# Patient Record
Sex: MALE | Race: WHITE | NOT HISPANIC OR LATINO | Employment: OTHER | URBAN - METROPOLITAN AREA
[De-identification: names, ages, dates, MRNs, and addresses within clinical notes are randomized per-mention and may not be internally consistent; named-entity substitution may affect disease eponyms.]

---

## 2017-01-06 ENCOUNTER — HOSPITAL ENCOUNTER (OUTPATIENT)
Dept: RADIOLOGY | Facility: MEDICAL CENTER | Age: 67
End: 2017-01-06
Attending: SPECIALIST
Payer: MEDICARE

## 2017-01-06 DIAGNOSIS — C18.2 MALIGNANT NEOPLASM OF ASCENDING COLON (HCC): ICD-10-CM

## 2017-01-06 PROCEDURE — 700117 HCHG RX CONTRAST REV CODE 255: Performed by: SPECIALIST

## 2017-01-06 PROCEDURE — 71260 CT THORAX DX C+: CPT

## 2017-01-06 RX ADMIN — IOHEXOL 75 ML: 300 INJECTION, SOLUTION INTRAVENOUS at 10:00

## 2017-01-16 ENCOUNTER — HOSPITAL ENCOUNTER (OUTPATIENT)
Dept: RADIOLOGY | Facility: MEDICAL CENTER | Age: 67
End: 2017-01-16

## 2017-01-18 VITALS
WEIGHT: 302 LBS | SYSTOLIC BLOOD PRESSURE: 144 MMHG | HEART RATE: 88 BPM | TEMPERATURE: 98.8 F | BODY MASS INDEX: 43.23 KG/M2 | HEIGHT: 70 IN | DIASTOLIC BLOOD PRESSURE: 82 MMHG | RESPIRATION RATE: 20 BRPM

## 2017-01-20 ENCOUNTER — APPOINTMENT (OUTPATIENT)
Dept: RADIOLOGY | Facility: MEDICAL CENTER | Age: 67
DRG: 684 | End: 2017-01-20
Attending: EMERGENCY MEDICINE
Payer: MEDICARE

## 2017-01-20 ENCOUNTER — SLEEP CENTER VISIT (OUTPATIENT)
Dept: SLEEP MEDICINE | Facility: MEDICAL CENTER | Age: 67
End: 2017-01-20
Payer: MEDICARE

## 2017-01-20 ENCOUNTER — RESOLUTE PROFESSIONAL BILLING HOSPITAL PROF FEE (OUTPATIENT)
Dept: HOSPITALIST | Facility: MEDICAL CENTER | Age: 67
End: 2017-01-20
Payer: MEDICARE

## 2017-01-20 ENCOUNTER — HOSPITAL ENCOUNTER (INPATIENT)
Facility: MEDICAL CENTER | Age: 67
LOS: 4 days | DRG: 684 | End: 2017-01-24
Attending: EMERGENCY MEDICINE | Admitting: HOSPITALIST
Payer: MEDICARE

## 2017-01-20 VITALS
DIASTOLIC BLOOD PRESSURE: 30 MMHG | SYSTOLIC BLOOD PRESSURE: 90 MMHG | BODY MASS INDEX: 38.51 KG/M2 | TEMPERATURE: 97.3 F | OXYGEN SATURATION: 92 % | WEIGHT: 260 LBS | HEIGHT: 69 IN | RESPIRATION RATE: 22 BRPM | HEART RATE: 85 BPM

## 2017-01-20 DIAGNOSIS — C18.9 MALIGNANT NEOPLASM OF COLON, UNSPECIFIED PART OF COLON (HCC): ICD-10-CM

## 2017-01-20 DIAGNOSIS — E86.0 DEHYDRATION: ICD-10-CM

## 2017-01-20 DIAGNOSIS — N17.9 AKI (ACUTE KIDNEY INJURY) (HCC): ICD-10-CM

## 2017-01-20 DIAGNOSIS — G47.33 OSA TREATED WITH BIPAP: ICD-10-CM

## 2017-01-20 DIAGNOSIS — K52.9 CHRONIC DIARRHEA: ICD-10-CM

## 2017-01-20 DIAGNOSIS — R00.0 TACHYCARDIA: ICD-10-CM

## 2017-01-20 DIAGNOSIS — I95.1 ORTHOSTATIC HYPOTENSION: ICD-10-CM

## 2017-01-20 DIAGNOSIS — E86.1 HYPOVOLEMIA DEHYDRATION: ICD-10-CM

## 2017-01-20 PROBLEM — E87.6 HYPOKALEMIA: Status: ACTIVE | Noted: 2017-01-20

## 2017-01-20 LAB
ALBUMIN SERPL BCP-MCNC: 3.9 G/DL (ref 3.2–4.9)
ALBUMIN/GLOB SERPL: 1.6 G/DL
ALP SERPL-CCNC: 77 U/L (ref 30–99)
ALT SERPL-CCNC: 21 U/L (ref 2–50)
ANION GAP SERPL CALC-SCNC: 14 MMOL/L (ref 0–11.9)
APPEARANCE UR: CLEAR
AST SERPL-CCNC: 16 U/L (ref 12–45)
BASOPHILS # BLD AUTO: 0.4 % (ref 0–1.8)
BASOPHILS # BLD: 0.02 K/UL (ref 0–0.12)
BILIRUB SERPL-MCNC: 0.7 MG/DL (ref 0.1–1.5)
BILIRUB UR QL STRIP.AUTO: NEGATIVE
BUN SERPL-MCNC: 81 MG/DL (ref 8–22)
C DIFF DNA SPEC QL NAA+PROBE: NEGATIVE
C DIFF TOX GENS STL QL NAA+PROBE: NEGATIVE
CALCIUM SERPL-MCNC: 9.1 MG/DL (ref 8.5–10.5)
CHLORIDE SERPL-SCNC: 101 MMOL/L (ref 96–112)
CO2 SERPL-SCNC: 22 MMOL/L (ref 20–33)
COLOR UR: COLORLESS
CREAT SERPL-MCNC: 5.12 MG/DL (ref 0.5–1.4)
CREAT UR-MCNC: 73.5 MG/DL
EOSINOPHIL # BLD AUTO: 0.06 K/UL (ref 0–0.51)
EOSINOPHIL NFR BLD: 1.1 % (ref 0–6.9)
ERYTHROCYTE [DISTWIDTH] IN BLOOD BY AUTOMATED COUNT: 64.7 FL (ref 35.9–50)
GFR SERPL CREATININE-BSD FRML MDRD: 11 ML/MIN/1.73 M 2
GLOBULIN SER CALC-MCNC: 2.5 G/DL (ref 1.9–3.5)
GLUCOSE SERPL-MCNC: 155 MG/DL (ref 65–99)
GLUCOSE UR STRIP.AUTO-MCNC: NEGATIVE MG/DL
HCT VFR BLD AUTO: 33.5 % (ref 42–52)
HGB BLD-MCNC: 11.8 G/DL (ref 14–18)
IMM GRANULOCYTES # BLD AUTO: 0.01 K/UL (ref 0–0.11)
IMM GRANULOCYTES NFR BLD AUTO: 0.2 % (ref 0–0.9)
KETONES UR STRIP.AUTO-MCNC: NEGATIVE MG/DL
LACTATE BLD-SCNC: 2.3 MMOL/L (ref 0.5–2)
LEUKOCYTE ESTERASE UR QL STRIP.AUTO: NEGATIVE
LYMPHOCYTES # BLD AUTO: 2.21 K/UL (ref 1–4.8)
LYMPHOCYTES NFR BLD: 41.1 % (ref 22–41)
MAGNESIUM SERPL-MCNC: 1.9 MG/DL (ref 1.5–2.5)
MCH RBC QN AUTO: 29.6 PG (ref 27–33)
MCHC RBC AUTO-ENTMCNC: 35.2 G/DL (ref 33.7–35.3)
MCV RBC AUTO: 84 FL (ref 81.4–97.8)
MICRO URNS: NORMAL
MONOCYTES # BLD AUTO: 1.26 K/UL (ref 0–0.85)
MONOCYTES NFR BLD AUTO: 23.4 % (ref 0–13.4)
NEUTROPHILS # BLD AUTO: 1.82 K/UL (ref 1.82–7.42)
NEUTROPHILS NFR BLD: 33.8 % (ref 44–72)
NITRITE UR QL STRIP.AUTO: NEGATIVE
NRBC # BLD AUTO: 0 K/UL
NRBC BLD AUTO-RTO: 0 /100 WBC
PH UR STRIP.AUTO: 6 [PH]
PHOSPHATE SERPL-MCNC: 4.6 MG/DL (ref 2.5–4.5)
PLATELET # BLD AUTO: 262 K/UL (ref 164–446)
PMV BLD AUTO: 9.2 FL (ref 9–12.9)
POTASSIUM SERPL-SCNC: 2.8 MMOL/L (ref 3.6–5.5)
PROT SERPL-MCNC: 6.4 G/DL (ref 6–8.2)
PROT UR QL STRIP: NEGATIVE MG/DL
RBC # BLD AUTO: 3.99 M/UL (ref 4.7–6.1)
RBC UR QL AUTO: NEGATIVE
SODIUM SERPL-SCNC: 137 MMOL/L (ref 135–145)
SODIUM UR-SCNC: 52 MMOL/L
SP GR UR STRIP.AUTO: 1
TROPONIN I SERPL-MCNC: 0.03 NG/ML (ref 0–0.04)
WBC # BLD AUTO: 5.4 K/UL (ref 4.8–10.8)

## 2017-01-20 PROCEDURE — 99285 EMERGENCY DEPT VISIT HI MDM: CPT

## 2017-01-20 PROCEDURE — 87086 URINE CULTURE/COLONY COUNT: CPT

## 2017-01-20 PROCEDURE — 83735 ASSAY OF MAGNESIUM: CPT

## 2017-01-20 PROCEDURE — 87177 OVA AND PARASITES SMEARS: CPT

## 2017-01-20 PROCEDURE — 99213 OFFICE O/P EST LOW 20 MIN: CPT | Performed by: INTERNAL MEDICINE

## 2017-01-20 PROCEDURE — 80053 COMPREHEN METABOLIC PANEL: CPT

## 2017-01-20 PROCEDURE — 84300 ASSAY OF URINE SODIUM: CPT

## 2017-01-20 PROCEDURE — 71010 DX-CHEST-PORTABLE (1 VIEW): CPT

## 2017-01-20 PROCEDURE — 84484 ASSAY OF TROPONIN QUANT: CPT

## 2017-01-20 PROCEDURE — 85025 COMPLETE CBC W/AUTO DIFF WBC: CPT

## 2017-01-20 PROCEDURE — 87040 BLOOD CULTURE FOR BACTERIA: CPT

## 2017-01-20 PROCEDURE — 82570 ASSAY OF URINE CREATININE: CPT

## 2017-01-20 PROCEDURE — 770020 HCHG ROOM/CARE - TELE (206)

## 2017-01-20 PROCEDURE — 700111 HCHG RX REV CODE 636 W/ 250 OVERRIDE (IP): Performed by: EMERGENCY MEDICINE

## 2017-01-20 PROCEDURE — 87899 AGENT NOS ASSAY W/OPTIC: CPT

## 2017-01-20 PROCEDURE — A9270 NON-COVERED ITEM OR SERVICE: HCPCS | Performed by: HOSPITALIST

## 2017-01-20 PROCEDURE — 700101 HCHG RX REV CODE 250: Performed by: INTERNAL MEDICINE

## 2017-01-20 PROCEDURE — 87493 C DIFF AMPLIFIED PROBE: CPT

## 2017-01-20 PROCEDURE — 87045 FECES CULTURE AEROBIC BACT: CPT

## 2017-01-20 PROCEDURE — 83605 ASSAY OF LACTIC ACID: CPT

## 2017-01-20 PROCEDURE — 93005 ELECTROCARDIOGRAM TRACING: CPT | Performed by: EMERGENCY MEDICINE

## 2017-01-20 PROCEDURE — 76775 US EXAM ABDO BACK WALL LIM: CPT

## 2017-01-20 PROCEDURE — 700102 HCHG RX REV CODE 250 W/ 637 OVERRIDE(OP): Performed by: HOSPITALIST

## 2017-01-20 PROCEDURE — 81003 URINALYSIS AUTO W/O SCOPE: CPT

## 2017-01-20 PROCEDURE — 99223 1ST HOSP IP/OBS HIGH 75: CPT | Mod: AI | Performed by: HOSPITALIST

## 2017-01-20 PROCEDURE — 87046 STOOL CULTR AEROBIC BACT EA: CPT

## 2017-01-20 PROCEDURE — 700111 HCHG RX REV CODE 636 W/ 250 OVERRIDE (IP): Performed by: HOSPITALIST

## 2017-01-20 PROCEDURE — 700101 HCHG RX REV CODE 250: Performed by: HOSPITALIST

## 2017-01-20 PROCEDURE — 96360 HYDRATION IV INFUSION INIT: CPT

## 2017-01-20 PROCEDURE — 84100 ASSAY OF PHOSPHORUS: CPT

## 2017-01-20 PROCEDURE — 36415 COLL VENOUS BLD VENIPUNCTURE: CPT

## 2017-01-20 PROCEDURE — 96361 HYDRATE IV INFUSION ADD-ON: CPT

## 2017-01-20 RX ORDER — ACETAMINOPHEN 325 MG/1
650 TABLET ORAL EVERY 6 HOURS PRN
Status: DISCONTINUED | OUTPATIENT
Start: 2017-01-20 | End: 2017-01-24 | Stop reason: HOSPADM

## 2017-01-20 RX ORDER — FLUOXETINE 10 MG/1
10 CAPSULE ORAL
COMMUNITY
End: 2017-01-20

## 2017-01-20 RX ORDER — OLANZAPINE 10 MG/1
10 TABLET ORAL NIGHTLY
COMMUNITY
End: 2017-01-20

## 2017-01-20 RX ORDER — HEPARIN SODIUM 5000 [USP'U]/ML
5000 INJECTION, SOLUTION INTRAVENOUS; SUBCUTANEOUS EVERY 8 HOURS
Status: DISCONTINUED | OUTPATIENT
Start: 2017-01-20 | End: 2017-01-24 | Stop reason: HOSPADM

## 2017-01-20 RX ORDER — CLONAZEPAM 1 MG/1
1 TABLET ORAL 2 TIMES DAILY
Status: DISCONTINUED | OUTPATIENT
Start: 2017-01-20 | End: 2017-01-24 | Stop reason: HOSPADM

## 2017-01-20 RX ORDER — ONDANSETRON 2 MG/ML
4 INJECTION INTRAMUSCULAR; INTRAVENOUS EVERY 4 HOURS PRN
Status: DISCONTINUED | OUTPATIENT
Start: 2017-01-20 | End: 2017-01-24 | Stop reason: HOSPADM

## 2017-01-20 RX ORDER — AMOXICILLIN 250 MG
1 CAPSULE ORAL
Status: DISCONTINUED | OUTPATIENT
Start: 2017-01-20 | End: 2017-01-21

## 2017-01-20 RX ORDER — BISACODYL 10 MG
10 SUPPOSITORY, RECTAL RECTAL
Status: DISCONTINUED | OUTPATIENT
Start: 2017-01-20 | End: 2017-01-21

## 2017-01-20 RX ORDER — SODIUM CHLORIDE 9 MG/ML
INJECTION, SOLUTION INTRAVENOUS CONTINUOUS
Status: DISCONTINUED | OUTPATIENT
Start: 2017-01-20 | End: 2017-01-20

## 2017-01-20 RX ORDER — ONDANSETRON 4 MG/1
4 TABLET, ORALLY DISINTEGRATING ORAL EVERY 4 HOURS PRN
Status: DISCONTINUED | OUTPATIENT
Start: 2017-01-20 | End: 2017-01-24 | Stop reason: HOSPADM

## 2017-01-20 RX ORDER — SODIUM CHLORIDE 9 MG/ML
30 INJECTION, SOLUTION INTRAVENOUS ONCE
Status: COMPLETED | OUTPATIENT
Start: 2017-01-20 | End: 2017-01-20

## 2017-01-20 RX ORDER — ZOLPIDEM TARTRATE 5 MG/1
5 TABLET ORAL
Status: DISCONTINUED | OUTPATIENT
Start: 2017-01-20 | End: 2017-01-24 | Stop reason: HOSPADM

## 2017-01-20 RX ORDER — POTASSIUM CHLORIDE 20 MEQ/1
40 TABLET, EXTENDED RELEASE ORAL ONCE
Status: COMPLETED | OUTPATIENT
Start: 2017-01-20 | End: 2017-01-20

## 2017-01-20 RX ORDER — ENEMA 19; 7 G/133ML; G/133ML
1 ENEMA RECTAL
Status: DISCONTINUED | OUTPATIENT
Start: 2017-01-20 | End: 2017-01-20

## 2017-01-20 RX ORDER — MEGESTROL ACETATE 40 MG/ML
800 SUSPENSION ORAL DAILY
Status: DISCONTINUED | OUTPATIENT
Start: 2017-01-20 | End: 2017-01-24 | Stop reason: HOSPADM

## 2017-01-20 RX ORDER — AMOXICILLIN 250 MG
1 CAPSULE ORAL NIGHTLY
Status: DISCONTINUED | OUTPATIENT
Start: 2017-01-20 | End: 2017-01-21

## 2017-01-20 RX ORDER — SODIUM CHLORIDE AND POTASSIUM CHLORIDE 150; 900 MG/100ML; MG/100ML
INJECTION, SOLUTION INTRAVENOUS CONTINUOUS
Status: DISCONTINUED | OUTPATIENT
Start: 2017-01-20 | End: 2017-01-22

## 2017-01-20 RX ORDER — POTASSIUM CHLORIDE 1.5 G/1.58G
20 POWDER, FOR SOLUTION ORAL ONCE
Status: COMPLETED | OUTPATIENT
Start: 2017-01-20 | End: 2017-01-20

## 2017-01-20 RX ORDER — DOCUSATE SODIUM 100 MG/1
100 CAPSULE, LIQUID FILLED ORAL EVERY MORNING
Status: DISCONTINUED | OUTPATIENT
Start: 2017-01-21 | End: 2017-01-21

## 2017-01-20 RX ORDER — LACTULOSE 20 G/30ML
30 SOLUTION ORAL
Status: DISCONTINUED | OUTPATIENT
Start: 2017-01-20 | End: 2017-01-21

## 2017-01-20 RX ADMIN — POTASSIUM CHLORIDE AND SODIUM CHLORIDE: 900; 150 INJECTION, SOLUTION INTRAVENOUS at 15:52

## 2017-01-20 RX ADMIN — HEPARIN SODIUM 5000 UNITS: 5000 INJECTION, SOLUTION INTRAVENOUS; SUBCUTANEOUS at 21:03

## 2017-01-20 RX ADMIN — POTASSIUM CHLORIDE 40 MEQ: 1500 TABLET, EXTENDED RELEASE ORAL at 19:05

## 2017-01-20 RX ADMIN — MEGESTROL ACETATE 800 MG: 40 SUSPENSION ORAL at 19:05

## 2017-01-20 RX ADMIN — SODIUM CHLORIDE 1000 ML: 9 INJECTION, SOLUTION INTRAVENOUS at 14:01

## 2017-01-20 RX ADMIN — SODIUM CHLORIDE 1000 ML: 9 INJECTION, SOLUTION INTRAVENOUS at 11:37

## 2017-01-20 ASSESSMENT — LIFESTYLE VARIABLES
ALCOHOL_USE: NO
EVER_SMOKED: NEVER

## 2017-01-20 ASSESSMENT — PAIN SCALES - GENERAL
PAINLEVEL_OUTOF10: 0
PAINLEVEL_OUTOF10: 0

## 2017-01-20 NOTE — MR AVS SNAPSHOT
"        Gerald Oshea   2017 10:00 AM   Sleep Center Visit   MRN: [id number]    Department:  [facility]   Dept Phone:  [phone]    Description:  Male : 1950   Provider:  Ann Keller M.D.           Reason for Visit     Apnea CPAP broke; Last seen by PMA 2015      Allergies as of 2017     Allergen Noted Reactions    Lidocaine 2016   Rash    Rash        You were diagnosed with     SU treated with BiPAP   [1806555]       Orthostatic hypotension   [458.0.ICD-9-CM]       Hypovolemia dehydration   [270950]       Malignant neoplasm of colon, unspecified part of colon (CMS-Formerly [facility])   [8457964]         Vital Signs     Blood Pressure Pulse Temperature Respirations Height Weight    90/30 mmHg 85 36.3 °C (97.3 °F) 22 1.753 m (5' 9\") 117.935 kg (260 lb)    Body Mass Index Oxygen Saturation Smoking Status             38.38 kg/m2 92% Former Smoker         Basic Information     Date Of Birth Sex Race Ethnicity Preferred Language    1950 Male White Non- English      Your appointments     Mar 22, 2017 11:00 AM   Established Patient with Jocelyn Ramos M.D.   [facility] [employer] [facility] (--)    [address]  [address]  [facility] [address] [phone]           You will be receiving a confirmation call a few days before your appointment from our automated call confirmation system.              Problem List              ICD-10-CM Priority Class Noted - Resolved    Respiratory failure following trauma and surgery (CMS-Formerly [facility]) J95.822 Medium  10/25/2012 - Present    Obesity E66.9 Medium  10/25/2012 - Present    Borderline diabetes mellitus R73.03 Low  10/25/2012 - Present    Colon cancer (CMS-HCC) C18.9   2016 - Present    Chronic idiopathic gout of multiple sites M1A.09X0   2016 - Present    Essential hypertension I10   2016 - Present    Lung nodule, solitary R91.1   2016 - Present    Anxiety F41.9   2016 - Present    Pure hypercholesterolemia E78.00   " 9/28/2016 - Present    Elevated PSA, less than 10 ng/ml R97.20   9/28/2016 - Present    Atrophy of right kidney N26.1   9/28/2016 - Present    Functional constipation K59.04   11/2/2016 - Present      Health Maintenance        Date Due Completion Dates    IMM DTaP/Tdap/Td Vaccine (1 - Tdap) 8/7/1970 ---    IMM ZOSTER VACCINE 8/7/2011 ---    IMM PNEUMOCOCCAL 65+ (ADULT) LOW/MEDIUM RISK SERIES (1 of 2 - PCV13) 8/7/2016 ---    IMM INFLUENZA (1) 9/1/2016 ---    COLONOSCOPY 6/29/2026 6/29/2016            Current Immunizations     No immunizations on file.      Below and/or attached are the medications your provider expects you to take. Review all of your home medications and newly ordered medications with your provider and/or pharmacist. Follow medication instructions as directed by your provider and/or pharmacist. Please keep your medication list with you and share with your provider. Update the information when medications are discontinued, doses are changed, or new medications (including over-the-counter products) are added; and carry medication information at all times in the event of emergency situations     Allergies:  LIDOCAINE - Rash               Medications  Valid as of: January 20, 2017 - 10:33 AM    Generic Name Brand Name Tablet Size Instructions for use    Allopurinol (Tab) ZYLOPRIM 100 MG Take 100 mg by mouth every morning.        ClonazePAM (Tab) KLONOPIN 1 MG Take 1 Tab by mouth 2 times a day.        Ferrous Sulfate (Tab) Iron 325 (65 FE) MG Take 325 mg by mouth every day.        Hydrocodone-Acetaminophen (Tab) NORCO 7.5-325 MG Take 1-2 Tabs by mouth every 6 hours as needed.        Losartan Potassium (Tab) COZAAR 100 MG Take 1 Tab by mouth every day.        Ondansetron HCl (Tab) ZOFRAN 8 MG Take 1 Tab by mouth 2 Times a Day.        Rosuvastatin Calcium (Tab) CRESTOR 40 MG Take 1 Tab by mouth every bedtime.        Triamterene-HCTZ (Tab) MAXZIDE-25/DYAZIDE 37.5-25 MG Take 1 Tab by mouth every day.        .                  Medicines prescribed today were sent to:     [id number] - [place], [place] - [address]    [address] [place] [address]    Phone: [phone] Fax: [phone]    Open 24 Hours?: No      Medication refill instructions:       If your prescription bottle indicates you have medication refills left, it is not necessary to call your provider’s office. Please contact your pharmacy and they will refill your medication.    If your prescription bottle indicates you do not have any refills left, you may request refills at any time through one of the following ways: The online HomeCon system (except Urgent Care), by calling your provider’s office, or by asking your pharmacy to contact your provider’s office with a refill request. Medication refills are processed only during regular business hours and may not be available until the next business day. Your provider may request additional information or to have a follow-up visit with you prior to refilling your medication.   *Please Note: Medication refills are assigned a new Rx number when refilled electronically. Your pharmacy may indicate that no refills were authorized even though a new prescription for the same medication is available at the pharmacy. Please request the medicine by name with the pharmacy before contacting your provider for a refill.           HomeCon Access Code: [email or web address]  Expires: 2/19/2017 10:33 AM    HomeCon  A secure, online tool to manage your health information     [employer]’s HomeCon® is a secure, online tool that connects you to your personalized health information from the privacy of your home -- day or night - making it very easy for you to manage your healthcare. Once the activation process is completed, you can even access your medical information using the HomeCon jimi, which is available for free in the Apple Jimi store or Google Play store.     HomeCon provides the following levels of access (as shown  below):   My Chart Features   [facility] Doctor [facility]  Specialists [facility]  Urgent  Care Non-[facility]  Primary Care  Doctor   Email your healthcare team securely and privately 24/7 X X X    Manage appointments: schedule your next appointment; view details of past/upcoming appointments X      Request prescription refills. X      View recent personal medical records, including lab and immunizations X X X X   View health record, including health history, allergies, medications X X X X   Read reports about your outpatient visits, procedures, consult and ER notes X X X X   See your discharge summary, which is a recap of your hospital and/or ER visit that includes your diagnosis, lab results, and care plan. X X       How to register for Netlogon:  1. Go to  [email or web address].  2. Click on the Sign Up Now box, which takes you to the New Member Sign Up page. You will need to provide the following information:  a. Enter your Netlogon Access Code exactly as it appears at the top of this page. (You will not need to use this code after you’ve completed the sign-up process. If you do not sign up before the expiration date, you must request a new code.)   b. Enter your date of birth.   c. Enter your home email address.   d. Click Submit, and follow the next screen’s instructions.  3. Create a Netlogon ID. This will be your Netlogon login ID and cannot be changed, so think of one that is secure and easy to remember.  4. Create a Netlogon password. You can change your password at any time.  5. Enter your Password Reset Question and Answer. This can be used at a later time if you forget your password.   6. Enter your e-mail address. This allows you to receive e-mail notifications when new information is available in Netlogon.  7. Click Sign Up. You can now view your health information.    For assistance activating your Netlogon account, call [phone]

## 2017-01-20 NOTE — CONSULTS
[facility] NEPHROLOGY CONSULTATION    Consulting MD: Justin Salamanca MD    Reason for Consult: VANNA/Hypokalemia    HISTORY  66 y.o. Male with no signficant hx of CKD who was admitted through the Emergency Department following a referral by Dr. Silverman for dehydration and general sickness.    Labs showed Acute Kidney injury and hypokalemia prompting this consultation.    The patient was diagnosed with colon cancer five months ago and underwent surgery in [place] to remove three cancerous lymph nodes.  He has since completed two rounds of chemotherapy.   Per patient, he was feeling great up until one month ago when he suddenly developed many symptoms.  For the last month the patient has suffered from decreased appetite and fatigue.  The patient is scheduled to receive treatment each week but missed his last appointment due to feeling ill.  The patient has suffered from diarrhea for the last week.  He does not note associated fevers, chills, or abdominal pain. He does not report having a medication for anti-nausea.     Currently, the patient remains fatigued.  He denies black or bloody stool.  His family says that he hasn't eaten much in multiple days.      He denies dysuria/frequency/nocturia/edema/rash  Denies decreased urine output  He did have CT scan as outpatient ~10 days ago - patient not sure if he got IV contrast or not.  He just had Renal US - has a history of a solitary functioning kidney (cause of loss of other kidney unknown)  Denies change in medications  Finished last round of Chemotherapy ~1 week ago per patient      PMH  # Colon Cancer  # Chronic Essential HTN  # Hyperlipidemia  # Severe pneumonia  - nearly  per patient    PSH  # Colon resection    REVIEW OF SYSTEMS  CARDIAC: no chest pain, no palpitations    PULMONARY: no dyspnea, cough, or congestion    GI: no vomiting, abdominal pain, nausea, Positive for diarrhea  : no dysuria, back pain, or hematuria    Neuro: no weakness,  numbness,  Endocrine: no fevers, sweating,    Decreased appetite yes    See history of present illness. All other systems are negative    EXAM  Vitals: 90/50 P 90  afeb  GEN: WNWD WM  HEENT: NC/AT  LUNGS: CTA  ABD: soft + BS  EXT no edema  NEURO non focal    RENAL US: just done    LABS  --see CPU    IMPRESSION/RECOMMENDATIONS    # Acute Kidney Injury  -- volume depletion vs obstruction  -- contrast injury also possible  -- currently non oliguric/non uremic  -- suggest aggressive IV hydration  -- check Renal US results for obstruction  -- Check UA/Urine sodium  -- serial monitoring of chemistries  -- avoid further IV contrast  -- close I/Os    # HYPOKALEMIA  -- likely related to decreased PO intake + diarrhea  -- no EKG changes  -- replace in IV and follow levels    # ANEMIA  -- no gross bleeding  -- follow Hgb  -- transfuse prn    # COLON CANCER

## 2017-01-20 NOTE — ED PROVIDER NOTES
ED Provider Note    Scribed for Emily Mercado M.D. by Sandy Peoples. 1/20/2017  11:13 AM    Primary care provider: Jocelyn Ramos M.D.  Means of arrival: Walk-In  History obtained from: Patient  History limited by: None    CHIEF COMPLAINT  Chief Complaint   Patient presents with   • Sent by MD     By Herrera, r/o dehydration   • Loss of Appetite   • Tachycardia   • Diarrhea     HPI  Gerald Oshea is a 66 y.o. male who presents to the Emergency Department following a referral by Dr. Silverman for dehydration and general sickness.  The patient was diagnosed with colon cancer five months ago and underwent surgery in [place] to remove three cancerous lymph nodes.  He has since completed two rounds of chemotherapy.   Per patient, he was feeling great up until one month ago when he suddenly developed many symptoms.  For the last month the patient has suffered from decreased appetite and fatigue.  The patient is scheduled to receive treatment each week but missed his last appointment due to feeling ill.  The patient has suffered from diarrhea for the last week.  He does not note associated fevers, chills, or abdominal pain. He does not report having a medication for anti-nausea.     Currently, the patient remains fatigued.  He denies black or bloody stool.  His family says that he hasn't eaten much in multiple days. The patient denies chest pain, breath shortness, fevers, chills, and nausea. His additional chronic medical history includes hyperlipidemia, diabetes, and anemia.  The patient has not met with a nephrologist. His primary care physician is Dr. Ramos. The patient denies additional symptoms or further pertinent medical history.     REVIEW OF SYSTEMS  CARDIAC: no chest pain, no palpitations    PULMONARY: no dyspnea, cough, or congestion   GI: no vomiting, abdominal pain, nausea, Positive for diarrhea  : no dysuria, back pain, or hematuria   Neuro: no weakness, numbness,  Endocrine: no fevers, sweating,    Decreased appetite yes    See history of present illness. All other systems are negative    PAST MEDICAL HISTORY   has a past medical history of Hypertension; Hyperlipidemia; Anxiety; Colon cancer (CMS-HCC); Gout; Former tobacco use; Cancer (CMS-HCC) (2016); Anemia; Atrophy of right kidney; Pneumonia (1991); Sleep apnea; Snoring; Psychiatric problem; and DM (diabetes mellitus) (CMS-HCC).    SURGICAL HISTORY   has past surgical history that includes closed reduction (10/25/2012); colon resection; and cath placement (Left, 10/7/2016).    SOCIAL HISTORY  Social History   Substance Use Topics   • Smoking status: Former Smoker -- 1.00 packs/day for 20 years     Types: Cigarettes     Quit date: 01/01/1998   • Smokeless tobacco: Never Used   • Alcohol Use: 0.0 oz/week     0 Standard drinks or equivalent per week      Comment: wine  2 daily      History   Drug Use No     FAMILY HISTORY  Family History   Problem Relation Age of Onset   • Cancer Mother      Bladder   • Heart Disease Father    • Heart Disease Brother      CABG x2     CURRENT MEDICATIONS  No current facility-administered medications on file prior to encounter.     Current Outpatient Prescriptions on File Prior to Encounter   Medication Sig Dispense Refill   • losartan (COZAAR) 100 MG Tab Take 1 Tab by mouth every day. 90 Tab 3   • rosuvastatin (CRESTOR) 40 MG tablet Take 1 Tab by mouth every bedtime. 90 Tab 3   • triamterene-hctz (MAXZIDE-25/DYAZIDE) 37.5-25 MG Tab Take 1 Tab by mouth every day. 90 Tab 3   • clonazepam (KLONOPIN) 1 MG Tab Take 1 Tab by mouth 2 times a day. 60 Tab 3   • ondansetron (ZOFRAN) 8 MG Tab Take 1 Tab by mouth 2 Times a Day.  4   • Ferrous Sulfate (IRON) 325 (65 FE) MG Tab Take 325 mg by mouth every day.     • hydrocodone-acetaminophen (NORCO) 7.5-325 MG per tablet Take 1-2 Tabs by mouth every 6 hours as needed. (Patient not taking: Reported on 1/20/2017) 20 Tab 0   • allopurinol (ZYLOPRIM) 100 MG TABS Take 100 mg by mouth every  "morning.       ALLERGIES  Allergies   Allergen Reactions   • Lidocaine Rash     Rash         PHYSICAL EXAM  VITAL SIGNS: BP 91/59 mmHg  Pulse 135  Temp(Src) 36.9 °C (98.4 °F) (Temporal)  Resp 14  Ht 1.753 m (5' 9\")  Wt 117 kg (257 lb 15 oz)  BMI 38.07 kg/m2  SpO2 94%    Constitutional: Well developed, Well nourished, No acute distress, Non-toxic appearance.   HEENT: Normocephalic, Atraumatic,  external ears normal, pharynx pink,  Mucous  Membranes moist, No rhinorrhea or mucosal edema  Eyes: PERRL, EOMI, Conjunctiva normal, No discharge.   Neck: Normal range of motion, No tenderness, Supple, No stridor.   Lymphatic: No lymphadenopathy    Cardiovascular: Regular Rate and Rhythm, No murmurs,  rubs, or gallops.   Thorax & Lungs: Lungs clear to auscultation bilaterally, No respiratory distress, No wheezes, rhales or rhonchi, No chest wall tenderness.   Abdomen: Bowel sounds normal, Soft, non tender, non distended,  No pulsatile masses., no rebound guarding or peritoneal signs.   Skin: Warm, Dry, No erythema, No rash,   Back:  No CVA tenderness,  No spinal tenderness, bony crepitance, step offs, or instability.   Neurologic: Alert & oriented x 3, Normal motor function, Normal sensory function, No focal deficits noted. Normal reflexes. Normal Cranial Nerves.  Extremities: Equal, intact distal pulses, No cyanosis, clubbing or edema,  No tenderness.   Musculoskeletal: Good range of motion in all major joints. No tenderness to palpation or major deformities noted.     DIAGNOSTIC STUDIES / PROCEDURES    LABS  Results for orders placed or performed during the hospital encounter of 01/20/17   LACTIC ACID   Result Value Ref Range    Lactic Acid 2.3 (H) 0.5 - 2.0 mmol/L   CBC WITH DIFFERENTIAL   Result Value Ref Range    WBC 5.4 4.8 - 10.8 K/uL    RBC 3.99 (L) 4.70 - 6.10 M/uL    Hemoglobin 11.8 (L) 14.0 - 18.0 g/dL    Hematocrit 33.5 (L) 42.0 - 52.0 %    MCV 84.0 81.4 - 97.8 fL    MCH 29.6 27.0 - 33.0 pg    MCHC 35.2 33.7 " - 35.3 g/dL    RDW 64.7 (H) 35.9 - 50.0 fL    Platelet Count 262 164 - 446 K/uL    MPV 9.2 9.0 - 12.9 fL    Neutrophils-Polys 33.80 (L) 44.00 - 72.00 %    Lymphocytes 41.10 (H) 22.00 - 41.00 %    Monocytes 23.40 (H) 0.00 - 13.40 %    Eosinophils 1.10 0.00 - 6.90 %    Basophils 0.40 0.00 - 1.80 %    Immature Granulocytes 0.20 0.00 - 0.90 %    Nucleated RBC 0.00 /100 WBC    Neutrophils (Absolute) 1.82 1.82 - 7.42 K/uL    Lymphs (Absolute) 2.21 1.00 - 4.80 K/uL    Monos (Absolute) 1.26 (H) 0.00 - 0.85 K/uL    Eos (Absolute) 0.06 0.00 - 0.51 K/uL    Baso (Absolute) 0.02 0.00 - 0.12 K/uL    Immature Granulocytes (abs) 0.01 0.00 - 0.11 K/uL    NRBC (Absolute) 0.00 K/uL   COMP METABOLIC PANEL   Result Value Ref Range    Sodium 137 135 - 145 mmol/L    Potassium 2.8 (L) 3.6 - 5.5 mmol/L    Chloride 101 96 - 112 mmol/L    Co2 22 20 - 33 mmol/L    Anion Gap 14.0 (H) 0.0 - 11.9    Glucose 155 (H) 65 - 99 mg/dL    Bun 81 (HH) 8 - 22 mg/dL    Creatinine 5.12 (HH) 0.50 - 1.40 mg/dL    Calcium 9.1 8.5 - 10.5 mg/dL    AST(SGOT) 16 12 - 45 U/L    ALT(SGPT) 21 2 - 50 U/L    Alkaline Phosphatase 77 30 - 99 U/L    Total Bilirubin 0.7 0.1 - 1.5 mg/dL    Albumin 3.9 3.2 - 4.9 g/dL    Total Protein 6.4 6.0 - 8.2 g/dL    Globulin 2.5 1.9 - 3.5 g/dL    A-G Ratio 1.6 g/dL   TROPONIN   Result Value Ref Range    Troponin I 0.03 0.00 - 0.04 ng/mL   MAGNESIUM   Result Value Ref Range    Magnesium 1.9 1.5 - 2.5 mg/dL   PHOSPHORUS   Result Value Ref Range    Phosphorus 4.6 (H) 2.5 - 4.5 mg/dL   ESTIMATED GFR   Result Value Ref Range    GFR If  14 (A) >60 mL/min/1.73 m 2    GFR If Non African American 11 (A) >60 mL/min/1.73 m 2     All labs reviewed by me.    EKG  12 lead EKG; Interpreted by emergency department physician    Rhythm: Normal Sinus Rhythm   Rate: 65  Axis: Normal  First Degree AV Block  Nonspecific Interventricular Conduction Delay  R Wave: Normal R wave progression  Normal ST-T segments  ST Segments: No ST segment  elevation   T waves: Normal  Q waves: None    Clinical Impression: No acute changes from 10/7/16    RADIOLOGY  US-RENAL   Preliminary Result      DX-CHEST-PORTABLE (1 VIEW)    (Results Pending)     The radiologist's interpretation of all radiological studies have been reviewed by me.    COURSE & MEDICAL DECISION MAKING  Nursing notes, [id number] reviewed in chart.    11:13 AM - Patient seen and examined at bedside. Patient will be treated with an NS Bolus Infusion, 3,510 ml. Ordered US-Renal, DX-Chest, Lactic Acid, Urine Creatinine, Troponin, Magnesium, Phosphorus, Urine Sodium Random, CBC, CMP, Urinalysis, Urine Culture, Blood culture, and an EKG to evaluate his symptoms. The differential diagnoses include but are not limited to: dehydration, renal insufficiency, electrolyte disturbance.     11:51 AM- At this time I obtained and reviewed the patient's EKG.  My findings can be seen above. The patient will be updated during my next recheck.     12:02 PM - I discussed the patient's case and the above findings with Dr. Silverman (Oncology) who informed me that patient had a CT scan one week ago. At that time the patient had a Creatinine of 7.    1:02 PM I spoke with Dr Salamanca nephrology who will see the patient. I also spoke with Dr Kaur who will admit the patient.     The patient will be admitted to the hospitalist with nephrology consult.     DISPOSITION:  Patient will be admitted to Dr. Kaur in guarded condition.    FINAL IMPRESSION  1. VANNA (acute kidney injury) (CMS-HCC)    2. Chronic diarrhea    3. Dehydration    4. Tachycardia          Sandy ACKERMAN ([place]), am scribing for, and in the presence of, Emily Mercado M.D..    Electronically signed by: Sandy Peoples (Scribe), 1/20/2017    Emily ACKERMAN M.D. personally performed the services described in this documentation, as scribed by Sandy Peoples in my presence, and it is both accurate and complete.    The note accurately reflects work and  decisions made by me.  Emily Mercado  1/20/2017  1:03 PM

## 2017-01-20 NOTE — ED NOTES
Pt amb to triage w/ brother.  Chief Complaint   Patient presents with   • Sent by MD     By Herrera, r/o dehydration   • Loss of Appetite   • Tachycardia   • Diarrhea     Chemo pt. Pt wearing a mask, charge RN aware of pt.

## 2017-01-20 NOTE — ED NOTES
Break RN:  Pt laying on gurney, eyes open ,looking around, no needs at this time.  Will continue to monitor.

## 2017-01-20 NOTE — IP AVS SNAPSHOT
Byban Access Code: [email or web address]  Expires: 2/19/2017 10:33 AM    Byban  A secure, online tool to manage your health information     [employer]’s Byban® is a secure, online tool that connects you to your personalized health information from the privacy of your home -- day or night - making it very easy for you to manage your healthcare. Once the activation process is completed, you can even access your medical information using the Byban jimi, which is available for free in the Apple Jimi store or Google Play store.     Byban provides the following levels of access (as shown below):   My Chart Features   [facility] Primary Care Doctor [facility]  Specialists [facility]  Urgent  Care Non-[facility]  Primary Care  Doctor   Email your healthcare team securely and privately 24/7 X X X X   Manage appointments: schedule your next appointment; view details of past/upcoming appointments X      Request prescription refills. X      View recent personal medical records, including lab and immunizations X X X X   View health record, including health history, allergies, medications X X X X   Read reports about your outpatient visits, procedures, consult and ER notes X X X X   See your discharge summary, which is a recap of your hospital and/or ER visit that includes your diagnosis, lab results, and care plan. X X       How to register for Byban:  1. Go to  [email or web address].  2. Click on the Sign Up Now box, which takes you to the New Member Sign Up page. You will need to provide the following information:  a. Enter your Byban Access Code exactly as it appears at the top of this page. (You will not need to use this code after you’ve completed the sign-up process. If you do not sign up before the expiration date, you must request a new code.)   b. Enter your date of birth.   c. Enter your home email address.   d. Click Submit, and follow the next screen’s instructions.  3. Create a Byban ID. This will be your Byban  login ID and cannot be changed, so think of one that is secure and easy to remember.  4. Create a [employer] password. You can change your password at any time.  5. Enter your Password Reset Question and Answer. This can be used at a later time if you forget your password.   6. Enter your e-mail address. This allows you to receive e-mail notifications when new information is available in [employer].  7. Click Sign Up. You can now view your health information.    For assistance activating your [employer] account, call [phone]

## 2017-01-20 NOTE — PROGRESS NOTES
Chief Complaint   Patient presents with   • Apnea     CPAP broke; Last seen by [facility] 01/19/2015       HPI: This patient is a 66 y.o. Male who returns for follow-up of severe SU, AHI 97. His last visit was in 2015. His BiPAP machine broke and requires replacement. He is currently on BiPAP 20/16 cm of water with 1 L oxygen bleed in. He has been undergoing chemotherapy for colon cancer and had poor oral intake over the past week in addition to diarrhea. He feels lightheaded and is hypotensive. He required IV hydration last week at his oncologist's office. I contacted Dr. Silverman's office directly and they have kindly agreed to see the patient immediately for IV hydration. He has dropped 40 pounds with chemotherapy.      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   • Pneumonia 1991     Left upper lobe   • Sleep apnea    • Snoring    • Psychiatric problem      anxiety   • DM (diabetes mellitus) (CMS-HCC)        Social History     Social History   • Marital Status: Single     Spouse Name: N/A   • Number of Children: N/A   • Years of Education: N/A     Occupational History   • Retired    • Former business owner      Social History Main Topics   • Smoking status: Former Smoker -- 1.00 packs/day for 20 years     Types: Cigarettes     Quit date: 01/01/1998   • Smokeless tobacco: Never Used   • Alcohol Use: 0.0 oz/week     0 Standard drinks or equivalent per week      Comment: wine  2 daily   • Drug Use: No   • Sexual Activity: Not Currently     Other Topics Concern   • Not on file     Social History Narrative       Family History   Problem Relation Age of Onset   • Cancer Mother      Bladder   • Heart Disease Father    • Heart Disease Brother      CABG x2       Current Outpatient Prescriptions on  "File Prior to Visit   Medication Sig Dispense Refill   • rosuvastatin (CRESTOR) 40 MG tablet Take 1 Tab by mouth every bedtime. 90 Tab 3   • clonazepam (KLONOPIN) 1 MG Tab Take 1 Tab by mouth 2 times a day. 60 Tab 3   • allopurinol (ZYLOPRIM) 100 MG TABS Take 100 mg by mouth every morning.     • losartan (COZAAR) 100 MG Tab Take 1 Tab by mouth every day. 90 Tab 3   • triamterene-hctz (MAXZIDE-25/DYAZIDE) 37.5-25 MG Tab Take 1 Tab by mouth every day. 90 Tab 3   • ondansetron (ZOFRAN) 8 MG Tab Take 1 Tab by mouth 2 Times a Day.  4   • Ferrous Sulfate (IRON) 325 (65 FE) MG Tab Take 325 mg by mouth every day.     • hydrocodone-acetaminophen (NORCO) 7.5-325 MG per tablet Take 1-2 Tabs by mouth every 6 hours as needed. (Patient not taking: Reported on 1/20/2017) 20 Tab 0     No current facility-administered medications on file prior to visit.       Allergies: Lidocaine    ROS:   Constitutional: Denies fevers, chills, night sweats, +fatigue,+ weight loss, + lightheaded  Eyes: Denies vision loss, pain, drainage, double vision  Ears, Nose, Throat: Denies earache, difficulty hearing, tinnitus, nasal congestion, hoarseness  Cardiovascular: Denies chest pain, tightness, palpitations, orthopnea or edema  Respiratory: Denies shortness of breath, cough, wheezing, hemoptysis  Sleep: Denies daytime sleepiness, snoring, apneas, insomnia, morning headaches  GI: Denies heartburn, dysphagia, nausea, abdominal pain, diarrhea or constipation  : Denies frequent urination, hematuria, discharge or painful urination  Musculoskeletal: Denies back pain, painful joints, sore muscles  Neurological: Denies weakness or headaches  Skin: No rashes    Blood pressure 90/30, pulse 85, temperature 36.3 °C (97.3 °F), resp. rate 22, height 1.753 m (5' 9\"), weight 117.935 kg (260 lb), SpO2 92 %.  Multi-Ox Readings  Multi Ox #1     O2 sat % at rest     O2 sat % on exertion     O2 sat average on exertion     Multi Ox #2     O2 sat % at rest     O2 sat % on " exertion     O2 sat average on exertion       Oxygen Use     Oxygen Frequency     Duration of need     Is the patient mobile within the home?     CPAP Use?     BIPAP Use? 20/16   Servo Titration         Physical Exam:  Appearance: Well-nourished, well-developed, in no acute distress  HEENT: Normocephalic, atraumatic, white sclera, PERRLA, oropharynx clear  Neck: No adenopathy or masses  Respiratory: no intercostal retractions or accessory muscle use  Lungs auscultation: Clear to auscultation bilaterally  Cardiovascular: Regular rate rhythm. No murmurs, rubs or gallops.  No LE edema  Abdomen: soft, obese  Gait: Normal  Digits: No clubbing, cyanosis  Motor: No focal deficits  Orientation: Oriented to time, person and place    Diagnosis:  1. SU treated with BiPAP     2. Orthostatic hypotension     3. Hypovolemia dehydration     4. Malignant neoplasm of colon, unspecified part of colon (CMS-HCC)         Plan:  We will arrange for a replacement BiPAP machine set at 20/16 cm of water with 1 L oxygen bleed in.   More importantly today, he is hypotensive and dehydrated following chemotherapy, and requires urgent IV hydration. I have contacted Dr. Silverman's office who has kindly agreed to see him immediately.  We will download his compliance card on follow-up. Given significant weight loss, he should undergo re-titration of BiPAP pressures in the sleep laboratory, which she wants to defer until he has completed chemotherapy in March.  Return in about 3 months (around 4/20/2017) for follow up visit with ELVIS

## 2017-01-20 NOTE — IP AVS SNAPSHOT
1/24/2017          Gerald Oshea  [address] [facility]  [place] [address]    Dear Gerald:    [facility] wants to ensure your discharge home is safe and you or your loved ones have had all your questions answered regarding your care after you leave the hospital.    You may receive a telephone call within two days of your discharge.  This call is to make certain you understand your discharge instructions as well as ensure we provided you with the best care possible during your stay with us.     The call will only last approximately 3-5 minutes and will be done by a nurse.    Once again, we want to ensure your discharge home is safe and that you have a clear understanding of any next steps in your care.  If you have any questions or concerns, please do not hesitate to contact us, we are here for you.  Thank you for choosing [facility] for your healthcare needs.    Sincerely,    Golden Jack    [facility]

## 2017-01-20 NOTE — IP AVS SNAPSHOT
" <p align=\"LEFT\"><IMG SRC=\"//[email or web address]\" alt=\"Image\" WIDTH=\"50%\" HEIGHT=\"200\" BORDER=\"\"></p>                   Name:Gerald Oshea  Medical Record Number:[id number]  CSN: [id number]    YOB: 1950   Age: 66 y.o.  Sex: male  HT:1.753 m (5' 9\") WT: 110.7 kg (244 lb 0.8 oz)          Admit Date: 1/20/2017     Discharge Date:   Today's Date: 1/24/2017  Attending Doctor:  Roxane Way M.D.                  Allergies:  Lidocaine          Your appointments     Mar 22, 2017 11:00 AM   Established Patient with Jocelyn Ramos M.D.   [facility] (--)    [email or web address]  [address]  [facility] [id number] [phone]           You will be receiving a confirmation call a few days before your appointment from our automated call confirmation system.                 Medication List      Take these Medications        Instructions    allopurinol 100 MG Tabs   Commonly known as:  ZYLOPRIM    Take 50 mg by mouth every morning.   Dose:  50 mg       clonazepam 1 MG Tabs   Commonly known as:  KLONOPIN    Doctor's comments:  Start on January 2nd, 2017   Take 1 Tab by mouth 2 times a day.   Dose:  1 mg       Iron 325 (65 FE) MG Tabs    Take 325 mg by mouth every day.   Dose:  325 mg       megestrol 40 MG/ML Susp   Commonly known as:  MEGACE    Take 20 mL by mouth every day.   Dose:  800 mg       NON SPECIFIED    1 Each by Intravenous route every 14 days. 5FU   Dose:  1 Each       rosuvastatin 40 MG tablet   Commonly known as:  CRESTOR    Doctor's comments:  Start January 2nd, 2017   Take 1 Tab by mouth every bedtime.   Dose:  40 mg         "

## 2017-01-20 NOTE — IP AVS SNAPSHOT
" After Visit Summary                                                                                                                  Name:Gerald Oshea  Medical Record Number:[id number]  CSN: [id number]    YOB: 1950   Age: 66 y.o.  Sex: male  HT:1.753 m (5' 9\") WT: 110.7 kg (244 lb 0.8 oz)          Admit Date: 1/20/2017     Discharge Date:   Today's Date: 1/24/2017  Attending Doctor:  Roxane Way M.D.                  Allergies:  Lidocaine            Discharge Instructions       Has Home health with [facility] for IV chemo infusions, resume upon dc.   No longer hypertensive:  Hold on cozaar and triamterene/HCTZ while receiving chemotherapy.   Ensure plenty of oral fluids.   Follow up with DR. Silverman next week.   Follow up with Dr. Carcamo in 4-6 weeks.   Continue port maintenance as before.   Continue healthy diet.    Dehydration, Adult  Dehydration is when you lose more fluids from the body than you take in. Vital organs like the kidneys, brain, and heart cannot function without a proper amount of fluids and salt. Any loss of fluids from the body can cause dehydration.   CAUSES   · Vomiting.  · Diarrhea.  · Excessive sweating.  · Excessive urine output.  · Fever.  SYMPTOMS   Mild dehydration  1. Thirst.  2. Dry lips.  3. Slightly dry mouth.  Moderate dehydration  1. Very dry mouth.  2. Sunken eyes.  3. Skin does not bounce back quickly when lightly pinched and released.  4. Dark urine and decreased urine production.  5. Decreased tear production.  6. Headache.  Severe dehydration  1. Very dry mouth.  2. Extreme thirst.  3. Rapid, weak pulse (more than 100 beats per minute at rest).  4. Cold hands and feet.  5. Not able to sweat in spite of heat and temperature.  6. Rapid breathing.  7. Blue lips.  8. Confusion and lethargy.  9. Difficulty being awakened.  10. Minimal urine production.  11. No tears.  DIAGNOSIS   Your caregiver will diagnose dehydration based on your symptoms and your exam. " Blood and urine tests will help confirm the diagnosis. The diagnostic evaluation should also identify the cause of dehydration.  TREATMENT   Treatment of mild or moderate dehydration can often be done at home by increasing the amount of fluids that you drink. It is best to drink small amounts of fluid more often. Drinking too much at one time can make vomiting worse. Refer to the home care instructions below.  Severe dehydration needs to be treated at the hospital where you will probably be given intravenous (IV) fluids that contain water and electrolytes.  HOME CARE INSTRUCTIONS   1. Ask your caregiver about specific rehydration instructions.  2. Drink enough fluids to keep your urine clear or pale yellow.  3. Drink small amounts frequently if you have nausea and vomiting.  4. Eat as you normally do.  5. Avoid:  1. Foods or drinks high in sugar.  2. Carbonated drinks.  3. Juice.  4. Extremely hot or cold fluids.  5. Drinks with caffeine.  6. Fatty, greasy foods.  7. Alcohol.  8. Tobacco.  9. Overeating.  10. Gelatin desserts.  6. Wash your hands well to avoid spreading bacteria and viruses.  7. Only take over-the-counter or prescription medicines for pain, discomfort, or fever as directed by your caregiver.  8. Ask your caregiver if you should continue all prescribed and over-the-counter medicines.  9. Keep all follow-up appointments with your caregiver.  SEEK MEDICAL CARE IF:  · You have abdominal pain and it increases or stays in one area (localizes).  · You have a rash, stiff neck, or severe headache.  · You are irritable, sleepy, or difficult to awaken.  · You are weak, dizzy, or extremely thirsty.  SEEK IMMEDIATE MEDICAL CARE IF:   · You are unable to keep fluids down or you get worse despite treatment.  · You have frequent episodes of vomiting or diarrhea.  · You have blood or green matter (bile) in your vomit.  · You have blood in your stool or your stool looks black and tarry.  · You have not urinated in 6  to 8 hours, or you have only urinated a small amount of very dark urine.  · You have a fever.  · You faint.  MAKE SURE YOU:   · Understand these instructions.  · Will watch your condition.  · Will get help right away if you are not doing well or get worse.     This information is not intended to replace advice given to you by your health care provider. Make sure you discuss any questions you have with your health care provider.     Document Released: 12/18/2006 Document Revised: 03/11/2013 Document Reviewed: 08/06/2012  [employer] Interactive Patient Education ©2016 [employer]    Discharge Instructions    Discharged to home by car with relative. Discharged via wheelchair, hospital escort: Yes.  Special equipment needed: Not Applicable    Be sure to schedule a follow-up appointment with your primary care doctor or any specialists as instructed.     Discharge Plan:   Influenza Vaccine Indication: Patient Refuses    I understand that a diet low in cholesterol, fat, and sodium is recommended for good health. Unless I have been given specific instructions below for another diet, I accept this instruction as my diet prescription.   Other diet: renal      Special Instructions: None    · Is patient discharged on Warfarin / Coumadin?   No     · Is patient Post Blood Transfusion?  No    Depression / Suicide Risk    As you are discharged from this [facility] facility, it is important to learn how to keep safe from harming yourself.    Recognize the warning signs:  · Abrupt changes in personality, positive or negative- including increase in energy   · Giving away possessions  · Change in eating patterns- significant weight changes-  positive or negative  · Change in sleeping patterns- unable to sleep or sleeping all the time   · Unwillingness or inability to communicate  · Depression  · Unusual sadness, discouragement and loneliness  · Talk of wanting to die  · Neglect of personal appearance   · Rebelliousness- reckless  behavior  · Withdrawal from people/activities they love  · Confusion- inability to concentrate     If you or a loved one observes any of these behaviors or has concerns about self-harm, here's what you can do:  · Talk about it- your feelings and reasons for harming yourself  · Remove any means that you might use to hurt yourself (examples: pills, rope, extension cords, firearm)  · Get professional help from the community (Mental Health, Substance Abuse, psychological counseling)  · Do not be alone:Call your Safe Contact- someone whom you trust who will be there for you.  · Call your local CRISIS HOTLINE 698-5511 or 345-462-8844  · Call your local Children's Mobile Crisis Response Team [place] (983) 130-4103 or www.[employer]  · Call the toll free National Suicide Prevention Hotlines   · National Suicide Prevention Lifeline 116-343-GESZ (8781)  · [employer] Line Network 800-SUICIDE (025-4258)    Chronic Kidney Disease  Chronic kidney disease occurs when the kidneys are damaged over a long period. The kidneys are two organs that lie on either side of the spine between the middle of the back and the front of the abdomen. The kidneys:  · Remove wastes and extra water from the blood.  · Produce important hormones. These help keep bones strong, regulate blood pressure, and help create red blood cells.  · Balance the fluids and chemicals in the blood and tissues.  A small amount of kidney damage may not cause problems, but a large amount of damage may make it difficult or impossible for the kidneys to work the way they should. If steps are not taken to slow down the kidney damage or stop it from getting worse, the kidneys may stop working permanently. Most of the time, chronic kidney disease does not go away. However, it can often be controlled, and those with the disease can usually live normal lives.  CAUSES  The most common causes of chronic kidney disease are diabetes and high blood pressure  (hypertension). Chronic kidney disease may also be caused by:  4. Diseases that cause the kidneys' filters to become inflamed.  5. Diseases that affect the immune system.  6. Genetic diseases.  7. Medicines that damage the kidneys, such as anti-inflammatory medicines.  8. Poisoning or exposure to toxic substances.  9. A reoccurring kidney or urinary infection.  10. A problem with urine flow. This may be caused by:  1. Cancer.  2. Kidney stones.  3. An enlarged prostate in males.  SIGNS AND SYMPTOMS  Because the kidney damage in chronic kidney disease occurs slowly, symptoms develop slowly and may not be obvious until the kidney damage becomes severe. A person may have a kidney disease for years without showing any symptoms. Symptoms can include:  7. Swelling (edema) of the legs, ankles, or feet.  8. Tiredness (lethargy).  9. Nausea or vomiting.  10. Confusion.  11. Problems with urination, such as:  1. Decreased urine production.  2. Frequent urination, especially at night.  3. Frequent accidents in children who are potty trained.  12. Muscle twitches and cramps.  13. Shortness of breath.  14. Weakness.  15. Persistent itchiness.  16. Loss of appetite.  17. Metallic taste in the mouth.  18. Trouble sleeping.  19. Slowed development in children.  20. Short stature in children.  DIAGNOSIS  Chronic kidney disease may be detected and diagnosed by tests, including blood, urine, imaging, or kidney biopsy tests.  TREATMENT  Most chronic kidney diseases cannot be cured. Treatment usually involves relieving symptoms and preventing or slowing the progression of the disease. Treatment may include:  2. A special diet. You may need to avoid alcohol and foods that are salty and high in potassium.  3. Medicines. These may:  1. Lower blood pressure.  2. Relieve anemia.  3. Relieve swelling.  4. Protect the bones.  HOME CARE INSTRUCTIONS  2. Follow your prescribed diet.  Your health care provider may instruct you to limit daily salt  (sodium) and protein intake.  3. Take medicines only as directed by your health care provider. Do not take any new medicines (prescription, over-the-counter, or nutritional supplements) unless approved by your health care provider. Many medicines can worsen your kidney damage or need to have the dose adjusted.    4. Quit smoking if you smoke. Talk to your health care provider about a smoking cessation program.  5. Keep all follow-up visits as directed by your health care provider.  6. Monitor your blood pressure.  7. Start or continue an exercise plan.  8. Get immunizations as directed by your health care provider.  9. Take vitamin and mineral supplements as directed by your health care provider.  SEEK IMMEDIATE MEDICAL CARE IF:  · Your symptoms get worse or you develop new symptoms.  · You develop symptoms of end-stage kidney disease. These include:  ¨ Headaches.  ¨ Abnormally dark or light skin.  ¨ Numbness in the hands or feet.  ¨ Easy bruising.  ¨ Frequent hiccups.  ¨ Menstruation stops.  · You have a fever.  · You have decreased urine production.  · You have pain or bleeding when urinating.  MAKE SURE YOU:  · Understand these instructions.  · Will watch your condition.  · Will get help right away if you are not doing well or get worse.  FOR MORE INFORMATION   · American Association of Kidney Patients: www.aakp.org  · National Kidney Foundation: www.kidney.org  · American Kidney Fund: www.akfinc.org  · Life Options Rehabilitation Program: www.lifeoptions.org and www.kidneyschool.org     This information is not intended to replace advice given to you by your health care provider. Make sure you discuss any questions you have with your health care provider.     Document Released: 09/26/2009 Document Revised: 01/08/2016 Document Reviewed: 08/16/2013  [employer] Interactive Patient Education ©2016 [employer]        Your appointments     Mar 22, 2017 11:00 AM   Established Patient with LUIS Richard  [facility] (--)    [address]  [address]  [place] [id number] [phone]           You will be receiving a confirmation call a few days before your appointment from our automated call confirmation system.                 Discharge Medication Instructions:    Below are the medications your physician expects you to take upon discharge:    Review all your home medications and newly ordered medications with your doctor and/or pharmacist. Follow medication instructions as directed by your doctor and/or pharmacist.    Please keep your medication list with you and share with your physician.               Medication List      START taking these medications        Instructions    megestrol 40 MG/ML Susp   Last time this was given:  800 mg on 1/24/2017  9:24 AM   Commonly known as:  MEGACE    Take 20 mL by mouth every day.   Dose:  800 mg         CONTINUE taking these medications        Instructions    allopurinol 100 MG Tabs   Commonly known as:  ZYLOPRIM    Take 50 mg by mouth every morning.   Dose:  50 mg       clonazepam 1 MG Tabs   Last time this was given:  1 mg on 1/24/2017  9:24 AM   Commonly known as:  KLONOPIN    Doctor's comments:  Start on January 2nd, 2017   Take 1 Tab by mouth 2 times a day.   Dose:  1 mg       Iron 325 (65 FE) MG Tabs    Take 325 mg by mouth every day.   Dose:  325 mg       NON SPECIFIED    1 Each by Intravenous route every 14 days. 5FU   Dose:  1 Each       rosuvastatin 40 MG tablet   Commonly known as:  CRESTOR    Doctor's comments:  Start January 2nd, 2017   Take 1 Tab by mouth every bedtime.   Dose:  40 mg         STOP taking these medications     losartan 100 MG Tabs   Commonly known as:  COZAAR       triamterene-hctz 37.5-25 MG Tabs   Commonly known as:  MAXZIDE-25/DYAZIDE               Instructions           Diet / Nutrition:    Follow any diet instructions given to you by your doctor or the dietician, including how much salt (sodium) you are allowed each day.    If you  are overweight, talk to your doctor about a weight reduction plan.    Activity:    Remain physically active following your doctor's instructions about exercise and activity.    Rest often.     Any time you become even a little tired or short of breath, SIT DOWN and rest.    Worsening Symptoms:    Report any of the following signs and symptoms to the doctor's office immediately:    *Pain of jaw, arm, or neck  *Chest pain not relieved by medication                               *Dizziness or loss of consciousness  *Difficulty breathing even when at rest   *More tired than usual                                       *Bleeding drainage or swelling of surgical site  *Swelling of feet, ankles, legs or stomach                 *Fever (>100ºF)  *Pink or blood tinged sputum  *Weight gain (3lbs/day or 5lbs /week)           *Shock from internal defibrillator (if applicable)  *Palpitations or irregular heartbeats                *Cool and/or numb extremities    Stroke Awareness    Common Risk Factors for Stroke include:    Age  Atrial Fibrillation  Carotid Artery Stenosis  Diabetes Mellitus  Excessive alcohol consumption  High blood pressure  Overweight   Physical inactivity  Smoking    Warning signs and symptoms of a stroke include:    *Sudden numbness or weakness of the face, arm or leg (especially on one side of the body).  *Sudden confusion, trouble speaking or understanding.  *Sudden trouble seeing in one or both eyes.  *Sudden trouble walking, dizziness, loss of balance or coordination.Sudden severe headache with no known cause.    It is very important to get treatment quickly when a stroke occurs. If you experience any of the above warning signs, call 911 immediately.                   Disclaimer         Quit Smoking / Tobacco Use:    I understand the use of any tobacco products increases my chance of suffering from future heart disease or stroke and could cause other illnesses which may shorten my life. Quitting the use  of tobacco products is the single most important thing I can do to improve my health. For further information on smoking / tobacco cessation call a Toll Free Quit Line at 1-811.147.1999 (*[employer] [place]) or 1-486.354.7372 (American Lung Association) or you can access the web based program at www.lungusa.org.    [place] Tobacco Users Help Line:  (253) 885-4743       Toll Free: 1-203.648.6855  Quit Tobacco Program [facility] [phone]    Crisis Hotline:    [place] Crisis Hotline:  5-594-FOAXDNI or 1-882.846.7564    [place] Crisis Hotline:    1-251.637.5557 or 521-815-3596    Discharge Survey:   Thank you for choosing [facility]. We hope we did everything we could to make your hospital stay a pleasant one. You may be receiving a phone survey and we would appreciate your time and participation in answering the questions. Your input is very valuable to us in our efforts to improve our service to our patients and their families.        My signature on this form indicates that:    1. I have reviewed and understand the above information.  2. My questions regarding this information have been answered to my satisfaction.  3. I have formulated a plan with my discharge nurse to obtain my prescribed medications for home.                  Disclaimer         __________________________________                     __________       ________                       Patient Signature                                                 Date                    Time

## 2017-01-21 LAB
ANION GAP SERPL CALC-SCNC: 11 MMOL/L (ref 0–11.9)
BUN SERPL-MCNC: 67 MG/DL (ref 8–22)
CALCIUM SERPL-MCNC: 9.2 MG/DL (ref 8.5–10.5)
CHLORIDE SERPL-SCNC: 109 MMOL/L (ref 96–112)
CO2 SERPL-SCNC: 20 MMOL/L (ref 20–33)
CREAT SERPL-MCNC: 3.63 MG/DL (ref 0.5–1.4)
E COLI SXT1+2 STL IA: NORMAL
GFR SERPL CREATININE-BSD FRML MDRD: 17 ML/MIN/1.73 M 2
GLUCOSE SERPL-MCNC: 125 MG/DL (ref 65–99)
POTASSIUM SERPL-SCNC: 3.3 MMOL/L (ref 3.6–5.5)
SIGNIFICANT IND 70042: NORMAL
SITE SITE: NORMAL
SODIUM SERPL-SCNC: 140 MMOL/L (ref 135–145)
SOURCE SOURCE: NORMAL

## 2017-01-21 PROCEDURE — A9270 NON-COVERED ITEM OR SERVICE: HCPCS | Performed by: INTERNAL MEDICINE

## 2017-01-21 PROCEDURE — 700111 HCHG RX REV CODE 636 W/ 250 OVERRIDE (IP): Performed by: HOSPITALIST

## 2017-01-21 PROCEDURE — 700102 HCHG RX REV CODE 250 W/ 637 OVERRIDE(OP): Performed by: HOSPITALIST

## 2017-01-21 PROCEDURE — 80048 BASIC METABOLIC PNL TOTAL CA: CPT

## 2017-01-21 PROCEDURE — 770006 HCHG ROOM/CARE - MED/SURG/GYN SEMI*

## 2017-01-21 PROCEDURE — 700102 HCHG RX REV CODE 250 W/ 637 OVERRIDE(OP): Performed by: INTERNAL MEDICINE

## 2017-01-21 PROCEDURE — 700101 HCHG RX REV CODE 250: Performed by: HOSPITALIST

## 2017-01-21 PROCEDURE — A9270 NON-COVERED ITEM OR SERVICE: HCPCS | Performed by: HOSPITALIST

## 2017-01-21 PROCEDURE — 700101 HCHG RX REV CODE 250: Performed by: INTERNAL MEDICINE

## 2017-01-21 PROCEDURE — 99233 SBSQ HOSP IP/OBS HIGH 50: CPT | Performed by: HOSPITALIST

## 2017-01-21 PROCEDURE — 94760 N-INVAS EAR/PLS OXIMETRY 1: CPT

## 2017-01-21 PROCEDURE — 94660 CPAP INITIATION&MGMT: CPT

## 2017-01-21 RX ORDER — POTASSIUM CHLORIDE 20 MEQ/1
40 TABLET, EXTENDED RELEASE ORAL 2 TIMES DAILY
Status: DISCONTINUED | OUTPATIENT
Start: 2017-01-21 | End: 2017-01-24 | Stop reason: HOSPADM

## 2017-01-21 RX ORDER — POTASSIUM CHLORIDE 20 MEQ/1
60 TABLET, EXTENDED RELEASE ORAL ONCE
Status: COMPLETED | OUTPATIENT
Start: 2017-01-21 | End: 2017-01-21

## 2017-01-21 RX ADMIN — POTASSIUM CHLORIDE 40 MEQ: 1500 TABLET, EXTENDED RELEASE ORAL at 20:24

## 2017-01-21 RX ADMIN — MEGESTROL ACETATE 800 MG: 40 SUSPENSION ORAL at 08:50

## 2017-01-21 RX ADMIN — HEPARIN SODIUM 5000 UNITS: 5000 INJECTION, SOLUTION INTRAVENOUS; SUBCUTANEOUS at 14:16

## 2017-01-21 RX ADMIN — CLONAZEPAM 1 MG: 1 TABLET ORAL at 20:24

## 2017-01-21 RX ADMIN — POTASSIUM CHLORIDE 60 MEQ: 1500 TABLET, EXTENDED RELEASE ORAL at 08:49

## 2017-01-21 RX ADMIN — POTASSIUM CHLORIDE 20 MEQ: 1500 TABLET, EXTENDED RELEASE ORAL at 15:37

## 2017-01-21 RX ADMIN — POTASSIUM CHLORIDE AND SODIUM CHLORIDE: 900; 150 INJECTION, SOLUTION INTRAVENOUS at 17:00

## 2017-01-21 RX ADMIN — POTASSIUM CHLORIDE 20 MEQ: 1500 TABLET, EXTENDED RELEASE ORAL at 13:27

## 2017-01-21 RX ADMIN — HEPARIN SODIUM 5000 UNITS: 5000 INJECTION, SOLUTION INTRAVENOUS; SUBCUTANEOUS at 05:24

## 2017-01-21 RX ADMIN — POTASSIUM CHLORIDE AND SODIUM CHLORIDE: 900; 150 INJECTION, SOLUTION INTRAVENOUS at 08:45

## 2017-01-21 RX ADMIN — HEPARIN SODIUM 5000 UNITS: 5000 INJECTION, SOLUTION INTRAVENOUS; SUBCUTANEOUS at 20:24

## 2017-01-21 RX ADMIN — CLONAZEPAM 1 MG: 1 TABLET ORAL at 08:49

## 2017-01-21 RX ADMIN — POTASSIUM CHLORIDE AND SODIUM CHLORIDE: 900; 150 INJECTION, SOLUTION INTRAVENOUS at 00:34

## 2017-01-21 ASSESSMENT — ENCOUNTER SYMPTOMS
WEAKNESS: 0
MYALGIAS: 0
PALPITATIONS: 0
ABDOMINAL PAIN: 0
CHILLS: 0
DIARRHEA: 1
HEADACHES: 0
NAUSEA: 0
DIZZINESS: 0
CONSTIPATION: 0
COUGH: 0
FOCAL WEAKNESS: 0
VOMITING: 0
FEVER: 0
SHORTNESS OF BREATH: 0

## 2017-01-21 ASSESSMENT — COPD QUESTIONNAIRES
DURING THE PAST 4 WEEKS HOW MUCH DID YOU FEEL SHORT OF BREATH: NONE/LITTLE OF THE TIME
HAVE YOU SMOKED AT LEAST 100 CIGARETTES IN YOUR ENTIRE LIFE: NO/DON'T KNOW
COPD SCREENING SCORE: 2
DO YOU EVER COUGH UP ANY MUCUS OR PHLEGM?: NO/ONLY WITH OCCASIONAL COLDS OR INFECTIONS

## 2017-01-21 ASSESSMENT — LIFESTYLE VARIABLES
DO YOU DRINK ALCOHOL: NO
EVER_SMOKED: NEVER

## 2017-01-21 ASSESSMENT — PAIN SCALES - GENERAL
PAINLEVEL_OUTOF10: 0

## 2017-01-21 NOTE — CARE PLAN
Problem: Safety  Goal: Will remain free from falls  Intervention: Assess risk factors for falls  Up to bathroom with standby assist. Denies dizziness. Gait steady.      Problem: Knowledge Deficit  Goal: Knowledge of disease process/condition, treatment plan, diagnostic tests, and medications will improve  Outcome: PROGRESSING AS EXPECTED  Intervention: Explain information regarding disease process/condition, treatment plan, diagnostic tests, and medications and document in education  Completed.

## 2017-01-21 NOTE — PROGRESS NOTES
"Hospital Medicine Interval Note  Date of Service:  1/21/2017    Chief Complaint  66 y.o.-year-old male admitted 1/20/2017 with colon CA and acute renal failure due to dehydration    Interval Problem Update  1/21 - discussed with Herrera. Discussed with patient--he has not really been eating or drinking much since just before [place] since he says his \"taste buds are all messed up.\" Only has one kidney.     Consultants/Specialty  Herrera, oncology  Nephrology    Disposition  Clinical  No need for tele       Review of Systems   Constitutional: Negative for fever and chills.   Respiratory: Negative for cough and shortness of breath.    Cardiovascular: Negative for chest pain and palpitations.   Gastrointestinal: Positive for diarrhea. Negative for nausea, vomiting, abdominal pain and constipation.   Genitourinary: Negative for dysuria.   Musculoskeletal: Negative for myalgias.   Skin: Negative for itching.   Neurological: Negative for dizziness, focal weakness, weakness and headaches.   All other systems reviewed and are negative.     Physical Exam Laboratory/Imaging   Filed Vitals:    01/20/17 2000 01/21/17 0000 01/21/17 0400 01/21/17 0800   BP:  117/64 135/71 125/74   Pulse:  61 70 64   Temp:  36.7 °C (98.1 °F) 36.6 °C (97.9 °F) 37.1 °C (98.7 °F)   TempSrc:       Resp:  19 19 18   Height: 1.753 m (5' 9\")      Weight: 111.5 kg (245 lb 13 oz)      SpO2:  92% 94% 94%     Physical Exam   Constitutional: He is oriented to person, place, and time. He appears well-developed and well-nourished.   HENT:   Head: Normocephalic and atraumatic.   Mouth/Throat: Oropharynx is clear and moist.   Eyes: Conjunctivae and EOM are normal. Pupils are equal, round, and reactive to light. No scleral icterus.   Neck: Normal range of motion. Neck supple. No tracheal deviation present. No thyromegaly present.   Cardiovascular: Normal rate, regular rhythm, normal heart sounds and intact distal pulses.    No murmur heard.  Pulmonary/Chest: " Effort normal and breath sounds normal. No respiratory distress. He has no wheezes.   Abdominal: Soft. Bowel sounds are normal. He exhibits no distension. There is tenderness (slight).   Musculoskeletal: Normal range of motion. He exhibits no edema or tenderness.   Lymphadenopathy:     He has no cervical adenopathy.        Right: No supraclavicular adenopathy present.        Left: No supraclavicular adenopathy present.   Neurological: He is alert and oriented to person, place, and time. No cranial nerve deficit.   Skin: Skin is warm and dry.   Vitals reviewed.   Lab Results   Component Value Date/Time    WBC 5.4 01/20/2017 11:38 AM    HEMOGLOBIN 11.8* 01/20/2017 11:38 AM    HEMATOCRIT 33.5* 01/20/2017 11:38 AM    PLATELET COUNT 262 01/20/2017 11:38 AM     Lab Results   Component Value Date/Time    SODIUM 140 01/21/2017 02:25 AM    POTASSIUM 3.3* 01/21/2017 02:25 AM    CHLORIDE 109 01/21/2017 02:25 AM    CO2 20 01/21/2017 02:25 AM    GLUCOSE 125* 01/21/2017 02:25 AM    BUN 67* 01/21/2017 02:25 AM    CREATININE 3.63* 01/21/2017 02:25 AM      Assessment/Plan    * Acute renal failure (ARF) (CMS-HCC)  Assessment & Plan  Lab Results   Component Value Date    BUN 67* 01/21/2017    CREATININE 3.63* 01/21/2017   Baseline Cr 1.3 or so  Maintaining hydration  Avoiding nephrotoxics: NSAIDs etc  Following Cr closely; to keep near baseline as possible  Nephrology following  Appears to be all or mostly pre-renal    Colon cancer (CMS-HCC)  Assessment & Plan  Consulted [facility]  Has had 7 rounds of chemo  Following closely    Dehydration  Assessment & Plan  Much improved; will continue IVF, follow renal function    Essential hypertension  Assessment & Plan  SBP goal less than 140 mmHg  DBP goal less than 90 mmHg  PRN and antihypertensives to titrate by hospitalist towards goal  Most recent Blood Pressure : 125/74 mmHg    Hypokalemia  Assessment & Plan  Potassium supplementation, oral preferred  Expect increase of 0.1 mEq/L per each  10 mEq given  Will recheck after supplementation  Goal > 4 mEq/L  Lab Results   Component Value Date/Time    POTASSIUM 3.3* 01/21/2017 02:25 AM      Labs reviewed and Medications reviewed        DVT Prophylaxis: Heparin    Ulcer prophylaxis: Not indicated

## 2017-01-21 NOTE — ASSESSMENT & PLAN NOTE
Baseline Cr 1.3 or so  Maintaining hydration PO  Avoiding nephrotoxics: NSAIDs etc  Following Cr closely; to keep near baseline as possible  Nephrology following  Appears to be all or mostly pre-renal (dehydration from diarrhea)  Oncology requests INR <1.5 prior to discharge.

## 2017-01-21 NOTE — ASSESSMENT & PLAN NOTE
SBP goal less than 140 mmHg  DBP goal less than 90 mmHg  PRN and antihypertensives to titrate by hospitalist towards goal  Most recent Blood Pressure : 125/80 mmHg

## 2017-01-21 NOTE — PROGRESS NOTES
Care of patient assumed after arrival to unit, cardiac monitor on, vital signs stable. Patient denies having any pain, no distress noted. Bed alarm on, fall precautions in place.  Discussed plan of care with patient. Will continue to monitor.

## 2017-01-21 NOTE — DIETARY
"Nutrition Services: Consult FTT    Gerald Oshea is a 66 y.o. male with admitting DX of Acute kidney injury (CMS-HCC), Dehydration, Hypokalemia, Colon cancer    Pertinent History: Colon CA, resected 5 mos ago;  chemo rounds completed. H/o HTN, hypelipidemia, former smoker, DM (pre-diabetes per pt), SU, gout  Allergies: Lidocaine    Height: 175.3 cm (5' 9\")  Weight: 111.5 kg (245 lb 13 oz)  Ideal Body Weight: 72.576 kg (160 lb)  Percent Ideal Body Weight: 153.6  Body mass index is 36.28 kg/(m^2).     Pertinent Labs: K+ 3.3, glu 125, BUN 67, Cr 3.63  Pertinent Medications: Megace, zofran, compazine  Pertinent Fluids: NaCl KCl @ 125 ml/hr  Skin: Intact  GI: Diarrhea >1 week (2x today)    Estimated Needs: REE per MSJ = 1892 kcal/day (x1.1-1.2 = 1829-1053 kcal/day)  Total Calories / day: 1900 - 2275  (Calories / k - 20)  Total Grams Protein / day: 112 - 145 (Grams Protein / k - 1.3)  Total Fluids ml / day: 2790 - 3345 ml (25-30 ml/kg)         Assessment / Evaluation:  Pt with decreased appetite, fatigue, dysgeusia, dry mouth, and poor PO intake x1 month. Pt states initial 6 rounds of chemo went very well, and he was eating and drinking normally. Pt is now having a very difficult time finding food that appeals and tastes good to him.   RD spoke @ length with pt about likes, dislikes, preferences, and options. Pt uses Ensure supplements @ home and is receptive to small meals and snacks. Meal plan modified per this discussion. Pt became tearful during conversation.  Nutrition Representative will also see pt today to provide menu; plan daily NR visit for menu selections.  BUN and Cr improving with hydration.  Pt would like anti-diarrheal.    Plan / Recommendation:   Encourage adequate PO intake as pt tolerates. Promote Boost supplements and nutrient-dense snacks.   Fluids per MD.  Consider anti-diarrheal.    RD following.        "

## 2017-01-21 NOTE — CARE PLAN
Problem: Communication  Goal: The ability to communicate needs accurately and effectively will improve  Outcome: PROGRESSING AS EXPECTED    Problem: Safety  Goal: Will remain free from injury  Outcome: PROGRESSING AS EXPECTED

## 2017-01-21 NOTE — CARE PLAN
Problem: Nutritional:  Goal: Achieve adequate nutritional intake  Patient will consume 50% of small meals, supplements, and snacks.  Outcome: PROGRESSING SLOWER THAN EXPECTED

## 2017-01-21 NOTE — PROGRESS NOTES
Resting in bed. VSS. RA. NSR. A/o x 4. Denies pain. C/o fatigue. Appetite poor. IVF infusing. Plan of care reviewed, questions answered. Call light within reach.

## 2017-01-21 NOTE — CONSULTS
DATE OF SERVICE:  01/21/2017    CHIEF COMPLAINT:  1.  Renal insufficiency.  2.  History of colon cancer, status post 7 courses of FOLFOX chemotherapy.  3.  Intractable diarrhea.    HISTORY OF PRESENT ILLNESS:  The patient is a pleasant 66-year-old gentleman   who I know fairly well.  The patient presented in July with colon cancer.  He   had a colonoscopy with Dr. Abraham.  In August, he had a right hemicolectomy at   [facility].  He was noted to have a T4aN1b colon cancer.  He had an initial CAT   scan that showed that he only had one kidney.  He also had a CAT scan at   [facility] that showed a 9 mm left upper lobe pulmonary nodule.  The patient   refused a lung biopsy.  The patient was started on FOLFOX.  He was doing well   until around [place].  At that time, he began having some low-grade diarrhea   and a bad taste in his mouth.  At the end of December, the patient had a   creatinine of 1.33.  The patient presented for chemotherapy in our office on   the 17th and was not treated because of diarrhea.  He had a chem panel that   showed a creatinine of 7.  Because of that, he was advised to come to the   emergency room.  Coming into the emergency room, the patient had a creatinine   here of 5.12.  He has been seen by Dr. Justin Salamanca, had a diagnostic renal   ultrasound that again shows that his right kidney is not visualized and is   markedly atrophic.  It was not seen on prior CAT scan going all the way back   to 2012.  The patient has been receiving hydration.  His creatinine is down to   3.63.  Today, he feels fine.  He still has a bad taste in his mouth.  His   stool is C. difficile negative.  He is not on any IV antibiotics.    It looks like the patient is turning his renal function around.  The problem   is he still was having diarrhea.  I do not know the etiology of his diarrhea.    Generally one does not have diarrhea with FOLFOX.  The patient had   constipation at least for his first 6 cycles of FOLFOX.  I  want to make sure   that we deal with the diarrhea, so the patient does not continue to have   problems with renal dysfunction, particularly since he only has one kidney.    PAST MEDICAL HISTORY:  The patient's past medical history is pertinent for   hyperlipidemia, hypertension, sleep apnea, and chronic back pain.    PAST SURGICAL HISTORY:  Include his laparoscopic right hemicolectomy at   [place] in August.    RISK FACTORS:  The patient is a former smoker.  He discontinued in 1988.    Alcohol intake is denied.    FAMILY HISTORY:  Noncontributory.    REVIEW OF SYSTEMS:  The patient has no fevers, no chills.  He has no back   pain.  He has no abdominal pain.  He is urinating well.  He has had no nausea.    He has had no chest pain.  He has had diarrhea.  He has not had any blood in   his diarrhea. Neurologic exam is nonfocal.    PHYSICAL EXAMINATION:  VITAL SIGNS:  Currently on physical, the patient's blood pressure is 125/74,   pulse 64, respirations 18, temperature 37.1.  GENERAL:  He is awake, alert and oriented.  HEENT:  He has no scleral icterus.  No oral lesions.  NECK:  Supple.  No adenopathy.  CARDIAC:  Reveals a regular rate and rhythm.  ABDOMEN:  Soft and nontender without hepatosplenomegaly.  NEUROLOGIC:  Nonfocal.  He has minimal neuropathy.  He has no neurologic   weakness.  PSYCHIATRIC:  He has a normal mood and affect.    IMPRESSION:  Hopefully, the majority of the patient's renal insufficiency is   prerenal.  At the end of December, he had a creatinine of 1.3.  He had a CAT   scan here at [facility] with IV contrast on the 01/06/2017.    We may have to get GI involved to help us with the diarrhea, this can go on.    It is one of the etiologies of the patient's renal insufficiency.  I will   follow along with you.       ____________________________________     F MD JASWINDER RAY / ISABEL    DD:  01/21/2017 12:27:52  DT:  01/21/2017 15:13:56    D#:  [id number]  Job#:  [id number]

## 2017-01-21 NOTE — PROGRESS NOTES
Hospital Medicine Progress Note, Adult, Complex               Author: Breanna Lyon Date & Time created: 1/21/2017  8:23 AM     Interval History  66 y.o. Male with no signficant hx of CKD who was admitted through the Emergency Department following a referral by Dr. Silverman for dehydration and general sickness.    Labs showed Acute Kidney injury and hypokalemia prompting this consultation.The patient was diagnosed with colon cancer five months ago and underwent surgery in [place] to remove three cancerous lymph nodes.  He has since completed two rounds of chemotherapy.   Per patient, he was feeling great up until one month ago when he suddenly developed many symptoms.  For the last month the patient has suffered from decreased appetite and fatigue.  The patient is scheduled to receive treatment each week but missed his last appointment due to feeling ill.  The patient has suffered from diarrhea for the last week.  He does not note associated fevers, chills, or abdominal pain. He does not report having a medication for anti-nausea.   He denies dysuria/frequency/nocturia/edema/rash  Denies decreased urine output  He did have CT scan as outpatient ~10 days ago - patient not sure if he got IV contrast or not.  He just had Renal US - has a history of a solitary functioning kidney (cause of loss of other kidney unknown)  Denies change in medications  Finished last round of Chemotherapy ~1 week ago per patient      Daily Nephrology update:   01/21/17 -  Cr is trending down , we will replace Bhupendra RAMIREZ UOP    REVIEW OF SYSTEMS  CARDIAC: no chest pain, no palpitations    PULMONARY: no dyspnea, cough, or congestion    GI: no vomiting, abdominal pain, nausea, Positive for diarrhea  : no dysuria, back pain, or hematuria    Neuro: no weakness, numbness,  Endocrine: no fevers, sweating,    Decreased appetite yes        Review of Systems:  ROS    Physical Exam:  Physical Exam    Labs:        Invalid input(s):  [id number]  Recent Labs      17   1138   TROPONINI  0.03     Recent Labs      17   1138  17   0225   SODIUM  137  140   POTASSIUM  2.8*  3.3*   CHLORIDE  101  109   CO2  22  20   BUN  81*  67*   CREATININE  5.12*  3.63*   MAGNESIUM  1.9   --    PHOSPHORUS  4.6*   --    CALCIUM  9.1  9.2     Recent Labs      17   1138  17   0225   ALTSGPT  21   --    ASTSGOT  16   --    ALKPHOSPHAT  77   --    TBILIRUBIN  0.7   --    GLUCOSE  155*  125*     Recent Labs      17   1138   RBC  3.99*   HEMOGLOBIN  11.8*   HEMATOCRIT  33.5*   PLATELETCT  262     Recent Labs      17   1138   WBC  5.4   NEUTSPOLYS  33.80*   LYMPHOCYTES  41.10*   MONOCYTES  23.40*   EOSINOPHILS  1.10   BASOPHILS  0.40   ASTSGOT  16   ALTSGPT  21   ALKPHOSPHAT  77   TBILIRUBIN  0.7           Hemodynamics:  Temp (24hrs), Av.6 °C (97.9 °F), Min:36.2 °C (97.1 °F), Max:36.9 °C (98.4 °F)  Temperature: 36.6 °C (97.9 °F)  Pulse  Av.4  Min: 57  Max: 135Heart Rate (Monitored): 60  Blood Pressure : 135/71 mmHg, NIBP: (!) 95/57 mmHg     Respiratory:    Respiration: 19, Pulse Oximetry: 94 %           Fluids:    Intake/Output Summary (Last 24 hours) at 17 0823  Last data filed at 17 0720   Gross per 24 hour   Intake    120 ml   Output   1650 ml   Net  -1530 ml     Weight: 111.5 kg (245 lb 13 oz)  GI/Nutrition:  Orders Placed This Encounter   Procedures   • Diet Order     Standing Status: Standing      Number of Occurrences: 1      Standing Expiration Date:      Order Specific Question:  Diet:     Answer:  Regular [1]     Medical Decision Making, by Problem:  Active Hospital Problems    Diagnosis   • Dehydration [E86.0]   • Hypokalemia [E87.6]   • Acute kidney injury (CMS-HCC) [N17.9]   • Colon cancer (CMS-HCC) [C18.9]     IMPRESSION/RECOMMENDATIONS    # Acute Kidney Injury  -- volume depletion vs obstruction  -- contrast injury also possible  -- currently non oliguric/non uremic  -- suggest aggressive IV  hydration  -- check Renal US results for obstruction  -- Check UA/Urine sodium  -- serial monitoring of chemistries  -- avoid further IV contrast  -- close I/Os    # HYPOKALEMIA  -- likely related to decreased PO intake + diarrhea  -- no EKG changes  -- replace in IV and follow levels    # ANEMIA  -- no gross bleeding  -- follow Hgb  -- transfuse prn    # COLON CANCER    Suggestions:   - Continue fluid resuscitation  - Replace K  - Adjust meds  According to current eGFR.   - Daily BMP    Core Measures

## 2017-01-22 LAB
ANION GAP SERPL CALC-SCNC: 10 MMOL/L (ref 0–11.9)
BACTERIA UR CULT: NORMAL
BUN SERPL-MCNC: 45 MG/DL (ref 8–22)
CALCIUM SERPL-MCNC: 8.7 MG/DL (ref 8.5–10.5)
CHLORIDE SERPL-SCNC: 115 MMOL/L (ref 96–112)
CO2 SERPL-SCNC: 20 MMOL/L (ref 20–33)
CREAT SERPL-MCNC: 2.3 MG/DL (ref 0.5–1.4)
GFR SERPL CREATININE-BSD FRML MDRD: 29 ML/MIN/1.73 M 2
GLUCOSE SERPL-MCNC: 104 MG/DL (ref 65–99)
POTASSIUM SERPL-SCNC: 4.1 MMOL/L (ref 3.6–5.5)
SIGNIFICANT IND 70042: NORMAL
SITE SITE: NORMAL
SODIUM SERPL-SCNC: 145 MMOL/L (ref 135–145)
SOURCE SOURCE: NORMAL

## 2017-01-22 PROCEDURE — 80048 BASIC METABOLIC PNL TOTAL CA: CPT

## 2017-01-22 PROCEDURE — 94660 CPAP INITIATION&MGMT: CPT

## 2017-01-22 PROCEDURE — 700102 HCHG RX REV CODE 250 W/ 637 OVERRIDE(OP): Performed by: HOSPITALIST

## 2017-01-22 PROCEDURE — 700102 HCHG RX REV CODE 250 W/ 637 OVERRIDE(OP): Performed by: INTERNAL MEDICINE

## 2017-01-22 PROCEDURE — 700101 HCHG RX REV CODE 250: Performed by: HOSPITALIST

## 2017-01-22 PROCEDURE — 770009 HCHG ROOM/CARE - ONCOLOGY SEMI PRI*

## 2017-01-22 PROCEDURE — 700101 HCHG RX REV CODE 250: Performed by: INTERNAL MEDICINE

## 2017-01-22 PROCEDURE — A9270 NON-COVERED ITEM OR SERVICE: HCPCS | Performed by: HOSPITALIST

## 2017-01-22 PROCEDURE — 700111 HCHG RX REV CODE 636 W/ 250 OVERRIDE (IP): Performed by: HOSPITALIST

## 2017-01-22 PROCEDURE — 700105 HCHG RX REV CODE 258: Performed by: NURSE PRACTITIONER

## 2017-01-22 PROCEDURE — A9270 NON-COVERED ITEM OR SERVICE: HCPCS | Performed by: INTERNAL MEDICINE

## 2017-01-22 PROCEDURE — 99233 SBSQ HOSP IP/OBS HIGH 50: CPT | Performed by: HOSPITALIST

## 2017-01-22 RX ORDER — SODIUM CHLORIDE 450 MG/100ML
INJECTION, SOLUTION INTRAVENOUS CONTINUOUS
Status: DISCONTINUED | OUTPATIENT
Start: 2017-01-22 | End: 2017-01-23

## 2017-01-22 RX ADMIN — HEPARIN SODIUM 5000 UNITS: 5000 INJECTION, SOLUTION INTRAVENOUS; SUBCUTANEOUS at 21:55

## 2017-01-22 RX ADMIN — HEPARIN SODIUM 5000 UNITS: 5000 INJECTION, SOLUTION INTRAVENOUS; SUBCUTANEOUS at 05:52

## 2017-01-22 RX ADMIN — POTASSIUM CHLORIDE AND SODIUM CHLORIDE: 900; 150 INJECTION, SOLUTION INTRAVENOUS at 08:34

## 2017-01-22 RX ADMIN — CLONAZEPAM 1 MG: 1 TABLET ORAL at 21:52

## 2017-01-22 RX ADMIN — SODIUM CHLORIDE: 4.5 INJECTION, SOLUTION INTRAVENOUS at 16:20

## 2017-01-22 RX ADMIN — POTASSIUM CHLORIDE AND SODIUM CHLORIDE: 900; 150 INJECTION, SOLUTION INTRAVENOUS at 00:15

## 2017-01-22 RX ADMIN — POTASSIUM CHLORIDE 40 MEQ: 1500 TABLET, EXTENDED RELEASE ORAL at 21:52

## 2017-01-22 RX ADMIN — MEGESTROL ACETATE 800 MG: 40 SUSPENSION ORAL at 08:32

## 2017-01-22 RX ADMIN — HEPARIN SODIUM 5000 UNITS: 5000 INJECTION, SOLUTION INTRAVENOUS; SUBCUTANEOUS at 13:02

## 2017-01-22 RX ADMIN — CLONAZEPAM 1 MG: 1 TABLET ORAL at 08:32

## 2017-01-22 RX ADMIN — POTASSIUM CHLORIDE 40 MEQ: 1500 TABLET, EXTENDED RELEASE ORAL at 08:32

## 2017-01-22 ASSESSMENT — ENCOUNTER SYMPTOMS
MYALGIAS: 0
FEVER: 0
CONSTIPATION: 0
CARDIOVASCULAR NEGATIVE: 1
PALPITATIONS: 0
CHILLS: 0
PSYCHIATRIC NEGATIVE: 1
HEADACHES: 0
VOMITING: 0
COUGH: 0
NEUROLOGICAL NEGATIVE: 1
WEAKNESS: 0
FOCAL WEAKNESS: 0
SHORTNESS OF BREATH: 0
NAUSEA: 0
ABDOMINAL PAIN: 0
DIARRHEA: 1
DIZZINESS: 0

## 2017-01-22 ASSESSMENT — PAIN SCALES - GENERAL
PAINLEVEL_OUTOF10: 0

## 2017-01-22 NOTE — PROGRESS NOTES
"Hospital Medicine Interval Note  Date of Service:  1/22/2017    Chief Complaint  66 y.o.-year-old male admitted 1/20/2017 with colon CA and acute renal failure due to dehydration    Interval Problem Update  1/22 - discussed with roberth. Discussed with patient at length. He feels like his appetites is much better and his diarrhea has resolved. His SCr is improved to 2.3 now.     1/21 - discussed with Herrera. Discussed with patient--he has not really been eating or drinking much since just before [place] since he says his \"taste buds are all messed up.\" Only has one kidney.     Consultants/Specialty  Herrera, oncology  Nephrology    Disposition  Clinical  No need for tele       Review of Systems   Constitutional: Negative for fever and chills.   Respiratory: Negative for cough and shortness of breath.    Cardiovascular: Negative for chest pain and palpitations.   Gastrointestinal: Positive for diarrhea. Negative for nausea, vomiting, abdominal pain and constipation.   Genitourinary: Negative for dysuria.   Musculoskeletal: Negative for myalgias.   Skin: Negative for itching.   Neurological: Negative for dizziness, focal weakness, weakness and headaches.   All other systems reviewed and are negative.     Physical Exam Laboratory/Imaging   Filed Vitals:    01/21/17 2000 01/21/17 2018 01/22/17 0400 01/22/17 0800   BP: 118/69  154/80 120/78   Pulse: 60 75 76 61   Temp: 36.4 °C (97.6 °F)  36.6 °C (97.9 °F) 37.2 °C (98.9 °F)   TempSrc:       Resp: 16 16 17 18   Height:       Weight: 110.7 kg (244 lb 0.8 oz)      SpO2: 96% 97% 94% 96%     Physical Exam   Constitutional: He is oriented to person, place, and time. He appears well-developed and well-nourished.   HENT:   Head: Normocephalic and atraumatic.   Mouth/Throat: Oropharynx is clear and moist.   Eyes: Conjunctivae and EOM are normal. Pupils are equal, round, and reactive to light. No scleral icterus.   Neck: Normal range of motion. Neck supple. No tracheal " deviation present. No thyromegaly present.   Cardiovascular: Normal rate, regular rhythm, normal heart sounds and intact distal pulses.    No murmur heard.  Pulmonary/Chest: Effort normal and breath sounds normal. No respiratory distress. He has no wheezes.   Abdominal: Soft. Bowel sounds are normal. He exhibits no distension. There is tenderness (slight).   Musculoskeletal: Normal range of motion. He exhibits no edema or tenderness.   Lymphadenopathy:     He has no cervical adenopathy.        Right: No supraclavicular adenopathy present.        Left: No supraclavicular adenopathy present.   Neurological: He is alert and oriented to person, place, and time. No cranial nerve deficit.   Skin: Skin is warm and dry.   Vitals reviewed.   Lab Results   Component Value Date/Time    WBC 5.4 01/20/2017 11:38 AM    HEMOGLOBIN 11.8* 01/20/2017 11:38 AM    HEMATOCRIT 33.5* 01/20/2017 11:38 AM    PLATELET COUNT 262 01/20/2017 11:38 AM     Lab Results   Component Value Date/Time    SODIUM 145 01/22/2017 03:00 AM    POTASSIUM 4.1 01/22/2017 03:00 AM    CHLORIDE 115* 01/22/2017 03:00 AM    CO2 20 01/22/2017 03:00 AM    GLUCOSE 104* 01/22/2017 03:00 AM    BUN 45* 01/22/2017 03:00 AM    CREATININE 2.30* 01/22/2017 03:00 AM      Assessment/Plan    * Acute renal failure (ARF) (CMS-HCC)  Assessment & Plan  Lab Results   Component Value Date    BUN 45* 01/22/2017    CREATININE 2.30* 01/22/2017   Baseline Cr 1.3 or so  Maintaining hydration  Avoiding nephrotoxics: NSAIDs etc  Following Cr closely; to keep near baseline as possible  Nephrology following  Appears to be all or mostly pre-renal    Colon cancer (CMS-HCC)  Assessment & Plan  Consulted [facility]  Has had 7 rounds of chemo  Following closely    Dehydration  Assessment & Plan  Much improved; will continue IVF, follow renal function    Essential hypertension  Assessment & Plan  SBP goal less than 140 mmHg  DBP goal less than 90 mmHg  PRN and antihypertensives to titrate by  hospitalist towards goal  Most recent Blood Pressure : 120/78 mmHg     Hypokalemia  Assessment & Plan  Resolved  Goal > 4 mEq/L  Lab Results   Component Value Date/Time    POTASSIUM 4.1 01/22/2017 03:00 AM          Labs reviewed and Medications reviewed        DVT Prophylaxis: Heparin    Ulcer prophylaxis: Not indicated

## 2017-01-22 NOTE — PROGRESS NOTES
[facility] Nephrology Progress Note               Author: Deonna VASQUEZ, CNN-NP  Collaborating Physician: Dr. Mukherjee Date & Time created: 1/22/2017  2:59 PM     Interval History  66 y.o. Male with no signficant hx of CKD who was admitted through the Emergency Department following a referral by Dr. Silverman for dehydration and general sickness.    Labs showed Acute Kidney injury and hypokalemia prompting this consultation.The patient was diagnosed with colon cancer five months ago and underwent surgery in [place] to remove three cancerous lymph nodes.  He has since completed two rounds of chemotherapy.   Per patient, he was feeling great up until one month ago when he suddenly developed many symptoms.  For the last month the patient has suffered from decreased appetite and fatigue.  The patient is scheduled to receive treatment each week but missed his last appointment due to feeling ill.  The patient has suffered from diarrhea for the last week.  He does not note associated fevers, chills, or abdominal pain. He does not report having a medication for anti-nausea.   He denies dysuria/frequency/nocturia/edema/rash  Denies decreased urine output  He did have CT scan as outpatient ~10 days ago - patient not sure if he got IV contrast or not.  He just had Renal US - has a history of a solitary functioning kidney (cause of loss of other kidney unknown)  Denies change in medications  Finished last round of Chemotherapy ~1 week ago per patient      Daily Nephrology update:   01/21/17 -  Cr is trending down , we will replace K , Good UOP  01/22/17 - Scr continues to trend down with IVFs.  Appetite improved, continues with diarrhea     REVIEW OF SYSTEMS  CARDIAC: no chest pain, no palpitations    PULMONARY: no dyspnea, cough, or congestion    GI: no vomiting, abdominal pain, nausea, Positive for diarrhea  : no dysuria, back pain, or hematuria    Neuro: no weakness, numbness,  Endocrine: no fevers, sweating,     Decreased appetite yes        Review of Systems:  ROS    Physical Exam:  Physical Exam    Labs:        Invalid input(s): [id number]  Recent Labs      17   1138   TROPONINI  0.03     Recent Labs      17   1138  17   0300   SODIUM  137  140  145   POTASSIUM  2.8*  3.3*  4.1   CHLORIDE  101  109  115*   CO2     BUN  81*  67*  45*   CREATININE  5.12*  3.63*  2.30*   MAGNESIUM  1.9   --    --    PHOSPHORUS  4.6*   --    --    CALCIUM  9.1  9.2  8.7     Recent Labs      17   1138  17   0300   ALTSGPT  21   --    --    ASTSGOT  16   --    --    ALKPHOSPHAT  77   --    --    TBILIRUBIN  0.7   --    --    GLUCOSE  155*  125*  104*     Recent Labs      17   113   RBC  3.99*   HEMOGLOBIN  11.8*   HEMATOCRIT  33.5*   PLATELETCT  262     Recent Labs      17   1138   WBC  5.4   NEUTSPOLYS  33.80*   LYMPHOCYTES  41.10*   MONOCYTES  23.40*   EOSINOPHILS  1.10   BASOPHILS  0.40   ASTSGOT  16   ALTSGPT  21   ALKPHOSPHAT  77   TBILIRUBIN  0.7           Hemodynamics:  Temp (24hrs), Av.6 °C (97.9 °F), Min:36.2 °C (97.2 °F), Max:37.2 °C (98.9 °F)  Temperature: 36.5 °C (97.7 °F)  Pulse  Av.4  Min: 57  Max: 135   Blood Pressure : 136/64 mmHg     Respiratory:    Respiration: 20, Pulse Oximetry: 95 %, O2 Daily Delivery Respiratory : Room Air with O2 Available           Fluids:    Intake/Output Summary (Last 24 hours) at 17 1459  Last data filed at 17 0948   Gross per 24 hour   Intake   3600 ml   Output   1050 ml   Net   2550 ml     Weight: 110.7 kg (244 lb 0.8 oz)  GI/Nutrition:  Orders Placed This Encounter   Procedures   • Diet Order     Standing Status: Standing      Number of Occurrences: 1      Standing Expiration Date:      Order Specific Question:  Diet:     Answer:  Regular [1]     Medical Decision Making, by Problem:  Active Hospital Problems    Diagnosis   • Dehydration [E86.0]   • Hypokalemia [E87.6]   • Acute kidney  injury (CMS-HCC) [N17.9]   • Colon cancer (CMS-HCC) [C18.9]     Assessment/Plan:    # Acute Kidney Injury, non-oliguric   -- 2/2 volume depletion   -- contrast injury also possible  -- Renal US: neg for hydro   -- avoid further IV contrast  -- close I/Os  -- Scr trending down, continue IVFs     # HYPOKALEMIA, resolved   -- likely related to decreased PO intake + diarrhea  -- no EKG changes  -- replace PO     # ANEMIA  -- no gross bleeding  -- follow Hgb  -- transfuse prn    # COLON CANCER  --per oncology    #Diarrhea, improved     # Anorexia  -- megace         Suggestions:   - Change IVFs to 1/2NS with K+   - Potassium Chloride 40meq PO q day   - Adjust meds  According to current eGFR.   - Daily BMP    Core Measures    I personally and independently interviewed and examined the patient with NP , and I reviewed the patient's medical record with her.  I am in agreement with Mrs. Stapleton assessment and proposed treatment plan.  I discussed my findings and recommendation with the patient and answer all  questions.  The patient's medical records were edited to accurately reflect this encounter.

## 2017-01-22 NOTE — PROGRESS NOTES
Received bedside report from PM nurse. Assumed patient care. Chart reviewed. Pt was resting in bed. A&O x 4. No concerns, complaints or distress. Patient denies pain. POC updated with pt and on the patient communication board. Bed locked and in the lowest position. Call light within reach. Will continue to monitor.

## 2017-01-22 NOTE — PROGRESS NOTES
Oncology/Hematology Progress Note               Author: BROOKE Renecoleen Date & Time created: 1/22/2017  10:28 AM     Interval History:  CC: Renal Insufficiency         Hx of Colon Ca; s/p 7 cycles of Folfox         Diarrhea  With hydration creatinine slowly coming down; currently 2.3  I feel diarrhea lead to worsening renal function; patient had constipation for first 6 cycles of chemo  Would like to see creatinine < 1.5 before discharge  Now may not be a bad time for 1st colonoscopy since surgery    Review of Systems:  Review of Systems   Constitutional: Negative for fever and chills.   Cardiovascular: Negative.    Gastrointestinal: Positive for diarrhea. Negative for abdominal pain.   Skin: Negative.    Neurological: Negative.    Psychiatric/Behavioral: Negative.        Physical Exam:  Physical Exam   Constitutional: No distress.   HENT:   Head: Normocephalic.   Mouth/Throat: No oropharyngeal exudate.   Eyes: Pupils are equal, round, and reactive to light. No scleral icterus.   Cardiovascular: Normal rate.    Pulmonary/Chest: Effort normal and breath sounds normal.   Abdominal: Soft. Bowel sounds are normal.   Musculoskeletal: Normal range of motion.   Lymphadenopathy:     He has no cervical adenopathy.   Skin: Skin is warm.       Labs:        Invalid input(s): [id number]  Recent Labs      01/20/17   1138   TROPONINI  0.03     Recent Labs      01/20/17   1138  01/21/17   0225  01/22/17   0300   SODIUM  137  140  145   POTASSIUM  2.8*  3.3*  4.1   CHLORIDE  101  109  115*   CO2  22  20  20   BUN  81*  67*  45*   CREATININE  5.12*  3.63*  2.30*   MAGNESIUM  1.9   --    --    PHOSPHORUS  4.6*   --    --    CALCIUM  9.1  9.2  8.7     Recent Labs      01/20/17   1138  01/21/17   0225  01/22/17   0300   ALTSGPT  21   --    --    ASTSGOT  16   --    --    ALKPHOSPHAT  77   --    --    TBILIRUBIN  0.7   --    --    GLUCOSE  155*  125*  104*     Recent Labs      01/20/17   1138   RBC  3.99*   HEMOGLOBIN  11.8*    HEMATOCRIT  33.5*   PLATELETCT  262     Recent Labs      17   1138   WBC  5.4   NEUTSPOLYS  33.80*   LYMPHOCYTES  41.10*   MONOCYTES  23.40*   EOSINOPHILS  1.10   BASOPHILS  0.40   ASTSGOT  16   ALTSGPT  21   ALKPHOSPHAT  77   TBILIRUBIN  0.7     Recent Labs      17   1138  17   0225  17   0300   SODIUM  137  140  145   POTASSIUM  2.8*  3.3*  4.1   CHLORIDE  101  109  115*   CO2  22  20  20   GLUCOSE  155*  125*  104*   BUN  81*  67*  45*   CREATININE  5.12*  3.63*  2.30*   CALCIUM  9.1  9.2  8.7     Hemodynamics:  Temp (24hrs), Av.6 °C (97.9 °F), Min:36.2 °C (97.2 °F), Max:37.2 °C (98.9 °F)  Temperature: 37.2 °C (98.9 °F)  Pulse  Av.6  Min: 57  Max: 135   Blood Pressure : 120/78 mmHg     Respiratory:    Respiration: 18, Pulse Oximetry: 96 %, O2 Daily Delivery Respiratory : Room Air with O2 Available           Fluids:    Intake/Output Summary (Last 24 hours) at 17 1028  Last data filed at 17 0948   Gross per 24 hour   Intake   3840 ml   Output   2150 ml   Net   1690 ml     Weight: 110.7 kg (244 lb 0.8 oz)  GI/Nutrition:  Orders Placed This Encounter   Procedures   • Diet Order     Standing Status: Standing      Number of Occurrences: 1      Standing Expiration Date:      Order Specific Question:  Diet:     Answer:  Regular [1]     Medical Decision Making, by Problem:  Active Hospital Problems    Diagnosis   • *Acute renal failure (ARF) (CMS-HCC) [N17.9]   • Dehydration [E86.0]   • Colon cancer (CMS-HCC) [C18.9]   • Hypokalemia [E87.6]   • Essential hypertension [I10]       Plan:  Continue hydration; discharge when creatinine < 1.5  GI to see; not sure about etiology of diarrhea    Labs reviewed

## 2017-01-22 NOTE — CARE PLAN
Problem: Safety  Goal: Will remain free from injury  Outcome: PROGRESSING AS EXPECTED  Will remain free from injury    Problem: Bowel/Gastric:  Goal: Normal bowel function is maintained or improved  Outcome: PROGRESSING AS EXPECTED  If loose stool send for C Diff

## 2017-01-22 NOTE — PROGRESS NOTES
Report received from Katie Worrell RN at bedside with son present; patient is very pleasant and talkative.  C/o his bed not being changed and bed bath offered at 0100 and 0300; reported not being to sleep for 2 weeks because of broken CPAP machine; Respiratory therapist aware and will take care of this tonight.

## 2017-01-22 NOTE — CARE PLAN
Problem: Safety  Goal: Will remain free from injury  Fall precautions in place. Bed wheels locked, bed is in the lowest position. Call light in reach. Bed alarm on and in place. Non-skid socks provided. Patient provides successful verbalization of fall and safety precautions and demonstration of use of call bell. Upper side rails are up. Hourly rounding in progress.     Problem: Knowledge Deficit  Goal: Knowledge of disease process/condition, treatment plan, diagnostic tests, and medications will improve  POC discussed with the patient. All questions and concerns addressed and answered. Patient is involved in POC and verbalizes the understanding of the disease process, medications, tests and treatments. Questions on POC encouraged throughout care.

## 2017-01-23 LAB
ALBUMIN SERPL BCP-MCNC: 3.4 G/DL (ref 3.2–4.9)
ALBUMIN/GLOB SERPL: 1.5 G/DL
ALP SERPL-CCNC: 71 U/L (ref 30–99)
ALT SERPL-CCNC: 15 U/L (ref 2–50)
ANION GAP SERPL CALC-SCNC: 7 MMOL/L (ref 0–11.9)
AST SERPL-CCNC: 14 U/L (ref 12–45)
BACTERIA STL CULT: NORMAL
BILIRUB SERPL-MCNC: 0.4 MG/DL (ref 0.1–1.5)
BUN SERPL-MCNC: 30 MG/DL (ref 8–22)
CALCIUM SERPL-MCNC: 8.6 MG/DL (ref 8.5–10.5)
CHLORIDE SERPL-SCNC: 114 MMOL/L (ref 96–112)
CO2 SERPL-SCNC: 20 MMOL/L (ref 20–33)
CREAT SERPL-MCNC: 1.79 MG/DL (ref 0.5–1.4)
E COLI SXT1+2 STL IA: NORMAL
GFR SERPL CREATININE-BSD FRML MDRD: 38 ML/MIN/1.73 M 2
GLOBULIN SER CALC-MCNC: 2.2 G/DL (ref 1.9–3.5)
GLUCOSE SERPL-MCNC: 116 MG/DL (ref 65–99)
POTASSIUM SERPL-SCNC: 3.8 MMOL/L (ref 3.6–5.5)
PROT SERPL-MCNC: 5.6 G/DL (ref 6–8.2)
SIGNIFICANT IND 70042: NORMAL
SITE SITE: NORMAL
SODIUM SERPL-SCNC: 141 MMOL/L (ref 135–145)
SOURCE SOURCE: NORMAL

## 2017-01-23 PROCEDURE — A9270 NON-COVERED ITEM OR SERVICE: HCPCS | Performed by: HOSPITALIST

## 2017-01-23 PROCEDURE — 700102 HCHG RX REV CODE 250 W/ 637 OVERRIDE(OP): Performed by: HOSPITALIST

## 2017-01-23 PROCEDURE — 700101 HCHG RX REV CODE 250: Performed by: HOSPITALIST

## 2017-01-23 PROCEDURE — A9270 NON-COVERED ITEM OR SERVICE: HCPCS | Performed by: INTERNAL MEDICINE

## 2017-01-23 PROCEDURE — 700105 HCHG RX REV CODE 258: Performed by: NURSE PRACTITIONER

## 2017-01-23 PROCEDURE — 700101 HCHG RX REV CODE 250: Performed by: INTERNAL MEDICINE

## 2017-01-23 PROCEDURE — 99233 SBSQ HOSP IP/OBS HIGH 50: CPT | Performed by: INTERNAL MEDICINE

## 2017-01-23 PROCEDURE — 80053 COMPREHEN METABOLIC PANEL: CPT

## 2017-01-23 PROCEDURE — 700111 HCHG RX REV CODE 636 W/ 250 OVERRIDE (IP): Performed by: HOSPITALIST

## 2017-01-23 PROCEDURE — 770009 HCHG ROOM/CARE - ONCOLOGY SEMI PRI*

## 2017-01-23 PROCEDURE — 700102 HCHG RX REV CODE 250 W/ 637 OVERRIDE(OP): Performed by: INTERNAL MEDICINE

## 2017-01-23 RX ORDER — SODIUM CHLORIDE AND POTASSIUM CHLORIDE 150; 450 MG/100ML; MG/100ML
INJECTION, SOLUTION INTRAVENOUS CONTINUOUS
Status: DISCONTINUED | OUTPATIENT
Start: 2017-01-23 | End: 2017-01-24

## 2017-01-23 RX ADMIN — HEPARIN SODIUM 5000 UNITS: 5000 INJECTION, SOLUTION INTRAVENOUS; SUBCUTANEOUS at 21:13

## 2017-01-23 RX ADMIN — CLONAZEPAM 1 MG: 1 TABLET ORAL at 08:26

## 2017-01-23 RX ADMIN — HEPARIN SODIUM 5000 UNITS: 5000 INJECTION, SOLUTION INTRAVENOUS; SUBCUTANEOUS at 14:00

## 2017-01-23 RX ADMIN — MEGESTROL ACETATE 800 MG: 40 SUSPENSION ORAL at 10:37

## 2017-01-23 RX ADMIN — POTASSIUM CHLORIDE AND SODIUM CHLORIDE: 450; 150 INJECTION, SOLUTION INTRAVENOUS at 08:26

## 2017-01-23 RX ADMIN — CLONAZEPAM 1 MG: 1 TABLET ORAL at 21:13

## 2017-01-23 RX ADMIN — POTASSIUM CHLORIDE 40 MEQ: 1500 TABLET, EXTENDED RELEASE ORAL at 08:26

## 2017-01-23 RX ADMIN — SODIUM CHLORIDE: 4.5 INJECTION, SOLUTION INTRAVENOUS at 00:39

## 2017-01-23 RX ADMIN — POTASSIUM CHLORIDE 40 MEQ: 1500 TABLET, EXTENDED RELEASE ORAL at 21:13

## 2017-01-23 RX ADMIN — POTASSIUM CHLORIDE AND SODIUM CHLORIDE: 450; 150 INJECTION, SOLUTION INTRAVENOUS at 17:32

## 2017-01-23 RX ADMIN — HEPARIN SODIUM 5000 UNITS: 5000 INJECTION, SOLUTION INTRAVENOUS; SUBCUTANEOUS at 06:28

## 2017-01-23 ASSESSMENT — ENCOUNTER SYMPTOMS
RESPIRATORY NEGATIVE: 1
DEPRESSION: 0
NERVOUS/ANXIOUS: 0
EYES NEGATIVE: 1
MYALGIAS: 0
SPEECH CHANGE: 0
HEARTBURN: 0
CONSTIPATION: 1
SHORTNESS OF BREATH: 0
ABDOMINAL PAIN: 0
WEAKNESS: 1
CARDIOVASCULAR NEGATIVE: 1
PSYCHIATRIC NEGATIVE: 1
DIARRHEA: 0
HEADACHES: 0
CHILLS: 0
FEVER: 0
NEUROLOGICAL NEGATIVE: 1
MUSCULOSKELETAL NEGATIVE: 1
DIZZINESS: 0
BLURRED VISION: 0
COUGH: 0
SENSORY CHANGE: 0
DIARRHEA: 1
CLAUDICATION: 0
PHOTOPHOBIA: 0

## 2017-01-23 ASSESSMENT — PAIN SCALES - GENERAL
PAINLEVEL_OUTOF10: 0
PAINLEVEL_OUTOF10: 0

## 2017-01-23 NOTE — PROGRESS NOTES
Oncology/Hematology Progress Note               Author: Yo clark Date & Time created: 1/23/2017  2:18 PM     Interval History:  CC: Renal Insufficiency         Hx of Colon Ca; s/p 7 cycles of Folfox         Diarrhea  With hydration creatinine slowly coming down; currently 1.79   Feels better.Diarrhea resolved.          Review of Systems:  Review of Systems   Constitutional: Negative for fever and chills.   Cardiovascular: Negative.    Gastrointestinal: Positive for diarrhea. Negative for abdominal pain.   Skin: Negative.    Neurological: Negative.    Psychiatric/Behavioral: Negative.        Physical Exam:  Physical Exam   Constitutional: No distress.   HENT:   Head: Normocephalic.   Mouth/Throat: No oropharyngeal exudate.   Eyes: Pupils are equal, round, and reactive to light. No scleral icterus.   Cardiovascular: Normal rate.    Pulmonary/Chest: Effort normal and breath sounds normal.   Abdominal: Soft. Bowel sounds are normal.   Musculoskeletal: Normal range of motion.   Lymphadenopathy:     He has no cervical adenopathy.   Skin: Skin is warm.       Labs:        Invalid input(s): [id number]      Recent Labs      01/21/17 0225 01/22/17 0300  01/23/17   0040   SODIUM  140  145  141   POTASSIUM  3.3*  4.1  3.8   CHLORIDE  109  115*  114*   CO2  20  20  20   BUN  67*  45*  30*   CREATININE  3.63*  2.30*  1.79*   CALCIUM  9.2  8.7  8.6     Recent Labs      01/21/17 0225 01/22/17   0300  01/23/17   0040   ALTSGPT   --    --   15   ASTSGOT   --    --   14   ALKPHOSPHAT   --    --   71   TBILIRUBIN   --    --   0.4   GLUCOSE  125*  104*  116*     No results for input(s): RBC, HEMOGLOBIN, HEMATOCRIT, PLATELETCT, PROTHROMBTM, APTT, INR, IRON, FERRITIN, TOTIRONBC in the last 72 hours.  Recent Labs      01/23/17   0040   ASTSGOT  14   ALTSGPT  15   ALKPHOSPHAT  71   TBILIRUBIN  0.4     Recent Labs      01/21/17 0225 01/22/17   0300  01/23/17   0040   SODIUM  140  145  141   POTASSIUM  3.3*  4.1  3.8    CHLORIDE  109  115*  114*   CO2  20  20  20   GLUCOSE  125*  104*  116*   BUN  67*  45*  30*   CREATININE  3.63*  2.30*  1.79*   CALCIUM  9.2  8.7  8.6     Hemodynamics:  Temp (24hrs), Av.5 °C (97.7 °F), Min:36.4 °C (97.5 °F), Max:36.6 °C (97.9 °F)  Temperature: 36.4 °C (97.6 °F)  Pulse  Av.6  Min: 57  Max: 135   Blood Pressure : 125/80 mmHg     Respiratory:    Respiration: 18, Pulse Oximetry: 93 %           Fluids:    Intake/Output Summary (Last 24 hours) at 17 1028  Last data filed at 17 0948   Gross per 24 hour   Intake   3840 ml   Output   2150 ml   Net   1690 ml        GI/Nutrition:  Orders Placed This Encounter   Procedures   • Diet Order     Standing Status: Standing      Number of Occurrences: 1      Standing Expiration Date:      Order Specific Question:  Diet:     Answer:  Regular [1]     Medical Decision Making, by Problem:  Active Hospital Problems    Diagnosis   • *Acute renal failure (ARF) (CMS-HCC) [N17.9]   • Dehydration [E86.0]   • Colon cancer (CMS-HCC) [C18.9]   • Hypokalemia [E87.6]   • Essential hypertension [I10]       Plan:   suggested GI opinion regarding diarrhea.However, diarrhea has resolved at this point . Pre renal VANNA from dehydration resolving .  Should f/u with Dr Silverman early next week in the office.        Labs reviewed

## 2017-01-23 NOTE — PROGRESS NOTES
Pt up to unit via bed. Pt oriented to room and call light. Pt A&Ox4. Pt denies pain, n/v, numbness, tingling, SOB and chest pain. Pt needs met at this time, call light within reach, hourly rounding in effect, and will continue to monitor.

## 2017-01-23 NOTE — PROGRESS NOTES
Hospital Medicine Interval Note  Date of Service:  1/23/2017    Chief Complaint  66 y.o.-year-old male admitted 1/20/2017 with colon cancer and presented with renal failure and diarrhea.    Interval Problem Update  Patient feeling well, good appetite, good hydration and diarrhea has resolved.  Renal function improving daily, Cr down to 1.79 and once less than 1.5 is okay for dc home.    Consultants/Specialty  Nephrology - [facility]  Oncology - [facility] ([facility])    Disposition  Home when cr <1.5     Review of Systems   Constitutional: Negative for fever and chills.   HENT: Negative for congestion and hearing loss.    Eyes: Negative for blurred vision and photophobia.   Respiratory: Negative for cough and shortness of breath.    Cardiovascular: Negative for chest pain and claudication.   Gastrointestinal: Negative for heartburn, abdominal pain and diarrhea.   Genitourinary: Negative for dysuria and hematuria.   Musculoskeletal: Negative for myalgias and joint pain.   Skin: Negative for itching and rash.   Neurological: Positive for weakness (better). Negative for dizziness, sensory change, speech change and headaches.   Psychiatric/Behavioral: Negative for depression. The patient is not nervous/anxious.       Physical Exam Laboratory/Imaging   Filed Vitals:    01/22/17 1600 01/22/17 2000 01/23/17 0400 01/23/17 0800   BP: 118/63 136/67 139/65 125/80   Pulse: 66 64 61 63   Temp: 36.6 °C (97.9 °F) 36.4 °C (97.5 °F) 36.6 °C (97.8 °F) 36.4 °C (97.6 °F)   TempSrc:       Resp: 18 18 18 18   Height:       Weight:       SpO2: 94% 93% 95% 93%     Physical Exam   Constitutional: He is oriented to person, place, and time. He appears well-developed and well-nourished. No distress.   HENT:   Head: Normocephalic and atraumatic.   Eyes: Conjunctivae are normal. No scleral icterus.   Neck: Neck supple. No JVD present.   Cardiovascular: Normal rate, regular rhythm and normal heart sounds.  Exam reveals no gallop and no friction rub.     No murmur heard.  Pulmonary/Chest: Effort normal and breath sounds normal. No respiratory distress. He has no wheezes.   Abdominal: Soft. Bowel sounds are normal. He exhibits no distension and no mass.   Musculoskeletal: Normal range of motion. He exhibits no edema or tenderness.   Neurological: He is alert and oriented to person, place, and time. No cranial nerve deficit. Coordination normal.   Skin: Skin is warm and dry. He is not diaphoretic. No erythema. No pallor.   Psychiatric: He has a normal mood and affect. His behavior is normal.   Nursing note and vitals reviewed.   Lab Results   Component Value Date/Time    WBC 5.4 01/20/2017 11:38 AM    HEMOGLOBIN 11.8* 01/20/2017 11:38 AM    HEMATOCRIT 33.5* 01/20/2017 11:38 AM    PLATELET COUNT 262 01/20/2017 11:38 AM     Lab Results   Component Value Date/Time    SODIUM 141 01/23/2017 12:40 AM    POTASSIUM 3.8 01/23/2017 12:40 AM    CHLORIDE 114* 01/23/2017 12:40 AM    CO2 20 01/23/2017 12:40 AM    GLUCOSE 116* 01/23/2017 12:40 AM    BUN 30* 01/23/2017 12:40 AM    CREATININE 1.79* 01/23/2017 12:40 AM      Assessment/Plan    * Acute renal failure (ARF) (CMS-HCC)  Assessment & Plan  Baseline Cr 1.3 or so  Maintaining hydration PO  Avoiding nephrotoxics: NSAIDs etc  Following Cr closely; to keep near baseline as possible  Nephrology following  Appears to be all or mostly pre-renal (dehydration from diarrhea)  Oncology requests INR <1.5 prior to discharge.    Colon cancer (CMS-HCC)  Assessment & Plan  [facility]  Has had 7 rounds of chemo  Following closely    Dehydration  Assessment & Plan  Much improved; will continue IVF, follow renal function    Essential hypertension  Assessment & Plan  SBP goal less than 140 mmHg  DBP goal less than 90 mmHg  PRN and antihypertensives to titrate by hospitalist towards goal  Most recent Blood Pressure : 125/80 mmHg     Hypokalemia  Assessment & Plan  Resolved  Supplement as needed  3.8  Goal > 4 mEq/L       Labs reviewed,  Radiology images reviewed and Medications reviewed  Valenzuela catheter: No Valenzuela      DVT Prophylaxis: Heparin

## 2017-01-23 NOTE — CARE PLAN
Problem: Safety  Goal: Will remain free from injury  Outcome: PROGRESSING AS EXPECTED  Will remain free from fall or injury        Problem: Infection  Goal: Will remain free from infection  Outcome: PROGRESSING AS EXPECTED  Will remain afebrile, labs will continue to improve

## 2017-01-23 NOTE — PROGRESS NOTES
[facility] Nephrology Progress Note               Author: Carlos Alberto Date & Time created: 1/23/2017  8:06 AM     Interval History  66 y.o. Male with no signficant hx of CKD who was admitted through the Emergency Department following a referral by Dr. Silverman for dehydration and general sickness.    Labs showed Acute Kidney injury and hypokalemia prompting this consultation.The patient was diagnosed with colon cancer five months ago and underwent surgery in [place] to remove three cancerous lymph nodes.  He has since completed two rounds of chemotherapy.   Per patient, he was feeling great up until one month ago when he suddenly developed many symptoms.  For the last month the patient has suffered from decreased appetite and fatigue.  The patient is scheduled to receive treatment each week but missed his last appointment due to feeling ill.  The patient has suffered from diarrhea for the last week.  He does not note associated fevers, chills, or abdominal pain. He does not report having a medication for anti-nausea.   He denies dysuria/frequency/nocturia/edema/rash  Denies decreased urine output  He did have CT scan as outpatient ~10 days ago - patient not sure if he got IV contrast or not.  He just had Renal US - has a history of a solitary functioning kidney (cause of loss of other kidney unknown)  Denies change in medications  Finished last round of Chemotherapy ~1 week ago per patient      Daily Nephrology update:   01/21/17 -  Cr is trending down , we will replace K , Good UOP  01/22/17 - Scr continues to trend down with IVFs.  Appetite improved, continues with diarrhea   1/23/17 -pt continuing to improve, better apetite, still no BM      Review of Systems:  Review of Systems   HENT: Negative.    Eyes: Negative.    Respiratory: Negative.    Cardiovascular: Negative.    Gastrointestinal: Positive for constipation.   Genitourinary: Negative.    Musculoskeletal: Negative.    Skin: Negative.    Neurological:  Positive for weakness.   Endo/Heme/Allergies: Negative.    Psychiatric/Behavioral: Negative.        Physical Exam:  Physical Exam   Constitutional: He is oriented to person, place, and time. He appears well-developed and well-nourished.   HENT:   Head: Normocephalic and atraumatic.   Eyes: Conjunctivae are normal. Pupils are equal, round, and reactive to light.   Neck: Normal range of motion. Neck supple.   Cardiovascular: Normal rate and regular rhythm.    Pulmonary/Chest: Effort normal and breath sounds normal.   Abdominal: Soft. Bowel sounds are normal.   Musculoskeletal: Normal range of motion.   Neurological: He is alert and oriented to person, place, and time.   Skin: Skin is warm and dry.   Nursing note and vitals reviewed.      Labs:        Invalid input(s): [id number]  Recent Labs      17   TROPONINI  0.03     Recent Labs      17   1138  17   0225  17   0300  17   0040   SODIUM  137  140  145  141   POTASSIUM  2.8*  3.3*  4.1  3.8   CHLORIDE  101  109  115*  114*   CO2     BUN  81*  67*  45*  30*   CREATININE  5.12*  3.63*  2.30*  1.79*   MAGNESIUM  1.9   --    --    --    PHOSPHORUS  4.6*   --    --    --    CALCIUM  9.1  9.2  8.7  8.6     Recent Labs      17   1138  17   0225  17   0300  17   0040   ALTSGPT  21   --    --   15   ASTSGOT  16   --    --   14   ALKPHOSPHAT  77   --    --   71   TBILIRUBIN  0.7   --    --   0.4   GLUCOSE  155*  125*  104*  116*     Recent Labs      17   RBC  3.99*   HEMOGLOBIN  11.8*   HEMATOCRIT  33.5*   PLATELETCT  262     Recent Labs      178  17   0040   WBC  5.4   --    NEUTSPOLYS  33.80*   --    LYMPHOCYTES  41.10*   --    MONOCYTES  23.40*   --    EOSINOPHILS  1.10   --    BASOPHILS  0.40   --    ASTSGOT  16  14   ALTSGPT  21  15   ALKPHOSPHAT  77  71   TBILIRUBIN  0.7  0.4           Hemodynamics:  Temp (24hrs), Av.5 °C (97.7 °F), Min:36.4 °C (97.5 °F),  Max:36.6 °C (97.9 °F)  Temperature: 36.6 °C (97.8 °F)  Pulse  Av.8  Min: 57  Max: 135   Blood Pressure : 139/65 mmHg     Respiratory:    Respiration: 18, Pulse Oximetry: 95 %           Fluids:    Intake/Output Summary (Last 24 hours) at 17 0806  Last data filed at 17 0600   Gross per 24 hour   Intake   3467 ml   Output    800 ml   Net   2667 ml        GI/Nutrition:  Orders Placed This Encounter   Procedures   • Diet Order     Standing Status: Standing      Number of Occurrences: 1      Standing Expiration Date:      Order Specific Question:  Diet:     Answer:  Regular [1]     Medical Decision Making, by Problem:  Active Hospital Problems    Diagnosis   • Dehydration [E86.0]   • Hypokalemia [E87.6]   • Acute kidney injury (CMS-HCC) [N17.9]   • Colon cancer (CMS-HCC) [C18.9]     Assessment/Plan:    # Acute Kidney Injury, non-oliguric   -- 2/2 volume depletion   -- improved with fluids    # HYPOKALEMIA, resolved   -- likely related to decreased PO intake + diarrhea  -- replace PO     # ANEMIA  -- no gross bleeding  -- follow Hgb  -- transfuse prn    # COLON CANCER  --per oncology    #Diarrhea, improved     # Anorexia  -- megace         Suggestions:   - IVF x 24 more hours with potassium  - continue with potassium supplemnts    Medications reviewed and Labs reviewed

## 2017-01-23 NOTE — PROGRESS NOTES
"Pt A&Ox4. VS: /80 mmHg  Pulse 63  Temp(Src) 36.4 °C (97.6 °F) (Temporal)  Resp 18  Ht 1.753 m (5' 9\")  Wt 110.7 kg (244 lb 0.8 oz)  BMI 36.02 kg/m2  SpO2 93%. Pt denies pain, n/v, numbness, tingling, SOB and chest pain. Pt needs met at this time, call light within reach, hourly rounding in effect, and will continue to monitor.       "

## 2017-01-23 NOTE — CARE PLAN
Problem: Safety  Goal: Will remain free from injury  Hourly rounding in effect, pt instructed to call for assistance, bed locked and in lowest position. Bed alarm off, with Elisabeth as second RN. Pt calls appropriately for assistance          Problem: Knowledge Deficit  Goal: Knowledge of disease process/condition, treatment plan, diagnostic tests, and medications will improve  Pt updated and educated on nursing interventions, medications and POC

## 2017-01-24 VITALS
HEIGHT: 69 IN | TEMPERATURE: 98.7 F | HEART RATE: 56 BPM | BODY MASS INDEX: 36.15 KG/M2 | WEIGHT: 244.05 LBS | DIASTOLIC BLOOD PRESSURE: 76 MMHG | SYSTOLIC BLOOD PRESSURE: 131 MMHG | OXYGEN SATURATION: 95 % | RESPIRATION RATE: 18 BRPM

## 2017-01-24 PROBLEM — R19.7 DIARRHEA, UNSPECIFIED: Status: ACTIVE | Noted: 2017-01-24

## 2017-01-24 LAB
ALBUMIN SERPL BCP-MCNC: 3.2 G/DL (ref 3.2–4.9)
ALBUMIN/GLOB SERPL: 1.5 G/DL
ALP SERPL-CCNC: 71 U/L (ref 30–99)
ALT SERPL-CCNC: 14 U/L (ref 2–50)
ANION GAP SERPL CALC-SCNC: 8 MMOL/L (ref 0–11.9)
AST SERPL-CCNC: 13 U/L (ref 12–45)
BILIRUB SERPL-MCNC: 0.3 MG/DL (ref 0.1–1.5)
BUN SERPL-MCNC: 24 MG/DL (ref 8–22)
CALCIUM SERPL-MCNC: 8.5 MG/DL (ref 8.5–10.5)
CHLORIDE SERPL-SCNC: 112 MMOL/L (ref 96–112)
CO2 SERPL-SCNC: 20 MMOL/L (ref 20–33)
CREAT SERPL-MCNC: 1.55 MG/DL (ref 0.5–1.4)
GFR SERPL CREATININE-BSD FRML MDRD: 45 ML/MIN/1.73 M 2
GLOBULIN SER CALC-MCNC: 2.1 G/DL (ref 1.9–3.5)
GLUCOSE SERPL-MCNC: 143 MG/DL (ref 65–99)
O+P SPEC MICRO: NORMAL
POTASSIUM SERPL-SCNC: 4.3 MMOL/L (ref 3.6–5.5)
PROT SERPL-MCNC: 5.3 G/DL (ref 6–8.2)
SIGNIFICANT IND 70042: NORMAL
SITE SITE: NORMAL
SODIUM SERPL-SCNC: 140 MMOL/L (ref 135–145)
SOURCE SOURCE: NORMAL

## 2017-01-24 PROCEDURE — 700111 HCHG RX REV CODE 636 W/ 250 OVERRIDE (IP): Performed by: HOSPITALIST

## 2017-01-24 PROCEDURE — 80053 COMPREHEN METABOLIC PANEL: CPT

## 2017-01-24 PROCEDURE — 700102 HCHG RX REV CODE 250 W/ 637 OVERRIDE(OP): Performed by: HOSPITALIST

## 2017-01-24 PROCEDURE — 700101 HCHG RX REV CODE 250: Performed by: INTERNAL MEDICINE

## 2017-01-24 PROCEDURE — 99239 HOSP IP/OBS DSCHRG MGMT >30: CPT | Performed by: HOSPITALIST

## 2017-01-24 PROCEDURE — 700102 HCHG RX REV CODE 250 W/ 637 OVERRIDE(OP): Performed by: INTERNAL MEDICINE

## 2017-01-24 PROCEDURE — A9270 NON-COVERED ITEM OR SERVICE: HCPCS | Performed by: INTERNAL MEDICINE

## 2017-01-24 PROCEDURE — A9270 NON-COVERED ITEM OR SERVICE: HCPCS | Performed by: HOSPITALIST

## 2017-01-24 PROCEDURE — 700101 HCHG RX REV CODE 250: Performed by: HOSPITALIST

## 2017-01-24 RX ORDER — MEGESTROL ACETATE 40 MG/ML
800 SUSPENSION ORAL DAILY
Qty: 1 BOTTLE | Refills: 2 | Status: SHIPPED | OUTPATIENT
Start: 2017-01-24 | End: 2017-03-28

## 2017-01-24 RX ADMIN — HEPARIN 500 UNITS: 100 SYRINGE at 14:06

## 2017-01-24 RX ADMIN — CLONAZEPAM 1 MG: 1 TABLET ORAL at 09:24

## 2017-01-24 RX ADMIN — POTASSIUM CHLORIDE 40 MEQ: 1500 TABLET, EXTENDED RELEASE ORAL at 09:23

## 2017-01-24 RX ADMIN — HEPARIN SODIUM 5000 UNITS: 5000 INJECTION, SOLUTION INTRAVENOUS; SUBCUTANEOUS at 06:15

## 2017-01-24 RX ADMIN — POTASSIUM CHLORIDE AND SODIUM CHLORIDE: 450; 150 INJECTION, SOLUTION INTRAVENOUS at 02:53

## 2017-01-24 RX ADMIN — MEGESTROL ACETATE 800 MG: 40 SUSPENSION ORAL at 09:24

## 2017-01-24 ASSESSMENT — ENCOUNTER SYMPTOMS
FEVER: 0
CHILLS: 0
PSYCHIATRIC NEGATIVE: 1
GASTROINTESTINAL NEGATIVE: 1
WEAKNESS: 1
MUSCULOSKELETAL NEGATIVE: 1
EYES NEGATIVE: 1
DIARRHEA: 1
ABDOMINAL PAIN: 0
NEUROLOGICAL NEGATIVE: 1
CARDIOVASCULAR NEGATIVE: 1
RESPIRATORY NEGATIVE: 1

## 2017-01-24 ASSESSMENT — PATIENT HEALTH QUESTIONNAIRE - PHQ9
SUM OF ALL RESPONSES TO PHQ QUESTIONS 1-9: 0
2. FEELING DOWN, DEPRESSED, IRRITABLE, OR HOPELESS: NOT AT ALL
1. LITTLE INTEREST OR PLEASURE IN DOING THINGS: NOT AT ALL
SUM OF ALL RESPONSES TO PHQ9 QUESTIONS 1 AND 2: 0

## 2017-01-24 ASSESSMENT — PAIN SCALES - GENERAL: PAINLEVEL_OUTOF10: 0

## 2017-01-24 NOTE — DISCHARGE PLANNING
Medical Social Work    JONATHAN received VM from Kiersten with [facility] asking if pt is on any new infusions.  JONATHAN spoke with hospitalist who informed this SW that pt is not on any new infusions.  JONATHAN called Kiersten with [facility] and informed her of this.  SW also informed her that pt will d/c home today.    Plan:

## 2017-01-24 NOTE — DISCHARGE PLANNING
Medical Social Work    Referral:  [facility]    Intervention:  Pt states that he has [facility] in place to assist with his port care and IV home infusions.  Pt states that they come once every two weeks.  Pt would like to continue to have [facility] in place.  SW faxed signed choice form to [facility] to submit referral for [facility] to [facility] services.    Discharge Address: [address] [place] [id number]  Phone #: [phone]  PCP: Dr. Ramos  Oncologist: Dr. Silverman    Plan:  Await response from [facility].  Pt to d/c home today.

## 2017-01-24 NOTE — DISCHARGE PLANNING
Received fax back with acceptance from [facility]. Notified JONATHAN García via voicemail. We will need to fax the DC summary to [facility] at [phone].

## 2017-01-24 NOTE — PROGRESS NOTES
Oncology/Hematology Progress Note               Author: Yo clark Date & Time created: 1/24/2017  11:36 AM     Interval History:  CC: Renal Insufficiency         Hx of Colon Ca; s/p 7 cycles of Folfox         Diarrhea  With hydration creatinine slowly coming down; currently 1.55   Feels better.Diarrhea resolved.          Review of Systems:  Review of Systems   Constitutional: Negative for fever and chills.   Cardiovascular: Negative.    Gastrointestinal: Positive for diarrhea. Negative for abdominal pain.   Skin: Negative.    Neurological: Negative.    Psychiatric/Behavioral: Negative.        Physical Exam:  Physical Exam   Constitutional: No distress.   HENT:   Head: Normocephalic.   Mouth/Throat: No oropharyngeal exudate.   Eyes: Pupils are equal, round, and reactive to light. No scleral icterus.   Cardiovascular: Normal rate.    Pulmonary/Chest: Effort normal and breath sounds normal.   Abdominal: Soft. Bowel sounds are normal.   Musculoskeletal: Normal range of motion.   Lymphadenopathy:     He has no cervical adenopathy.   Skin: Skin is warm.       Labs:        Invalid input(s): [id number]      Recent Labs      01/22/17 0300 01/23/17 0040  01/24/17   0300   SODIUM  145  141  140   POTASSIUM  4.1  3.8  4.3   CHLORIDE  115*  114*  112   CO2  20  20  20   BUN  45*  30*  24*   CREATININE  2.30*  1.79*  1.55*   CALCIUM  8.7  8.6  8.5     Recent Labs      01/22/17   0300 01/23/17   0040  01/24/17   0300   ALTSGPT   --   15  14   ASTSGOT   --   14  13   ALKPHOSPHAT   --   71  71   TBILIRUBIN   --   0.4  0.3   GLUCOSE  104*  116*  143*     No results for input(s): RBC, HEMOGLOBIN, HEMATOCRIT, PLATELETCT, PROTHROMBTM, APTT, INR, IRON, FERRITIN, TOTIRONBC in the last 72 hours.  Recent Labs      01/23/17   0040  01/24/17   0300   ASTSGOT  14  13   ALTSGPT  15  14   ALKPHOSPHAT  71  71   TBILIRUBIN  0.4  0.3     Recent Labs      01/22/17   0300  01/23/17   0040  01/24/17   0300   SODIUM  145  141  140    POTASSIUM  4.1  3.8  4.3   CHLORIDE  115*  114*  112   CO2  20  20  20   GLUCOSE  104*  116*  143*   BUN  45*  30*  24*   CREATININE  2.30*  1.79*  1.55*   CALCIUM  8.7  8.6  8.5     Hemodynamics:  Temp (24hrs), Av.8 °C (98.2 °F), Min:36.5 °C (97.7 °F), Max:37.1 °C (98.7 °F)  Temperature: 37.1 °C (98.7 °F)  Pulse  Av.2  Min: 54  Max: 135   Blood Pressure : 131/76 mmHg     Respiratory:    Respiration: 18, Pulse Oximetry: 95 %           Fluids:    Intake/Output Summary (Last 24 hours) at 17 1028  Last data filed at 17 0948   Gross per 24 hour   Intake   3840 ml   Output   2150 ml   Net   1690 ml        GI/Nutrition:  Orders Placed This Encounter   Procedures   • Diet Order     Standing Status: Standing      Number of Occurrences: 1      Standing Expiration Date:      Order Specific Question:  Diet:     Answer:  Regular [1]     Medical Decision Making, by Problem:  Active Hospital Problems    Diagnosis   • *Acute renal failure (ARF) (CMS-HCC) [N17.9]   • Dehydration [E86.0]   • Colon cancer (CMS-HCC) [C18.9]   • Hypokalemia [E87.6]   • Essential hypertension [I10]       Plan:   suggested GI opinion regarding diarrhea.However, diarrhea has resolved at this point . Pre renal VANNA from dehydration resolving .Can be DCd  Home.  Should f/u with Dr Silverman early next week in the office.Has appt for next wed.        Labs reviewed

## 2017-01-24 NOTE — PROGRESS NOTES
Assumed care of pt at 1900.  POC reviewed and updated, pt agreeable.  Will continue hourly rounding.

## 2017-01-24 NOTE — CARE PLAN
Problem: Communication  Goal: The ability to communicate needs accurately and effectively will improve  Outcome: PROGRESSING AS EXPECTED  Pt able to communicate needs and understands POC.    Problem: Venous Thromboembolism (VTW)/Deep Vein Thrombosis (DVT) Prevention:  Goal: Patient will participate in Venous Thrombosis (VTE)/Deep Vein Thrombosis (DVT)Prevention Measures  Outcome: PROGRESSING AS EXPECTED  Educated on importance of VTE interventions and Heparin use.

## 2017-01-24 NOTE — DISCHARGE INSTRUCTIONS
Has Home health with [facility] for IV chemo infusions, resume upon dc.   No longer hypertensive:  Hold on cozaar and triamterene/HCTZ while receiving chemotherapy.   Ensure plenty of oral fluids.   Follow up with DR. Silverman next week.   Follow up with Dr. Carcamo in 4-6 weeks.   Continue port maintenance as before.   Continue healthy diet.    Dehydration, Adult  Dehydration is when you lose more fluids from the body than you take in. Vital organs like the kidneys, brain, and heart cannot function without a proper amount of fluids and salt. Any loss of fluids from the body can cause dehydration.   CAUSES   · Vomiting.  · Diarrhea.  · Excessive sweating.  · Excessive urine output.  · Fever.  SYMPTOMS   Mild dehydration  1. Thirst.  2. Dry lips.  3. Slightly dry mouth.  Moderate dehydration  1. Very dry mouth.  2. Sunken eyes.  3. Skin does not bounce back quickly when lightly pinched and released.  4. Dark urine and decreased urine production.  5. Decreased tear production.  6. Headache.  Severe dehydration  1. Very dry mouth.  2. Extreme thirst.  3. Rapid, weak pulse (more than 100 beats per minute at rest).  4. Cold hands and feet.  5. Not able to sweat in spite of heat and temperature.  6. Rapid breathing.  7. Blue lips.  8. Confusion and lethargy.  9. Difficulty being awakened.  10. Minimal urine production.  11. No tears.  DIAGNOSIS   Your caregiver will diagnose dehydration based on your symptoms and your exam. Blood and urine tests will help confirm the diagnosis. The diagnostic evaluation should also identify the cause of dehydration.  TREATMENT   Treatment of mild or moderate dehydration can often be done at home by increasing the amount of fluids that you drink. It is best to drink small amounts of fluid more often. Drinking too much at one time can make vomiting worse. Refer to the home care instructions below.  Severe dehydration needs to be treated at the hospital where you will probably be given  intravenous (IV) fluids that contain water and electrolytes.  HOME CARE INSTRUCTIONS   1. Ask your caregiver about specific rehydration instructions.  2. Drink enough fluids to keep your urine clear or pale yellow.  3. Drink small amounts frequently if you have nausea and vomiting.  4. Eat as you normally do.  5. Avoid:  1. Foods or drinks high in sugar.  2. Carbonated drinks.  3. Juice.  4. Extremely hot or cold fluids.  5. Drinks with caffeine.  6. Fatty, greasy foods.  7. Alcohol.  8. Tobacco.  9. Overeating.  10. Gelatin desserts.  6. Wash your hands well to avoid spreading bacteria and viruses.  7. Only take over-the-counter or prescription medicines for pain, discomfort, or fever as directed by your caregiver.  8. Ask your caregiver if you should continue all prescribed and over-the-counter medicines.  9. Keep all follow-up appointments with your caregiver.  SEEK MEDICAL CARE IF:  · You have abdominal pain and it increases or stays in one area (localizes).  · You have a rash, stiff neck, or severe headache.  · You are irritable, sleepy, or difficult to awaken.  · You are weak, dizzy, or extremely thirsty.  SEEK IMMEDIATE MEDICAL CARE IF:   · You are unable to keep fluids down or you get worse despite treatment.  · You have frequent episodes of vomiting or diarrhea.  · You have blood or green matter (bile) in your vomit.  · You have blood in your stool or your stool looks black and tarry.  · You have not urinated in 6 to 8 hours, or you have only urinated a small amount of very dark urine.  · You have a fever.  · You faint.  MAKE SURE YOU:   · Understand these instructions.  · Will watch your condition.  · Will get help right away if you are not doing well or get worse.     This information is not intended to replace advice given to you by your health care provider. Make sure you discuss any questions you have with your health care provider.     Document Released: 12/18/2006 Document Revised: 03/11/2013  Document Reviewed: 08/06/2012  [employer] Interactive Patient Education ©2016 [employer]vier Inc.    Discharge Instructions    Discharged to home by car with relative. Discharged via wheelchair, hospital escort: Yes.  Special equipment needed: Not Applicable    Be sure to schedule a follow-up appointment with your primary care doctor or any specialists as instructed.     Discharge Plan:   Influenza Vaccine Indication: Patient Refuses    I understand that a diet low in cholesterol, fat, and sodium is recommended for good health. Unless I have been given specific instructions below for another diet, I accept this instruction as my diet prescription.   Other diet: renal      Special Instructions: None    · Is patient discharged on Warfarin / Coumadin?   No     · Is patient Post Blood Transfusion?  No    Depression / Suicide Risk    As you are discharged from this [facility] facility, it is important to learn how to keep safe from harming yourself.    Recognize the warning signs:  · Abrupt changes in personality, positive or negative- including increase in energy   · Giving away possessions  · Change in eating patterns- significant weight changes-  positive or negative  · Change in sleeping patterns- unable to sleep or sleeping all the time   · Unwillingness or inability to communicate  · Depression  · Unusual sadness, discouragement and loneliness  · Talk of wanting to die  · Neglect of personal appearance   · Rebelliousness- reckless behavior  · Withdrawal from people/activities they love  · Confusion- inability to concentrate     If you or a loved one observes any of these behaviors or has concerns about self-harm, here's what you can do:  · Talk about it- your feelings and reasons for harming yourself  · Remove any means that you might use to hurt yourself (examples: pills, rope, extension cords, firearm)  · Get professional help from the community (Mental Health, Substance Abuse, psychological counseling)  · Do not be  alone:Call your Safe Contact- someone whom you trust who will be there for you.  · Call your local CRISIS HOTLINE 005-5090 or 330-058-5868  · Call your local Children's Mobile Crisis Response Team [place] (548) 537-5476 or www.Tripwolf  · Call the toll free National Suicide Prevention Hotlines   · National Suicide Prevention Lifeline 590-571-KUGE (6072)  · [employer] Network 800-SUICIDE (830-1478)    Chronic Kidney Disease  Chronic kidney disease occurs when the kidneys are damaged over a long period. The kidneys are two organs that lie on either side of the spine between the middle of the back and the front of the abdomen. The kidneys:  · Remove wastes and extra water from the blood.  · Produce important hormones. These help keep bones strong, regulate blood pressure, and help create red blood cells.  · Balance the fluids and chemicals in the blood and tissues.  A small amount of kidney damage may not cause problems, but a large amount of damage may make it difficult or impossible for the kidneys to work the way they should. If steps are not taken to slow down the kidney damage or stop it from getting worse, the kidneys may stop working permanently. Most of the time, chronic kidney disease does not go away. However, it can often be controlled, and those with the disease can usually live normal lives.  CAUSES  The most common causes of chronic kidney disease are diabetes and high blood pressure (hypertension). Chronic kidney disease may also be caused by:  4. Diseases that cause the kidneys' filters to become inflamed.  5. Diseases that affect the immune system.  6. Genetic diseases.  7. Medicines that damage the kidneys, such as anti-inflammatory medicines.  8. Poisoning or exposure to toxic substances.  9. A reoccurring kidney or urinary infection.  10. A problem with urine flow. This may be caused by:  1. Cancer.  2. Kidney stones.  3. An enlarged prostate in males.  SIGNS AND  SYMPTOMS  Because the kidney damage in chronic kidney disease occurs slowly, symptoms develop slowly and may not be obvious until the kidney damage becomes severe. A person may have a kidney disease for years without showing any symptoms. Symptoms can include:  7. Swelling (edema) of the legs, ankles, or feet.  8. Tiredness (lethargy).  9. Nausea or vomiting.  10. Confusion.  11. Problems with urination, such as:  1. Decreased urine production.  2. Frequent urination, especially at night.  3. Frequent accidents in children who are potty trained.  12. Muscle twitches and cramps.  13. Shortness of breath.  14. Weakness.  15. Persistent itchiness.  16. Loss of appetite.  17. Metallic taste in the mouth.  18. Trouble sleeping.  19. Slowed development in children.  20. Short stature in children.  DIAGNOSIS  Chronic kidney disease may be detected and diagnosed by tests, including blood, urine, imaging, or kidney biopsy tests.  TREATMENT  Most chronic kidney diseases cannot be cured. Treatment usually involves relieving symptoms and preventing or slowing the progression of the disease. Treatment may include:  2. A special diet. You may need to avoid alcohol and foods that are salty and high in potassium.  3. Medicines. These may:  1. Lower blood pressure.  2. Relieve anemia.  3. Relieve swelling.  4. Protect the bones.  HOME CARE INSTRUCTIONS  2. Follow your prescribed diet.  Your health care provider may instruct you to limit daily salt (sodium) and protein intake.  3. Take medicines only as directed by your health care provider. Do not take any new medicines (prescription, over-the-counter, or nutritional supplements) unless approved by your health care provider. Many medicines can worsen your kidney damage or need to have the dose adjusted.    4. Quit smoking if you smoke. Talk to your health care provider about a smoking cessation program.  5. Keep all follow-up visits as directed by your health care  provider.  6. Monitor your blood pressure.  7. Start or continue an exercise plan.  8. Get immunizations as directed by your health care provider.  9. Take vitamin and mineral supplements as directed by your health care provider.  SEEK IMMEDIATE MEDICAL CARE IF:  · Your symptoms get worse or you develop new symptoms.  · You develop symptoms of end-stage kidney disease. These include:  ¨ Headaches.  ¨ Abnormally dark or light skin.  ¨ Numbness in the hands or feet.  ¨ Easy bruising.  ¨ Frequent hiccups.  ¨ Menstruation stops.  · You have a fever.  · You have decreased urine production.  · You have pain or bleeding when urinating.  MAKE SURE YOU:  · Understand these instructions.  · Will watch your condition.  · Will get help right away if you are not doing well or get worse.  FOR MORE INFORMATION   · American Association of Kidney Patients: www.aakp.org  · National Kidney Foundation: www.kidney.org  · American Kidney Fund: www.akfinc.org  · Life Options Rehabilitation Program: www.lifeoptions.org and www.kidneyschool.org     This information is not intended to replace advice given to you by your health care provider. Make sure you discuss any questions you have with your health care provider.     Document Released: 09/26/2009 Document Revised: 01/08/2016 Document Reviewed: 08/16/2013  [employer] Interactive Patient Education ©2016 Elsevier Inc.

## 2017-01-24 NOTE — DISCHARGE PLANNING
Received call from Kiersten with [facility] , who has questions. Gave SW number to her.

## 2017-01-24 NOTE — PROGRESS NOTES
[facility] Nephrology Progress Note               Author: Carlos Alberto Date & Time created: 1/24/2017  8:03 AM     Interval History  66 y.o. Male with no signficant hx of CKD who was admitted through the Emergency Department following a referral by Dr. Silverman for dehydration and general sickness.    Labs showed Acute Kidney injury and hypokalemia prompting this consultation.The patient was diagnosed with colon cancer five months ago and underwent surgery in [place] to remove three cancerous lymph nodes.  He has since completed two rounds of chemotherapy.   Per patient, he was feeling great up until one month ago when he suddenly developed many symptoms.  For the last month the patient has suffered from decreased appetite and fatigue.  The patient is scheduled to receive treatment each week but missed his last appointment due to feeling ill.  The patient has suffered from diarrhea for the last week.  He does not note associated fevers, chills, or abdominal pain. He does not report having a medication for anti-nausea.   He denies dysuria/frequency/nocturia/edema/rash  Denies decreased urine output  He did have CT scan as outpatient ~10 days ago - patient not sure if he got IV contrast or not.  He just had Renal US - has a history of a solitary functioning kidney (cause of loss of other kidney unknown)  Denies change in medications  Finished last round of Chemotherapy ~1 week ago per patient      Daily Nephrology update:   01/21/17 -  Cr is trending down , we will replace K , Good UOP  01/22/17 - Scr continues to trend down with IVFs.  Appetite improved, continues with diarrhea   1/23/17 -pt continuing to improve, better apetite, still no BM  1/24/17- Regular BM yesterday, pt eating and drinking normally      Review of Systems:  Review of Systems   HENT: Negative.    Eyes: Negative.    Respiratory: Negative.    Cardiovascular: Negative.    Gastrointestinal: Negative.    Genitourinary: Negative.    Musculoskeletal:  Negative.    Skin: Negative.    Neurological: Positive for weakness.   Endo/Heme/Allergies: Negative.    Psychiatric/Behavioral: Negative.        Physical Exam:  Physical Exam   Constitutional: He is oriented to person, place, and time. He appears well-developed and well-nourished.   HENT:   Head: Normocephalic and atraumatic.   Eyes: Conjunctivae are normal. Pupils are equal, round, and reactive to light.   Neck: Normal range of motion. Neck supple.   Cardiovascular: Normal rate and regular rhythm.    Pulmonary/Chest: Effort normal and breath sounds normal.   Abdominal: Soft. Bowel sounds are normal.   Musculoskeletal: Normal range of motion.   Neurological: He is alert and oriented to person, place, and time.   Skin: Skin is warm and dry.   Nursing note and vitals reviewed.      Labs:        Invalid input(s): [id number]      Recent Labs      17   0300  17   0040  17   0300   SODIUM  145  141  140   POTASSIUM  4.1  3.8  4.3   CHLORIDE  115*  114*  112   CO2  20  20  20   BUN  45*  30*  24*   CREATININE  2.30*  1.79*  1.55*   CALCIUM  8.7  8.6  8.5     Recent Labs      17   0300  17   0040  17   0300   ALTSGPT   --   15  14   ASTSGOT   --   14  13   ALKPHOSPHAT   --   71  71   TBILIRUBIN   --   0.4  0.3   GLUCOSE  104*  116*  143*     No results for input(s): RBC, HEMOGLOBIN, HEMATOCRIT, PLATELETCT, PROTHROMBTM, APTT, INR, IRON, FERRITIN, TOTIRONBC in the last 72 hours.  Recent Labs      17   0040  17   0300   ASTSGOT  14  13   ALTSGPT  15  14   ALKPHOSPHAT  71  71   TBILIRUBIN  0.4  0.3           Hemodynamics:  Temp (24hrs), Av.7 °C (98 °F), Min:36.5 °C (97.7 °F), Max:36.8 °C (98.3 °F)  Temperature: 36.5 °C (97.7 °F)  Pulse  Av.5  Min: 54  Max: 135   Blood Pressure : 138/77 mmHg     Respiratory:    Respiration: 18, Pulse Oximetry: 96 %           Fluids:    Intake/Output Summary (Last 24 hours) at 17 0803  Last data filed at 17 0451   Gross per  24 hour   Intake   1121 ml   Output   1350 ml   Net   -229 ml        GI/Nutrition:  Orders Placed This Encounter   Procedures   • Diet Order     Standing Status: Standing      Number of Occurrences: 1      Standing Expiration Date:      Order Specific Question:  Diet:     Answer:  Regular [1]     Medical Decision Making, by Problem:  Active Hospital Problems    Diagnosis   • Dehydration [E86.0]   • Hypokalemia [E87.6]   • Acute kidney injury (CMS-HCC) [N17.9]   • Colon cancer (CMS-HCC) [C18.9]     Assessment/Plan:    # Acute Kidney Injury, non-oliguric   -- 2/2 volume depletion   -- improved with fluids    # HYPOKALEMIA, resolved        # ANEMIA      # COLON CANCER  --per oncology    #Diarrhea, resolved    # Anorexia  -- megace         Suggestions:   DC IVF  DC potassium  OK to DC from renal perpsective      Medications reviewed and Labs reviewed

## 2017-01-24 NOTE — FACE TO FACE
Face to Face Supporting Documentation - Home Health    The encounter with this patient was in whole or in part the primary reason for home health admission.    Date of encounter:   Patient:                    MRN:                       YOB: 2016  Gerald Oshea  [id number]  1950     Home health to see patient for:  Skilled Nursing care for assessment, interventions & education and Comment: IV home chemo infusions    Skilled need for:  Exacerbation of Chronic Disease State colon cancer and Medication Management IV chemo home infusions     Skilled nursing interventions to include:  Home IV infusion therapy    Homebound status evidenced by:  Have a condition such that leaving his or her home is medically contraindicated. Leaving home requires a considerable and taxing effort. There is a normal inability to leave the home.    Community Physician to provide follow up care: Jocelyn Ramos M.D.     Optional Interventions? No      I certify the face to face encounter for this home health care referral meets the CMS requirements and the encounter/clinical assessment with the patient was, in whole, or in part, for the medical condition(s) listed above, which is the primary reason for home health care. Based on my clinical findings: the service(s) are medically necessary, support the need for home health care, and the homebound criteria are met.  I certify that this patient has had a face to face encounter by myself.  Roxane Way M.D. - NPI: [id number]

## 2017-01-24 NOTE — DISCHARGE PLANNING
Received choice form from JONATHAN García at 1418. Referral sent to [facility] at 1421.

## 2017-01-25 ENCOUNTER — PATIENT OUTREACH (OUTPATIENT)
Dept: HEALTH INFORMATION MANAGEMENT | Facility: OTHER | Age: 67
End: 2017-01-25

## 2017-01-25 LAB
BACTERIA BLD CULT: NORMAL
BACTERIA BLD CULT: NORMAL
SIGNIFICANT IND 70042: NORMAL
SIGNIFICANT IND 70042: NORMAL
SITE SITE: NORMAL
SITE SITE: NORMAL
SOURCE SOURCE: NORMAL
SOURCE SOURCE: NORMAL

## 2017-01-25 NOTE — DISCHARGE SUMMARY
PRIMARY CARE PROVIDER:  Dr. Jocelyn Ramos, follow up in the next 2 weeks.    ONCOLOGIST:  Dr. Silverman, follow up early next week.    CONSULTATIONS:  Dr. Carcamo of nephrologist, follow up in 4-6 weeks.    CHIEF COMPLAINT:  Acute dehydration after chemotherapy 5-FU 7 rounds with   onset of diarrhea, found to be in acute kidney failure.  Admission BUN 81 and   creatinine 5.12.  One solitary kidney in left side.    DISCHARGE MEDICATIONS:  Megace 800 mg p.o. daily for appetite, allopurinol 50   mg p.o. daily, clonazepam 1 mg 2 times daily, iron 325 mg p.o. daily, he is to   continue with 5-FU through port to [facility], IV infusion as   scheduled, Crestor 40 mg p.o. at bedtime.  He is to stop taking losartan and   triamterene-HCTZ as well as any potassium supplementation.    DISCHARGE DIAGNOSES:  1.  Acute diarrheal illness, thought to be due to viral versus chemotherapy   induced.  Clostridium difficile is negative.  Stool culture is negative,   resolved at the time of discharge.  2.  Acute dehydration secondary to dehydration from diarrhea, resolved with IV   fluids.  3.  Acute kidney injury with initial BUN and creatinine as mentioned,   creatinine 5.3.  Nephrology consulted.  IV fluids has resolved.  Patient's   acute kidney injury.  BUN and creatinine at time of discharge, BUN 24 and   creatinine 1.55 with GFR of 45.  Tolerating p.o. fluids well.  4.  Hypokalemia also resolved.  5.  Anemia without acute blood loss or drop in hemoglobin.  6.  History of colon cancer diagnosed 5 months ago.  He underwent surgery in   [place] with 3 out of 25 cancerous lymph nodes.  He is currently under active   chemotherapy treatment 5-FU.  7.  Essential hypertension.  He has been off all antihypertensives during   hospitalization with the blood pressure of 131/76.  I did recommend that he   not restart his Cozaar or his triamterene-HCTZ as he is prone to dehydration.    His renal failure improved with fluid boluses,  fluid administration.  We   would recommend either a beta blocker or hydralazine, if blood pressure   control is needed.  8.  Hypokalemia, appears to be resolved, likely secondary to diarrheal losses.    HOSPITAL COURSE:  This 66-year-old male presented with acute kidney injury   from acute dehydration secondary to diarrheal losses.  On 01/21/2017, he has   undergone ____ chemotherapy.  Cultures of stool as well C. difficile were   negative.  His LFTs were within normal limits.  His diarrhea had completely   resolved.  Nephrology was consulted.  His triamterene-HCTZ and losartan had   been held since admission with good control of his blood pressure without   these medications.  He also has A1c of 5.7 with borderline diabetes with no   active diabetes treatment.  Patient is doing well.  No further diarrhea and   eating well.  He has a central port in left anterior chest, which he actively   gets home health chemo infusions through [facility].  He will continue with   these and follow with Dr. Silverman.  No procedures performed.  Of note, he also   had a CAT scan on 01/06/2017 with IV contrast, avoid nephrotoxins.  It is   found on ultrasound, he has one solitary kidney on the left with no   hydronephrosis or other abnormality.    TOTAL TIME SPENT:  Discharge time is 50 minutes.       ____________________________________     MD ONESIMO Keith / ISABEL    DD:  01/24/2017 13:48:00  DT:  01/24/2017 20:43:52    D#:  [id number]  Job#:  [id number]

## 2017-01-25 NOTE — PROGRESS NOTES
Pt port deaccessed. Received discharge instructions- no questions, brother at bedside, refused to wait for wheelchair, walked out with brother with his belongs.

## 2017-01-27 LAB — EKG IMPRESSION: NORMAL

## 2017-02-07 ENCOUNTER — TELEPHONE (OUTPATIENT)
Dept: SLEEP MEDICINE | Facility: MEDICAL CENTER | Age: 67
End: 2017-02-07

## 2017-02-07 NOTE — TELEPHONE ENCOUNTER
Pt called [facility] what the status was on order w/ DME:  [facility]  / ph [phone] / fax [phone]  Called [facility] and they said they needed the sleep studies and a note prior to sleep studies, I faxed these records again from [facility] on 2/7/17 at 9:27am. LVM for pt for call back to inform of situation.

## 2017-02-07 NOTE — TELEPHONE ENCOUNTER
Spoke with pt, he is aware and is going to follow up with DME:  [facility]  / khalif [phone] / fax [phone] in a few days.

## 2017-02-16 ENCOUNTER — TELEPHONE (OUTPATIENT)
Dept: SLEEP MEDICINE | Facility: MEDICAL CENTER | Age: 67
End: 2017-02-16

## 2017-02-16 NOTE — TELEPHONE ENCOUNTER
Patient is dissatisfied with [facility] and would like his order for a new BPAP machine to be faxed elsewhere.  Printed order, demos, and records - faxed to DME:  [facility] / ph [phone] / fax [phone].  [facility] is conveniently located for [place].  Gave him the address and phone number.  Advised this could take 7-10 days for setup.

## 2017-03-22 ENCOUNTER — APPOINTMENT (OUTPATIENT)
Dept: MEDICAL GROUP | Facility: PHYSICIAN GROUP | Age: 67
End: 2017-03-22
Payer: MEDICARE

## 2017-03-28 ENCOUNTER — OFFICE VISIT (OUTPATIENT)
Dept: MEDICAL GROUP | Facility: PHYSICIAN GROUP | Age: 67
End: 2017-03-28
Payer: MEDICARE

## 2017-03-28 VITALS
SYSTOLIC BLOOD PRESSURE: 122 MMHG | OXYGEN SATURATION: 95 % | HEIGHT: 69 IN | DIASTOLIC BLOOD PRESSURE: 70 MMHG | HEART RATE: 79 BPM | WEIGHT: 268 LBS | BODY MASS INDEX: 39.69 KG/M2 | TEMPERATURE: 97.8 F | RESPIRATION RATE: 18 BRPM

## 2017-03-28 DIAGNOSIS — C18.6 MALIGNANT NEOPLASM OF DESCENDING COLON (HCC): ICD-10-CM

## 2017-03-28 DIAGNOSIS — R97.20 ELEVATED PSA, LESS THAN 10 NG/ML: ICD-10-CM

## 2017-03-28 DIAGNOSIS — G63 NEUROPATHY ASSOCIATED WITH CANCER (HCC): ICD-10-CM

## 2017-03-28 DIAGNOSIS — E78.00 PURE HYPERCHOLESTEROLEMIA: ICD-10-CM

## 2017-03-28 DIAGNOSIS — E66.9 OBESITY, UNSPECIFIED OBESITY SEVERITY, UNSPECIFIED OBESITY TYPE: ICD-10-CM

## 2017-03-28 DIAGNOSIS — C80.1 NEUROPATHY ASSOCIATED WITH CANCER (HCC): ICD-10-CM

## 2017-03-28 DIAGNOSIS — Z87.39 HISTORY OF GOUT: ICD-10-CM

## 2017-03-28 PROBLEM — E86.0 DEHYDRATION: Status: RESOLVED | Noted: 2017-01-20 | Resolved: 2017-03-28

## 2017-03-28 PROBLEM — N17.9 ACUTE RENAL FAILURE (ARF) (HCC): Status: RESOLVED | Noted: 2017-01-20 | Resolved: 2017-03-28

## 2017-03-28 PROBLEM — R19.7 DIARRHEA, UNSPECIFIED: Status: RESOLVED | Noted: 2017-01-24 | Resolved: 2017-03-28

## 2017-03-28 PROBLEM — E87.6 HYPOKALEMIA: Status: RESOLVED | Noted: 2017-01-20 | Resolved: 2017-03-28

## 2017-03-28 PROCEDURE — G8432 DEP SCR NOT DOC, RNG: HCPCS | Performed by: FAMILY MEDICINE

## 2017-03-28 PROCEDURE — G8484 FLU IMMUNIZE NO ADMIN: HCPCS | Performed by: FAMILY MEDICINE

## 2017-03-28 PROCEDURE — 99214 OFFICE O/P EST MOD 30 MIN: CPT | Performed by: FAMILY MEDICINE

## 2017-03-28 PROCEDURE — G8419 CALC BMI OUT NRM PARAM NOF/U: HCPCS | Performed by: FAMILY MEDICINE

## 2017-03-28 PROCEDURE — 1036F TOBACCO NON-USER: CPT | Performed by: FAMILY MEDICINE

## 2017-03-28 PROCEDURE — 1101F PT FALLS ASSESS-DOCD LE1/YR: CPT | Performed by: FAMILY MEDICINE

## 2017-03-28 PROCEDURE — 4040F PNEUMOC VAC/ADMIN/RCVD: CPT | Performed by: FAMILY MEDICINE

## 2017-03-28 NOTE — ASSESSMENT & PLAN NOTE
PSA 7.74 to 6.7 to 4.9 over the last few months. Denies frequent urination or weak urinary stream. He does admit to nocturia every once in a while.

## 2017-03-28 NOTE — ASSESSMENT & PLAN NOTE
Patient has started having some numbness and tingling in his fingertips and toes. He tells me that for the last 2 chemotherapy sessions, Dr. Silverman will not order one of the medications that has been known to cause neuropathy. He is interested in starting the medicine, but wants to discuss it with Dr. Silverman first.

## 2017-03-28 NOTE — MR AVS SNAPSHOT
"        Gerald Oshea   3/28/2017 10:40 AM   Office Visit   MRN: [id number]    Department:  [facility]   Dept Phone:  [phone]    Description:  Male : 1950   Provider:  Jocelyn Ramos M.D.           Reason for Visit     Labs Only           Allergies as of 3/28/2017     Allergen Noted Reactions    Lidocaine 2016   Rash    [id number]        You were diagnosed with     Malignant neoplasm of descending colon (CMS-HCC)   [153.2.ICD-9-CM]       Neuropathy associated with cancer (CMS-HCC)   [552098]       Elevated PSA, less than 10 ng/ml   [092323]       Pure hypercholesterolemia   [272.0.ICD-9-CM]       History of gout   [373709]       Obesity, unspecified obesity severity, unspecified obesity type   [7394630]         Vital Signs     Blood Pressure Pulse Temperature Respirations Height Weight    122/70 mmHg 79 36.6 °C (97.8 °F) 18 1.753 m (5' 9\") 121.564 kg (268 lb)    Body Mass Index Oxygen Saturation Smoking Status             39.56 kg/m2 95% Former Smoker         Basic Information     Date Of Birth Sex Race Ethnicity Preferred Language    1950 Male White Non- English      Your appointments     2017 11:00 AM   Established Patient with Jocelyn Ramos M.D.   [facility] (--)    [address]  [address]  [facility] [id number] [phone]           You will be receiving a confirmation call a few days before your appointment from our automated call confirmation system.              Problem List              ICD-10-CM Priority Class Noted - Resolved    Obesity E66.9 Medium  10/25/2012 - Present    Colon cancer (CMS-HCC) C18.9 Medium  2016 - Present    History of gout Z87.39   2016 - Present    Essential hypertension I10 Low  2016 - Present    Lung nodule, solitary R91.1   2016 - Present    Anxiety F41.9   2016 - Present    Pure hypercholesterolemia E78.00   2016 - Present    Elevated PSA, less than 10 ng/ml R97.20   2016 - Present "    Functional constipation K59.04   11/2/2016 - Present    Neuropathy associated with cancer (CMS-HCC) C80.1, G63   3/28/2017 - Present      Health Maintenance        Date Due Completion Dates    IMM DTaP/Tdap/Td Vaccine (1 - Tdap) 8/7/1970 ---    IMM ZOSTER VACCINE 8/7/2011 ---    IMM INFLUENZA (1) 9/1/2016 11/20/2015    IMM PNEUMOCOCCAL 65+ (ADULT) LOW/MEDIUM RISK SERIES (2 of 2 - PCV13) 11/20/2017 11/20/2016    COLONOSCOPY 6/29/2026 6/29/2016            Current Immunizations     Influenza Vaccine Quad Inj (Pf) 11/20/2015    Pneumococcal polysaccharide vaccine (PPSV-23) 11/20/2016      Below and/or attached are the medications your provider expects you to take. Review all of your home medications and newly ordered medications with your provider and/or pharmacist. Follow medication instructions as directed by your provider and/or pharmacist. Please keep your medication list with you and share with your provider. Update the information when medications are discontinued, doses are changed, or new medications (including over-the-counter products) are added; and carry medication information at all times in the event of emergency situations     Allergies:  LIDOCAINE - Rash               Medications  Valid as of: March 28, 2017 - 11:06 AM    Generic Name Brand Name Tablet Size Instructions for use    ClonazePAM (Tab) KLONOPIN 1 MG Take 1 Tab by mouth 2 times a day.        Ferrous Sulfate (Tab) Iron 325 (65 FE) MG Take 325 mg by mouth every day.        NON SPECIFIED   1 Each by Intravenous route every 14 days. 5FU        Rosuvastatin Calcium (Tab) CRESTOR 40 MG Take 1 Tab by mouth every bedtime.        .                 Medicines prescribed today were sent to:     [facility] #[id number] - [place], [place] - [address]    [address] [place] [address]    Phone: [phone] Fax: [phone]    Open 24 Hours?: No      Medication refill instructions:       If your prescription bottle indicates you have medication refills  left, it is not necessary to call your provider’s office. Please contact your pharmacy and they will refill your medication.    If your prescription bottle indicates you do not have any refills left, you may request refills at any time through one of the following ways: The online [employer] system (except Urgent Care), by calling your provider’s office, or by asking your pharmacy to contact your provider’s office with a refill request. Medication refills are processed only during regular business hours and may not be available until the next business day. Your provider may request additional information or to have a follow-up visit with you prior to refilling your medication.   *Please Note: Medication refills are assigned a new Rx number when refilled electronically. Your pharmacy may indicate that no refills were authorized even though a new prescription for the same medication is available at the pharmacy. Please request the medicine by name with the pharmacy before contacting your provider for a refill.        Your To Do List     Future Labs/Procedures Complete By Expires    LIPID PROFILE  As directed 3/29/2018    PROSTATE SPECIFIC AG SCREENING  As directed 3/28/2018      Instructions    Gabapentin or Lyrica to help with neuropathy          [employer] Access Code: Activation code not generated  Current [employer] Status: Active

## 2017-04-03 ENCOUNTER — DOCUMENTATION (OUTPATIENT)
Dept: PULMONOLOGY | Facility: HOSPICE | Age: 67
End: 2017-04-03

## 2017-04-03 NOTE — PROGRESS NOTES
PER [facility] - SINCE PT IS NOW MEDICARE AND IT IS DOCUMENTED PT DID NOT USE MACHINE SINCE 2015, HE WILL NEED NEW DX SLEEP STUDY AND FOLLOW UP

## 2017-04-07 ENCOUNTER — TELEPHONE (OUTPATIENT)
Dept: SLEEP MEDICINE | Facility: MEDICAL CENTER | Age: 67
End: 2017-04-07

## 2017-04-07 DIAGNOSIS — G47.33 OSA (OBSTRUCTIVE SLEEP APNEA): ICD-10-CM

## 2017-04-07 NOTE — TELEPHONE ENCOUNTER
Called patient, he just finished chemotherapy and doesn't want to complete any testing at this time.  He was, however, successful in getting his machine from [facility].  He told [facility] to disregard.    Patient will call to schedule the in-lab sleep study after he feels better from the chemo.

## 2017-04-07 NOTE — TELEPHONE ENCOUNTER
Dr. Keller's last note recommended the patient have an in lab titration to BiPAP given weight loss. Since this was recommended, in lab diagnostic and titration in the form of a split-night study should be completed.

## 2017-04-07 NOTE — TELEPHONE ENCOUNTER
Patient needs to requalify for the BPAP order we sent to [facility].  Can we just have the patient complete a home sleep study?

## 2017-05-09 ENCOUNTER — SLEEP CENTER VISIT (OUTPATIENT)
Dept: SLEEP MEDICINE | Facility: MEDICAL CENTER | Age: 67
End: 2017-05-09
Payer: MEDICARE

## 2017-05-09 VITALS
RESPIRATION RATE: 16 BRPM | BODY MASS INDEX: 39.55 KG/M2 | WEIGHT: 267 LBS | DIASTOLIC BLOOD PRESSURE: 86 MMHG | OXYGEN SATURATION: 93 % | SYSTOLIC BLOOD PRESSURE: 118 MMHG | HEIGHT: 69 IN | HEART RATE: 71 BPM

## 2017-05-09 DIAGNOSIS — C18.6 MALIGNANT NEOPLASM OF DESCENDING COLON (HCC): ICD-10-CM

## 2017-05-09 DIAGNOSIS — G47.33 OBSTRUCTIVE SLEEP APNEA: ICD-10-CM

## 2017-05-09 PROCEDURE — 99213 OFFICE O/P EST LOW 20 MIN: CPT | Performed by: NURSE PRACTITIONER

## 2017-05-09 RX ORDER — TRIAMTERENE AND HYDROCHLOROTHIAZIDE 37.5; 25 MG/1; MG/1
1 TABLET ORAL
Refills: 3 | COMMUNITY
Start: 2017-03-30 | End: 2018-02-27

## 2017-05-09 RX ORDER — LOSARTAN POTASSIUM 100 MG/1
100 TABLET ORAL
Refills: 3 | COMMUNITY
Start: 2017-03-30 | End: 2018-02-27

## 2017-05-09 RX ORDER — GABAPENTIN 300 MG/1
300-600 CAPSULE ORAL 4 TIMES DAILY
Refills: 4 | COMMUNITY
Start: 2017-04-18 | End: 2017-12-12

## 2017-05-09 NOTE — MR AVS SNAPSHOT
"        Gerald Oshea   2017 9:20 AM   Sleep Center Visit   MRN: [id number]    Department:  [facility]   Dept Phone:  [phone]    Description:  Male : 1950   Provider:  CHICHO Ramos           Reason for Visit     Follow-Up           Allergies as of 2017     Allergen Noted Reactions    Lidocaine 2016   Rash    HSO=[id number]        You were diagnosed with     Obstructive sleep apnea   [709441]       Malignant neoplasm of descending colon (CMS-HCC)   [153.2.ICD-9-CM]       BMI 39.0-39.9,adult   [099163]         Vital Signs     Blood Pressure Pulse Respirations Height Weight Body Mass Index    118/86 mmHg 71 16 1.753 m (5' 9.02\") 121.11 kg (267 lb) 39.41 kg/m2    Oxygen Saturation Smoking Status                93% Former Smoker          Basic Information     Date Of Birth Sex Race Ethnicity Preferred Language    1950 Male White Non- English      Your appointments     2017 11:00 AM   Established Patient with Jocelyn Ramos M.D.   [facility] (--)    [address]  [address]  [place] [address] [phone]           You will be receiving a confirmation call a few days before your appointment from our automated call confirmation system.            2017  9:00 PM   Sleep Study with SLEEP TECH   [facility] (--)    [address]  [place] [id number] [phone]            2017 11:20 AM   Follow UP with CHICHO Godoy   [facility] (--)    [address]  [place] [id number] [phone]              Problem List              ICD-10-CM Priority Class Noted - Resolved    Obesity E66.9 Medium  10/25/2012 - Present    Colon cancer (CMS-HCC) C18.9 Medium  2016 - Present    History of gout Z87.39   2016 - Present    Essential hypertension I10 Low  2016 - Present    Lung nodule, solitary R91.1   2016 - Present   " Anxiety F41.9   9/28/2016 - Present    Pure hypercholesterolemia E78.00   9/28/2016 - Present    Elevated PSA, less than 10 ng/ml R97.20   9/28/2016 - Present    Functional constipation K59.04   11/2/2016 - Present    Neuropathy associated with cancer (CMS-HCC) C80.1, G63   3/28/2017 - Present    Obstructive sleep apnea G47.33   5/9/2017 - Present    BMI 39.0-39.9,adult Z68.39   5/9/2017 - Present      Health Maintenance        Date Due Completion Dates    IMM DTaP/Tdap/Td Vaccine (1 - Tdap) 8/7/1970 ---    IMM ZOSTER VACCINE 8/7/2011 ---    IMM PNEUMOCOCCAL 65+ (ADULT) LOW/MEDIUM RISK SERIES (2 of 2 - PCV13) 11/20/2017 11/20/2016    COLONOSCOPY 6/29/2026 6/29/2016            Current Immunizations     Influenza Vaccine Quad Inj (Pf) 11/20/2015    Pneumococcal polysaccharide vaccine (PPSV-23) 11/20/2016      Below and/or attached are the medications your provider expects you to take. Review all of your home medications and newly ordered medications with your provider and/or pharmacist. Follow medication instructions as directed by your provider and/or pharmacist. Please keep your medication list with you and share with your provider. Update the information when medications are discontinued, doses are changed, or new medications (including over-the-counter products) are added; and carry medication information at all times in the event of emergency situations     Allergies:  LIDOCAINE - Rash               Medications  Valid as of: May 09, 2017 -  9:52 AM    Generic Name Brand Name Tablet Size Instructions for use    ClonazePAM (Tab) KLONOPIN 1 MG Take 1 Tab by mouth 2 times a day.        Ferrous Sulfate (Tab) Iron 325 (65 FE) MG Take 325 mg by mouth every day.        Gabapentin (Cap) NEURONTIN 300 MG Take 300 mg by mouth.        Losartan Potassium (Tab) COZAAR 100 MG Take 100 mg by mouth every day. TAKE 1 TAB BY MOUTH EVERY DAY.        NON SPECIFIED   1 Each by Intravenous route every 14 days. 5FU        Rosuvastatin  Calcium (Tab) CRESTOR 40 MG Take 1 Tab by mouth every bedtime.        Triamterene-HCTZ (Tab) MAXZIDE-25/DYAZIDE 37.5-25 MG Take 1 Tab by mouth every day. TAKE 1 TAB BY MOUTH EVERY DAY.        .                 Medicines prescribed today were sent to:     [facility]/PHARMACY #[id number] - [place], [place] - [address]    [address] [place] [address]    Phone: [phone] Fax: [phone]    Open 24 Hours?: No      Medication refill instructions:       If your prescription bottle indicates you have medication refills left, it is not necessary to call your provider’s office. Please contact your pharmacy and they will refill your medication.    If your prescription bottle indicates you do not have any refills left, you may request refills at any time through one of the following ways: The online [employer] system (except Urgent Care), by calling your provider’s office, or by asking your pharmacy to contact your provider’s office with a refill request. Medication refills are processed only during regular business hours and may not be available until the next business day. Your provider may request additional information or to have a follow-up visit with you prior to refilling your medication.   *Please Note: Medication refills are assigned a new Rx number when refilled electronically. Your pharmacy may indicate that no refills were authorized even though a new prescription for the same medication is available at the pharmacy. Please request the medicine by name with the pharmacy before contacting your provider for a refill.        Your To Do List     Future Labs/Procedures Complete By Expires    MASK FITTING  As directed 5/9/2018         [employer] Access Code: Activation code not generated  Current [employer] Status: Active

## 2017-05-09 NOTE — PROGRESS NOTES
Chief Complaint   Patient presents with   • Follow-Up       HPI:  Gerald Oshea is a 66 y.o. year old male here today for follow-up on SU, AHI 97. His last visit was 1/20/17 with Dr. Keller and he was experiencing significant weight loss and hypovolemia due to colon cancer. He was referred to Dr. Silverman's office for immediate evaluation. He had been undergoing chemoradiation. Due to weight loss, repeat sleep study was recommended, but this has not been completed. Today he present for compliance check for Medicare due to obtaining a new machine since last OV.  He is currently on BiPAP 20/16 cm of water with 1 L oxygen bleed in. He has dropped 40 pounds with chemotherapy. Compliance card 2/8/17-5/8/17 indicates 61 days of usage since obtaining machine, avg nightly use of 6hrs, AHI 25.5, and significant mask leak. I reviewed finding with patient and highly recommended scheduling PSG split night to re-evaluate sleep apnea and mask fit. He does have a beckwith.    Today he notes fatigue and having to take naps 2 hrs after waking. He finished chemotherapy 1.5mos ago. He is pending f/u with Dr. Newman at the end of this month for updated CT scan and colonoscopy to determine status of colon cancer. He is wants to complete sleep study after that time. He denies fever, chills, night sweats, chest pain, cough, ankle swelling or hemoptysis. He does have neuropathy in hands/feet from chemo. He notes being sedentary due to fatigue and being deconditioned when walking further distances causing dyspnea.      ROS: As per HPI and otherwise negative if not stated.    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   • Pneumonia 1991     Left upper lobe   • Sleep apnea    • Snoring    • Psychiatric  "problem      anxiety   • DM (diabetes mellitus) (CMS-[facility])        Past Surgical History   Procedure Laterality Date   • Closed reduction  10/25/2012     Performed by Chavez Duran M.D. at SURGERY [facility]   • Colon resection     • Cath placement Left 10/7/2016     Procedure: CATH PLACEMENT - SUBCLAVIAN PORT;  Surgeon: Sho Bajwa M.D.;  Location: SURGERY SAME DAY [facility];  Service:        Family History   Problem Relation Age of Onset   • Cancer Mother      Bladder   • Heart Disease Father    • Heart Disease Brother      CABG x2       Social History     Social History   • Marital Status: Single     Spouse Name: N/A   • Number of Children: N/A   • Years of Education: N/A     Occupational History   • Retired    • Former business owner      Social History Main Topics   • Smoking status: Former Smoker -- 1.00 packs/day for 20 years     Types: Cigarettes     Quit date: 01/01/1998   • Smokeless tobacco: Never Used   • Alcohol Use: 0.0 oz/week     0 Standard drinks or equivalent per week      Comment: wine  2 daily   • Drug Use: No   • Sexual Activity: Not Currently     Other Topics Concern   • Not on file     Social History Narrative       Allergies as of 05/09/2017 - Marc as Reviewed 05/09/2017   Allergen Reaction Noted   • Lidocaine Rash 09/28/2016        @Vital signs for this encounter:  Filed Vitals:    05/09/17 0913   Height: 1.753 m (5' 9.02\")   Weight: 121.11 kg (267 lb)   Weight % change since last entry.: 0 %   BP: 118/86   Pulse: 71   BMI (Calculated): 39.41   Resp: 16   O2 sat % room air: 93 %       Current medications as of today   Current Outpatient Prescriptions   Medication Sig Dispense Refill   • gabapentin (NEURONTIN) 300 MG Cap Take 300 mg by mouth.  4   • losartan (COZAAR) 100 MG Tab Take 100 mg by mouth every day. TAKE 1 TAB BY MOUTH EVERY DAY.  3   • triamterene-hctz (MAXZIDE-25/DYAZIDE) 37.5-25 MG Tab Take 1 Tab by mouth every day. TAKE 1 TAB BY MOUTH EVERY DAY.  3   • NON " SPECIFIED 1 Each by Intravenous route every 14 days. 5FU     • rosuvastatin (CRESTOR) 40 MG tablet Take 1 Tab by mouth every bedtime. 90 Tab 3   • clonazepam (KLONOPIN) 1 MG Tab Take 1 Tab by mouth 2 times a day. 60 Tab 3   • Ferrous Sulfate (IRON) 325 (65 FE) MG Tab Take 325 mg by mouth every day.       No current facility-administered medications for this visit.         Physical Exam:   Gen:           Alert and oriented, No apparent distress. Mood and affect appropriate, normal interaction with examiner.  Eyes:          PERRL, EOM intact, sclere white, conjunctive moist. Glasses.  Ears:          Not examined.   Hearing:     Grossly intact.  Nose:          Normal, no lesions or deformities.  Dentition:    Good dentition.  Oropharynx:   Tongue normal, posterior pharynx without erythema or exudate.  Mallampati Classification: 3  Neck:        Supple, trachea midline, no masses.  Respiratory Effort: No intercostal retractions or use of accessory muscles.   Lung Auscultation:      Clear to auscultation bilaterally; no rales, rhonchi or wheezing.  CV:            Regular rate and rhythm. No murmurs, rubs or gallops.  Abd:           Not examined. Overweight.  Lymphadenopathy: Not examined.  Gait and Station: Normal.  Digits and Nails: No clubbing, cyanosis, petechiae, or nodes.   Cranial Nerves: II-XII grossly intact.  Skin:        No rashes, lesions or ulcers noted.               Ext:           No cyanosis or edema.      Assessment:  1. Obstructive sleep apnea     2. Malignant neoplasm of descending colon (CMS-HCC)     3. BMI 39.0-39.9,adult         Immunizations:    Flu:not given  Pneumovax 23:2016  Prevnar 13:not given    Plan:  1. Mask fit in office - patient will tighten his current mask - tech reviewed proper fit.  2. DME mask/supplies order.  3. DME othe order to check humidifier - it is pulling all the water out set at 1.  4. Discussed sleep hygiene.  5. F/u with Dr. Silverman as scheduled.  6. Follow up in 2 months  with updated PSG/compliance card, sooner if needed.

## 2017-05-15 RX ORDER — CLONAZEPAM 1 MG/1
TABLET ORAL
Qty: 60 TAB | Refills: 2 | Status: SHIPPED
Start: 2017-05-15 | End: 2017-08-29 | Stop reason: SDUPTHER

## 2017-06-13 ENCOUNTER — HOSPITAL ENCOUNTER (OUTPATIENT)
Dept: RADIOLOGY | Facility: MEDICAL CENTER | Age: 67
End: 2017-06-13
Attending: SPECIALIST
Payer: MEDICARE

## 2017-06-13 DIAGNOSIS — C18.2 MALIGNANT NEOPLASM OF ASCENDING COLON (HCC): ICD-10-CM

## 2017-06-13 PROCEDURE — 700117 HCHG RX CONTRAST REV CODE 255: Performed by: SPECIALIST

## 2017-06-13 PROCEDURE — 71260 CT THORAX DX C+: CPT

## 2017-06-13 RX ADMIN — IOHEXOL 100 ML: 350 INJECTION, SOLUTION INTRAVENOUS at 11:57

## 2017-06-27 ENCOUNTER — APPOINTMENT (OUTPATIENT)
Dept: MEDICAL GROUP | Facility: PHYSICIAN GROUP | Age: 67
End: 2017-06-27
Payer: MEDICARE

## 2017-06-27 ENCOUNTER — TELEPHONE (OUTPATIENT)
Dept: MEDICAL GROUP | Facility: PHYSICIAN GROUP | Age: 67
End: 2017-06-27

## 2017-06-27 LAB
CHOLEST SERPL-MCNC: 129 MG/DL (ref 100–199)
COMMENT 011824: NORMAL
HDLC SERPL-MCNC: 51 MG/DL
LDLC SERPL CALC-MCNC: 55 MG/DL (ref 0–99)
PSA SERPL-MCNC: 4.8 NG/ML (ref 0–4)
TRIGL SERPL-MCNC: 113 MG/DL (ref 0–149)
VLDLC SERPL CALC-MCNC: 23 MG/DL (ref 5–40)

## 2017-07-03 ENCOUNTER — OFFICE VISIT (OUTPATIENT)
Dept: MEDICAL GROUP | Facility: PHYSICIAN GROUP | Age: 67
End: 2017-07-03
Payer: MEDICARE

## 2017-07-03 VITALS
TEMPERATURE: 98.6 F | OXYGEN SATURATION: 93 % | SYSTOLIC BLOOD PRESSURE: 112 MMHG | DIASTOLIC BLOOD PRESSURE: 66 MMHG | WEIGHT: 284.83 LBS | HEIGHT: 69 IN | HEART RATE: 74 BPM | RESPIRATION RATE: 16 BRPM | BODY MASS INDEX: 42.19 KG/M2

## 2017-07-03 DIAGNOSIS — C80.1 NEUROPATHY ASSOCIATED WITH CANCER (HCC): ICD-10-CM

## 2017-07-03 DIAGNOSIS — G63 NEUROPATHY ASSOCIATED WITH CANCER (HCC): ICD-10-CM

## 2017-07-03 DIAGNOSIS — R97.20 ELEVATED PSA, LESS THAN 10 NG/ML: ICD-10-CM

## 2017-07-03 DIAGNOSIS — C18.6 MALIGNANT NEOPLASM OF DESCENDING COLON (HCC): ICD-10-CM

## 2017-07-03 DIAGNOSIS — N40.1 BENIGN NON-NODULAR PROSTATIC HYPERPLASIA WITH LOWER URINARY TRACT SYMPTOMS: ICD-10-CM

## 2017-07-03 PROCEDURE — 99213 OFFICE O/P EST LOW 20 MIN: CPT | Performed by: FAMILY MEDICINE

## 2017-07-03 NOTE — MR AVS SNAPSHOT
"        Gerald Wooten Dayo   7/3/2017 1:00 PM   Office Visit   MRN: [id number]    Department:  [facility]   Dept Phone:  [phone]    Description:  Male : 1950   Provider:  Jocelyn Ramos M.D.           Reason for Visit     Results Labs & CT       Allergies as of 7/3/2017     Allergen Noted Reactions    Lidocaine 2016   Rash    DYS=3434        You were diagnosed with     Elevated PSA, less than 10 ng/ml   [003590]       Benign non-nodular prostatic hyperplasia with lower urinary tract symptoms   [7611052]       Malignant neoplasm of descending colon (CMS-HCC)   [153.2.ICD-9-CM]       Neuropathy associated with cancer (CMS-HCC)   [499831]         Vital Signs     Blood Pressure Pulse Temperature Respirations Height Weight    112/66 mmHg 74 37 °C (98.6 °F) 16 1.753 m (5' 9\") 129.2 kg (284 lb 13.4 oz)    Body Mass Index Oxygen Saturation Smoking Status             42.04 kg/m2 93% Former Smoker         Basic Information     Date Of Birth Sex Race Ethnicity Preferred Language    1950 Male White Non- English      Your appointments     2017  9:00 PM   Sleep Study Diagnostic with SLEEP TECH   [facility] (--)    [address]  [facility]  [place] [id number] [phone]            2017 11:20 AM   Follow UP with CHICHO Godoy   [facility] (--)    [address]  [facility]  [place] [id number] [phone]            Dec 06, 2017  1:00 PM   Established Patient with Jocelyn Ramos M.D.   [facility] (--)    [address]  [address]  [place] [id number] [phone]           You will be receiving a confirmation call a few days before your appointment from our automated call confirmation system.              Problem List              ICD-10-CM Priority Class Noted - Resolved    Obesity E66.9 Medium  10/25/2012 - Present    Colon cancer (CMS-HCC) C18.9 Medium  2016 - Present   " History of gout Z87.39   9/28/2016 - Present    Essential hypertension I10 Low  9/28/2016 - Present    Lung nodule, solitary R91.1   9/28/2016 - Present    Anxiety F41.9   9/28/2016 - Present    Pure hypercholesterolemia E78.00   9/28/2016 - Present    Elevated PSA, less than 10 ng/ml R97.20   9/28/2016 - Present    Functional constipation K59.04   11/2/2016 - Present    Neuropathy associated with cancer (CMS-HCC) C80.1, G63   3/28/2017 - Present    Obstructive sleep apnea G47.33   5/9/2017 - Present    BMI 39.0-39.9,adult Z68.39   5/9/2017 - Present    Benign non-nodular prostatic hyperplasia with lower urinary tract symptoms N40.1   7/3/2017 - Present      Health Maintenance        Date Due Completion Dates    IMM DTaP/Tdap/Td Vaccine (1 - Tdap) 7/27/2018 (Originally 8/7/1970) ---    IMM ZOSTER VACCINE 7/27/2018 (Originally 8/7/2011) ---    IMM INFLUENZA (1) 9/1/2017 11/20/2015    IMM PNEUMOCOCCAL 65+ (ADULT) LOW/MEDIUM RISK SERIES (2 of 2 - PCV13) 11/20/2017 11/20/2016    COLONOSCOPY 6/29/2026 6/29/2016            Current Immunizations     Influenza Vaccine Quad Inj (Pf) 11/20/2015    Pneumococcal polysaccharide vaccine (PPSV-23) 11/20/2016      Below and/or attached are the medications your provider expects you to take. Review all of your home medications and newly ordered medications with your provider and/or pharmacist. Follow medication instructions as directed by your provider and/or pharmacist. Please keep your medication list with you and share with your provider. Update the information when medications are discontinued, doses are changed, or new medications (including over-the-counter products) are added; and carry medication information at all times in the event of emergency situations     Allergies:  LIDOCAINE - Rash               Medications  Valid as of: July 03, 2017 -  1:20 PM    Generic Name Brand Name Tablet Size Instructions for use    ClonazePAM (Tab) KLONOPIN 1 MG TAKE 1 TABLET BY MOUTH 2 TIMES  DAILY        Ferrous Sulfate (Tab) Iron 325 (65 FE) MG Take 325 mg by mouth every day.        Gabapentin (Cap) NEURONTIN 300 MG Take 300 mg by mouth 4 times a day.        Losartan Potassium (Tab) COZAAR 100 MG Take 100 mg by mouth every day. TAKE 1 TAB BY MOUTH EVERY DAY.        NON SPECIFIED   1 Each by Intravenous route every 14 days. 5FU        Rosuvastatin Calcium (Tab) CRESTOR 40 MG Take 1 Tab by mouth every bedtime.        Triamterene-HCTZ (Tab) MAXZIDE-25/DYAZIDE 37.5-25 MG Take 1 Tab by mouth every day. TAKE 1 TAB BY MOUTH EVERY DAY.        .                 Medicines prescribed today were sent to:     [id number] - [place], [place] - [address]    [address] [place] [address]    Phone: [phone] Fax: [phone]    Open 24 Hours?: No      Medication refill instructions:       If your prescription bottle indicates you have medication refills left, it is not necessary to call your provider’s office. Please contact your pharmacy and they will refill your medication.    If your prescription bottle indicates you do not have any refills left, you may request refills at any time through one of the following ways: The online Sponsify system (except Urgent Care), by calling your provider’s office, or by asking your pharmacy to contact your provider’s office with a refill request. Medication refills are processed only during regular business hours and may not be available until the next business day. Your provider may request additional information or to have a follow-up visit with you prior to refilling your medication.   *Please Note: Medication refills are assigned a new Rx number when refilled electronically. Your pharmacy may indicate that no refills were authorized even though a new prescription for the same medication is available at the pharmacy. Please request the medicine by name with the pharmacy before contacting your provider for a refill.           Sponsify Access Code: Activation code not  generated  Current MyChart Status: Active

## 2017-07-03 NOTE — PROGRESS NOTES
"Subjective:   Gerald Oshea is a 66 y.o. male here today for follow-up labs.    Elevated PSA, less than 10 ng/ml  PSA down to 4.8 on most recent bloodwork. He did have a CT scan done to follow-up finishing chemotherapy. He does have an enlarged prostate. No nodules on the prostate seen on CT. Denies frequent urination or weak urinary stream. He does admit to nocturia every once in a while.    Colon cancer (CMS-HCC)  Completed chemotherapy. He'll have a repeat colonoscopy in September with Dr. Abraham. Following closely with Dr. Silverman. He still struggles with neuropathy in his hands and feet.     Current medicines (including changes today)  Current Outpatient Prescriptions   Medication Sig Dispense Refill   • clonazepam (KLONOPIN) 1 MG Tab TAKE 1 TABLET BY MOUTH 2 TIMES DAILY 60 Tab 2   • gabapentin (NEURONTIN) 300 MG Cap Take 300 mg by mouth 4 times a day.  4   • losartan (COZAAR) 100 MG Tab Take 100 mg by mouth every day. TAKE 1 TAB BY MOUTH EVERY DAY.  3   • triamterene-hctz (MAXZIDE-25/DYAZIDE) 37.5-25 MG Tab Take 1 Tab by mouth every day. TAKE 1 TAB BY MOUTH EVERY DAY.  3   • NON SPECIFIED 1 Each by Intravenous route every 14 days. 5FU     • rosuvastatin (CRESTOR) 40 MG tablet Take 1 Tab by mouth every bedtime. 90 Tab 3   • Ferrous Sulfate (IRON) 325 (65 FE) MG Tab Take 325 mg by mouth every day.       No current facility-administered medications for this visit.     He  has a past medical history of Hypertension; Hyperlipidemia; Anxiety; Colon cancer (CMS-HCC); Gout; Former tobacco use; Cancer (CMS-HCC) (2016); Anemia; Atrophy of right kidney; Pneumonia (1991); Sleep apnea; Snoring; Psychiatric problem; and DM (diabetes mellitus) (CMS-HCC).    ROS   No chest pain, no shortness of breath, no abdominal pain.     Objective:     Physical Exam:  Blood pressure 112/66, pulse 74, temperature 37 °C (98.6 °F), resp. rate 16, height 1.753 m (5' 9\"), weight 129.2 kg (284 lb 13.4 oz), SpO2 93 %. Body mass index is " 42.04 kg/(m^2).  Constitutional: Alert, no distress.  Skin: Warm, dry, good turgor, no rashes in visible areas.  Eye: Conjunctiva clear, lids normal.  ENMT: Lips without lesions, good dentition, oropharynx clear.  Neck: Trachea midline, no masses, no thyromegaly.  Respiratory: Unlabored respiratory effort, no cough.  Abdomen: Soft, non-tender, no masses, no hepatosplenomegaly.  Psych: Alert and oriented x3, normal affect and mood.    Assessment and Plan:     1. Elevated PSA, less than 10 ng/ml  2. Benign non-nodular prostatic hyperplasia with lower urinary tract symptoms  PSA continues to decline. Most recent result is 4.8. Enlarged prostate seen on CT scan. No nodules. Will follow annually.    3. Malignant neoplasm of descending colon (CMS-HCC)  4. Neuropathy associated with cancer (CMS-HCC)  Recently finished chemotherapy. He will have a follow-up colonoscopy later this year. Continues to follow closely with oncology. Encouraged patient to try 2 capsules in the morning to help with his neuropathy.    Followup: Return in about 5 months (around 12/3/2017) for f/u colon cancer, short.         PLEASE NOTE: This dictation was created using voice recognition software. I have made every reasonable attempt to correct obvious errors, but I expect that there are errors of grammar and possibly content that I did not discover before finalizing the note.

## 2017-07-03 NOTE — ASSESSMENT & PLAN NOTE
Completed chemotherapy. He'll have a repeat colonoscopy in September with Dr. Abraham. Following closely with Dr. Silverman. He still struggles with neuropathy in his hands and feet.

## 2017-07-03 NOTE — ASSESSMENT & PLAN NOTE
PSA down to 4.8 on most recent bloodwork. He did have a CT scan done to follow-up finishing chemotherapy. He does have an enlarged prostate. No nodules on the prostate seen on CT. Denies frequent urination or weak urinary stream. He does admit to nocturia every once in a while.

## 2017-07-17 ENCOUNTER — APPOINTMENT (OUTPATIENT)
Dept: SLEEP MEDICINE | Facility: MEDICAL CENTER | Age: 67
End: 2017-07-17
Attending: NURSE PRACTITIONER
Payer: MEDICARE

## 2017-07-20 ENCOUNTER — TELEPHONE (OUTPATIENT)
Dept: MEDICAL GROUP | Facility: PHYSICIAN GROUP | Age: 67
End: 2017-07-20

## 2017-07-20 ENCOUNTER — APPOINTMENT (OUTPATIENT)
Dept: SLEEP MEDICINE | Facility: MEDICAL CENTER | Age: 67
End: 2017-07-20
Payer: MEDICARE

## 2017-07-20 DIAGNOSIS — D22.9 ATYPICAL NEVUS: ICD-10-CM

## 2017-07-20 NOTE — TELEPHONE ENCOUNTER
Phone Number Called: [phone] (home)     Message: Pt notified of message pt states he can wait several months.     Left Message for patient to call back: N\A

## 2017-07-20 NOTE — TELEPHONE ENCOUNTER
I'll place the referral, but please let him know it may take several months for dermatology to see him. If he has any concerning moles, he can make an appointment with me and we can discuss a biopsy.  Jocelyn Ramos M.D.

## 2017-08-01 ENCOUNTER — PATIENT OUTREACH (OUTPATIENT)
Dept: HEALTH INFORMATION MANAGEMENT | Facility: OTHER | Age: 67
End: 2017-08-01

## 2017-08-01 NOTE — PROGRESS NOTES
Attempt #:1    WebIZ Checked & Epic Updated: yes  HealthConnect Verified: no  Verify PCP: yes    Communication Preference Obtained: yes     Review Care Team: yes    Annual Wellness Visit Scheduling  1. Scheduling Status:Scheduled        Care Gap Scheduling (Attempt to Schedule EACH Overdue Care Gap!)     Health Maintenance Due   Topic Date Due   • Annual Wellness Visit  SCHEDULED         Linkuahart Activation: already active  TeamSnap Jimi: no  Virtual Visits: no  Opt In to Text Messages: no

## 2017-08-07 ENCOUNTER — HOSPITAL ENCOUNTER (EMERGENCY)
Facility: MEDICAL CENTER | Age: 67
End: 2017-08-07
Attending: EMERGENCY MEDICINE
Payer: MEDICARE

## 2017-08-07 VITALS
HEIGHT: 69 IN | TEMPERATURE: 97.5 F | OXYGEN SATURATION: 92 % | WEIGHT: 293.87 LBS | SYSTOLIC BLOOD PRESSURE: 141 MMHG | HEART RATE: 81 BPM | RESPIRATION RATE: 18 BRPM | DIASTOLIC BLOOD PRESSURE: 77 MMHG | BODY MASS INDEX: 43.53 KG/M2

## 2017-08-07 DIAGNOSIS — L08.9 INFECTED SEBACEOUS CYST: ICD-10-CM

## 2017-08-07 DIAGNOSIS — L72.3 INFECTED SEBACEOUS CYST: ICD-10-CM

## 2017-08-07 PROCEDURE — 99283 EMERGENCY DEPT VISIT LOW MDM: CPT

## 2017-08-07 PROCEDURE — 99282 EMERGENCY DEPT VISIT SF MDM: CPT

## 2017-08-07 PROCEDURE — 700111 HCHG RX REV CODE 636 W/ 250 OVERRIDE (IP)

## 2017-08-07 PROCEDURE — 303977 HCHG I & D

## 2017-08-07 RX ORDER — SULFAMETHOXAZOLE AND TRIMETHOPRIM 800; 160 MG/1; MG/1
1 TABLET ORAL 2 TIMES DAILY
Qty: 14 TAB | Refills: 0 | Status: SHIPPED | OUTPATIENT
Start: 2017-08-07 | End: 2017-08-14

## 2017-08-07 RX ORDER — AMOXICILLIN 500 MG/1
500 CAPSULE ORAL 3 TIMES DAILY
Qty: 21 CAP | Refills: 0 | Status: SHIPPED | OUTPATIENT
Start: 2017-08-07 | End: 2017-08-14

## 2017-08-07 RX ORDER — DIPHENHYDRAMINE HYDROCHLORIDE 50 MG/ML
INJECTION INTRAMUSCULAR; INTRAVENOUS
Status: DISCONTINUED
Start: 2017-08-07 | End: 2017-08-07 | Stop reason: HOSPADM

## 2017-08-07 RX ADMIN — DIPHENHYDRAMINE HYDROCHLORIDE: 50 INJECTION, SOLUTION INTRAMUSCULAR; INTRAVENOUS at 09:30

## 2017-08-07 NOTE — DISCHARGE INSTRUCTIONS
Abscess    Antibiotics.  Return here in 3 days for wound check and likely re-packing.  Keep appt with your dermatologist to discuss excising that cyst vs just watching it.    An abscess (boil or furuncle) is an infected area on or under the skin. This area is filled with yellowish-white fluid (pus) and other material (debris).  HOME CARE   · Only take medicines as told by your doctor.  · If you were given antibiotic medicine, take it as directed. Finish the medicine even if you start to feel better.  · If gauze is used, follow your doctor's directions for changing the gauze.  · To avoid spreading the infection:  · Keep your abscess covered with a bandage.  · Wash your hands well.  · Do not share personal care items, towels, or whirlpools with others.  · Avoid skin contact with others.  · Keep your skin and clothes clean around the abscess.  · Keep all doctor visits as told.  GET HELP RIGHT AWAY IF:   · You have more pain, puffiness (swelling), or redness in the wound site.  · You have more fluid or blood coming from the wound site.  · You have muscle aches, chills, or you feel sick.  · You have a fever.  MAKE SURE YOU:   · Understand these instructions.  · Will watch your condition.  · Will get help right away if you are not doing well or get worse.     This information is not intended to replace advice given to you by your health care provider. Make sure you discuss any questions you have with your health care provider.     Document Released: 06/05/2009 Document Revised: 06/18/2013 Document Reviewed: 03/02/2013  [employer] Interactive Patient Education ©2016 [employer]    Epidermal Cyst  An epidermal cyst is usually a small, painless lump under the skin. Cysts often occur on the face, neck, stomach, chest, or genitals. The cyst may be filled with a bad smelling paste. Do not pop your cyst. Popping the cyst can cause pain and puffiness (swelling).  HOME CARE   · Only take medicines as told by your doctor.  · Take  your medicine (antibiotics) as told. Finish it even if you start to feel better.  GET HELP RIGHT AWAY IF:  · Your cyst is tender, red, or puffy.  · You are not getting better, or you are getting worse.  · You have any questions or concerns.  MAKE SURE YOU:  · Understand these instructions.  · Will watch your condition.  · Will get help right away if you are not doing well or get worse.     This information is not intended to replace advice given to you by your health care provider. Make sure you discuss any questions you have with your health care provider.     Document Released: 01/25/2006 Document Revised: 06/18/2013 Document Reviewed: 06/25/2012  [employer] Interactive Patient Education ©2016 [employer] Inc.

## 2017-08-07 NOTE — ED AVS SNAPSHOT
8/7/2017    Gerald Porterkentrell  [address] [place] [address]    Dear Gerald:    Critical access hospital wants to ensure your discharge home is safe and you or your loved ones have had all of your questions answered regarding your care after you leave the hospital.    Below is a list of resources and contact information should you have any questions regarding your hospital stay, follow-up instructions, or active medical symptoms.    Questions or Concerns Regarding… Contact   Medical Questions Related to Your Discharge  (7 days a week, 8am-5pm) Contact a Nurse Care Coordinator   [phone]   Medical Questions Not Related to Your Discharge  (24 hours a day / 7 days a week)  Contact the Nurse Health Line   [phone]    Medications or Discharge Instructions Refer to your discharge packet   or contact your [facility] Primary Care Provider   [phone]   Follow-up Appointment(s) Schedule your appointment via [employer]   or contact Scheduling [phone]   Billing Review your statement via [employer]  or contact Billing [phone]   Medical Records Review your records via [employer]   or contact Medical Records [phone]     You may receive a telephone call within two days of discharge. This call is to make certain you understand your discharge instructions and have the opportunity to have any questions answered. You can also easily access your medical information, test results and upcoming appointments via the [employer] free online health management tool. You can learn more and sign up at [employer]. For assistance setting up your [employer] account, please call [phone].    Once again, we want to ensure your discharge home is safe and that you have a clear understanding of any next steps in your care. If you have any questions or concerns, please do not hesitate to contact us, we are here for you. Thank you for choosing [facility] for your healthcare needs.    Sincerely,    Your [facility] Healthcare Team

## 2017-08-07 NOTE — ED AVS SNAPSHOT
FoKo Access Code: Activation code not generated  Current FoKo Status: Active    [employer]  A secure, online tool to manage your health information     [employer]’s FoKo® is a secure, online tool that connects you to your personalized health information from the privacy of your home -- day or night - making it very easy for you to manage your healthcare. Once the activation process is completed, you can even access your medical information using the FoKo jimi, which is available for free in the Apple Jimi store or Google Play store.     FoKo provides the following levels of access (as shown below):   My Chart Features   [facility] Primary Care Doctor [facility]  Specialists [facility]  Urgent  Care Non-[facility]  Primary Care  Doctor   Email your healthcare team securely and privately 24/7 X X X X   Manage appointments: schedule your next appointment; view details of past/upcoming appointments X      Request prescription refills. X      View recent personal medical records, including lab and immunizations X X X X   View health record, including health history, allergies, medications X X X X   Read reports about your outpatient visits, procedures, consult and ER notes X X X X   See your discharge summary, which is a recap of your hospital and/or ER visit that includes your diagnosis, lab results, and care plan. X X       How to register for FoKo:  1. Go to  [email or web address].  2. Click on the Sign Up Now box, which takes you to the New Member Sign Up page. You will need to provide the following information:  a. Enter your FoKo Access Code exactly as it appears at the top of this page. (You will not need to use this code after you’ve completed the sign-up process. If you do not sign up before the expiration date, you must request a new code.)   b. Enter your date of birth.   c. Enter your home email address.   d. Click Submit, and follow the next screen’s instructions.  3. Create a FoKo ID. This will  be your Echograph login ID and cannot be changed, so think of one that is secure and easy to remember.  4. Create a Echograph password. You can change your password at any time.  5. Enter your Password Reset Question and Answer. This can be used at a later time if you forget your password.   6. Enter your e-mail address. This allows you to receive e-mail notifications when new information is available in Echograph.  7. Click Sign Up. You can now view your health information.    For assistance activating your Echograph account, call [phone]

## 2017-08-07 NOTE — ED NOTES
"Med rec updated and complete  Allergies reviewed  Pt states\"No antibiotics in the last 30 days\".  Pt states \"No vitamins or OTC'S\".      "

## 2017-08-07 NOTE — ED PROVIDER NOTES
ED Provider Note    CHIEF COMPLAINT  Chief Complaint   Patient presents with   • Abscess     pt c/o abscess upper mid back.       HPI  Gerald Oshea is a 67 y.o. male who presents complaining of a spots on his upper back which he thinks may be a spider bite. It came up about 3 weeks ago. Is bothering him since that time. He's been using topical medications. As was not getting better, he presented here. Denies any fever. He's never had anything this before. There is no other complaint.    PAST MEDICAL HISTORY  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   • Pneumonia 1991     Left upper lobe   • Sleep apnea    • Snoring    • Psychiatric problem      anxiety   • DM (diabetes mellitus) ([facility])        FAMILY HISTORY  Family History   Problem Relation Age of Onset   • Cancer Mother      Bladder   • Heart Disease Father    • Heart Disease Brother      CABG x2       SOCIAL HISTORY  Social History   Substance Use Topics   • Smoking status: Former Smoker -- 1.00 packs/day for 20 years     Types: Cigarettes     Quit date: 01/01/1998   • Smokeless tobacco: Never Used   • Alcohol Use: 0.0 oz/week     0 Standard drinks or equivalent per week      Comment: wine  2 daily         SURGICAL HISTORY  Past Surgical History   Procedure Laterality Date   • Closed reduction  10/25/2012     Performed by Chavez Duran M.D. at SURGERY [facility]   • Colon resection     • Cath placement Left 10/7/2016     Procedure: CATH PLACEMENT - SUBCLAVIAN PORT;  Surgeon: Sho Bajwa M.D.;  Location: SURGERY SAME DAY [facility];  Service:        CURRENT MEDICATIONS  Home Medications     Reviewed by Stefano Weller (Pharmacy Tech) on 08/07/17 at 0910  Med List Status: Complete     "Medication Last Dose Status    clonazepam (KLONOPIN) 1 MG Tab 8/6/2017 Active    gabapentin (NEURONTIN) 300 MG Cap 8/6/2017 Active    losartan (COZAAR) 100 MG Tab 8/6/2017 Active    rosuvastatin (CRESTOR) 40 MG tablet 8/6/2017 Active    triamterene-hctz (MAXZIDE-25/DYAZIDE) 37.5-25 MG Tab 8/6/2017 Active                I have reviewed the nurses notes and/or the list brought with the patient.    ALLERGIES  Allergies   Allergen Reactions   • Ketamine Unspecified     \"I hallucinate\".     • Lidocaine Rash     SAP=1620  Pt states \"Rash all over my body\".         REVIEW OF SYSTEMS  See HPI for further details. Review of systems as above, mass on back.    PHYSICAL EXAM  VITAL SIGNS: /77 mmHg  Pulse 81  Temp(Src) 36.4 °C (97.5 °F)  Resp 18  Ht 1.753 m (5' 9\")  Wt 133.3 kg (293 lb 14 oz)  BMI 43.38 kg/m2  SpO2 92%  Constitutional: Well appearing patient in no acute distress.  Not toxic, nor ill in appearance.  HENT: Mucus membranes moist.    Skin: No purpura nor petechia noted. On his back, there is a silver dollar sized area of erythema, warmth and fluctuance.  PROCEDURES  I have obtained verbal consent for incision and drainage. His lidocaine allergy is noted. He has had difficulty with anesthesia in his mouth, as well as recently with his back. We talked about using preservative-free lidocaine, however, I think he is a candidate for treatment with Benadryl infiltration. He agrees to this and we proceed. The area is prepped with chlorhexidine. About 12 mg of Benadryl is injected in the area of maximum fluctuance. Then, a 5 mm incision is made. This returns caseous, purulent debris. Explore with hemostat, no loculations are present. Wound was packed with quarter inch iodoform gauze. The patient tolerated the procedure with ease.    MEDICAL RECORD  I have reviewed patient's medical record and pertinent results are listed above.    COURSE & MEDICAL DECISION MAKING  I have reviewed any medical record information, " laboratory studies and radiographic results as noted above.  Patient presents with what appears to be an infected sebaceous cyst. We have incise and drain this. We have packed it. We will go ahead and put him on amoxicillin and Bactrim for a weeks course. I've asked him to return here in 3 days for wound check and likely repacking. We discussed that definitive care would be excision of that cyst. Obviously, this will not be done on an emergent basis. He does have an appointment with dermatology later this month. I've asked him to keep that appointment and to discuss excision with them. He is given instructions on epidermal inclusion cyst as well as abscess.      FINAL IMPRESSION  1. Infected sebaceous cyst     2. Incision and drainage with packing       This dictation was created using voice recognition software.    Electronically signed by: Gurdeep Triplett, 8/7/2017 9:49 AM

## 2017-08-07 NOTE — ED NOTES
"Chief Complaint   Patient presents with   • Abscess     pt c/o abscess upper mid back.     /77 mmHg  Pulse 81  Temp(Src) 36.4 °C (97.5 °F)  Resp 18  Ht 1.753 m (5' 9\")  Wt 133.3 kg (293 lb 14 oz)  BMI 43.38 kg/m2  SpO2 92%    "

## 2017-08-07 NOTE — ED AVS SNAPSHOT
Home Care Instructions                                                                                                                Gerald Oshea   MRN: [id number]    Department:  [facility], Emergency Dept   Date of Visit:  8/7/2017            [facility], Emergency Dept    [address]    [place] [address]    Phone:  [phone]      You were seen by     Gurdeep Triplett M.D.      Your Diagnosis Was     Infected sebaceous cyst     L72.3, L08.9       Follow-up Information     1. Follow up with [facility], Emergency Dept In 3 days.    Specialty:  Emergency Medicine    Contact information    [address] [address] [phone]      Medication Information     Review all of your home medications and newly ordered medications with your primary doctor and/or pharmacist as soon as possible. Follow medication instructions as directed by your doctor and/or pharmacist.     Please keep your complete medication list with you and share with your physician. Update the information when medications are discontinued, doses are changed, or new medications (including over-the-counter products) are added; and carry medication information at all times in the event of emergency situations.               Medication List      START taking these medications        Instructions    Morning Afternoon Evening Bedtime    amoxicillin 500 MG Caps   Commonly known as:  AMOXIL        Take 1 Cap by mouth 3 times a day for 7 days.   Dose:  500 mg                        sulfamethoxazole-trimethoprim 800-160 MG tablet   Commonly known as:  BACTRIM DS        Take 1 Tab by mouth 2 times a day for 7 days.   Dose:  1 Tab                          ASK your doctor about these medications        Instructions    Morning Afternoon Evening Bedtime    clonazepam 1 MG Tabs   Commonly known as:  KLONOPIN        Doctor's comments:  Not to exceed 2  additional fills before 06/12/2017   TAKE 1 TABLET BY MOUTH 2 TIMES DAILY                        gabapentin 300 MG Caps   Commonly known as:  NEURONTIN        Take 300-600 mg by mouth 3 times a day. Pt takes:  300MG in AM 300MG in PM 600MG HS   Dose:  300-600 mg                        losartan 100 MG Tabs   Commonly known as:  COZAAR        Take 100 mg by mouth every day. TAKE 1 TAB BY MOUTH EVERY DAY.   Dose:  100 mg                        rosuvastatin 40 MG tablet   Commonly known as:  CRESTOR        Doctor's comments:  Start January 2nd, 2017   Take 1 Tab by mouth every bedtime.   Dose:  40 mg                        triamterene-hctz 37.5-25 MG Tabs   Commonly known as:  MAXZIDE-25/DYAZIDE        Take 1 Tab by mouth every day. TAKE 1 TAB BY MOUTH EVERY DAY.   Dose:  1 Tab                             Where to Get Your Medications      These medications were sent to [id number] - [place], [place] - [address]  [address] [place] [address]     Phone:  [phone]    - amoxicillin 500 MG Caps  - sulfamethoxazole-trimethoprim 800-160 MG tablet              Discharge Instructions       Abscess    Antibiotics.  Return here in 3 days for wound check and likely re-packing.  Keep appt with your dermatologist to discuss excising that cyst vs just watching it.    An abscess (boil or furuncle) is an infected area on or under the skin. This area is filled with yellowish-white fluid (pus) and other material (debris).  HOME CARE   · Only take medicines as told by your doctor.  · If you were given antibiotic medicine, take it as directed. Finish the medicine even if you start to feel better.  · If gauze is used, follow your doctor's directions for changing the gauze.  · To avoid spreading the infection:  · Keep your abscess covered with a bandage.  · Wash your hands well.  · Do not share personal care items, towels, or whirlpools with others.  · Avoid skin contact with others.  · Keep your skin and clothes clean  around the abscess.  · Keep all doctor visits as told.  GET HELP RIGHT AWAY IF:   · You have more pain, puffiness (swelling), or redness in the wound site.  · You have more fluid or blood coming from the wound site.  · You have muscle aches, chills, or you feel sick.  · You have a fever.  MAKE SURE YOU:   · Understand these instructions.  · Will watch your condition.  · Will get help right away if you are not doing well or get worse.     This information is not intended to replace advice given to you by your health care provider. Make sure you discuss any questions you have with your health care provider.     Document Released: 06/05/2009 Document Revised: 06/18/2013 Document Reviewed: 03/02/2013  [employer] Patient Education ©2016 [employer].    Epidermal Cyst  An epidermal cyst is usually a small, painless lump under the skin. Cysts often occur on the face, neck, stomach, chest, or genitals. The cyst may be filled with a bad smelling paste. Do not pop your cyst. Popping the cyst can cause pain and puffiness (swelling).  HOME CARE   · Only take medicines as told by your doctor.  · Take your medicine (antibiotics) as told. Finish it even if you start to feel better.  GET HELP RIGHT AWAY IF:  · Your cyst is tender, red, or puffy.  · You are not getting better, or you are getting worse.  · You have any questions or concerns.  MAKE SURE YOU:  · Understand these instructions.  · Will watch your condition.  · Will get help right away if you are not doing well or get worse.     This information is not intended to replace advice given to you by your health care provider. Make sure you discuss any questions you have with your health care provider.     Document Released: 01/25/2006 Document Revised: 06/18/2013 Document Reviewed: 06/25/2012  [employer] Patient Education ©2016 [employer] Inc.            Patient Information     Patient Information    Following emergency treatment: all patient requiring  follow-up care must return either to a private physician or a clinic if your condition worsens before you are able to obtain further medical attention, please return to the emergency room.     Billing Information    At [facility], we work to make the billing process streamlined for our patients.  Our Representatives are here to answer any questions you may have regarding your hospital bill.  If you have insurance coverage and have supplied your insurance information to us, we will submit a claim to your insurer on your behalf.  Should you have any questions regarding your bill, we can be reached online or by phone as follows:  Online: You are able pay your bills online or live chat with our representatives about any billing questions you may have. We are here to help Monday - Friday from 8:00am to 7:30pm and 9:00am - 12:00pm on Saturdays.  Please visit [email or web address]  for more information.   Phone:  [phone] or [phone]    Please note that your emergency physician, surgeon, pathologist, radiologist, anesthesiologist, and other specialists are not employed by [facility] and will therefore bill separately for their services.  Please contact them directly for any questions concerning their bills at the numbers below:     Emergency Physician Services:  [phone]  [facility]:  [phone]  [facility]:  [phone]  [facility]:  [phone]    1. Your final bill may vary from the amount quoted upon discharge if all procedures are not complete at that time, or if your doctor has additional procedures of which we are not aware. You will receive an additional bill if you return to the Emergency Department at [facility] for suture removal regardless of the facility of which the sutures were placed.     2. Please arrange for settlement of this account at the emergency registration.    3. All self-pay accounts are due in  full at the time of treatment.  If you are unable to meet this obligation then payment is expected within 4-5 days.     4. If you have had radiology studies (CT, X-ray, Ultrasound, MRI), you have received a preliminary result during your emergency department visit. Please contact the radiology department [phone] to receive a copy of your final result. Please discuss the Final result with your primary physician or with the follow up physician provided.     Crisis Hotline:  [place] Crisis Hotline:  9-634-GSLINNK or 1-151.471.1487  [place] Crisis Hotline:    1-794.954.9720 or 374-821-3269         ED Discharge Follow Up Questions    1. In order to provide you with very good care, we would like to follow up with a phone call in the next few days.  May we have your permission to contact you?     YES /  NO    2. What is the best phone number to call you? (       )_____-__________    3. What is the best time to call you?      Morning  /  Afternoon  /  Evening                   Patient Signature:  ____________________________________________________________    Date:  ____________________________________________________________      Your appointments     Sep 20, 2017  1:20 PM   ANNUAL WELLNESS with Jocelyn Ramos M.D., [facility]    [facility] (--)    [address]  [address]  [facility] [address]   [phone]            Dec 06, 2017  1:00 PM   Established Patient with Jocelyn Ramos M.D.   [facility] (--)    [address]  [address]  [facility] [address]   [phone]           You will be receiving a confirmation call a few days before your appointment from our automated call confirmation system.

## 2017-08-08 ENCOUNTER — PATIENT OUTREACH (OUTPATIENT)
Dept: HEALTH INFORMATION MANAGEMENT | Facility: OTHER | Age: 67
End: 2017-08-08

## 2017-08-10 ENCOUNTER — HOSPITAL ENCOUNTER (EMERGENCY)
Facility: MEDICAL CENTER | Age: 67
End: 2017-08-10
Attending: EMERGENCY MEDICINE
Payer: MEDICARE

## 2017-08-10 VITALS
OXYGEN SATURATION: 91 % | DIASTOLIC BLOOD PRESSURE: 85 MMHG | HEART RATE: 77 BPM | WEIGHT: 292.33 LBS | RESPIRATION RATE: 16 BRPM | SYSTOLIC BLOOD PRESSURE: 145 MMHG | BODY MASS INDEX: 43.3 KG/M2 | HEIGHT: 69 IN | TEMPERATURE: 98.7 F

## 2017-08-10 DIAGNOSIS — L72.3 SEBACEOUS CYST: ICD-10-CM

## 2017-08-10 PROCEDURE — 303485 HCHG DRESSING MEDIUM

## 2017-08-10 PROCEDURE — 99282 EMERGENCY DEPT VISIT SF MDM: CPT

## 2017-08-10 ASSESSMENT — PAIN SCALES - GENERAL: PAINLEVEL_OUTOF10: 0

## 2017-08-10 NOTE — ED NOTES
Here for wound check on his back. Draining pus cyst in the middle of his upper back. Initially packed here..

## 2017-08-10 NOTE — ED NOTES
2x2 and 4x4 dressing applied and secured to his upper back. Released with all follow-up and advised to see primary next week. This pt understands his follow-up, to take 2 epsom salt baths daily.

## 2017-08-10 NOTE — DISCHARGE INSTRUCTIONS
Epidermal Cyst  An epidermal cyst is usually a small, painless lump under the skin. Cysts often occur on the face, neck, stomach, chest, or genitals. The cyst may be filled with a bad smelling paste. Do not pop your cyst. Popping the cyst can cause pain and puffiness (swelling).  HOME CARE   · Only take medicines as told by your doctor.  · Take your medicine (antibiotics) as told. Finish it even if you start to feel better.  GET HELP RIGHT AWAY IF:  · Your cyst is tender, red, or puffy.  · You are not getting better, or you are getting worse.  · You have any questions or concerns.  MAKE SURE YOU:  · Understand these instructions.  · Will watch your condition.  · Will get help right away if you are not doing well or get worse.     This information is not intended to replace advice given to you by your health care provider. Make sure you discuss any questions you have with your health care provider.     Document Released: 01/25/2006 Document Revised: 06/18/2013 Document Reviewed: 06/25/2012  [employer] Patient Education ©2016 [employer]

## 2017-08-10 NOTE — ED AVS SNAPSHOT
8/10/2017    Gerald Porterkentrell  [address] [place] [address]    Dear Gerald:    [facility] wants to ensure your discharge home is safe and you or your loved ones have had all of your questions answered regarding your care after you leave the hospital.    Below is a list of resources and contact information should you have any questions regarding your hospital stay, follow-up instructions, or active medical symptoms.    Questions or Concerns Regarding… Contact   Medical Questions Related to Your Discharge  (7 days a week, 8am-5pm) Contact a Nurse Care Coordinator   [phone]   Medical Questions Not Related to Your Discharge  (24 hours a day / 7 days a week)  Contact the Nurse Health Line   [phone]    Medications or Discharge Instructions Refer to your discharge packet   or contact your [facility] Primary Care Provider   [phone]   Follow-up Appointment(s) Schedule your appointment via [employer]   or contact Scheduling [phone]   Billing Review your statement via [employer]  or contact Billing [phone]   Medical Records Review your records via [employer]   or contact Medical Records [phone]     You may receive a telephone call within two days of discharge. This call is to make certain you understand your discharge instructions and have the opportunity to have any questions answered. You can also easily access your medical information, test results and upcoming appointments via the [employer] free online health management tool. You can learn more and sign up at [email or web address]. For assistance setting up your [employer] account, please call [phone].    Once again, we want to ensure your discharge home is safe and that you have a clear understanding of any next steps in your care. If you have any questions or concerns, please do not hesitate to contact us, we are here for you. Thank you for choosing [facility] for your healthcare needs.    Sincerely,    Your [facility] Healthcare Team

## 2017-08-10 NOTE — ED PROVIDER NOTES
"ED Provider Note    CHIEF COMPLAINT  Chief Complaint   Patient presents with   • Wound Re-Check       HPI  Gerald Oshea is a 67 y.o. male who presents evaluation of wound check.  The patient was seen here 3 days ago and had incision and drainage of an infected sebaceous cyst.  The patient states he pulled the packing out yesterday because it was bleeding quite a bit.  He presents back here as requested.  He denies any fever or chills.  No recent illness.    REVIEW OF SYSTEMS  See HPI for further details.  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 at SURGERY [facility]   • Colon resection     • Cath placement Left 10/7/2016     Procedure: CATH PLACEMENT - SUBCLAVIAN PORT;  Surgeon: Sho Bajwa M.D.;  Location: SURGERY SAME DAY [facility];  Service:        CURRENT MEDICATIONS  See nurses notes    ALLERGIES  Allergies   Allergen Reactions   • Ketamine Unspecified     \"I hallucinate\".     • Lidocaine Rash     NII=2871  Pt states \"Rash all over my body\".         PHYSICAL EXAM  VITAL SIGNS: /85 mmHg  Pulse 77  Temp(Src) 37.1 °C (98.7 °F)  Resp 16  Ht 1.753 m (5' 9\")  Wt " 132.6 kg (292 lb 5.3 oz)  BMI 43.15 kg/m2  SpO2 91%   Constitutional: A 67-year-old male, awake, oriented ×3  HENT: Normocephalic, Atraumatic,   Cardiovascular: Regular rate and rhythm without mumurs, gallups, rubs   Thorax & Lungs: Normal Equal breath sounds, No respiratory distress, No wheezing, no stridor, no rales. No chest tenderness; posterior back reveals an wound in the mid upper back area; I cleansed off mucoid material; I then applied pressure to the surrounding skin and was able to express a large amount of sebaceous material.  This was performed until all loculations were broken up and no further sebaceous material was identified.    Skin: Good skin turgor, pink, warm, dry. No rashes, petechiae, purpura. Normal capillary refill.   Neurologic: Alert & oriented x 3,  No gross focal deficits noted.     COURSE & MEDICAL DECISION MAKING  Pertinent Labs & Imaging studies reviewed. (See chart for details)  Discussion: At this time, the patient was seen earlier for treatment sebaceous cyst.  The patient had continued sebaceous material which was removed.  The wound is opened wide enough that I feel it should not close off prematurely.  I do not feel we need to repack the wound at this time.  I have instructed the patient to continue the antibiotics he was previously prescribed.  I also asked him follow up closely for recheck with his primary care next week.  The patient indicates comfortable with this explanation and disposition.    FINAL IMPRESSION  1. Sebaceous cyst        PLAN  1.  Appropriate discharge instructions given  2.  Continue antibiotics  3.  Follow-up with primary care    Electronically signed by: Guy G Gansert, 8/10/2017 9:23 AM

## 2017-08-10 NOTE — ED AVS SNAPSHOT
Home Care Instructions                                                                                                                Gerald Oshea   MRN: [id number]    Department:  [facility], Emergency Dept   Date of Visit:  8/10/2017            [facility], Emergency Dept    [address]    [place] [address]    Phone:  [phone]      You were seen by     Guy G Gansert, M.D.      Your Diagnosis Was     Sebaceous cyst     L72.3 Back      Follow-up Information     1. Follow up with Jocelyn Ramos M.D..    Specialty:  Family Medicine    Why:  1.  Continue antibiotics; 2.  Soak back in warm Epsom salts twice daily; 3.  Follow-up with primary care    Contact information    [address]  [place] [address] [phone]        Medication Information     Review all of your home medications and newly ordered medications with your primary doctor and/or pharmacist as soon as possible. Follow medication instructions as directed by your doctor and/or pharmacist.     Please keep your complete medication list with you and share with your physician. Update the information when medications are discontinued, doses are changed, or new medications (including over-the-counter products) are added; and carry medication information at all times in the event of emergency situations.               Medication List      ASK your doctor about these medications        Instructions    Morning Afternoon Evening Bedtime    amoxicillin 500 MG Caps   Commonly known as:  AMOXIL        Take 1 Cap by mouth 3 times a day for 7 days.   Dose:  500 mg                        clonazepam 1 MG Tabs   Commonly known as:  KLONOPIN        Doctor's comments:  Not to exceed 2 additional fills before 06/12/2017   TAKE 1 TABLET BY MOUTH 2 TIMES DAILY                        gabapentin 300 MG Caps   Commonly known as:  NEURONTIN        Take 300-600 mg by mouth 3 times a day. Pt takes:  300MG in  AM 300MG in PM 600MG HS   Dose:  300-600 mg                        losartan 100 MG Tabs   Commonly known as:  COZAAR        Take 100 mg by mouth every day. TAKE 1 TAB BY MOUTH EVERY DAY.   Dose:  100 mg                        rosuvastatin 40 MG tablet   Commonly known as:  CRESTOR        Doctor's comments:  Start January 2nd, 2017   Take 1 Tab by mouth every bedtime.   Dose:  40 mg                        sulfamethoxazole-trimethoprim 800-160 MG tablet   Commonly known as:  BACTRIM DS        Take 1 Tab by mouth 2 times a day for 7 days.   Dose:  1 Tab                        triamterene-hctz 37.5-25 MG Tabs   Commonly known as:  MAXZIDE-25/DYAZIDE        Take 1 Tab by mouth every day. TAKE 1 TAB BY MOUTH EVERY DAY.   Dose:  1 Tab                                  Discharge Instructions       Epidermal Cyst  An epidermal cyst is usually a small, painless lump under the skin. Cysts often occur on the face, neck, stomach, chest, or genitals. The cyst may be filled with a bad smelling paste. Do not pop your cyst. Popping the cyst can cause pain and puffiness (swelling).  HOME CARE   · Only take medicines as told by your doctor.  · Take your medicine (antibiotics) as told. Finish it even if you start to feel better.  GET HELP RIGHT AWAY IF:  · Your cyst is tender, red, or puffy.  · You are not getting better, or you are getting worse.  · You have any questions or concerns.  MAKE SURE YOU:  · Understand these instructions.  · Will watch your condition.  · Will get help right away if you are not doing well or get worse.     This information is not intended to replace advice given to you by your health care provider. Make sure you discuss any questions you have with your health care provider.     Document Released: 01/25/2006 Document Revised: 06/18/2013 Document Reviewed: 06/25/2012  Elsevier Interactive Patient Education ©2016 Elsevier Inc.            Patient Information     Patient Information    Following emergency  treatment: all patient requiring follow-up care must return either to a private physician or a clinic if your condition worsens before you are able to obtain further medical attention, please return to the emergency room.     Billing Information    At [facility], we work to make the billing process streamlined for our patients.  Our Representatives are here to answer any questions you may have regarding your hospital bill.  If you have insurance coverage and have supplied your insurance information to us, we will submit a claim to your insurer on your behalf.  Should you have any questions regarding your bill, we can be reached online or by phone as follows:  Online: You are able pay your bills online or live chat with our representatives about any billing questions you may have. We are here to help Monday - Friday from 8:00am to 7:30pm and 9:00am - 12:00pm on Saturdays.  Please visit [email or web address]  for more information.   Phone:  [phone] or [phone]    Please note that your emergency physician, surgeon, pathologist, radiologist, anesthesiologist, and other specialists are not employed by [facility] and will therefore bill separately for their services.  Please contact them directly for any questions concerning their bills at the numbers below:     Emergency Physician Services:  [phone]  [facility]:  [phone]  [facility]:  [phone]  [facility]:  [phone]    1. Your final bill may vary from the amount quoted upon discharge if all procedures are not complete at that time, or if your doctor has additional procedures of which we are not aware. You will receive an additional bill if you return to the Emergency Department at [facility] for suture removal regardless of the facility of which the sutures were placed.     2. Please arrange for settlement of this account at the emergency  registration.    3. All self-pay accounts are due in full at the time of treatment.  If you are unable to meet this obligation then payment is expected within 4-5 days.     4. If you have had radiology studies (CT, X-ray, Ultrasound, MRI), you have received a preliminary result during your emergency department visit. Please contact the radiology department [phone] to receive a copy of your final result. Please discuss the Final result with your primary physician or with the follow up physician provided.     Crisis Hotline:  [place] Crisis Hotline:  0-670-BRCCYIM or 1-911.290.4903  [place] Crisis Hotline:    1-566.863.7509 or 561-736-6332         ED Discharge Follow Up Questions    1. In order to provide you with very good care, we would like to follow up with a phone call in the next few days.  May we have your permission to contact you?     YES /  NO    2. What is the best phone number to call you? (       )_____-__________    3. What is the best time to call you?      Morning  /  Afternoon  /  Evening                   Patient Signature:  ____________________________________________________________    Date:  ____________________________________________________________      Your appointments     Sep 20, 2017  1:20 PM   ANNUAL WELLNESS with Jocelyn Rmaos M.D., [facility]    [facility] (--)    [address]  [address]  [place] [address]   [phone]            Dec 06, 2017  1:00 PM   Established Patient with Jocelyn Ramos M.D.   [facility] (--)    [address]  [address]  [place] [address]   [phone]           You will be receiving a confirmation call a few days before your appointment from our automated call confirmation system.

## 2017-08-10 NOTE — ED AVS SNAPSHOT
JournallyMe Access Code: Activation code not generated  Current JournallyMe Status: Active    [employer]elly[employer]  A secure, online tool to manage your health information     [employer] JournallyMe® is a secure, online tool that connects you to your personalized health information from the privacy of your home -- day or night - making it very easy for you to manage your healthcare. Once the activation process is completed, you can even access your medical information using the JournallyMe jimi, which is available for free in the Apple Jimi store or Google Play store.     JournallyMe provides the following levels of access (as shown below):   My Chart Features   [facility] Primary Care Doctor [facility]  Specialists [facility]  Urgent  Care Non-[facility]  Primary Care  Doctor   Email your healthcare team securely and privately 24/7 X X X X   Manage appointments: schedule your next appointment; view details of past/upcoming appointments X      Request prescription refills. X      View recent personal medical records, including lab and immunizations X X X X   View health record, including health history, allergies, medications X X X X   Read reports about your outpatient visits, procedures, consult and ER notes X X X X   See your discharge summary, which is a recap of your hospital and/or ER visit that includes your diagnosis, lab results, and care plan. X X       How to register for JournallyMe:  1. Go to  [email or web address].  2. Click on the Sign Up Now box, which takes you to the New Member Sign Up page. You will need to provide the following information:  a. Enter your JournallyMe Access Code exactly as it appears at the top of this page. (You will not need to use this code after you’ve completed the sign-up process. If you do not sign up before the expiration date, you must request a new code.)   b. Enter your date of birth.   c. Enter your home email address.   d. Click Submit, and follow the next screen’s instructions.  3. Create a JournallyMe ID. This will  be your [employer] login ID and cannot be changed, so think of one that is secure and easy to remember.  4. Create a [employer] password. You can change your password at any time.  5. Enter your Password Reset Question and Answer. This can be used at a later time if you forget your password.   6. Enter your e-mail address. This allows you to receive e-mail notifications when new information is available in [employer].  7. Click Sign Up. You can now view your health information.    For assistance activating your [employer] account, call [phone]

## 2017-08-24 PROBLEM — D22.5 MELANOCYTIC NEVI OF TRUNK: Status: ACTIVE | Noted: 2017-08-24

## 2017-08-29 RX ORDER — CLONAZEPAM 1 MG/1
TABLET ORAL
Qty: 60 TAB | Refills: 5 | Status: SHIPPED
Start: 2017-08-29 | End: 2018-03-12 | Stop reason: SDUPTHER

## 2017-09-05 PROBLEM — D22.72 MELANOCYTIC NEVI OF LEFT LOWER LIMB, INCLUDING HIP: Status: ACTIVE | Noted: 2017-09-05

## 2017-09-07 PROBLEM — D49.2 NEOPLASM OF UNSPECIFIED BEHAVIOR OF BONE, SOFT TISSUE, AND SKIN: Status: RESOLVED | Noted: 2017-08-24 | Resolved: 2017-09-07

## 2017-09-14 ENCOUNTER — TELEPHONE (OUTPATIENT)
Dept: MEDICAL GROUP | Facility: PHYSICIAN GROUP | Age: 67
End: 2017-09-14

## 2017-09-14 NOTE — TELEPHONE ENCOUNTER
ANNUAL WELLNESS VISIT PRE-VISIT PLANNING     1.  Reviewed note from last office visit with PCP: YES    2.  If any orders were placed at last visit, do we have Results/Consult Notes?        •  Labs - Labs ordered, completed and results are in chart.       •  Imaging - Imaging was not ordered at last office visit.       •  Referrals - No referrals were ordered at last office visit.    3.  Immunizations were updated in Epic using WebIZ?: Epic matches WebIZ       •  WebIZ Recommendations: FLU       •  Is patient due for Tdap? NO       •  Is patient due for Shingles? NO     4.  Patient is due for the following Health Maintenance Topics:   Health Maintenance Due   Topic Date Due   • Annual Wellness Visit  1950   • IMM INFLUENZA (1) 09/01/2017       - Patient declines Immunizations: FLU. FLU  He wants to talk to Dr Brent livingston      5.  Reviewed/Updated the following with patient:       •   Preferred Pharmacy? YES       •   Preferred Lab? YES       •   Medications? YES. Was Abstract Encounter opened and chart updated? YES       •   Social History? YES. Was Abstract Encounter opened and chart updated? YES       •   Family History? YES. Was Abstract Encounter opened and chart updated? YES    6.  Care Team Updated:       •   DME Company (gait device, O2, CPAP, etc.): YES [employer]       •   Other Specialists (eye doctor, derm, GYN, cardiology, endo, etc): YES    7.  Patient has the following Care Path diagnoses on Problem List:  NONE    8.  Specialty Comments was updated with diagnosis information provided by [facility]: NO    9.  Patient was advised: “This is a free wellness visit. The provider will screen for medical conditions to help you stay healthy. If you have other concerns to address you may be asked to discuss these at a separate visit or there may be an additional fee.”     6.  Patient was informed to arrive 15 min prior to their scheduled appointment and bring in their medication bottles.

## 2017-09-20 ENCOUNTER — OFFICE VISIT (OUTPATIENT)
Dept: MEDICAL GROUP | Facility: PHYSICIAN GROUP | Age: 67
End: 2017-09-20
Payer: MEDICARE

## 2017-09-20 VITALS
HEART RATE: 70 BPM | OXYGEN SATURATION: 94 % | WEIGHT: 305.12 LBS | DIASTOLIC BLOOD PRESSURE: 64 MMHG | TEMPERATURE: 100.3 F | RESPIRATION RATE: 16 BRPM | SYSTOLIC BLOOD PRESSURE: 122 MMHG | BODY MASS INDEX: 45.19 KG/M2 | HEIGHT: 69 IN

## 2017-09-20 DIAGNOSIS — L60.0 INGROWN TOENAIL WITHOUT INFECTION: ICD-10-CM

## 2017-09-20 DIAGNOSIS — R97.20 ELEVATED PSA, LESS THAN 10 NG/ML: ICD-10-CM

## 2017-09-20 DIAGNOSIS — C18.6 MALIGNANT NEOPLASM OF DESCENDING COLON (HCC): ICD-10-CM

## 2017-09-20 DIAGNOSIS — R91.1 LUNG NODULE, SOLITARY: ICD-10-CM

## 2017-09-20 DIAGNOSIS — F41.9 ANXIETY: ICD-10-CM

## 2017-09-20 DIAGNOSIS — Z87.39 HISTORY OF GOUT: ICD-10-CM

## 2017-09-20 DIAGNOSIS — G63 NEUROPATHY ASSOCIATED WITH CANCER (HCC): ICD-10-CM

## 2017-09-20 DIAGNOSIS — Z00.00 MEDICARE ANNUAL WELLNESS VISIT, INITIAL: ICD-10-CM

## 2017-09-20 DIAGNOSIS — G47.33 OBSTRUCTIVE SLEEP APNEA: ICD-10-CM

## 2017-09-20 DIAGNOSIS — I10 ESSENTIAL HYPERTENSION: ICD-10-CM

## 2017-09-20 DIAGNOSIS — E78.00 PURE HYPERCHOLESTEROLEMIA: ICD-10-CM

## 2017-09-20 DIAGNOSIS — N40.1 BENIGN NON-NODULAR PROSTATIC HYPERPLASIA WITH LOWER URINARY TRACT SYMPTOMS: ICD-10-CM

## 2017-09-20 DIAGNOSIS — C80.1 NEUROPATHY ASSOCIATED WITH CANCER (HCC): ICD-10-CM

## 2017-09-20 DIAGNOSIS — E66.01 MORBID OBESITY WITH BMI OF 45.0-49.9, ADULT (HCC): ICD-10-CM

## 2017-09-20 PROCEDURE — G0438 PPPS, INITIAL VISIT: HCPCS | Performed by: FAMILY MEDICINE

## 2017-09-20 ASSESSMENT — PATIENT HEALTH QUESTIONNAIRE - PHQ9: CLINICAL INTERPRETATION OF PHQ2 SCORE: 0

## 2017-09-20 NOTE — PROGRESS NOTES
Chief Complaint   Patient presents with   • Annual Wellness Visit         HPI:  Gerald is a 67 y.o. here for Medicare Annual Wellness Visit. He is feeling well today. He does have an ingrown toenail on his right foot that he would like me to take a look at.        Patient Active Problem List    Diagnosis Date Noted   • Colon cancer (CMS-HCC) 09/28/2016     Priority: Medium   • Obesity 10/25/2012     Priority: Medium   • Essential hypertension 09/28/2016     Priority: Low   • Benign non-nodular prostatic hyperplasia with lower urinary tract symptoms 07/03/2017   • Obstructive sleep apnea 05/09/2017   • BMI 39.0-39.9,adult 05/09/2017   • Neuropathy associated with cancer (CMS-HCC) 03/28/2017   • Functional constipation 11/02/2016   • History of gout 09/28/2016   • Lung nodule, solitary 09/28/2016   • Anxiety 09/28/2016   • Pure hypercholesterolemia 09/28/2016   • Elevated PSA, less than 10 ng/ml 09/28/2016       Current Outpatient Prescriptions   Medication Sig Dispense Refill   • clonazepam (KLONOPIN) 1 MG Tab TAKE 1 TABLET BY MOUTH 2 TIMES DAILY 60 Tab 5   • gabapentin (NEURONTIN) 300 MG Cap Take 300-600 mg by mouth 4 times a day. Pt takes:   300MG in AM  300MG in PM  600MG HS  4   • losartan (COZAAR) 100 MG Tab Take 100 mg by mouth every day. TAKE 1 TAB BY MOUTH EVERY DAY.  3   • triamterene-hctz (MAXZIDE-25/DYAZIDE) 37.5-25 MG Tab Take 1 Tab by mouth every day. TAKE 1 TAB BY MOUTH EVERY DAY.  3   • rosuvastatin (CRESTOR) 40 MG tablet Take 1 Tab by mouth every bedtime. 90 Tab 3     No current facility-administered medications for this visit.         Patient is taking medications as noted in medication list.  Current supplements as per medication list.     Allergies: Ketamine and Lidocaine    Current social contact/activities: Spending time with son, lunch with family, playing games.    Is patient current with immunizations? No, due for FLU. Patient is interested in receiving NONE today. He would like to check with  his oncologist to see if it is safe for him to restart his immunizations.    He  reports that he quit smoking about 19 years ago. His smoking use included Cigarettes. He has a 20.00 pack-year smoking history. He has never used smokeless tobacco. He reports that he drinks alcohol. He reports that he does not use drugs.  Counseling given: Not Answered        DPA/Advanced directive: Patient has Living Will on file.     ROS:    Gait: Uses no assistive device   Ostomy: no   Other tubes: Port  Amputations: no   Chronic oxygen use: no, he does use a C-PAP  Last eye exam 08/17   Wears hearing aids: no   : Denies incontinence      Screening:    Screening for depression:  Little interest or pleasure in doing things?  0 - not at all  Feeling down, depressed, or hopeless? 0 - not at all  Patient Health Questionnaire Score: 0    If depressive symptoms identified deferred to follow up visit unless specifically addressed in assessment and plan.    Interpretation of PHQ-9 Total Score   Score Severity   1-4 No Depression   5-9 Mild Depression   10-14 Moderate Depression   15-19 Moderately Severe Depression   20-27 Severe Depression    Screening for Cognitive Impairment  Three Minute Recall (apple, watch, raza)   /3 3  Draw clock face with all 12 numbers set to the hand to show 10 minutes past 11 o'clock  1 5/5  If cognitive concerns identified, deferred for follow up unless specifically addressed in assessment and plan.    Fall Risk Assessment  Has the patient had two or more falls in the last year or any fall with injury in the last year?  Yes  If fall risk identified, deferred for follow up unless specifically addressed in assessment and plan.    Safety Assessment  Throw rugs on floor.  No  Handrails on all stairs.  Yes  Good lighting in all hallways.  Yes  Difficulty hearing.  Yes  Patient counseled about all safety risks that were identified.    Functional Assessment ADLs  Are there any barriers preventing you from cooking  for yourself or meeting nutritional needs?  No.    Are there any barriers preventing you from driving safely or obtaining transportation?  No.    Are there any barriers preventing you from using a telephone or calling for help?  No.    Are there any barriers preventing you from shopping?  No.    Are there any barriers preventing you from taking care of your own finances?  No.    Are there any barriers preventing you from managing your medications?  No.    Are you currently engaging any exercise or physical activity?  No.       Health Maintenance Summary                Annual Wellness Visit Overdue 1950     IMM INFLUENZA Overdue 9/1/2017      Done 11/20/2015 Imm Admin: Influenza Vaccine Quad Inj (Pf)    IMM DTaP/Tdap/Td Vaccine Postponed 7/27/2018 Originally 8/7/1970. Provider advises postponing for medical reasons    IMM ZOSTER VACCINE Postponed 7/27/2018 Originally 8/7/2011. Provider advises postponing for medical reasons    IMM PNEUMOCOCCAL 65+ (ADULT) LOW/MEDIUM RISK SERIES Next Due 11/20/2017      Done 11/20/2016 Imm Admin: Pneumococcal polysaccharide vaccine (PPSV-23)    COLONOSCOPY Next Due 6/29/2026      Done 6/29/2016 REFERRAL TO GI FOR COLONOSCOPY          Patient Care Team:  Jocelyn Ramos M.D. as PCP - General (Family Medicine)  Lex Abraham M.D. as Consulting Physician (Gastroenterology)  BROOKE Silverman M.D. as Consulting Physician (Oncology)  [facility] as Consulting Physician  Ann Keller M.D. as Consulting Physician (Pulmonary Medicine)  Saúl Kamara M.D. (Phys Med and Rehab)  Amy R Schoening, P.A. (Family Medicine)  Manuel Boston O.D. (Optometry)    Social History   Substance Use Topics   • Smoking status: Former Smoker     Packs/day: 1.00     Years: 20.00     Types: Cigarettes     Quit date: 1/1/1998   • Smokeless tobacco: Never Used   • Alcohol use 0.0 oz/week      Comment: wine  2 daily     Family History   Problem Relation Age of Onset   • Cancer Mother       "Bladder   • Heart Disease Father    • Heart Disease Brother      CABG x2     He  has a past medical history of Anemia; Anxiety; Atrophy of right kidney; Cancer (CMS-HCC) (2016); Colon cancer (CMS-[facility]); DM (diabetes mellitus) (CMS-HCC); Former tobacco use; Gout; Hyperlipidemia; Hypertension; Pneumonia (1991); Psychiatric problem; Sleep apnea; and Snoring.   Past Surgical History:   Procedure Laterality Date   • CATH PLACEMENT Left 10/7/2016    Procedure: CATH PLACEMENT - SUBCLAVIAN PORT;  Surgeon: Sho Bajwa M.D.;  Location: SURGERY SAME DAY [facility];  Service:    • CLOSED REDUCTION  10/25/2012    Performed by Chavez Duran M.D. at SURGERY [facility] ORS   • COLON RESECTION           Exam:   Blood pressure 122/64, pulse 70, temperature 37.9 °C (100.3 °F), resp. rate 16, height 1.753 m (5' 9\"), weight (!) 138.4 kg (305 lb 1.9 oz), SpO2 94 %. Body mass index is 45.06 kg/m².    Constitutional: Alert, obese, no distress.  Skin: Warm, dry, good turgor, erythematous rash on right lower leg.  Eye: Conjunctiva clear, lids normal.  ENMT: Lips without lesions, good dentition, oropharynx clear. Hearing is good.  Neck: Trachea midline, no masses, no thyromegaly.  Respiratory: Unlabored respiratory effort, no cough.  Feet: Thickened and yellow toenails. Right great toenail is thickened, yellow and has valgus deformity. There is surrounding edema and erythema, but no purulent drainage expressed.  Psych: Alert and oriented x3, normal affect and mood. Eye contact is good, speech goal directed, affect calm.    Assessment and Plan. The following treatment and monitoring plan is recommended:      1. Medicare annual wellness visit, initial  HRA reviewed and appropriate. See individual assessments and plans below. Anticipatory guidance discussed.    2. Malignant neoplasm of descending colon (CMS-HCC)  Stable. Patient recently completed chemotherapy. He follows regularly with Dr. Silverman. Continue to monitor.    3. " Neuropathy associated with cancer (CMS-HCC)  Stable and not well-controlled. Likely a permanent effect from his chemotherapy. Continue gabapentin and monitor.    4. Lung nodule, solitary  Stable. Patient is followed closely by oncology and has repeat imaging ordered.    5. Elevated PSA, less than 10 ng/ml  Stable and down trending. We'll continue to monitor. CT of abdomen/pelvis showed an enlarged prostate without masses.    6. Essential hypertension  Chronic and stable. Continue current medications. Monitor.    7. Pure hypercholesterolemia  Chronic and stable. Continue current medications. Monitor.    8. Benign non-nodular prostatic hyperplasia with lower urinary tract symptoms  Chronic and stable. Continue current medications. Monitor.    9. Obstructive sleep apnea  Chronic and stable. Continue CPAP. Monitor.    10. Anxiety  Chronic and stable. Continue current medications. Monitor.    11. History of gout  Chronic and stable. Allopurinol colchicine discontinued without flareups. Monitor.    12. Ingrown toenail without infection  This is a new problem. Ingrown toenail noted on exam. Encourage patient to follow-up with his podiatrist to discuss removal.    13. Morbid obesity with BMI of 45.0-49.9, adult (CMS-[facility])  Patient's weight was discussed today, including healthy low-carb diet, 30-minutes of moderate exercise daily and avoiding sugars and high-fat foods.  - Patient identified as having weight management issue.  Appropriate orders and counseling given.    Services suggested: No services needed at this time  Health Care Screening recommendations as per orders if indicated.  Referrals offered: PT/OT/Nutrition counseling/Behavioral Health/Smoking cessation as per orders if indicated.    Discussion today about general wellness and lifestyle habits:    · Prevent falls and reduce trip hazards; Cautioned about securing or removing rugs.  · Have a working fire alarm and carbon monoxide detector.  · Engage in regular  physical activity and social activities.     Follow-up: 3 months

## 2017-09-20 NOTE — PATIENT INSTRUCTIONS
Your toenail is ingrown, but it is not infected. It would be best to have it removed by your podiatrist.

## 2017-09-26 ENCOUNTER — APPOINTMENT (RX ONLY)
Dept: URBAN - METROPOLITAN AREA CLINIC 31 | Facility: CLINIC | Age: 67
Setting detail: DERMATOLOGY
End: 2017-09-26

## 2017-09-26 PROBLEM — C44.519 BASAL CELL CARCINOMA OF SKIN OF OTHER PART OF TRUNK: Status: ACTIVE | Noted: 2017-09-26

## 2017-09-26 PROCEDURE — ? COUNSELING

## 2017-09-26 PROCEDURE — ? DEFER

## 2017-09-26 PROCEDURE — 99211 OFF/OP EST MAY X REQ PHY/QHP: CPT

## 2017-10-18 ENCOUNTER — APPOINTMENT (RX ONLY)
Dept: URBAN - METROPOLITAN AREA CLINIC 31 | Facility: CLINIC | Age: 67
Setting detail: DERMATOLOGY
End: 2017-10-18

## 2017-10-18 DIAGNOSIS — Z48.817 ENCOUNTER FOR SURGICAL AFTERCARE FOLLOWING SURGERY ON THE SKIN AND SUBCUTANEOUS TISSUE: ICD-10-CM

## 2017-10-18 PROCEDURE — ? POST-OP WOUND CHECK

## 2017-10-18 PROCEDURE — ? COUNSELING

## 2017-10-18 ASSESSMENT — LOCATION SIMPLE DESCRIPTION DERM
LOCATION SIMPLE: UPPER BACK
LOCATION SIMPLE: UPPER BACK

## 2017-10-18 ASSESSMENT — LOCATION DETAILED DESCRIPTION DERM
LOCATION DETAILED: SUPERIOR THORACIC SPINE
LOCATION DETAILED: SUPERIOR THORACIC SPINE

## 2017-10-18 ASSESSMENT — LOCATION ZONE DERM
LOCATION ZONE: TRUNK
LOCATION ZONE: TRUNK

## 2017-10-18 NOTE — PROCEDURE: POST-OP WOUND CHECK
Detail Level: Detailed
Add 71651 Cpt? (Important Note: In 2017 The Use Of 03624 Is Being Tracked By Cms To Determine Future Global Period Reimbursement For Global Periods): no

## 2017-10-24 ENCOUNTER — APPOINTMENT (RX ONLY)
Dept: URBAN - METROPOLITAN AREA CLINIC 31 | Facility: CLINIC | Age: 67
Setting detail: DERMATOLOGY
End: 2017-10-24

## 2017-10-24 DIAGNOSIS — D22 MELANOCYTIC NEVI: ICD-10-CM

## 2017-10-24 PROBLEM — D22.5 MELANOCYTIC NEVI OF TRUNK: Status: ACTIVE | Noted: 2017-10-24

## 2017-10-24 PROCEDURE — 12032 INTMD RPR S/A/T/EXT 2.6-7.5: CPT

## 2017-10-24 PROCEDURE — 11401 EXC TR-EXT B9+MARG 0.6-1 CM: CPT

## 2017-10-24 PROCEDURE — ? EXCISION

## 2017-10-24 ASSESSMENT — LOCATION DETAILED DESCRIPTION DERM: LOCATION DETAILED: INFERIOR THORACIC SPINE

## 2017-10-24 ASSESSMENT — LOCATION ZONE DERM: LOCATION ZONE: TRUNK

## 2017-10-24 ASSESSMENT — LOCATION SIMPLE DESCRIPTION DERM: LOCATION SIMPLE: UPPER BACK

## 2017-10-24 NOTE — PROCEDURE: EXCISION
Epidermal Autograft Text: The defect edges were debeveled with a #15 scalpel blade.  Given the location of the defect, shape of the defect and the proximity to free margins an epidermal autograft was deemed most appropriate.  Using a sterile surgical marker, the primary defect shape was transferred to the donor site. The epidermal graft was then harvested.  The skin graft was then placed in the primary defect and oriented appropriately.
Complex Repair And Bilobe Flap Text: The defect edges were debeveled with a #15 scalpel blade.  The primary defect was closed partially with a complex linear closure.  Given the location of the remaining defect, shape of the defect and the proximity to free margins a bilobe flap was deemed most appropriate for complete closure of the defect.  Using a sterile surgical marker, an appropriate advancement flap was drawn incorporating the defect and placing the expected incisions within the relaxed skin tension lines where possible.    The area thus outlined was incised deep to adipose tissue with a #15 scalpel blade.  The skin margins were undermined to an appropriate distance in all directions utilizing iris scissors.
Complex Repair And Melolabial Flap Text: The defect edges were debeveled with a #15 scalpel blade.  The primary defect was closed partially with a complex linear closure.  Given the location of the remaining defect, shape of the defect and the proximity to free margins a melolabial flap was deemed most appropriate for complete closure of the defect.  Using a sterile surgical marker, an appropriate advancement flap was drawn incorporating the defect and placing the expected incisions within the relaxed skin tension lines where possible.    The area thus outlined was incised deep to adipose tissue with a #15 scalpel blade.  The skin margins were undermined to an appropriate distance in all directions utilizing iris scissors.
Cheek-To-Nose Interpolation Flap Text: A decision was made to reconstruct the defect utilizing an interpolation axial flap and a staged reconstruction.  A telfa template was made of the defect.  This telfa template was then used to outline the Cheek-To-Nose Interpolation flap.  The donor area for the pedicle flap was then injected with anesthesia.  The flap was excised through the skin and subcutaneous tissue down to the layer of the underlying musculature.  The interpolation flap was carefully excised within this deep plane to maintain its blood supply.  The edges of the donor site were undermined.   The donor site was closed in a primary fashion.  The pedicle was then rotated into position and sutured.  Once the tube was sutured into place, adequate blood supply was confirmed with blanching and refill.  The pedicle was then wrapped with xeroform gauze and dressed appropriately with a telfa and gauze bandage to ensure continued blood supply and protect the attached pedicle.
Render Path Notes In Note?: no
Purse String (Intermediate) Text: Given the location of the defect and the characteristics of the surrounding skin a purse string intermediate closure was deemed most appropriate.  Undermining was performed circumferentially around the surgical defect.  A purse string suture was then placed and tightened.
Excision Method: Elliptical
Xenograft Text: The defect edges were debeveled with a #15 scalpel blade.  Given the location of the defect, shape of the defect and the proximity to free margins a xenograft was deemed most appropriate.  The graft was then trimmed to fit the size of the defect.  The graft was then placed in the primary defect and oriented appropriately.
Z Plasty Text: The lesion was extirpated to the level of the fat with a #15 scalpel blade.  Given the location of the defect, shape of the defect and the proximity to free margins a Z-plasty was deemed most appropriate for repair.  Using a sterile surgical marker, the appropriate transposition arms of the Z-plasty were drawn incorporating the defect and placing the expected incisions within the relaxed skin tension lines where possible.    The area thus outlined was incised deep to adipose tissue with a #15 scalpel blade.  The skin margins were undermined to an appropriate distance in all directions utilizing iris scissors.  The opposing transposition arms were then transposed into place in opposite direction and anchored with interrupted buried subcutaneous sutures.
W Plasty Text: The lesion was extirpated to the level of the fat with a #15 scalpel blade.  Given the location of the defect, shape of the defect and the proximity to free margins a W-plasty was deemed most appropriate for repair.  Using a sterile surgical marker, the appropriate transposition arms of the W-plasty were drawn incorporating the defect and placing the expected incisions within the relaxed skin tension lines where possible.    The area thus outlined was incised deep to adipose tissue with a #15 scalpel blade.  The skin margins were undermined to an appropriate distance in all directions utilizing iris scissors.  The opposing transposition arms were then transposed into place in opposite direction and anchored with interrupted buried subcutaneous sutures.
Epidermal Closure: simple interrupted
O-Z Plasty Text: The defect edges were debeveled with a #15 scalpel blade.  Given the location of the defect, shape of the defect and the proximity to free margins an O-Z plasty (double transposition flap) was deemed most appropriate.  Using a sterile surgical marker, the appropriate transposition flaps were drawn incorporating the defect and placing the expected incisions within the relaxed skin tension lines where possible.    The area thus outlined was incised deep to adipose tissue with a #15 scalpel blade.  The skin margins were undermined to an appropriate distance in all directions utilizing iris scissors.  Hemostasis was achieved with electrocautery.  The flaps were then transposed into place, one clockwise and the other counterclockwise, and anchored with interrupted buried subcutaneous sutures.
Graft Donor Site Bandage (Optional-Leave Blank If You Don't Want In Note): Steri-strips and a pressure bandage were applied to the donor site.
Excision Depth: adipose tissue
Wound Care: Vaseline
Lab Facility: 
Complex Repair And Dorsal Nasal Flap Text: The defect edges were debeveled with a #15 scalpel blade.  The primary defect was closed partially with a complex linear closure.  Given the location of the remaining defect, shape of the defect and the proximity to free margins a dorsal nasal flap was deemed most appropriate for complete closure of the defect.  Using a sterile surgical marker, an appropriate flap was drawn incorporating the defect and placing the expected incisions within the relaxed skin tension lines where possible.    The area thus outlined was incised deep to adipose tissue with a #15 scalpel blade.  The skin margins were undermined to an appropriate distance in all directions utilizing iris scissors.
Eliptical Excision Additional Text (Leave Blank If You Do Not Want): The margin was drawn around the clinically apparent lesion.  An elliptical shape was then drawn on the skin incorporating the lesion and margins.  Incisions were then made along these lines to the appropriate tissue plane and the lesion was extirpated.
Modified Advancement Flap Text: The defect edges were debeveled with a #15 scalpel blade.  Given the location of the defect, shape of the defect and the proximity to free margins a modified advancement flap was deemed most appropriate.  Using a sterile surgical marker, an appropriate advancement flap was drawn incorporating the defect and placing the expected incisions within the relaxed skin tension lines where possible.    The area thus outlined was incised deep to adipose tissue with a #15 scalpel blade.  The skin margins were undermined to an appropriate distance in all directions utilizing iris scissors.
Dermal Autograft Text: The defect edges were debeveled with a #15 scalpel blade.  Given the location of the defect, shape of the defect and the proximity to free margins a dermal autograft was deemed most appropriate.  Using a sterile surgical marker, the primary defect shape was transferred to the donor site. The area thus outlined was incised deep to adipose tissue with a #15 scalpel blade.  The harvested graft was then trimmed of adipose and epidermal tissue until only dermis was left.  The skin graft was then placed in the primary defect and oriented appropriately.
Complex Repair And Dermal Autograft Text: The defect edges were debeveled with a #15 scalpel blade.  The primary defect was closed partially with a complex linear closure.  Given the location of the defect, shape of the defect and the proximity to free margins an dermal autograft was deemed most appropriate to repair the remaining defect.  The graft was trimmed to fit the size of the remaining defect.  The graft was then placed in the primary defect, oriented appropriately, and sutured into place.
Double Island Pedicle Flap Text: The defect edges were debeveled with a #15 scalpel blade.  Given the location of the defect, shape of the defect and the proximity to free margins a double island pedicle advancement flap was deemed most appropriate.  Using a sterile surgical marker, an appropriate advancement flap was drawn incorporating the defect, outlining the appropriate donor tissue and placing the expected incisions within the relaxed skin tension lines where possible.    The area thus outlined was incised deep to adipose tissue with a #15 scalpel blade.  The skin margins were undermined to an appropriate distance in all directions around the primary defect and laterally outward around the island pedicle utilizing iris scissors.  There was minimal undermining beneath the pedicle flap.
Render Post-Care Instructions In Note?: yes
Secondary Defect Width (In Cm): 0
Lip Wedge Excision Repair Text: Given the location of the defect and the proximity to free margins a full thickness wedge repair was deemed most appropriate.  Using a sterile surgical marker, the appropriate repair was drawn incorporating the defect and placing the expected incisions perpendicular to the vermilion border.  The vermilion border was also meticulously outlined to ensure appropriate reapproximation during the repair.  The area thus outlined was incised through and through with a #15 scalpel blade.  The muscularis and dermis were reaproximated with deep sutures following hemostasis. Care was taken to realign the vermilion border before proceeding with the superficial closure.  Once the vermilion was realigned the superfical and mucosal closure was finished.
Complex Repair And M Plasty Text: The defect edges were debeveled with a #15 scalpel blade.  The primary defect was closed partially with a complex linear closure.  Given the location of the remaining defect, shape of the defect and the proximity to free margins an M plasty was deemed most appropriate for complete closure of the defect.  Using a sterile surgical marker, an appropriate advancement flap was drawn incorporating the defect and placing the expected incisions within the relaxed skin tension lines where possible.    The area thus outlined was incised deep to adipose tissue with a #15 scalpel blade.  The skin margins were undermined to an appropriate distance in all directions utilizing iris scissors.
Additional Anesthesia Volume In Cc: 3
Burow's Advancement Flap Text: The defect edges were debeveled with a #15 scalpel blade.  Given the location of the defect and the proximity to free margins a Burow's advancement flap was deemed most appropriate.  Using a sterile surgical marker, the appropriate advancement flap was drawn incorporating the defect and placing the expected incisions within the relaxed skin tension lines where possible.    The area thus outlined was incised deep to adipose tissue with a #15 scalpel blade.  The skin margins were undermined to an appropriate distance in all directions utilizing iris scissors.
Intermediate Repair Preamble Text (Leave Blank If You Do Not Want): Undermining was performed with blunt dissection.
H Plasty Text: Given the location of the defect, shape of the defect and the proximity to free margins a H-plasty was deemed most appropriate for repair.  Using a sterile surgical marker, the appropriate advancement arms of the H-plasty were drawn incorporating the defect and placing the expected incisions within the relaxed skin tension lines where possible. The area thus outlined was incised deep to adipose tissue with a #15 scalpel blade. The skin margins were undermined to an appropriate distance in all directions utilizing iris scissors.  The opposing advancement arms were then advanced into place in opposite direction and anchored with interrupted buried subcutaneous sutures.
Alar Island Pedicle Flap Text: The defect edges were debeveled with a #15 scalpel blade.  Given the location of the defect, shape of the defect and the proximity to the alar rim an island pedicle advancement flap was deemed most appropriate.  Using a sterile surgical marker, an appropriate advancement flap was drawn incorporating the defect, outlining the appropriate donor tissue and placing the expected incisions within the nasal ala running parallel to the alar rim. The area thus outlined was incised with a #15 scalpel blade.  The skin margins were undermined minimally to an appropriate distance in all directions around the primary defect and laterally outward around the island pedicle utilizing iris scissors.  There was minimal undermining beneath the pedicle flap.
Rotation Flap Text: The defect edges were debeveled with a #15 scalpel blade.  Given the location of the defect, shape of the defect and the proximity to free margins a rotation flap was deemed most appropriate.  Using a sterile surgical marker, an appropriate rotation flap was drawn incorporating the defect and placing the expected incisions within the relaxed skin tension lines where possible.    The area thus outlined was incised deep to adipose tissue with a #15 scalpel blade.  The skin margins were undermined to an appropriate distance in all directions utilizing iris scissors.
Dorsal Nasal Flap Text: The defect edges were debeveled with a #15 scalpel blade.  Given the location of the defect and the proximity to free margins a dorsal nasal flap was deemed most appropriate.  Using a sterile surgical marker, an appropriate dorsal nasal flap was drawn around the defect.    The area thus outlined was incised deep to adipose tissue with a #15 scalpel blade.  The skin margins were undermined to an appropriate distance in all directions utilizing iris scissors.
Tissue Cultured Epidermal Autograft Text: The defect edges were debeveled with a #15 scalpel blade.  Given the location of the defect, shape of the defect and the proximity to free margins a tissue cultured epidermal autograft was deemed most appropriate.  The graft was then trimmed to fit the size of the defect.  The graft was then placed in the primary defect and oriented appropriately.
Dressing: pressure dressing
Split-Thickness Skin Graft Text: The defect edges were debeveled with a #15 scalpel blade.  Given the location of the defect, shape of the defect and the proximity to free margins a split thickness skin graft was deemed most appropriate.  Using a sterile surgical marker, the primary defect shape was transferred to the donor site. The split thickness graft was then harvested.  The skin graft was then placed in the primary defect and oriented appropriately.
Date Of Previous Biopsy (Optional): 08/24/2017
Cheek Interpolation Flap Text: A decision was made to reconstruct the defect utilizing an interpolation axial flap and a staged reconstruction.  A telfa template was made of the defect.  This telfa template was then used to outline the Cheek Interpolation flap.  The donor area for the pedicle flap was then injected with anesthesia.  The flap was excised through the skin and subcutaneous tissue down to the layer of the underlying musculature.  The interpolation flap was carefully excised within this deep plane to maintain its blood supply.  The edges of the donor site were undermined.   The donor site was closed in a primary fashion.  The pedicle was then rotated into position and sutured.  Once the tube was sutured into place, adequate blood supply was confirmed with blanching and refill.  The pedicle was then wrapped with xeroform gauze and dressed appropriately with a telfa and gauze bandage to ensure continued blood supply and protect the attached pedicle.
Complex Repair And Tissue Cultured Epidermal Autograft Text: The defect edges were debeveled with a #15 scalpel blade.  The primary defect was closed partially with a complex linear closure.  Given the location of the defect, shape of the defect and the proximity to free margins an tissue cultured epidermal autograft was deemed most appropriate to repair the remaining defect.  The graft was trimmed to fit the size of the remaining defect.  The graft was then placed in the primary defect, oriented appropriately, and sutured into place.
V-Y Flap Text: The defect edges were debeveled with a #15 scalpel blade.  Given the location of the defect, shape of the defect and the proximity to free margins a V-Y flap was deemed most appropriate.  Using a sterile surgical marker, an appropriate advancement flap was drawn incorporating the defect and placing the expected incisions within the relaxed skin tension lines where possible.    The area thus outlined was incised deep to adipose tissue with a #15 scalpel blade.  The skin margins were undermined to an appropriate distance in all directions utilizing iris scissors.
Complex Repair And O-T Advancement Flap Text: The defect edges were debeveled with a #15 scalpel blade.  The primary defect was closed partially with a complex linear closure.  Given the location of the remaining defect, shape of the defect and the proximity to free margins an O-T advancement flap was deemed most appropriate for complete closure of the defect.  Using a sterile surgical marker, an appropriate advancement flap was drawn incorporating the defect and placing the expected incisions within the relaxed skin tension lines where possible.    The area thus outlined was incised deep to adipose tissue with a #15 scalpel blade.  The skin margins were undermined to an appropriate distance in all directions utilizing iris scissors.
Complex Repair And Rotation Flap Text: The defect edges were debeveled with a #15 scalpel blade.  The primary defect was closed partially with a complex linear closure.  Given the location of the remaining defect, shape of the defect and the proximity to free margins a rotation flap was deemed most appropriate for complete closure of the defect.  Using a sterile surgical marker, an appropriate advancement flap was drawn incorporating the defect and placing the expected incisions within the relaxed skin tension lines where possible.    The area thus outlined was incised deep to adipose tissue with a #15 scalpel blade.  The skin margins were undermined to an appropriate distance in all directions utilizing iris scissors.
Perilesional Excision Additional Text (Leave Blank If You Do Not Want): The margin was drawn around the clinically apparent lesion. Incisions were then made along these lines to the appropriate tissue plane and the lesion was extirpated.
Epidermal Sutures: 4-0 Nylon
Repair Performed By Another Provider Text (Leave Blank If You Do Not Want): After the tissue was excised the defect was repaired by another provider.
Complex Repair And Z Plasty Text: The defect edges were debeveled with a #15 scalpel blade.  The primary defect was closed partially with a complex linear closure.  Given the location of the remaining defect, shape of the defect and the proximity to free margins a Z plasty was deemed most appropriate for complete closure of the defect.  Using a sterile surgical marker, an appropriate advancement flap was drawn incorporating the defect and placing the expected incisions within the relaxed skin tension lines where possible.    The area thus outlined was incised deep to adipose tissue with a #15 scalpel blade.  The skin margins were undermined to an appropriate distance in all directions utilizing iris scissors.
Muscle Hinge Flap Text: The defect edges were debeveled with a #15 scalpel blade.  Given the size, depth and location of the defect and the proximity to free margins a muscle hinge flap was deemed most appropriate.  Using a sterile surgical marker, an appropriate hinge flap was drawn incorporating the defect. The area thus outlined was incised with a #15 scalpel blade.  The skin margins were undermined to an appropriate distance in all directions utilizing iris scissors.
Complex Repair Preamble Text (Leave Blank If You Do Not Want): Extensive wide undermining was performed.
S Plasty Text: Given the location and shape of the defect, and the orientation of relaxed skin tension lines, an S-plasty was deemed most appropriate for repair.  Using a sterile surgical marker, the appropriate outline of the S-plasty was drawn, incorporating the defect and placing the expected incisions within the relaxed skin tension lines where possible.  The area thus outlined was incised deep to adipose tissue with a #15 scalpel blade.  The skin margins were undermined to an appropriate distance in all directions utilizing iris scissors. The skin flaps were advanced over the defect.  The opposing margins were then approximated with interrupted buried subcutaneous sutures.
Complex Repair And Xenograft Text: The defect edges were debeveled with a #15 scalpel blade.  The primary defect was closed partially with a complex linear closure.  Given the location of the defect, shape of the defect and the proximity to free margins a xenograft was deemed most appropriate to repair the remaining defect.  The graft was trimmed to fit the size of the remaining defect.  The graft was then placed in the primary defect, oriented appropriately, and sutured into place.
Complex Repair And Double M Plasty Text: The defect edges were debeveled with a #15 scalpel blade.  The primary defect was closed partially with a complex linear closure.  Given the location of the remaining defect, shape of the defect and the proximity to free margins a double M plasty was deemed most appropriate for complete closure of the defect.  Using a sterile surgical marker, an appropriate advancement flap was drawn incorporating the defect and placing the expected incisions within the relaxed skin tension lines where possible.    The area thus outlined was incised deep to adipose tissue with a #15 scalpel blade.  The skin margins were undermined to an appropriate distance in all directions utilizing iris scissors.
Lab: [id number]
Ftsg Text: The defect edges were debeveled with a #15 scalpel blade.  Given the location of the defect, shape of the defect and the proximity to free margins a full thickness skin graft was deemed most appropriate.  Using a sterile surgical marker, the primary defect shape was transferred to the donor site. The area thus outlined was incised deep to adipose tissue with a #15 scalpel blade.  The harvested graft was then trimmed of adipose tissue until only dermis and epidermis was left.  The skin margins of the secondary defect were undermined to an appropriate distance in all directions utilizing iris scissors.  The secondary defect was closed with interrupted buried subcutaneous sutures.  The skin edges were then re-apposed with running  sutures.  The skin graft was then placed in the primary defect and oriented appropriately.
Post-Care Instructions: I reviewed with the patient in detail post-care instructions. Patient is not to engage in any heavy lifting, exercise, or swimming for the next 14 days. Should the patient develop any fevers, chills, bleeding, severe pain patient will contact the office immediately.
Complex Repair And W Plasty Text: The defect edges were debeveled with a #15 scalpel blade.  The primary defect was closed partially with a complex linear closure.  Given the location of the remaining defect, shape of the defect and the proximity to free margins a W plasty was deemed most appropriate for complete closure of the defect.  Using a sterile surgical marker, an appropriate advancement flap was drawn incorporating the defect and placing the expected incisions within the relaxed skin tension lines where possible.    The area thus outlined was incised deep to adipose tissue with a #15 scalpel blade.  The skin margins were undermined to an appropriate distance in all directions utilizing iris scissors.
Surgeon (Optional): Amy Schoening
Transposition Flap Text: The defect edges were debeveled with a #15 scalpel blade.  Given the location of the defect and the proximity to free margins a transposition flap was deemed most appropriate.  Using a sterile surgical marker, an appropriate transposition flap was drawn incorporating the defect.    The area thus outlined was incised deep to adipose tissue with a #15 scalpel blade.  The skin margins were undermined to an appropriate distance in all directions utilizing iris scissors.
Path Notes (To The Dermatopathologist): Please check margins.
Crescentic Advancement Flap Text: The defect edges were debeveled with a #15 scalpel blade.  Given the location of the defect and the proximity to free margins a crescentic advancement flap was deemed most appropriate.  Using a sterile surgical marker, the appropriate advancement flap was drawn incorporating the defect and placing the expected incisions within the relaxed skin tension lines where possible.    The area thus outlined was incised deep to adipose tissue with a #15 scalpel blade.  The skin margins were undermined to an appropriate distance in all directions utilizing iris scissors.
Complex Repair And Skin Substitute Graft Text: The defect edges were debeveled with a #15 scalpel blade.  The primary defect was closed partially with a complex linear closure.  Given the location of the remaining defect, shape of the defect and the proximity to free margins a skin substitute graft was deemed most appropriate to repair the remaining defect.  The graft was trimmed to fit the size of the remaining defect.  The graft was then placed in the primary defect, oriented appropriately, and sutured into place.
Skin Substitute Text: The defect edges were debeveled with a #15 scalpel blade.  Given the location of the defect, shape of the defect and the proximity to free margins a skin substitute graft was deemed most appropriate.  The graft material was trimmed to fit the size of the defect. The graft was then placed in the primary defect and oriented appropriately.
Melolabial Transposition Flap Text: The defect edges were debeveled with a #15 scalpel blade.  Given the location of the defect and the proximity to free margins a melolabial flap was deemed most appropriate.  Using a sterile surgical marker, an appropriate melolabial transposition flap was drawn incorporating the defect.    The area thus outlined was incised deep to adipose tissue with a #15 scalpel blade.  The skin margins were undermined to an appropriate distance in all directions utilizing iris scissors.
Scalpel Size: 15 blade
Complex Repair And Transposition Flap Text: The defect edges were debeveled with a #15 scalpel blade.  The primary defect was closed partially with a complex linear closure.  Given the location of the remaining defect, shape of the defect and the proximity to free margins a transposition flap was deemed most appropriate for complete closure of the defect.  Using a sterile surgical marker, an appropriate advancement flap was drawn incorporating the defect and placing the expected incisions within the relaxed skin tension lines where possible.    The area thus outlined was incised deep to adipose tissue with a #15 scalpel blade.  The skin margins were undermined to an appropriate distance in all directions utilizing iris scissors.
Hemostasis: Electrocautery
Melolabial Interpolation Flap Text: A decision was made to reconstruct the defect utilizing an interpolation axial flap and a staged reconstruction.  A telfa template was made of the defect.  This telfa template was then used to outline the melolabial interpolation flap.  The donor area for the pedicle flap was then injected with anesthesia.  The flap was excised through the skin and subcutaneous tissue down to the layer of the underlying musculature.  The pedicle flap was carefully excised within this deep plane to maintain its blood supply.  The edges of the donor site were undermined.   The donor site was closed in a primary fashion.  The pedicle was then rotated into position and sutured.  Once the tube was sutured into place, adequate blood supply was confirmed with blanching and refill.  The pedicle was then wrapped with xeroform gauze and dressed appropriately with a telfa and gauze bandage to ensure continued blood supply and protect the attached pedicle.
Excisional Biopsy Additional Text (Leave Blank If You Do Not Want): The margin was drawn around the clinically apparent lesion. An elliptical shape was then drawn on the skin incorporating the lesion and margins.  Incisions were then made along these lines to the appropriate tissue plane and the lesion was extirpated.
Billing Type: Third-Party Bill
V-Y Plasty Text: The defect edges were debeveled with a #15 scalpel blade.  Given the location of the defect, shape of the defect and the proximity to free margins an V-Y advancement flap was deemed most appropriate.  Using a sterile surgical marker, an appropriate advancement flap was drawn incorporating the defect and placing the expected incisions within the relaxed skin tension lines where possible.    The area thus outlined was incised deep to adipose tissue with a #15 scalpel blade.  The skin margins were undermined to an appropriate distance in all directions utilizing iris scissors.
Island Pedicle Flap-Requiring Vessel Identification Text: The defect edges were debeveled with a #15 scalpel blade.  Given the location of the defect, shape of the defect and the proximity to free margins an island pedicle advancement flap was deemed most appropriate.  Using a sterile surgical marker, an appropriate advancement flap was drawn, based on the axial vessel mentioned above, incorporating the defect, outlining the appropriate donor tissue and placing the expected incisions within the relaxed skin tension lines where possible.    The area thus outlined was incised deep to adipose tissue with a #15 scalpel blade.  The skin margins were undermined to an appropriate distance in all directions around the primary defect and laterally outward around the island pedicle utilizing iris scissors.  There was minimal undermining beneath the pedicle flap.
Bilobed Flap Text: The defect edges were debeveled with a #15 scalpel blade.  Given the location of the defect and the proximity to free margins a bilobe flap was deemed most appropriate.  Using a sterile surgical marker, an appropriate bilobe flap drawn around the defect.    The area thus outlined was incised deep to adipose tissue with a #15 scalpel blade.  The skin margins were undermined to an appropriate distance in all directions utilizing iris scissors.
Medical Necessity Clause: This procedure was medically necessary because the lesion that was treated was:
Bilobed Transposition Flap Text: The defect edges were debeveled with a #15 scalpel blade.  Given the location of the defect and the proximity to free margins a bilobed transposition flap was deemed most appropriate.  Using a sterile surgical marker, an appropriate bilobe flap drawn around the defect.    The area thus outlined was incised deep to adipose tissue with a #15 scalpel blade.  The skin margins were undermined to an appropriate distance in all directions utilizing iris scissors.
Cartilage Graft Text: The defect edges were debeveled with a #15 scalpel blade.  Given the location of the defect, shape of the defect, the fact the defect involved a full thickness cartilage defect a cartilage graft was deemed most appropriate.  An appropriate donor site was identified, cleansed, and anesthetized. The cartilage graft was then harvested and transferred to the recipient site, oriented appropriately and then sutured into place.  The secondary defect was then repaired using a primary closure.
Suture Removal: 14 days
Complex Repair And V-Y Plasty Text: The defect edges were debeveled with a #15 scalpel blade.  The primary defect was closed partially with a complex linear closure.  Given the location of the remaining defect, shape of the defect and the proximity to free margins a V-Y plasty was deemed most appropriate for complete closure of the defect.  Using a sterile surgical marker, an appropriate advancement flap was drawn incorporating the defect and placing the expected incisions within the relaxed skin tension lines where possible.    The area thus outlined was incised deep to adipose tissue with a #15 scalpel blade.  The skin margins were undermined to an appropriate distance in all directions utilizing iris scissors.
Ear Star Wedge Flap Text: The defect edges were debeveled with a #15 blade scalpel.  Given the location of the defect and the proximity to free margins (helical rim) an ear star wedge flap was deemed most appropriate.  Using a sterile surgical marker, the appropriate flap was drawn incorporating the defect and placing the expected incisions between the helical rim and antihelix where possible.  The area thus outlined was incised through and through with a #15 scalpel blade.
Rhombic Flap Text: The defect edges were debeveled with a #15 scalpel blade.  Given the location of the defect and the proximity to free margins a rhombic flap was deemed most appropriate.  Using a sterile surgical marker, an appropriate rhombic flap was drawn incorporating the defect.    The area thus outlined was incised deep to adipose tissue with a #15 scalpel blade.  The skin margins were undermined to an appropriate distance in all directions utilizing iris scissors.
Anesthesia Type: 0.5% marcaine
A-T Advancement Flap Text: The defect edges were debeveled with a #15 scalpel blade.  Given the location of the defect, shape of the defect and the proximity to free margins an A-T advancement flap was deemed most appropriate.  Using a sterile surgical marker, an appropriate advancement flap was drawn incorporating the defect and placing the expected incisions within the relaxed skin tension lines where possible.    The area thus outlined was incised deep to adipose tissue with a #15 scalpel blade.  The skin margins were undermined to an appropriate distance in all directions utilizing iris scissors.
Home Suture Removal Text: Patient was provided a home suture removal kit and will remove their sutures at home.  If they have any questions or difficulties they will call the office.
Size Of Lesion In Cm: 0.6
Fusiform Excision Additional Text (Leave Blank If You Do Not Want): The margin was drawn around the clinically apparent lesion.  A fusiform shape was then drawn on the skin incorporating the lesion and margins.  Incisions were then made along these lines to the appropriate tissue plane and the lesion was extirpated.
Complex Repair And Split-Thickness Skin Graft Text: The defect edges were debeveled with a #15 scalpel blade.  The primary defect was closed partially with a complex linear closure.  Given the location of the defect, shape of the defect and the proximity to free margins a split thickness skin graft was deemed most appropriate to repair the remaining defect.  The graft was trimmed to fit the size of the remaining defect.  The graft was then placed in the primary defect, oriented appropriately, and sutured into place.
Posterior Auricular Interpolation Flap Text: A decision was made to reconstruct the defect utilizing an interpolation axial flap and a staged reconstruction.  A telfa template was made of the defect.  This telfa template was then used to outline the posterior auricular interpolation flap.  The donor area for the pedicle flap was then injected with anesthesia.  The flap was excised through the skin and subcutaneous tissue down to the layer of the underlying musculature.  The pedicle flap was carefully excised within this deep plane to maintain its blood supply.  The edges of the donor site were undermined.   The donor site was closed in a primary fashion.  The pedicle was then rotated into position and sutured.  Once the tube was sutured into place, adequate blood supply was confirmed with blanching and refill.  The pedicle was then wrapped with xeroform gauze and dressed appropriately with a telfa and gauze bandage to ensure continued blood supply and protect the attached pedicle.
Medical Necessity Information: It is in your best interest to select a reason for this procedure from the list below. All of these items fulfill various CMS LCD requirements except lesion extends to a margin.
Detail Level: Detailed
Complex Repair And Rhombic Flap Text: The defect edges were debeveled with a #15 scalpel blade.  The primary defect was closed partially with a complex linear closure.  Given the location of the remaining defect, shape of the defect and the proximity to free margins a rhombic flap was deemed most appropriate for complete closure of the defect.  Using a sterile surgical marker, an appropriate advancement flap was drawn incorporating the defect and placing the expected incisions within the relaxed skin tension lines where possible.    The area thus outlined was incised deep to adipose tissue with a #15 scalpel blade.  The skin margins were undermined to an appropriate distance in all directions utilizing iris scissors.
O-L Flap Text: The defect edges were debeveled with a #15 scalpel blade.  Given the location of the defect, shape of the defect and the proximity to free margins an O-L flap was deemed most appropriate.  Using a sterile surgical marker, an appropriate advancement flap was drawn incorporating the defect and placing the expected incisions within the relaxed skin tension lines where possible.    The area thus outlined was incised deep to adipose tissue with a #15 scalpel blade.  The skin margins were undermined to an appropriate distance in all directions utilizing iris scissors.
Size Of Margin In Cm: 0.2
Mastoid Interpolation Flap Text: A decision was made to reconstruct the defect utilizing an interpolation axial flap and a staged reconstruction.  A telfa template was made of the defect.  This telfa template was then used to outline the mastoid interpolation flap.  The donor area for the pedicle flap was then injected with anesthesia.  The flap was excised through the skin and subcutaneous tissue down to the layer of the underlying musculature.  The pedicle flap was carefully excised within this deep plane to maintain its blood supply.  The edges of the donor site were undermined.   The donor site was closed in a primary fashion.  The pedicle was then rotated into position and sutured.  Once the tube was sutured into place, adequate blood supply was confirmed with blanching and refill.  The pedicle was then wrapped with xeroform gauze and dressed appropriately with a telfa and gauze bandage to ensure continued blood supply and protect the attached pedicle.
Repair Type: Intermediate
No Repair - Repaired With Adjacent Surgical Defect Text (Leave Blank If You Do Not Want): After the excision the defect was repaired concurrently with another surgical defect which was in close approximation.
Star Wedge Flap Text: The defect edges were debeveled with a #15 scalpel blade.  Given the location of the defect, shape of the defect and the proximity to free margins a star wedge flap was deemed most appropriate.  Using a sterile surgical marker, an appropriate rotation flap was drawn incorporating the defect and placing the expected incisions within the relaxed skin tension lines where possible. The area thus outlined was incised deep to adipose tissue with a #15 scalpel blade.  The skin margins were undermined to an appropriate distance in all directions utilizing iris scissors.
Accession #: [id number]
Complex Repair And A-T Advancement Flap Text: The defect edges were debeveled with a #15 scalpel blade.  The primary defect was closed partially with a complex linear closure.  Given the location of the remaining defect, shape of the defect and the proximity to free margins an A-T advancement flap was deemed most appropriate for complete closure of the defect.  Using a sterile surgical marker, an appropriate advancement flap was drawn incorporating the defect and placing the expected incisions within the relaxed skin tension lines where possible.    The area thus outlined was incised deep to adipose tissue with a #15 scalpel blade.  The skin margins were undermined to an appropriate distance in all directions utilizing iris scissors.
Keystone Flap Text: The defect edges were debeveled with a #15 scalpel blade.  Given the location of the defect, shape of the defect a keystone flap was deemed most appropriate.  Using a sterile surgical marker, an appropriate keystone flap was drawn incorporating the defect, outlining the appropriate donor tissue and placing the expected incisions within the relaxed skin tension lines where possible. The area thus outlined was incised deep to adipose tissue with a #15 scalpel blade.  The skin margins were undermined to an appropriate distance in all directions around the primary defect and laterally outward around the flap utilizing iris scissors.
Anesthesia Type: 1% lidocaine with epinephrine
Slit Excision Additional Text (Leave Blank If You Do Not Want): A linear line was drawn on the skin overlying the lesion. An incision was made slowly until the lesion was visualized.  Once visualized, the lesion was removed with blunt dissection.
Bi-Rhombic Flap Text: The defect edges were debeveled with a #15 scalpel blade.  Given the location of the defect and the proximity to free margins a bi-rhombic flap was deemed most appropriate.  Using a sterile surgical marker, an appropriate rhombic flap was drawn incorporating the defect. The area thus outlined was incised deep to adipose tissue with a #15 scalpel blade.  The skin margins were undermined to an appropriate distance in all directions utilizing iris scissors.
Saucerization Excision Additional Text (Leave Blank If You Do Not Want): The margin was drawn around the clinically apparent lesion.  Incisions were then made along these lines, in a tangential fashion, to the appropriate tissue plane and the lesion was extirpated.
O-T Plasty Text: The defect edges were debeveled with a #15 scalpel blade.  Given the location of the defect, shape of the defect and the proximity to free margins an O-T plasty was deemed most appropriate.  Using a sterile surgical marker, an appropriate O-T plasty was drawn incorporating the defect and placing the expected incisions within the relaxed skin tension lines where possible.    The area thus outlined was incised deep to adipose tissue with a #15 scalpel blade.  The skin margins were undermined to an appropriate distance in all directions utilizing iris scissors.
Complex Repair And Single Advancement Flap Text: The defect edges were debeveled with a #15 scalpel blade.  The primary defect was closed partially with a complex linear closure.  Given the location of the remaining defect, shape of the defect and the proximity to free margins a single advancement flap was deemed most appropriate for complete closure of the defect.  Using a sterile surgical marker, an appropriate advancement flap was drawn incorporating the defect and placing the expected incisions within the relaxed skin tension lines where possible.    The area thus outlined was incised deep to adipose tissue with a #15 scalpel blade.  The skin margins were undermined to an appropriate distance in all directions utilizing iris scissors.
Bilateral Helical Rim Advancement Flap Text: The defect edges were debeveled with a #15 blade scalpel.  Given the location of the defect and the proximity to free margins (helical rim) a bilateral helical rim advancement flap was deemed most appropriate.  Using a sterile surgical marker, the appropriate advancement flaps were drawn incorporating the defect and placing the expected incisions between the helical rim and antihelix where possible.  The area thus outlined was incised through and through with a #15 scalpel blade.  With a skin hook and iris scissors, the flaps were gently and sharply undermined and freed up.
Complex Repair And Modified Advancement Flap Text: The defect edges were debeveled with a #15 scalpel blade.  The primary defect was closed partially with a complex linear closure.  Given the location of the remaining defect, shape of the defect and the proximity to free margins a modified advancement flap was deemed most appropriate for complete closure of the defect.  Using a sterile surgical marker, an appropriate advancement flap was drawn incorporating the defect and placing the expected incisions within the relaxed skin tension lines where possible.    The area thus outlined was incised deep to adipose tissue with a #15 scalpel blade.  The skin margins were undermined to an appropriate distance in all directions utilizing iris scissors.
Purse String (Simple) Text: Given the location of the defect and the characteristics of the surrounding skin a purse string simple closure was deemed most appropriate.  Undermining was performed circumferentially around the surgical defect.  A purse string suture was then placed and tightened.
Island Pedicle Flap With Canthal Suspension Text: The defect edges were debeveled with a #15 scalpel blade.  Given the location of the defect, shape of the defect and the proximity to free margins an island pedicle advancement flap was deemed most appropriate.  Using a sterile surgical marker, an appropriate advancement flap was drawn incorporating the defect, outlining the appropriate donor tissue and placing the expected incisions within the relaxed skin tension lines where possible. The area thus outlined was incised deep to adipose tissue with a #15 scalpel blade.  The skin margins were undermined to an appropriate distance in all directions around the primary defect and laterally outward around the island pedicle utilizing iris scissors.  There was minimal undermining beneath the pedicle flap. A suspension suture was placed in the canthal tendon to prevent tension and prevent ectropion.
Trilobed Flap Text: The defect edges were debeveled with a #15 scalpel blade.  Given the location of the defect and the proximity to free margins a trilobed flap was deemed most appropriate.  Using a sterile surgical marker, an appropriate trilobed flap drawn around the defect.    The area thus outlined was incised deep to adipose tissue with a #15 scalpel blade.  The skin margins were undermined to an appropriate distance in all directions utilizing iris scissors.
Mucosal Advancement Flap Text: Given the location of the defect, shape of the defect and the proximity to free margins a mucosal advancement flap was deemed most appropriate. Incisions were made with a 15 blade scalpel in the appropriate fashion along the cutaneous vermillion border and the mucosal lip. The remaining actinically damaged mucosal tissue was excised.  The mucosal advancement flap was then elevated to the gingival sulcus with care taken to preserve the neurovascular structures and advanced into the primary defect. Care was taken to ensure that precise realignment of the vermilion border was achieved.
Advancement Flap (Single) Text: The defect edges were debeveled with a #15 scalpel blade.  Given the location of the defect and the proximity to free margins a single advancement flap was deemed most appropriate.  Using a sterile surgical marker, an appropriate advancement flap was drawn incorporating the defect and placing the expected incisions within the relaxed skin tension lines where possible.    The area thus outlined was incised deep to adipose tissue with a #15 scalpel blade.  The skin margins were undermined to an appropriate distance in all directions utilizing iris scissors.
Consent was obtained from the patient. The risks and benefits to therapy were discussed in detail. Specifically, the risks of infection, scarring, bleeding, prolonged wound healing, incomplete removal, allergy to anesthesia, nerve injury and recurrence were addressed. Prior to the procedure, the treatment site was clearly identified and confirmed by the patient. All components of Universal Protocol/PAUSE Rule completed.
Complex Repair And Ftsg Text: The defect edges were debeveled with a #15 scalpel blade.  The primary defect was closed partially with a complex linear closure.  Given the location of the defect, shape of the defect and the proximity to free margins a full thickness skin graft was deemed most appropriate to repair the remaining defect.  The graft was trimmed to fit the size of the remaining defect.  The graft was then placed in the primary defect, oriented appropriately, and sutured into place.
Complex Repair And Epidermal Autograft Text: The defect edges were debeveled with a #15 scalpel blade.  The primary defect was closed partially with a complex linear closure.  Given the location of the defect, shape of the defect and the proximity to free margins an epidermal autograft was deemed most appropriate to repair the remaining defect.  The graft was trimmed to fit the size of the remaining defect.  The graft was then placed in the primary defect, oriented appropriately, and sutured into place.
Body Location Override (Optional - Billing Will Still Be Based On Selected Body Map Location If Applicable): superior mid upper back
Island Pedicle Flap Text: The defect edges were debeveled with a #15 scalpel blade.  Given the location of the defect, shape of the defect and the proximity to free margins an island pedicle advancement flap was deemed most appropriate.  Using a sterile surgical marker, an appropriate advancement flap was drawn incorporating the defect, outlining the appropriate donor tissue and placing the expected incisions within the relaxed skin tension lines where possible.    The area thus outlined was incised deep to adipose tissue with a #15 scalpel blade.  The skin margins were undermined to an appropriate distance in all directions around the primary defect and laterally outward around the island pedicle utilizing iris scissors.  There was minimal undermining beneath the pedicle flap.
Helical Rim Advancement Flap Text: The defect edges were debeveled with a #15 blade scalpel.  Given the location of the defect and the proximity to free margins (helical rim) a double helical rim advancement flap was deemed most appropriate.  Using a sterile surgical marker, the appropriate advancement flaps were drawn incorporating the defect and placing the expected incisions between the helical rim and antihelix where possible.  The area thus outlined was incised through and through with a #15 scalpel blade.  With a skin hook and iris scissors, the flaps were gently and sharply undermined and freed up.
Interpolation Flap Text: A decision was made to reconstruct the defect utilizing an interpolation axial flap and a staged reconstruction.  A telfa template was made of the defect.  This telfa template was then used to outline the interpolation flap.  The donor area for the pedicle flap was then injected with anesthesia.  The flap was excised through the skin and subcutaneous tissue down to the layer of the underlying musculature.  The interpolation flap was carefully excised within this deep plane to maintain its blood supply.  The edges of the donor site were undermined.   The donor site was closed in a primary fashion.  The pedicle was then rotated into position and sutured.  Once the tube was sutured into place, adequate blood supply was confirmed with blanching and refill.  The pedicle was then wrapped with xeroform gauze and dressed appropriately with a telfa and gauze bandage to ensure continued blood supply and protect the attached pedicle.
O-T Advancement Flap Text: The defect edges were debeveled with a #15 scalpel blade.  Given the location of the defect, shape of the defect and the proximity to free margins an O-T advancement flap was deemed most appropriate.  Using a sterile surgical marker, an appropriate advancement flap was drawn incorporating the defect and placing the expected incisions within the relaxed skin tension lines where possible.    The area thus outlined was incised deep to adipose tissue with a #15 scalpel blade.  The skin margins were undermined to an appropriate distance in all directions utilizing iris scissors.
Deep Sutures: 4-0 PDO
Paramedian Forehead Flap Text: A decision was made to reconstruct the defect utilizing an interpolation axial flap and a staged reconstruction.  A telfa template was made of the defect.  This telfa template was then used to outline the paramedian forehead pedicle flap.  The donor area for the pedicle flap was then injected with anesthesia.  The flap was excised through the skin and subcutaneous tissue down to the layer of the underlying musculature.  The pedicle flap was carefully excised within this deep plane to maintain its blood supply.  The edges of the donor site were undermined.   The donor site was closed in a primary fashion.  The pedicle was then rotated into position and sutured.  Once the tube was sutured into place, adequate blood supply was confirmed with blanching and refill.  The pedicle was then wrapped with xeroform gauze and dressed appropriately with a telfa and gauze bandage to ensure continued blood supply and protect the attached pedicle.
Hatchet Flap Text: The defect edges were debeveled with a #15 scalpel blade.  Given the location of the defect, shape of the defect and the proximity to free margins a hatchet flap was deemed most appropriate.  Using a sterile surgical marker, an appropriate hatchet flap was drawn incorporating the defect and placing the expected incisions within the relaxed skin tension lines where possible.    The area thus outlined was incised deep to adipose tissue with a #15 scalpel blade.  The skin margins were undermined to an appropriate distance in all directions utilizing iris scissors.
Complex Repair And Double Advancement Flap Text: The defect edges were debeveled with a #15 scalpel blade.  The primary defect was closed partially with a complex linear closure.  Given the location of the remaining defect, shape of the defect and the proximity to free margins a double advancement flap was deemed most appropriate for complete closure of the defect.  Using a sterile surgical marker, an appropriate advancement flap was drawn incorporating the defect and placing the expected incisions within the relaxed skin tension lines where possible.    The area thus outlined was incised deep to adipose tissue with a #15 scalpel blade.  The skin margins were undermined to an appropriate distance in all directions utilizing iris scissors.
Estimated Blood Loss (Cc): minimal
Advancement Flap (Double) Text: The defect edges were debeveled with a #15 scalpel blade.  Given the location of the defect and the proximity to free margins a double advancement flap was deemed most appropriate.  Using a sterile surgical marker, the appropriate advancement flaps were drawn incorporating the defect and placing the expected incisions within the relaxed skin tension lines where possible.    The area thus outlined was incised deep to adipose tissue with a #15 scalpel blade.  The skin margins were undermined to an appropriate distance in all directions utilizing iris scissors.
Spiral Flap Text: The defect edges were debeveled with a #15 scalpel blade.  Given the location of the defect, shape of the defect and the proximity to free margins a spiral flap was deemed most appropriate.  Using a sterile surgical marker, an appropriate rotation flap was drawn incorporating the defect and placing the expected incisions within the relaxed skin tension lines where possible. The area thus outlined was incised deep to adipose tissue with a #15 scalpel blade.  The skin margins were undermined to an appropriate distance in all directions utilizing iris scissors.
Complex Repair And O-L Flap Text: The defect edges were debeveled with a #15 scalpel blade.  The primary defect was closed partially with a complex linear closure.  Given the location of the remaining defect, shape of the defect and the proximity to free margins an O-L flap was deemed most appropriate for complete closure of the defect.  Using a sterile surgical marker, an appropriate flap was drawn incorporating the defect and placing the expected incisions within the relaxed skin tension lines where possible.    The area thus outlined was incised deep to adipose tissue with a #15 scalpel blade.  The skin margins were undermined to an appropriate distance in all directions utilizing iris scissors.
Composite Graft Text: The defect edges were debeveled with a #15 scalpel blade.  Given the location of the defect, shape of the defect, the proximity to free margins and the fact the defect was full thickness a composite graft was deemed most appropriate.  The defect was outline and then transferred to the donor site.  A full thickness graft was then excised from the donor site. The graft was then placed in the primary defect, oriented appropriately and then sutured into place.  The secondary defect was then repaired using a primary closure.

## 2017-10-24 NOTE — HPI: PROCEDURE (SKIN SURGERY)
Has The Growth Been Previously Biopsied?: has been previously biopsied
Body Location Override (Optional): Superior mid upper back

## 2017-10-26 ENCOUNTER — APPOINTMENT (RX ONLY)
Dept: URBAN - METROPOLITAN AREA CLINIC 31 | Facility: CLINIC | Age: 67
Setting detail: DERMATOLOGY
End: 2017-10-26

## 2017-10-26 DIAGNOSIS — Z48.817 ENCOUNTER FOR SURGICAL AFTERCARE FOLLOWING SURGERY ON THE SKIN AND SUBCUTANEOUS TISSUE: ICD-10-CM

## 2017-10-26 PROCEDURE — ? COUNSELING

## 2017-10-26 PROCEDURE — ? POST-OP WOUND CHECK

## 2017-10-26 ASSESSMENT — LOCATION ZONE DERM
LOCATION ZONE: TRUNK
LOCATION ZONE: TRUNK

## 2017-10-26 ASSESSMENT — LOCATION SIMPLE DESCRIPTION DERM
LOCATION SIMPLE: LEFT UPPER BACK
LOCATION SIMPLE: LEFT UPPER BACK

## 2017-10-26 ASSESSMENT — LOCATION DETAILED DESCRIPTION DERM
LOCATION DETAILED: LEFT SUPERIOR MEDIAL UPPER BACK
LOCATION DETAILED: LEFT SUPERIOR MEDIAL UPPER BACK

## 2017-10-26 NOTE — PROCEDURE: POST-OP WOUND CHECK
Detail Level: Detailed
Add 59425 Cpt? (Important Note: In 2017 The Use Of 65639 Is Being Tracked By Cms To Determine Future Global Period Reimbursement For Global Periods): no

## 2017-11-07 ENCOUNTER — APPOINTMENT (RX ONLY)
Dept: URBAN - METROPOLITAN AREA CLINIC 31 | Facility: CLINIC | Age: 67
Setting detail: DERMATOLOGY
End: 2017-11-07

## 2017-11-07 DIAGNOSIS — Z48.02 ENCOUNTER FOR REMOVAL OF SUTURES: ICD-10-CM

## 2017-11-07 DIAGNOSIS — D22 MELANOCYTIC NEVI: ICD-10-CM

## 2017-11-07 PROBLEM — D22.5 MELANOCYTIC NEVI OF TRUNK: Status: ACTIVE | Noted: 2017-11-07

## 2017-11-07 PROCEDURE — 99024 POSTOP FOLLOW-UP VISIT: CPT

## 2017-11-07 PROCEDURE — ? COUNSELING

## 2017-11-07 PROCEDURE — 12032 INTMD RPR S/A/T/EXT 2.6-7.5: CPT

## 2017-11-07 PROCEDURE — ? SUTURE REMOVAL (GLOBAL PERIOD)

## 2017-11-07 PROCEDURE — ? EXCISION

## 2017-11-07 PROCEDURE — 11402 EXC TR-EXT B9+MARG 1.1-2 CM: CPT

## 2017-11-07 ASSESSMENT — LOCATION ZONE DERM
LOCATION ZONE: TRUNK

## 2017-11-07 ASSESSMENT — LOCATION SIMPLE DESCRIPTION DERM
LOCATION SIMPLE: UPPER BACK
LOCATION SIMPLE: RIGHT UPPER BACK
LOCATION SIMPLE: UPPER BACK

## 2017-11-07 ASSESSMENT — LOCATION DETAILED DESCRIPTION DERM
LOCATION DETAILED: SUPERIOR THORACIC SPINE
LOCATION DETAILED: RIGHT MID-UPPER BACK
LOCATION DETAILED: SUPERIOR THORACIC SPINE

## 2017-11-07 NOTE — PROCEDURE: EXCISION
Island Pedicle Flap Text: The defect edges were debeveled with a #15 scalpel blade.  Given the location of the defect, shape of the defect and the proximity to free margins an island pedicle advancement flap was deemed most appropriate.  Using a sterile surgical marker, an appropriate advancement flap was drawn incorporating the defect, outlining the appropriate donor tissue and placing the expected incisions within the relaxed skin tension lines where possible.    The area thus outlined was incised deep to adipose tissue with a #15 scalpel blade.  The skin margins were undermined to an appropriate distance in all directions around the primary defect and laterally outward around the island pedicle utilizing iris scissors.  There was minimal undermining beneath the pedicle flap.
Show Repair Anesthesia Variables: Yes
Island Pedicle Flap-Requiring Vessel Identification Text: The defect edges were debeveled with a #15 scalpel blade.  Given the location of the defect, shape of the defect and the proximity to free margins an island pedicle advancement flap was deemed most appropriate.  Using a sterile surgical marker, an appropriate advancement flap was drawn, based on the axial vessel mentioned above, incorporating the defect, outlining the appropriate donor tissue and placing the expected incisions within the relaxed skin tension lines where possible.    The area thus outlined was incised deep to adipose tissue with a #15 scalpel blade.  The skin margins were undermined to an appropriate distance in all directions around the primary defect and laterally outward around the island pedicle utilizing iris scissors.  There was minimal undermining beneath the pedicle flap.
Home Suture Removal Text: Patient was provided a home suture removal kit and will remove their sutures at home.  If they have any questions or difficulties they will call the office.
Interpolation Flap Text: A decision was made to reconstruct the defect utilizing an interpolation axial flap and a staged reconstruction.  A telfa template was made of the defect.  This telfa template was then used to outline the interpolation flap.  The donor area for the pedicle flap was then injected with anesthesia.  The flap was excised through the skin and subcutaneous tissue down to the layer of the underlying musculature.  The interpolation flap was carefully excised within this deep plane to maintain its blood supply.  The edges of the donor site were undermined.   The donor site was closed in a primary fashion.  The pedicle was then rotated into position and sutured.  Once the tube was sutured into place, adequate blood supply was confirmed with blanching and refill.  The pedicle was then wrapped with xeroform gauze and dressed appropriately with a telfa and gauze bandage to ensure continued blood supply and protect the attached pedicle.
Muscle Hinge Flap Text: The defect edges were debeveled with a #15 scalpel blade.  Given the size, depth and location of the defect and the proximity to free margins a muscle hinge flap was deemed most appropriate.  Using a sterile surgical marker, an appropriate hinge flap was drawn incorporating the defect. The area thus outlined was incised with a #15 scalpel blade.  The skin margins were undermined to an appropriate distance in all directions utilizing iris scissors.
Rhombic Flap Text: The defect edges were debeveled with a #15 scalpel blade.  Given the location of the defect and the proximity to free margins a rhombic flap was deemed most appropriate.  Using a sterile surgical marker, an appropriate rhombic flap was drawn incorporating the defect.    The area thus outlined was incised deep to adipose tissue with a #15 scalpel blade.  The skin margins were undermined to an appropriate distance in all directions utilizing iris scissors.
Medical Necessity Clause: This procedure was medically necessary because the lesion that was treated was:
Hemostasis: Electrocautery
Accession #: [id number]
Bilobed Transposition Flap Text: The defect edges were debeveled with a #15 scalpel blade.  Given the location of the defect and the proximity to free margins a bilobed transposition flap was deemed most appropriate.  Using a sterile surgical marker, an appropriate bilobe flap drawn around the defect.    The area thus outlined was incised deep to adipose tissue with a #15 scalpel blade.  The skin margins were undermined to an appropriate distance in all directions utilizing iris scissors.
Rotation Flap Text: The defect edges were debeveled with a #15 scalpel blade.  Given the location of the defect, shape of the defect and the proximity to free margins a rotation flap was deemed most appropriate.  Using a sterile surgical marker, an appropriate rotation flap was drawn incorporating the defect and placing the expected incisions within the relaxed skin tension lines where possible.    The area thus outlined was incised deep to adipose tissue with a #15 scalpel blade.  The skin margins were undermined to an appropriate distance in all directions utilizing iris scissors.
Complex Repair And Modified Advancement Flap Text: The defect edges were debeveled with a #15 scalpel blade.  The primary defect was closed partially with a complex linear closure.  Given the location of the remaining defect, shape of the defect and the proximity to free margins a modified advancement flap was deemed most appropriate for complete closure of the defect.  Using a sterile surgical marker, an appropriate advancement flap was drawn incorporating the defect and placing the expected incisions within the relaxed skin tension lines where possible.    The area thus outlined was incised deep to adipose tissue with a #15 scalpel blade.  The skin margins were undermined to an appropriate distance in all directions utilizing iris scissors.
Patient Will Remove Sutures At Home?: No
Size Of Margin In Cm: 0.3
Number Of Epidermal Sutures (Optional): 2
Complex Repair And Single Advancement Flap Text: The defect edges were debeveled with a #15 scalpel blade.  The primary defect was closed partially with a complex linear closure.  Given the location of the remaining defect, shape of the defect and the proximity to free margins a single advancement flap was deemed most appropriate for complete closure of the defect.  Using a sterile surgical marker, an appropriate advancement flap was drawn incorporating the defect and placing the expected incisions within the relaxed skin tension lines where possible.    The area thus outlined was incised deep to adipose tissue with a #15 scalpel blade.  The skin margins were undermined to an appropriate distance in all directions utilizing iris scissors.
Excision Method: Elliptical
Complex Repair And Rotation Flap Text: The defect edges were debeveled with a #15 scalpel blade.  The primary defect was closed partially with a complex linear closure.  Given the location of the remaining defect, shape of the defect and the proximity to free margins a rotation flap was deemed most appropriate for complete closure of the defect.  Using a sterile surgical marker, an appropriate advancement flap was drawn incorporating the defect and placing the expected incisions within the relaxed skin tension lines where possible.    The area thus outlined was incised deep to adipose tissue with a #15 scalpel blade.  The skin margins were undermined to an appropriate distance in all directions utilizing iris scissors.
Dorsal Nasal Flap Text: The defect edges were debeveled with a #15 scalpel blade.  Given the location of the defect and the proximity to free margins a dorsal nasal flap was deemed most appropriate.  Using a sterile surgical marker, an appropriate dorsal nasal flap was drawn around the defect.    The area thus outlined was incised deep to adipose tissue with a #15 scalpel blade.  The skin margins were undermined to an appropriate distance in all directions utilizing iris scissors.
Number Of Deep Sutures (Optional): 3
V-Y Flap Text: The defect edges were debeveled with a #15 scalpel blade.  Given the location of the defect, shape of the defect and the proximity to free margins a V-Y flap was deemed most appropriate.  Using a sterile surgical marker, an appropriate advancement flap was drawn incorporating the defect and placing the expected incisions within the relaxed skin tension lines where possible.    The area thus outlined was incised deep to adipose tissue with a #15 scalpel blade.  The skin margins were undermined to an appropriate distance in all directions utilizing iris scissors.
Epidermal Closure: simple interrupted
Primary Defect Length (In Cm): 0
Epidermal Sutures: 3-0 Nylon
Skin Substitute Text: The defect edges were debeveled with a #15 scalpel blade.  Given the location of the defect, shape of the defect and the proximity to free margins a skin substitute graft was deemed most appropriate.  The graft material was trimmed to fit the size of the defect. The graft was then placed in the primary defect and oriented appropriately.
Complex Repair And Transposition Flap Text: The defect edges were debeveled with a #15 scalpel blade.  The primary defect was closed partially with a complex linear closure.  Given the location of the remaining defect, shape of the defect and the proximity to free margins a transposition flap was deemed most appropriate for complete closure of the defect.  Using a sterile surgical marker, an appropriate advancement flap was drawn incorporating the defect and placing the expected incisions within the relaxed skin tension lines where possible.    The area thus outlined was incised deep to adipose tissue with a #15 scalpel blade.  The skin margins were undermined to an appropriate distance in all directions utilizing iris scissors.
Melolabial Transposition Flap Text: The defect edges were debeveled with a #15 scalpel blade.  Given the location of the defect and the proximity to free margins a melolabial flap was deemed most appropriate.  Using a sterile surgical marker, an appropriate melolabial transposition flap was drawn incorporating the defect.    The area thus outlined was incised deep to adipose tissue with a #15 scalpel blade.  The skin margins were undermined to an appropriate distance in all directions utilizing iris scissors.
Saucerization Excision Additional Text (Leave Blank If You Do Not Want): The margin was drawn around the clinically apparent lesion.  Incisions were then made along these lines, in a tangential fashion, to the appropriate tissue plane and the lesion was extirpated.
Consent was obtained from the patient. The risks and benefits to therapy were discussed in detail. Specifically, the risks of infection, scarring, bleeding, prolonged wound healing, incomplete removal, allergy to anesthesia, nerve injury and recurrence were addressed. Prior to the procedure, the treatment site was clearly identified and confirmed by the patient. All components of Universal Protocol/PAUSE Rule completed.
Complex Repair And Double Advancement Flap Text: The defect edges were debeveled with a #15 scalpel blade.  The primary defect was closed partially with a complex linear closure.  Given the location of the remaining defect, shape of the defect and the proximity to free margins a double advancement flap was deemed most appropriate for complete closure of the defect.  Using a sterile surgical marker, an appropriate advancement flap was drawn incorporating the defect and placing the expected incisions within the relaxed skin tension lines where possible.    The area thus outlined was incised deep to adipose tissue with a #15 scalpel blade.  The skin margins were undermined to an appropriate distance in all directions utilizing iris scissors.
Complex Repair And Dorsal Nasal Flap Text: The defect edges were debeveled with a #15 scalpel blade.  The primary defect was closed partially with a complex linear closure.  Given the location of the remaining defect, shape of the defect and the proximity to free margins a dorsal nasal flap was deemed most appropriate for complete closure of the defect.  Using a sterile surgical marker, an appropriate flap was drawn incorporating the defect and placing the expected incisions within the relaxed skin tension lines where possible.    The area thus outlined was incised deep to adipose tissue with a #15 scalpel blade.  The skin margins were undermined to an appropriate distance in all directions utilizing iris scissors.
Anesthesia Type: 1% lidocaine with epinephrine
Repair Performed By Another Provider Text (Leave Blank If You Do Not Want): After the tissue was excised the defect was repaired by another provider.
Complex Repair Preamble Text (Leave Blank If You Do Not Want): Extensive wide undermining was performed.
Complex Repair And Skin Substitute Graft Text: The defect edges were debeveled with a #15 scalpel blade.  The primary defect was closed partially with a complex linear closure.  Given the location of the remaining defect, shape of the defect and the proximity to free margins a skin substitute graft was deemed most appropriate to repair the remaining defect.  The graft was trimmed to fit the size of the remaining defect.  The graft was then placed in the primary defect, oriented appropriately, and sutured into place.
Billing Type: Third-Party Bill
Purse String (Simple) Text: Given the location of the defect and the characteristics of the surrounding skin a purse string simple closure was deemed most appropriate.  Undermining was performed circumferentially around the surgical defect.  A purse string suture was then placed and tightened.
Mastoid Interpolation Flap Text: A decision was made to reconstruct the defect utilizing an interpolation axial flap and a staged reconstruction.  A telfa template was made of the defect.  This telfa template was then used to outline the mastoid interpolation flap.  The donor area for the pedicle flap was then injected with anesthesia.  The flap was excised through the skin and subcutaneous tissue down to the layer of the underlying musculature.  The pedicle flap was carefully excised within this deep plane to maintain its blood supply.  The edges of the donor site were undermined.   The donor site was closed in a primary fashion.  The pedicle was then rotated into position and sutured.  Once the tube was sutured into place, adequate blood supply was confirmed with blanching and refill.  The pedicle was then wrapped with xeroform gauze and dressed appropriately with a telfa and gauze bandage to ensure continued blood supply and protect the attached pedicle.
Complex Repair And Z Plasty Text: The defect edges were debeveled with a #15 scalpel blade.  The primary defect was closed partially with a complex linear closure.  Given the location of the remaining defect, shape of the defect and the proximity to free margins a Z plasty was deemed most appropriate for complete closure of the defect.  Using a sterile surgical marker, an appropriate advancement flap was drawn incorporating the defect and placing the expected incisions within the relaxed skin tension lines where possible.    The area thus outlined was incised deep to adipose tissue with a #15 scalpel blade.  The skin margins were undermined to an appropriate distance in all directions utilizing iris scissors.
S Plasty Text: Given the location and shape of the defect, and the orientation of relaxed skin tension lines, an S-plasty was deemed most appropriate for repair.  Using a sterile surgical marker, the appropriate outline of the S-plasty was drawn, incorporating the defect and placing the expected incisions within the relaxed skin tension lines where possible.  The area thus outlined was incised deep to adipose tissue with a #15 scalpel blade.  The skin margins were undermined to an appropriate distance in all directions utilizing iris scissors. The skin flaps were advanced over the defect.  The opposing margins were then approximated with interrupted buried subcutaneous sutures.
Island Pedicle Flap With Canthal Suspension Text: The defect edges were debeveled with a #15 scalpel blade.  Given the location of the defect, shape of the defect and the proximity to free margins an island pedicle advancement flap was deemed most appropriate.  Using a sterile surgical marker, an appropriate advancement flap was drawn incorporating the defect, outlining the appropriate donor tissue and placing the expected incisions within the relaxed skin tension lines where possible. The area thus outlined was incised deep to adipose tissue with a #15 scalpel blade.  The skin margins were undermined to an appropriate distance in all directions around the primary defect and laterally outward around the island pedicle utilizing iris scissors.  There was minimal undermining beneath the pedicle flap. A suspension suture was placed in the canthal tendon to prevent tension and prevent ectropion.
Dermal Autograft Text: The defect edges were debeveled with a #15 scalpel blade.  Given the location of the defect, shape of the defect and the proximity to free margins a dermal autograft was deemed most appropriate.  Using a sterile surgical marker, the primary defect shape was transferred to the donor site. The area thus outlined was incised deep to adipose tissue with a #15 scalpel blade.  The harvested graft was then trimmed of adipose and epidermal tissue until only dermis was left.  The skin graft was then placed in the primary defect and oriented appropriately.
Complex Repair And Epidermal Autograft Text: The defect edges were debeveled with a #15 scalpel blade.  The primary defect was closed partially with a complex linear closure.  Given the location of the defect, shape of the defect and the proximity to free margins an epidermal autograft was deemed most appropriate to repair the remaining defect.  The graft was trimmed to fit the size of the remaining defect.  The graft was then placed in the primary defect, oriented appropriately, and sutured into place.
Lab Facility: 
Intermediate / Complex Repair - Final Wound Length In Cm: 4
Additional Epidermal Closure (Optional): running
Complex Repair And V-Y Plasty Text: The defect edges were debeveled with a #15 scalpel blade.  The primary defect was closed partially with a complex linear closure.  Given the location of the remaining defect, shape of the defect and the proximity to free margins a V-Y plasty was deemed most appropriate for complete closure of the defect.  Using a sterile surgical marker, an appropriate advancement flap was drawn incorporating the defect and placing the expected incisions within the relaxed skin tension lines where possible.    The area thus outlined was incised deep to adipose tissue with a #15 scalpel blade.  The skin margins were undermined to an appropriate distance in all directions utilizing iris scissors.
Eliptical Excision Additional Text (Leave Blank If You Do Not Want): The margin was drawn around the clinically apparent lesion.  An elliptical shape was then drawn on the skin incorporating the lesion and margins.  Incisions were then made along these lines to the appropriate tissue plane and the lesion was extirpated.
Scalpel Size: 15 blade
Cheek Interpolation Flap Text: A decision was made to reconstruct the defect utilizing an interpolation axial flap and a staged reconstruction.  A telfa template was made of the defect.  This telfa template was then used to outline the Cheek Interpolation flap.  The donor area for the pedicle flap was then injected with anesthesia.  The flap was excised through the skin and subcutaneous tissue down to the layer of the underlying musculature.  The interpolation flap was carefully excised within this deep plane to maintain its blood supply.  The edges of the donor site were undermined.   The donor site was closed in a primary fashion.  The pedicle was then rotated into position and sutured.  Once the tube was sutured into place, adequate blood supply was confirmed with blanching and refill.  The pedicle was then wrapped with xeroform gauze and dressed appropriately with a telfa and gauze bandage to ensure continued blood supply and protect the attached pedicle.
Lab: [id number]
Complex Repair And Melolabial Flap Text: The defect edges were debeveled with a #15 scalpel blade.  The primary defect was closed partially with a complex linear closure.  Given the location of the remaining defect, shape of the defect and the proximity to free margins a melolabial flap was deemed most appropriate for complete closure of the defect.  Using a sterile surgical marker, an appropriate advancement flap was drawn incorporating the defect and placing the expected incisions within the relaxed skin tension lines where possible.    The area thus outlined was incised deep to adipose tissue with a #15 scalpel blade.  The skin margins were undermined to an appropriate distance in all directions utilizing iris scissors.
Excision Depth: adipose tissue
Complex Repair And Xenograft Text: The defect edges were debeveled with a #15 scalpel blade.  The primary defect was closed partially with a complex linear closure.  Given the location of the defect, shape of the defect and the proximity to free margins a xenograft was deemed most appropriate to repair the remaining defect.  The graft was trimmed to fit the size of the remaining defect.  The graft was then placed in the primary defect, oriented appropriately, and sutured into place.
Melolabial Interpolation Flap Text: A decision was made to reconstruct the defect utilizing an interpolation axial flap and a staged reconstruction.  A telfa template was made of the defect.  This telfa template was then used to outline the melolabial interpolation flap.  The donor area for the pedicle flap was then injected with anesthesia.  The flap was excised through the skin and subcutaneous tissue down to the layer of the underlying musculature.  The pedicle flap was carefully excised within this deep plane to maintain its blood supply.  The edges of the donor site were undermined.   The donor site was closed in a primary fashion.  The pedicle was then rotated into position and sutured.  Once the tube was sutured into place, adequate blood supply was confirmed with blanching and refill.  The pedicle was then wrapped with xeroform gauze and dressed appropriately with a telfa and gauze bandage to ensure continued blood supply and protect the attached pedicle.
Modified Advancement Flap Text: The defect edges were debeveled with a #15 scalpel blade.  Given the location of the defect, shape of the defect and the proximity to free margins a modified advancement flap was deemed most appropriate.  Using a sterile surgical marker, an appropriate advancement flap was drawn incorporating the defect and placing the expected incisions within the relaxed skin tension lines where possible.    The area thus outlined was incised deep to adipose tissue with a #15 scalpel blade.  The skin margins were undermined to an appropriate distance in all directions utilizing iris scissors.
Mucosal Advancement Flap Text: Given the location of the defect, shape of the defect and the proximity to free margins a mucosal advancement flap was deemed most appropriate. Incisions were made with a 15 blade scalpel in the appropriate fashion along the cutaneous vermillion border and the mucosal lip. The remaining actinically damaged mucosal tissue was excised.  The mucosal advancement flap was then elevated to the gingival sulcus with care taken to preserve the neurovascular structures and advanced into the primary defect. Care was taken to ensure that precise realignment of the vermilion border was achieved.
Intermediate Repair Preamble Text (Leave Blank If You Do Not Want): Undermining was performed with blunt dissection.
Trilobed Flap Text: The defect edges were debeveled with a #15 scalpel blade.  Given the location of the defect and the proximity to free margins a trilobed flap was deemed most appropriate.  Using a sterile surgical marker, an appropriate trilobed flap drawn around the defect.    The area thus outlined was incised deep to adipose tissue with a #15 scalpel blade.  The skin margins were undermined to an appropriate distance in all directions utilizing iris scissors.
O-Z Plasty Text: The defect edges were debeveled with a #15 scalpel blade.  Given the location of the defect, shape of the defect and the proximity to free margins an O-Z plasty (double transposition flap) was deemed most appropriate.  Using a sterile surgical marker, the appropriate transposition flaps were drawn incorporating the defect and placing the expected incisions within the relaxed skin tension lines where possible.    The area thus outlined was incised deep to adipose tissue with a #15 scalpel blade.  The skin margins were undermined to an appropriate distance in all directions utilizing iris scissors.  Hemostasis was achieved with electrocautery.  The flaps were then transposed into place, one clockwise and the other counterclockwise, and anchored with interrupted buried subcutaneous sutures.
Bilateral Helical Rim Advancement Flap Text: The defect edges were debeveled with a #15 blade scalpel.  Given the location of the defect and the proximity to free margins (helical rim) a bilateral helical rim advancement flap was deemed most appropriate.  Using a sterile surgical marker, the appropriate advancement flaps were drawn incorporating the defect and placing the expected incisions between the helical rim and antihelix where possible.  The area thus outlined was incised through and through with a #15 scalpel blade.  With a skin hook and iris scissors, the flaps were gently and sharply undermined and freed up.
Dressing: pressure dressing
V-Y Plasty Text: The defect edges were debeveled with a #15 scalpel blade.  Given the location of the defect, shape of the defect and the proximity to free margins an V-Y advancement flap was deemed most appropriate.  Using a sterile surgical marker, an appropriate advancement flap was drawn incorporating the defect and placing the expected incisions within the relaxed skin tension lines where possible.    The area thus outlined was incised deep to adipose tissue with a #15 scalpel blade.  The skin margins were undermined to an appropriate distance in all directions utilizing iris scissors.
Body Location Override (Optional - Billing Will Still Be Based On Selected Body Map Location If Applicable): mid upper back inferior
Complex Repair And A-T Advancement Flap Text: The defect edges were debeveled with a #15 scalpel blade.  The primary defect was closed partially with a complex linear closure.  Given the location of the remaining defect, shape of the defect and the proximity to free margins an A-T advancement flap was deemed most appropriate for complete closure of the defect.  Using a sterile surgical marker, an appropriate advancement flap was drawn incorporating the defect and placing the expected incisions within the relaxed skin tension lines where possible.    The area thus outlined was incised deep to adipose tissue with a #15 scalpel blade.  The skin margins were undermined to an appropriate distance in all directions utilizing iris scissors.
Complex Repair And M Plasty Text: The defect edges were debeveled with a #15 scalpel blade.  The primary defect was closed partially with a complex linear closure.  Given the location of the remaining defect, shape of the defect and the proximity to free margins an M plasty was deemed most appropriate for complete closure of the defect.  Using a sterile surgical marker, an appropriate advancement flap was drawn incorporating the defect and placing the expected incisions within the relaxed skin tension lines where possible.    The area thus outlined was incised deep to adipose tissue with a #15 scalpel blade.  The skin margins were undermined to an appropriate distance in all directions utilizing iris scissors.
Paramedian Forehead Flap Text: A decision was made to reconstruct the defect utilizing an interpolation axial flap and a staged reconstruction.  A telfa template was made of the defect.  This telfa template was then used to outline the paramedian forehead pedicle flap.  The donor area for the pedicle flap was then injected with anesthesia.  The flap was excised through the skin and subcutaneous tissue down to the layer of the underlying musculature.  The pedicle flap was carefully excised within this deep plane to maintain its blood supply.  The edges of the donor site were undermined.   The donor site was closed in a primary fashion.  The pedicle was then rotated into position and sutured.  Once the tube was sutured into place, adequate blood supply was confirmed with blanching and refill.  The pedicle was then wrapped with xeroform gauze and dressed appropriately with a telfa and gauze bandage to ensure continued blood supply and protect the attached pedicle.
W Plasty Text: The lesion was extirpated to the level of the fat with a #15 scalpel blade.  Given the location of the defect, shape of the defect and the proximity to free margins a W-plasty was deemed most appropriate for repair.  Using a sterile surgical marker, the appropriate transposition arms of the W-plasty were drawn incorporating the defect and placing the expected incisions within the relaxed skin tension lines where possible.    The area thus outlined was incised deep to adipose tissue with a #15 scalpel blade.  The skin margins were undermined to an appropriate distance in all directions utilizing iris scissors.  The opposing transposition arms were then transposed into place in opposite direction and anchored with interrupted buried subcutaneous sutures.
Helical Rim Advancement Flap Text: The defect edges were debeveled with a #15 blade scalpel.  Given the location of the defect and the proximity to free margins (helical rim) a double helical rim advancement flap was deemed most appropriate.  Using a sterile surgical marker, the appropriate advancement flaps were drawn incorporating the defect and placing the expected incisions between the helical rim and antihelix where possible.  The area thus outlined was incised through and through with a #15 scalpel blade.  With a skin hook and iris scissors, the flaps were gently and sharply undermined and freed up.
Complex Repair And O-L Flap Text: The defect edges were debeveled with a #15 scalpel blade.  The primary defect was closed partially with a complex linear closure.  Given the location of the remaining defect, shape of the defect and the proximity to free margins an O-L flap was deemed most appropriate for complete closure of the defect.  Using a sterile surgical marker, an appropriate flap was drawn incorporating the defect and placing the expected incisions within the relaxed skin tension lines where possible.    The area thus outlined was incised deep to adipose tissue with a #15 scalpel blade.  The skin margins were undermined to an appropriate distance in all directions utilizing iris scissors.
Estimated Blood Loss (Cc): minimal
Posterior Auricular Interpolation Flap Text: A decision was made to reconstruct the defect utilizing an interpolation axial flap and a staged reconstruction.  A telfa template was made of the defect.  This telfa template was then used to outline the posterior auricular interpolation flap.  The donor area for the pedicle flap was then injected with anesthesia.  The flap was excised through the skin and subcutaneous tissue down to the layer of the underlying musculature.  The pedicle flap was carefully excised within this deep plane to maintain its blood supply.  The edges of the donor site were undermined.   The donor site was closed in a primary fashion.  The pedicle was then rotated into position and sutured.  Once the tube was sutured into place, adequate blood supply was confirmed with blanching and refill.  The pedicle was then wrapped with xeroform gauze and dressed appropriately with a telfa and gauze bandage to ensure continued blood supply and protect the attached pedicle.
Alar Island Pedicle Flap Text: The defect edges were debeveled with a #15 scalpel blade.  Given the location of the defect, shape of the defect and the proximity to the alar rim an island pedicle advancement flap was deemed most appropriate.  Using a sterile surgical marker, an appropriate advancement flap was drawn incorporating the defect, outlining the appropriate donor tissue and placing the expected incisions within the nasal ala running parallel to the alar rim. The area thus outlined was incised with a #15 scalpel blade.  The skin margins were undermined minimally to an appropriate distance in all directions around the primary defect and laterally outward around the island pedicle utilizing iris scissors.  There was minimal undermining beneath the pedicle flap.
Tissue Cultured Epidermal Autograft Text: The defect edges were debeveled with a #15 scalpel blade.  Given the location of the defect, shape of the defect and the proximity to free margins a tissue cultured epidermal autograft was deemed most appropriate.  The graft was then trimmed to fit the size of the defect.  The graft was then placed in the primary defect and oriented appropriately.
Complex Repair And Tissue Cultured Epidermal Autograft Text: The defect edges were debeveled with a #15 scalpel blade.  The primary defect was closed partially with a complex linear closure.  Given the location of the defect, shape of the defect and the proximity to free margins an tissue cultured epidermal autograft was deemed most appropriate to repair the remaining defect.  The graft was trimmed to fit the size of the remaining defect.  The graft was then placed in the primary defect, oriented appropriately, and sutured into place.
Repair Type: Intermediate
Advancement Flap (Double) Text: The defect edges were debeveled with a #15 scalpel blade.  Given the location of the defect and the proximity to free margins a double advancement flap was deemed most appropriate.  Using a sterile surgical marker, the appropriate advancement flaps were drawn incorporating the defect and placing the expected incisions within the relaxed skin tension lines where possible.    The area thus outlined was incised deep to adipose tissue with a #15 scalpel blade.  The skin margins were undermined to an appropriate distance in all directions utilizing iris scissors.
Transposition Flap Text: The defect edges were debeveled with a #15 scalpel blade.  Given the location of the defect and the proximity to free margins a transposition flap was deemed most appropriate.  Using a sterile surgical marker, an appropriate transposition flap was drawn incorporating the defect.    The area thus outlined was incised deep to adipose tissue with a #15 scalpel blade.  The skin margins were undermined to an appropriate distance in all directions utilizing iris scissors.
Hatchet Flap Text: The defect edges were debeveled with a #15 scalpel blade.  Given the location of the defect, shape of the defect and the proximity to free margins a hatchet flap was deemed most appropriate.  Using a sterile surgical marker, an appropriate hatchet flap was drawn incorporating the defect and placing the expected incisions within the relaxed skin tension lines where possible.    The area thus outlined was incised deep to adipose tissue with a #15 scalpel blade.  The skin margins were undermined to an appropriate distance in all directions utilizing iris scissors.
Complex Repair And O-T Advancement Flap Text: The defect edges were debeveled with a #15 scalpel blade.  The primary defect was closed partially with a complex linear closure.  Given the location of the remaining defect, shape of the defect and the proximity to free margins an O-T advancement flap was deemed most appropriate for complete closure of the defect.  Using a sterile surgical marker, an appropriate advancement flap was drawn incorporating the defect and placing the expected incisions within the relaxed skin tension lines where possible.    The area thus outlined was incised deep to adipose tissue with a #15 scalpel blade.  The skin margins were undermined to an appropriate distance in all directions utilizing iris scissors.
Z Plasty Text: The lesion was extirpated to the level of the fat with a #15 scalpel blade.  Given the location of the defect, shape of the defect and the proximity to free margins a Z-plasty was deemed most appropriate for repair.  Using a sterile surgical marker, the appropriate transposition arms of the Z-plasty were drawn incorporating the defect and placing the expected incisions within the relaxed skin tension lines where possible.    The area thus outlined was incised deep to adipose tissue with a #15 scalpel blade.  The skin margins were undermined to an appropriate distance in all directions utilizing iris scissors.  The opposing transposition arms were then transposed into place in opposite direction and anchored with interrupted buried subcutaneous sutures.
Epidermal Autograft Text: The defect edges were debeveled with a #15 scalpel blade.  Given the location of the defect, shape of the defect and the proximity to free margins an epidermal autograft was deemed most appropriate.  Using a sterile surgical marker, the primary defect shape was transferred to the donor site. The epidermal graft was then harvested.  The skin graft was then placed in the primary defect and oriented appropriately.
Composite Graft Text: The defect edges were debeveled with a #15 scalpel blade.  Given the location of the defect, shape of the defect, the proximity to free margins and the fact the defect was full thickness a composite graft was deemed most appropriate.  The defect was outline and then transferred to the donor site.  A full thickness graft was then excised from the donor site. The graft was then placed in the primary defect, oriented appropriately and then sutured into place.  The secondary defect was then repaired using a primary closure.
Size Of Lesion In Cm: 0.6
Fusiform Excision Additional Text (Leave Blank If You Do Not Want): The margin was drawn around the clinically apparent lesion.  A fusiform shape was then drawn on the skin incorporating the lesion and margins.  Incisions were then made along these lines to the appropriate tissue plane and the lesion was extirpated.
Additional Anesthesia Volume In Cc: 6
Slit Excision Additional Text (Leave Blank If You Do Not Want): A linear line was drawn on the skin overlying the lesion. An incision was made slowly until the lesion was visualized.  Once visualized, the lesion was removed with blunt dissection.
Complex Repair And Bilobe Flap Text: The defect edges were debeveled with a #15 scalpel blade.  The primary defect was closed partially with a complex linear closure.  Given the location of the remaining defect, shape of the defect and the proximity to free margins a bilobe flap was deemed most appropriate for complete closure of the defect.  Using a sterile surgical marker, an appropriate advancement flap was drawn incorporating the defect and placing the expected incisions within the relaxed skin tension lines where possible.    The area thus outlined was incised deep to adipose tissue with a #15 scalpel blade.  The skin margins were undermined to an appropriate distance in all directions utilizing iris scissors.
Burow's Advancement Flap Text: The defect edges were debeveled with a #15 scalpel blade.  Given the location of the defect and the proximity to free margins a Burow's advancement flap was deemed most appropriate.  Using a sterile surgical marker, the appropriate advancement flap was drawn incorporating the defect and placing the expected incisions within the relaxed skin tension lines where possible.    The area thus outlined was incised deep to adipose tissue with a #15 scalpel blade.  The skin margins were undermined to an appropriate distance in all directions utilizing iris scissors.
Ftsg Text: The defect edges were debeveled with a #15 scalpel blade.  Given the location of the defect, shape of the defect and the proximity to free margins a full thickness skin graft was deemed most appropriate.  Using a sterile surgical marker, the primary defect shape was transferred to the donor site. The area thus outlined was incised deep to adipose tissue with a #15 scalpel blade.  The harvested graft was then trimmed of adipose tissue until only dermis and epidermis was left.  The skin margins of the secondary defect were undermined to an appropriate distance in all directions utilizing iris scissors.  The secondary defect was closed with interrupted buried subcutaneous sutures.  The skin edges were then re-apposed with running  sutures.  The skin graft was then placed in the primary defect and oriented appropriately.
O-T Plasty Text: The defect edges were debeveled with a #15 scalpel blade.  Given the location of the defect, shape of the defect and the proximity to free margins an O-T plasty was deemed most appropriate.  Using a sterile surgical marker, an appropriate O-T plasty was drawn incorporating the defect and placing the expected incisions within the relaxed skin tension lines where possible.    The area thus outlined was incised deep to adipose tissue with a #15 scalpel blade.  The skin margins were undermined to an appropriate distance in all directions utilizing iris scissors.
Deep Sutures: 4-0 PDO
Undermining Location (Optional): in the superficial subcutaneous fat
Lip Wedge Excision Repair Text: Given the location of the defect and the proximity to free margins a full thickness wedge repair was deemed most appropriate.  Using a sterile surgical marker, the appropriate repair was drawn incorporating the defect and placing the expected incisions perpendicular to the vermilion border.  The vermilion border was also meticulously outlined to ensure appropriate reapproximation during the repair.  The area thus outlined was incised through and through with a #15 scalpel blade.  The muscularis and dermis were reaproximated with deep sutures following hemostasis. Care was taken to realign the vermilion border before proceeding with the superficial closure.  Once the vermilion was realigned the superfical and mucosal closure was finished.
Xenograft Text: The defect edges were debeveled with a #15 scalpel blade.  Given the location of the defect, shape of the defect and the proximity to free margins a xenograft was deemed most appropriate.  The graft was then trimmed to fit the size of the defect.  The graft was then placed in the primary defect and oriented appropriately.
Path Notes (To The Dermatopathologist): Please check margins.
Excisional Biopsy Additional Text (Leave Blank If You Do Not Want): The margin was drawn around the clinically apparent lesion. An elliptical shape was then drawn on the skin incorporating the lesion and margins.  Incisions were then made along these lines to the appropriate tissue plane and the lesion was extirpated.
Anesthesia Type: 5% marcaine
Date Of Previous Biopsy (Optional): 8/24/17
O-L Flap Text: The defect edges were debeveled with a #15 scalpel blade.  Given the location of the defect, shape of the defect and the proximity to free margins an O-L flap was deemed most appropriate.  Using a sterile surgical marker, an appropriate advancement flap was drawn incorporating the defect and placing the expected incisions within the relaxed skin tension lines where possible.    The area thus outlined was incised deep to adipose tissue with a #15 scalpel blade.  The skin margins were undermined to an appropriate distance in all directions utilizing iris scissors.
Double Island Pedicle Flap Text: The defect edges were debeveled with a #15 scalpel blade.  Given the location of the defect, shape of the defect and the proximity to free margins a double island pedicle advancement flap was deemed most appropriate.  Using a sterile surgical marker, an appropriate advancement flap was drawn incorporating the defect, outlining the appropriate donor tissue and placing the expected incisions within the relaxed skin tension lines where possible.    The area thus outlined was incised deep to adipose tissue with a #15 scalpel blade.  The skin margins were undermined to an appropriate distance in all directions around the primary defect and laterally outward around the island pedicle utilizing iris scissors.  There was minimal undermining beneath the pedicle flap.
Spiral Flap Text: The defect edges were debeveled with a #15 scalpel blade.  Given the location of the defect, shape of the defect and the proximity to free margins a spiral flap was deemed most appropriate.  Using a sterile surgical marker, an appropriate rotation flap was drawn incorporating the defect and placing the expected incisions within the relaxed skin tension lines where possible. The area thus outlined was incised deep to adipose tissue with a #15 scalpel blade.  The skin margins were undermined to an appropriate distance in all directions utilizing iris scissors.
Post-Care Instructions: I reviewed with the patient in detail post-care instructions. Patient is not to engage in any heavy lifting, exercise, or swimming for the next 14 days. Should the patient develop any fevers, chills, bleeding, severe pain patient will contact the office immediately.
A-T Advancement Flap Text: The defect edges were debeveled with a #15 scalpel blade.  Given the location of the defect, shape of the defect and the proximity to free margins an A-T advancement flap was deemed most appropriate.  Using a sterile surgical marker, an appropriate advancement flap was drawn incorporating the defect and placing the expected incisions within the relaxed skin tension lines where possible.    The area thus outlined was incised deep to adipose tissue with a #15 scalpel blade.  The skin margins were undermined to an appropriate distance in all directions utilizing iris scissors.
Ear Star Wedge Flap Text: The defect edges were debeveled with a #15 blade scalpel.  Given the location of the defect and the proximity to free margins (helical rim) an ear star wedge flap was deemed most appropriate.  Using a sterile surgical marker, the appropriate flap was drawn incorporating the defect and placing the expected incisions between the helical rim and antihelix where possible.  The area thus outlined was incised through and through with a #15 scalpel blade.
Complex Repair And Ftsg Text: The defect edges were debeveled with a #15 scalpel blade.  The primary defect was closed partially with a complex linear closure.  Given the location of the defect, shape of the defect and the proximity to free margins a full thickness skin graft was deemed most appropriate to repair the remaining defect.  The graft was trimmed to fit the size of the remaining defect.  The graft was then placed in the primary defect, oriented appropriately, and sutured into place.
Wound Care: Petrolatum
Complex Repair And W Plasty Text: The defect edges were debeveled with a #15 scalpel blade.  The primary defect was closed partially with a complex linear closure.  Given the location of the remaining defect, shape of the defect and the proximity to free margins a W plasty was deemed most appropriate for complete closure of the defect.  Using a sterile surgical marker, an appropriate advancement flap was drawn incorporating the defect and placing the expected incisions within the relaxed skin tension lines where possible.    The area thus outlined was incised deep to adipose tissue with a #15 scalpel blade.  The skin margins were undermined to an appropriate distance in all directions utilizing iris scissors.
Purse String (Intermediate) Text: Given the location of the defect and the characteristics of the surrounding skin a purse string intermediate closure was deemed most appropriate.  Undermining was performed circumferentially around the surgical defect.  A purse string suture was then placed and tightened.
Cheek-To-Nose Interpolation Flap Text: A decision was made to reconstruct the defect utilizing an interpolation axial flap and a staged reconstruction.  A telfa template was made of the defect.  This telfa template was then used to outline the Cheek-To-Nose Interpolation flap.  The donor area for the pedicle flap was then injected with anesthesia.  The flap was excised through the skin and subcutaneous tissue down to the layer of the underlying musculature.  The interpolation flap was carefully excised within this deep plane to maintain its blood supply.  The edges of the donor site were undermined.   The donor site was closed in a primary fashion.  The pedicle was then rotated into position and sutured.  Once the tube was sutured into place, adequate blood supply was confirmed with blanching and refill.  The pedicle was then wrapped with xeroform gauze and dressed appropriately with a telfa and gauze bandage to ensure continued blood supply and protect the attached pedicle.
Crescentic Advancement Flap Text: The defect edges were debeveled with a #15 scalpel blade.  Given the location of the defect and the proximity to free margins a crescentic advancement flap was deemed most appropriate.  Using a sterile surgical marker, the appropriate advancement flap was drawn incorporating the defect and placing the expected incisions within the relaxed skin tension lines where possible.    The area thus outlined was incised deep to adipose tissue with a #15 scalpel blade.  The skin margins were undermined to an appropriate distance in all directions utilizing iris scissors.
No Repair - Repaired With Adjacent Surgical Defect Text (Leave Blank If You Do Not Want): After the excision the defect was repaired concurrently with another surgical defect which was in close approximation.
Advancement Flap (Single) Text: The defect edges were debeveled with a #15 scalpel blade.  Given the location of the defect and the proximity to free margins a single advancement flap was deemed most appropriate.  Using a sterile surgical marker, an appropriate advancement flap was drawn incorporating the defect and placing the expected incisions within the relaxed skin tension lines where possible.    The area thus outlined was incised deep to adipose tissue with a #15 scalpel blade.  The skin margins were undermined to an appropriate distance in all directions utilizing iris scissors.
Bilobed Flap Text: The defect edges were debeveled with a #15 scalpel blade.  Given the location of the defect and the proximity to free margins a bilobe flap was deemed most appropriate.  Using a sterile surgical marker, an appropriate bilobe flap drawn around the defect.    The area thus outlined was incised deep to adipose tissue with a #15 scalpel blade.  The skin margins were undermined to an appropriate distance in all directions utilizing iris scissors.
Cartilage Graft Text: The defect edges were debeveled with a #15 scalpel blade.  Given the location of the defect, shape of the defect, the fact the defect involved a full thickness cartilage defect a cartilage graft was deemed most appropriate.  An appropriate donor site was identified, cleansed, and anesthetized. The cartilage graft was then harvested and transferred to the recipient site, oriented appropriately and then sutured into place.  The secondary defect was then repaired using a primary closure.
Complex Repair And Rhombic Flap Text: The defect edges were debeveled with a #15 scalpel blade.  The primary defect was closed partially with a complex linear closure.  Given the location of the remaining defect, shape of the defect and the proximity to free margins a rhombic flap was deemed most appropriate for complete closure of the defect.  Using a sterile surgical marker, an appropriate advancement flap was drawn incorporating the defect and placing the expected incisions within the relaxed skin tension lines where possible.    The area thus outlined was incised deep to adipose tissue with a #15 scalpel blade.  The skin margins were undermined to an appropriate distance in all directions utilizing iris scissors.
Complex Repair And Split-Thickness Skin Graft Text: The defect edges were debeveled with a #15 scalpel blade.  The primary defect was closed partially with a complex linear closure.  Given the location of the defect, shape of the defect and the proximity to free margins a split thickness skin graft was deemed most appropriate to repair the remaining defect.  The graft was trimmed to fit the size of the remaining defect.  The graft was then placed in the primary defect, oriented appropriately, and sutured into place.
H Plasty Text: Given the location of the defect, shape of the defect and the proximity to free margins a H-plasty was deemed most appropriate for repair.  Using a sterile surgical marker, the appropriate advancement arms of the H-plasty were drawn incorporating the defect and placing the expected incisions within the relaxed skin tension lines where possible. The area thus outlined was incised deep to adipose tissue with a #15 scalpel blade. The skin margins were undermined to an appropriate distance in all directions utilizing iris scissors.  The opposing advancement arms were then advanced into place in opposite direction and anchored with interrupted buried subcutaneous sutures.
Suture Removal: 14 days
Perilesional Excision Additional Text (Leave Blank If You Do Not Want): The margin was drawn around the clinically apparent lesion. Incisions were then made along these lines to the appropriate tissue plane and the lesion was extirpated.
Complex Repair And Double M Plasty Text: The defect edges were debeveled with a #15 scalpel blade.  The primary defect was closed partially with a complex linear closure.  Given the location of the remaining defect, shape of the defect and the proximity to free margins a double M plasty was deemed most appropriate for complete closure of the defect.  Using a sterile surgical marker, an appropriate advancement flap was drawn incorporating the defect and placing the expected incisions within the relaxed skin tension lines where possible.    The area thus outlined was incised deep to adipose tissue with a #15 scalpel blade.  The skin margins were undermined to an appropriate distance in all directions utilizing iris scissors.
O-T Advancement Flap Text: The defect edges were debeveled with a #15 scalpel blade.  Given the location of the defect, shape of the defect and the proximity to free margins an O-T advancement flap was deemed most appropriate.  Using a sterile surgical marker, an appropriate advancement flap was drawn incorporating the defect and placing the expected incisions within the relaxed skin tension lines where possible.    The area thus outlined was incised deep to adipose tissue with a #15 scalpel blade.  The skin margins were undermined to an appropriate distance in all directions utilizing iris scissors.
Split-Thickness Skin Graft Text: The defect edges were debeveled with a #15 scalpel blade.  Given the location of the defect, shape of the defect and the proximity to free margins a split thickness skin graft was deemed most appropriate.  Using a sterile surgical marker, the primary defect shape was transferred to the donor site. The split thickness graft was then harvested.  The skin graft was then placed in the primary defect and oriented appropriately.
Bi-Rhombic Flap Text: The defect edges were debeveled with a #15 scalpel blade.  Given the location of the defect and the proximity to free margins a bi-rhombic flap was deemed most appropriate.  Using a sterile surgical marker, an appropriate rhombic flap was drawn incorporating the defect. The area thus outlined was incised deep to adipose tissue with a #15 scalpel blade.  The skin margins were undermined to an appropriate distance in all directions utilizing iris scissors.
Graft Donor Site Bandage (Optional-Leave Blank If You Don't Want In Note): Steri-strips and a pressure bandage were applied to the donor site.
Complex Repair And Dermal Autograft Text: The defect edges were debeveled with a #15 scalpel blade.  The primary defect was closed partially with a complex linear closure.  Given the location of the defect, shape of the defect and the proximity to free margins an dermal autograft was deemed most appropriate to repair the remaining defect.  The graft was trimmed to fit the size of the remaining defect.  The graft was then placed in the primary defect, oriented appropriately, and sutured into place.
Keystone Flap Text: The defect edges were debeveled with a #15 scalpel blade.  Given the location of the defect, shape of the defect a keystone flap was deemed most appropriate.  Using a sterile surgical marker, an appropriate keystone flap was drawn incorporating the defect, outlining the appropriate donor tissue and placing the expected incisions within the relaxed skin tension lines where possible. The area thus outlined was incised deep to adipose tissue with a #15 scalpel blade.  The skin margins were undermined to an appropriate distance in all directions around the primary defect and laterally outward around the flap utilizing iris scissors.
Medical Necessity Information: It is in your best interest to select a reason for this procedure from the list below. All of these items fulfill various CMS LCD requirements except lesion extends to a margin.
Star Wedge Flap Text: The defect edges were debeveled with a #15 scalpel blade.  Given the location of the defect, shape of the defect and the proximity to free margins a star wedge flap was deemed most appropriate.  Using a sterile surgical marker, an appropriate rotation flap was drawn incorporating the defect and placing the expected incisions within the relaxed skin tension lines where possible. The area thus outlined was incised deep to adipose tissue with a #15 scalpel blade.  The skin margins were undermined to an appropriate distance in all directions utilizing iris scissors.
Detail Level: Detailed

## 2017-11-07 NOTE — HPI: BIOPSY FOLLOW-UP
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

## 2017-11-09 ENCOUNTER — APPOINTMENT (RX ONLY)
Dept: URBAN - METROPOLITAN AREA CLINIC 31 | Facility: CLINIC | Age: 67
Setting detail: DERMATOLOGY
End: 2017-11-09

## 2017-11-09 DIAGNOSIS — Z48.817 ENCOUNTER FOR SURGICAL AFTERCARE FOLLOWING SURGERY ON THE SKIN AND SUBCUTANEOUS TISSUE: ICD-10-CM

## 2017-11-09 PROCEDURE — ? COUNSELING

## 2017-11-09 PROCEDURE — ? POST-OP WOUND CHECK

## 2017-11-09 ASSESSMENT — LOCATION SIMPLE DESCRIPTION DERM
LOCATION SIMPLE: RIGHT UPPER BACK
LOCATION SIMPLE: RIGHT UPPER BACK

## 2017-11-09 ASSESSMENT — LOCATION DETAILED DESCRIPTION DERM
LOCATION DETAILED: RIGHT SUPERIOR UPPER BACK
LOCATION DETAILED: RIGHT SUPERIOR UPPER BACK

## 2017-11-09 ASSESSMENT — LOCATION ZONE DERM
LOCATION ZONE: TRUNK
LOCATION ZONE: TRUNK

## 2017-11-09 NOTE — PROCEDURE: POST-OP WOUND CHECK
Add 62098 Cpt? (Important Note: In 2017 The Use Of 92930 Is Being Tracked By Cms To Determine Future Global Period Reimbursement For Global Periods): no
Detail Level: Detailed

## 2017-11-13 ENCOUNTER — RX ONLY (OUTPATIENT)
Age: 67
Setting detail: RX ONLY
End: 2017-11-13

## 2017-11-13 ENCOUNTER — APPOINTMENT (RX ONLY)
Dept: URBAN - METROPOLITAN AREA CLINIC 31 | Facility: CLINIC | Age: 67
Setting detail: DERMATOLOGY
End: 2017-11-13

## 2017-11-13 DIAGNOSIS — Z48.817 ENCOUNTER FOR SURGICAL AFTERCARE FOLLOWING SURGERY ON THE SKIN AND SUBCUTANEOUS TISSUE: ICD-10-CM

## 2017-11-13 DIAGNOSIS — K6811 OTHER POSTOPERATIVE INFECTION: ICD-10-CM

## 2017-11-13 DIAGNOSIS — T814XXA OTHER POSTOPERATIVE INFECTION: ICD-10-CM

## 2017-11-13 PROBLEM — T81.4XXA INFECTION FOLLOWING A PROCEDURE, INITIAL ENCOUNTER: Status: ACTIVE | Noted: 2017-11-13

## 2017-11-13 PROCEDURE — ? POST-OP WOUND CHECK

## 2017-11-13 PROCEDURE — ? COUNSELING

## 2017-11-13 PROCEDURE — ? PRESCRIPTION

## 2017-11-13 PROCEDURE — ? ADDITIONAL NOTES

## 2017-11-13 PROCEDURE — ? PRESCRIPTION MEDICATION MANAGEMENT

## 2017-11-13 PROCEDURE — ? ORDER TESTS

## 2017-11-13 PROCEDURE — ? MEDICATION COUNSELING

## 2017-11-13 RX ORDER — DOXYCYCLINE HYCLATE 100 MG/1
CAPSULE, GELATIN COATED ORAL
Qty: 30 | Refills: 0 | Status: CANCELLED
Stop reason: CLARIF

## 2017-11-13 RX ORDER — DOXYCYCLINE HYCLATE 100 MG/1
CAPSULE, GELATIN COATED ORAL BID
Qty: 28 | Refills: 0 | Status: ERX

## 2017-11-13 ASSESSMENT — LOCATION DETAILED DESCRIPTION DERM
LOCATION DETAILED: RIGHT MID-UPPER BACK
LOCATION DETAILED: RIGHT MID-UPPER BACK

## 2017-11-13 ASSESSMENT — LOCATION ZONE DERM
LOCATION ZONE: TRUNK
LOCATION ZONE: TRUNK

## 2017-11-13 ASSESSMENT — LOCATION SIMPLE DESCRIPTION DERM
LOCATION SIMPLE: RIGHT UPPER BACK
LOCATION SIMPLE: RIGHT UPPER BACK

## 2017-11-13 NOTE — PROCEDURE: POST-OP WOUND CHECK
Detail Level: Detailed
Add 34199 Cpt? (Important Note: In 2017 The Use Of 81170 Is Being Tracked By Cms To Determine Future Global Period Reimbursement For Global Periods): no

## 2017-11-21 ENCOUNTER — APPOINTMENT (RX ONLY)
Dept: URBAN - METROPOLITAN AREA CLINIC 31 | Facility: CLINIC | Age: 67
Setting detail: DERMATOLOGY
End: 2017-11-21

## 2017-11-21 DIAGNOSIS — T81.89 OTHER COMPLICATIONS OF PROCEDURES, NOT ELSEWHERE CLASSIFIED: ICD-10-CM

## 2017-11-21 DIAGNOSIS — Z48.02 ENCOUNTER FOR REMOVAL OF SUTURES: ICD-10-CM

## 2017-11-21 PROBLEM — C44.519 BASAL CELL CARCINOMA OF SKIN OF OTHER PART OF TRUNK: Status: ACTIVE | Noted: 2017-11-21

## 2017-11-21 PROBLEM — T81.89XA OTHER COMPLICATIONS OF PROCEDURES, NOT ELSEWHERE CLASSIFIED, INITIAL ENCOUNTER: Status: ACTIVE | Noted: 2017-11-21

## 2017-11-21 PROCEDURE — ? ADDITIONAL NOTES

## 2017-11-21 PROCEDURE — ? EXCISION

## 2017-11-21 PROCEDURE — ? SUTURE REMOVAL (GLOBAL PERIOD)

## 2017-11-21 PROCEDURE — 11603 EXC TR-EXT MAL+MARG 2.1-3 CM: CPT

## 2017-11-21 PROCEDURE — 12032 INTMD RPR S/A/T/EXT 2.6-7.5: CPT

## 2017-11-21 PROCEDURE — 99024 POSTOP FOLLOW-UP VISIT: CPT

## 2017-11-21 PROCEDURE — ? COUNSELING

## 2017-11-21 ASSESSMENT — LOCATION DETAILED DESCRIPTION DERM
LOCATION DETAILED: RIGHT SUPERIOR UPPER BACK
LOCATION DETAILED: RIGHT SUPERIOR UPPER BACK

## 2017-11-21 ASSESSMENT — LOCATION SIMPLE DESCRIPTION DERM
LOCATION SIMPLE: RIGHT UPPER BACK
LOCATION SIMPLE: RIGHT UPPER BACK

## 2017-11-21 ASSESSMENT — LOCATION ZONE DERM
LOCATION ZONE: TRUNK
LOCATION ZONE: TRUNK

## 2017-11-21 NOTE — PROCEDURE: EXCISION
Partial Purse String (Simple) Text: Given the location of the defect and the characteristics of the surrounding skin a simple purse string closure was deemed most appropriate.  Undermining was performed circumferentially around the surgical defect.  A purse string suture was then placed and tightened. Wound tension of the circular defect prevented complete closure of the wound.
Graft Donor Site Will Heal By Secondary Intention: No
Bilobed Flap Text: The defect edges were debeveled with a #15 scalpel blade.  Given the location of the defect and the proximity to free margins a bilobe flap was deemed most appropriate.  Using a sterile surgical marker, an appropriate bilobe flap drawn around the defect.    The area thus outlined was incised deep to adipose tissue with a #15 scalpel blade.  The skin margins were undermined to an appropriate distance in all directions utilizing iris scissors.
Muscle Hinge Flap Text: The defect edges were debeveled with a #15 scalpel blade.  Given the size, depth and location of the defect and the proximity to free margins a muscle hinge flap was deemed most appropriate.  Using a sterile surgical marker, an appropriate hinge flap was drawn incorporating the defect. The area thus outlined was incised with a #15 scalpel blade.  The skin margins were undermined to an appropriate distance in all directions utilizing iris scissors.
Helical Rim Advancement Flap Text: The defect edges were debeveled with a #15 blade scalpel.  Given the location of the defect and the proximity to free margins (helical rim) a double helical rim advancement flap was deemed most appropriate.  Using a sterile surgical marker, the appropriate advancement flaps were drawn incorporating the defect and placing the expected incisions between the helical rim and antihelix where possible.  The area thus outlined was incised through and through with a #15 scalpel blade.  With a skin hook and iris scissors, the flaps were gently and sharply undermined and freed up.
Paramedian Forehead Flap Text: A decision was made to reconstruct the defect utilizing an interpolation axial flap and a staged reconstruction.  A telfa template was made of the defect.  This telfa template was then used to outline the paramedian forehead pedicle flap.  The donor area for the pedicle flap was then injected with anesthesia.  The flap was excised through the skin and subcutaneous tissue down to the layer of the underlying musculature.  The pedicle flap was carefully excised within this deep plane to maintain its blood supply.  The edges of the donor site were undermined.   The donor site was closed in a primary fashion.  The pedicle was then rotated into position and sutured.  Once the tube was sutured into place, adequate blood supply was confirmed with blanching and refill.  The pedicle was then wrapped with xeroform gauze and dressed appropriately with a telfa and gauze bandage to ensure continued blood supply and protect the attached pedicle.
Epidermal Closure Graft Donor Site (Optional): simple interrupted
O-T Plasty Text: The defect edges were debeveled with a #15 scalpel blade.  Given the location of the defect, shape of the defect and the proximity to free margins an O-T plasty was deemed most appropriate.  Using a sterile surgical marker, an appropriate O-T plasty was drawn incorporating the defect and placing the expected incisions within the relaxed skin tension lines where possible.    The area thus outlined was incised deep to adipose tissue with a #15 scalpel blade.  The skin margins were undermined to an appropriate distance in all directions utilizing iris scissors.
Keystone Flap Text: The defect edges were debeveled with a #15 scalpel blade.  Given the location of the defect, shape of the defect a keystone flap was deemed most appropriate.  Using a sterile surgical marker, an appropriate keystone flap was drawn incorporating the defect, outlining the appropriate donor tissue and placing the expected incisions within the relaxed skin tension lines where possible. The area thus outlined was incised deep to adipose tissue with a #15 scalpel blade.  The skin margins were undermined to an appropriate distance in all directions around the primary defect and laterally outward around the flap utilizing iris scissors.
Show Repair Anesthesia Variables: Yes
Composite Graft Text: The defect edges were debeveled with a #15 scalpel blade.  Given the location of the defect, shape of the defect, the proximity to free margins and the fact the defect was full thickness a composite graft was deemed most appropriate.  The defect was outline and then transferred to the donor site.  A full thickness graft was then excised from the donor site. The graft was then placed in the primary defect, oriented appropriately and then sutured into place.  The secondary defect was then repaired using a primary closure.
Purse String (Intermediate) Text: Given the location of the defect and the characteristics of the surrounding skin a purse string intermediate closure was deemed most appropriate.  Undermining was performed circumfirentially around the surgical defect.  A purse string suture was then placed and tightened.
Elliptical Excision Additional Text (Leave Blank If You Do Not Want): The margin was drawn around the clinically apparent lesion.  An elliptical shape was then drawn on the skin incorporating the lesion and margins.  Incisions were then made along these lines to the appropriate tissue plane and the lesion was extirpated.
Complex Repair And Melolabial Flap Text: The defect edges were debeveled with a #15 scalpel blade.  The primary defect was closed partially with a complex linear closure.  Given the location of the remaining defect, shape of the defect and the proximity to free margins a melolabial flap was deemed most appropriate for complete closure of the defect.  Using a sterile surgical marker, an appropriate advancement flap was drawn incorporating the defect and placing the expected incisions within the relaxed skin tension lines where possible.    The area thus outlined was incised deep to adipose tissue with a #15 scalpel blade.  The skin margins were undermined to an appropriate distance in all directions utilizing iris scissors.
Star Wedge Flap Text: The defect edges were debeveled with a #15 scalpel blade.  Given the location of the defect, shape of the defect and the proximity to free margins a star wedge flap was deemed most appropriate.  Using a sterile surgical marker, an appropriate rotation flap was drawn incorporating the defect and placing the expected incisions within the relaxed skin tension lines where possible. The area thus outlined was incised deep to adipose tissue with a #15 scalpel blade.  The skin margins were undermined to an appropriate distance in all directions utilizing iris scissors.
Partial Purse String (Intermediate) Text: Given the location of the defect and the characteristics of the surrounding skin an intermediate purse string closure was deemed most appropriate.  Undermining was performed circumferentially around the surgical defect.  A purse string suture was then placed and tightened. Wound tension of the circular defect prevented complete closure of the wound.
Interpolation Flap Text: A decision was made to reconstruct the defect utilizing an interpolation axial flap and a staged reconstruction.  A telfa template was made of the defect.  This telfa template was then used to outline the interpolation flap.  The donor area for the pedicle flap was then injected with anesthesia.  The flap was excised through the skin and subcutaneous tissue down to the layer of the underlying musculature.  The interpolation flap was carefully excised within this deep plane to maintain its blood supply.  The edges of the donor site were undermined.   The donor site was closed in a primary fashion.  The pedicle was then rotated into position and sutured.  Once the tube was sutured into place, adequate blood supply was confirmed with blanching and refill.  The pedicle was then wrapped with xeroform gauze and dressed appropriately with a telfa and gauze bandage to ensure continued blood supply and protect the attached pedicle.
Bi-Rhombic Flap Text: The defect edges were debeveled with a #15 scalpel blade.  Given the location of the defect and the proximity to free margins a bi-rhombic flap was deemed most appropriate.  Using a sterile surgical marker, an appropriate rhombic flap was drawn incorporating the defect. The area thus outlined was incised deep to adipose tissue with a #15 scalpel blade.  The skin margins were undermined to an appropriate distance in all directions utilizing iris scissors.
Complex Repair And Tissue Cultured Epidermal Autograft Text: The defect edges were debeveled with a #15 scalpel blade.  The primary defect was closed partially with a complex linear closure.  Given the location of the defect, shape of the defect and the proximity to free margins an tissue cultured epidermal autograft was deemed most appropriate to repair the remaining defect.  The graft was trimmed to fit the size of the remaining defect.  The graft was then placed in the primary defect, oriented appropriately, and sutured into place.
Posterior Auricular Interpolation Flap Text: A decision was made to reconstruct the defect utilizing an interpolation axial flap and a staged reconstruction.  A telfa template was made of the defect.  This telfa template was then used to outline the posterior auricular interpolation flap.  The donor area for the pedicle flap was then injected with anesthesia.  The flap was excised through the skin and subcutaneous tissue down to the layer of the underlying musculature.  The pedicle flap was carefully excised within this deep plane to maintain its blood supply.  The edges of the donor site were undermined.   The donor site was closed in a primary fashion.  The pedicle was then rotated into position and sutured.  Once the tube was sutured into place, adequate blood supply was confirmed with blanching and refill.  The pedicle was then wrapped with xeroform gauze and dressed appropriately with a telfa and gauze bandage to ensure continued blood supply and protect the attached pedicle.
Detail Level: Detailed
Repair Type: Intermediate
Bilobed Transposition Flap Text: The defect edges were debeveled with a #15 scalpel blade.  Given the location of the defect and the proximity to free margins a bilobed transposition flap was deemed most appropriate.  Using a sterile surgical marker, an appropriate bilobe flap drawn around the defect.    The area thus outlined was incised deep to adipose tissue with a #15 scalpel blade.  The skin margins were undermined to an appropriate distance in all directions utilizing iris scissors.
Advancement Flap (Single) Text: The defect edges were debeveled with a #15 scalpel blade.  Given the location of the defect and the proximity to free margins a single advancement flap was deemed most appropriate.  Using a sterile surgical marker, an appropriate advancement flap was drawn incorporating the defect and placing the expected incisions within the relaxed skin tension lines where possible.    The area thus outlined was incised deep to adipose tissue with a #15 scalpel blade.  The skin margins were undermined to an appropriate distance in all directions utilizing iris scissors.
Anesthesia Type: 0.5% marcaine
No Repair - Repaired With Adjacent Surgical Defect Text (Leave Blank If You Do Not Want): After the excision the defect was repaired concurrently with another surgical defect which was in close approximation.
Saucerization Excision Additional Text (Leave Blank If You Do Not Want): The margin was drawn around the clinically apparent lesion.  Incisions were then made along these lines, in a tangential fashion, to the appropriate tissue plane and the lesion was extirpated.
Purse String (Simple) Text: Given the location of the defect and the characteristics of the surrounding skin a purse string simple closure was deemed most appropriate.  Undermining was performed circumferentially around the surgical defect.  A purse string suture was then placed and tightened.
Complex Repair And Dorsal Nasal Flap Text: The defect edges were debeveled with a #15 scalpel blade.  The primary defect was closed partially with a complex linear closure.  Given the location of the remaining defect, shape of the defect and the proximity to free margins a dorsal nasal flap was deemed most appropriate for complete closure of the defect.  Using a sterile surgical marker, an appropriate flap was drawn incorporating the defect and placing the expected incisions within the relaxed skin tension lines where possible.    The area thus outlined was incised deep to adipose tissue with a #15 scalpel blade.  The skin margins were undermined to an appropriate distance in all directions utilizing iris scissors.
Fusiform Excision Additional Text (Leave Blank If You Do Not Want): The margin was drawn around the clinically apparent lesion.  A fusiform shape was then drawn on the skin incorporating the lesion and margins.  Incisions were then made along these lines to the appropriate tissue plane and the lesion was extirpated.
Complex Repair And A-T Advancement Flap Text: The defect edges were debeveled with a #15 scalpel blade.  The primary defect was closed partially with a complex linear closure.  Given the location of the remaining defect, shape of the defect and the proximity to free margins an A-T advancement flap was deemed most appropriate for complete closure of the defect.  Using a sterile surgical marker, an appropriate advancement flap was drawn incorporating the defect and placing the expected incisions within the relaxed skin tension lines where possible.    The area thus outlined was incised deep to adipose tissue with a #15 scalpel blade.  The skin margins were undermined to an appropriate distance in all directions utilizing iris scissors.
Complex Repair And Z Plasty Text: The defect edges were debeveled with a #15 scalpel blade.  The primary defect was closed partially with a complex linear closure.  Given the location of the remaining defect, shape of the defect and the proximity to free margins a Z plasty was deemed most appropriate for complete closure of the defect.  Using a sterile surgical marker, an appropriate advancement flap was drawn incorporating the defect and placing the expected incisions within the relaxed skin tension lines where possible.    The area thus outlined was incised deep to adipose tissue with a #15 scalpel blade.  The skin margins were undermined to an appropriate distance in all directions utilizing iris scissors.
Island Pedicle Flap Text: The defect edges were debeveled with a #15 scalpel blade.  Given the location of the defect, shape of the defect and the proximity to free margins an island pedicle advancement flap was deemed most appropriate.  Using a sterile surgical marker, an appropriate advancement flap was drawn incorporating the defect, outlining the appropriate donor tissue and placing the expected incisions within the relaxed skin tension lines where possible.    The area thus outlined was incised deep to adipose tissue with a #15 scalpel blade.  The skin margins were undermined to an appropriate distance in all directions around the primary defect and laterally outward around the island pedicle utilizing iris scissors.  There was minimal undermining beneath the pedicle flap.
Complex Repair And Double Advancement Flap Text: The defect edges were debeveled with a #15 scalpel blade.  The primary defect was closed partially with a complex linear closure.  Given the location of the remaining defect, shape of the defect and the proximity to free margins a double advancement flap was deemed most appropriate for complete closure of the defect.  Using a sterile surgical marker, an appropriate advancement flap was drawn incorporating the defect and placing the expected incisions within the relaxed skin tension lines where possible.    The area thus outlined was incised deep to adipose tissue with a #15 scalpel blade.  The skin margins were undermined to an appropriate distance in all directions utilizing iris scissors.
Positioning (Leave Blank If You Do Not Want): The patient was placed in a comfortable position exposing the surgical site.
Split-Thickness Skin Graft Text: The defect edges were debeveled with a #15 scalpel blade.  Given the location of the defect, shape of the defect and the proximity to free margins a split thickness skin graft was deemed most appropriate.  Using a sterile surgical marker, the primary defect shape was transferred to the donor site. The split thickness graft was then harvested.  The skin graft was then placed in the primary defect and oriented appropriately.
Island Pedicle Flap-Requiring Vessel Identification Text: The defect edges were debeveled with a #15 scalpel blade.  Given the location of the defect, shape of the defect and the proximity to free margins an island pedicle advancement flap was deemed most appropriate.  Using a sterile surgical marker, an appropriate advancement flap was drawn, based on the axial vessel mentioned above, incorporating the defect, outlining the appropriate donor tissue and placing the expected incisions within the relaxed skin tension lines where possible.    The area thus outlined was incised deep to adipose tissue with a #15 scalpel blade.  The skin margins were undermined to an appropriate distance in all directions around the primary defect and laterally outward around the island pedicle utilizing iris scissors.  There was minimal undermining beneath the pedicle flap.
Undermining Location (Optional): in the superficial subcutaneous fat
Melolabial Transposition Flap Text: The defect edges were debeveled with a #15 scalpel blade.  Given the location of the defect and the proximity to free margins a melolabial flap was deemed most appropriate.  Using a sterile surgical marker, an appropriate melolabial transposition flap was drawn incorporating the defect.    The area thus outlined was incised deep to adipose tissue with a #15 scalpel blade.  The skin margins were undermined to an appropriate distance in all directions utilizing iris scissors.
Post-Care Instructions: I reviewed with the patient in detail post-care instructions. Patient is not to engage in any heavy lifting, exercise, or swimming for the next 14 days. Should the patient develop any fevers, chills, bleeding, severe pain patient will contact the office immediately.
Burow's Advancement Flap Text: The defect edges were debeveled with a #15 scalpel blade.  Given the location of the defect and the proximity to free margins a Burow's advancement flap was deemed most appropriate.  Using a sterile surgical marker, the appropriate advancement flap was drawn incorporating the defect and placing the expected incisions within the relaxed skin tension lines where possible.    The area thus outlined was incised deep to adipose tissue with a #15 scalpel blade.  The skin margins were undermined to an appropriate distance in all directions utilizing iris scissors.
Slit Excision Additional Text (Leave Blank If You Do Not Want): A linear line was drawn on the skin overlying the lesion. An incision was made slowly until the lesion was visualized.  Once visualized, the lesion was removed with blunt dissection.
V-Y Flap Text: The defect edges were debeveled with a #15 scalpel blade.  Given the location of the defect, shape of the defect and the proximity to free margins a V-Y flap was deemed most appropriate.  Using a sterile surgical marker, an appropriate advancement flap was drawn incorporating the defect and placing the expected incisions within the relaxed skin tension lines where possible.    The area thus outlined was incised deep to adipose tissue with a #15 scalpel blade.  The skin margins were undermined to an appropriate distance in all directions utilizing iris scissors.
Spiral Flap Text: The defect edges were debeveled with a #15 scalpel blade.  Given the location of the defect, shape of the defect and the proximity to free margins a spiral flap was deemed most appropriate.  Using a sterile surgical marker, an appropriate rotation flap was drawn incorporating the defect and placing the expected incisions within the relaxed skin tension lines where possible. The area thus outlined was incised deep to adipose tissue with a #15 scalpel blade.  The skin margins were undermined to an appropriate distance in all directions utilizing iris scissors.
Additional Anesthesia Volume In Cc: 6
Dorsal Nasal Flap Text: The defect edges were debeveled with a #15 scalpel blade.  Given the location of the defect and the proximity to free margins a dorsal nasal flap was deemed most appropriate.  Using a sterile surgical marker, an appropriate dorsal nasal flap was drawn around the defect.    The area thus outlined was incised deep to adipose tissue with a #15 scalpel blade.  The skin margins were undermined to an appropriate distance in all directions utilizing iris scissors.
Excision Method: Elliptical
Graft Donor Site Bandage (Optional-Leave Blank If You Don't Want In Note): Steri-strips and a pressure bandage were applied to the donor site.
O-L Flap Text: The defect edges were debeveled with a #15 scalpel blade.  Given the location of the defect, shape of the defect and the proximity to free margins an O-L flap was deemed most appropriate.  Using a sterile surgical marker, an appropriate advancement flap was drawn incorporating the defect and placing the expected incisions within the relaxed skin tension lines where possible.    The area thus outlined was incised deep to adipose tissue with a #15 scalpel blade.  The skin margins were undermined to an appropriate distance in all directions utilizing iris scissors.
Curvilinear Excision Additional Text (Leave Blank If You Do Not Want): The margin was drawn around the clinically apparent lesion.  A curvilinear shape was then drawn on the skin incorporating the lesion and margins.  Incisions were then made along these lines to the appropriate tissue plane and the lesion was extirpated.
Secondary Defect Width (In Cm): 0
Complex Repair And Modified Advancement Flap Text: The defect edges were debeveled with a #15 scalpel blade.  The primary defect was closed partially with a complex linear closure.  Given the location of the remaining defect, shape of the defect and the proximity to free margins a modified advancement flap was deemed most appropriate for complete closure of the defect.  Using a sterile surgical marker, an appropriate advancement flap was drawn incorporating the defect and placing the expected incisions within the relaxed skin tension lines where possible.    The area thus outlined was incised deep to adipose tissue with a #15 scalpel blade.  The skin margins were undermined to an appropriate distance in all directions utilizing iris scissors.
Cartilage Graft Text: The defect edges were debeveled with a #15 scalpel blade.  Given the location of the defect, shape of the defect, the fact the defect involved a full thickness cartilage defect a cartilage graft was deemed most appropriate.  An appropriate donor site was identified, cleansed, and anesthetized. The cartilage graft was then harvested and transferred to the recipient site, oriented appropriately and then sutured into place.  The secondary defect was then repaired using a primary closure.
Billing Type: Third-Party Bill
Complex Repair And Single Advancement Flap Text: The defect edges were debeveled with a #15 scalpel blade.  The primary defect was closed partially with a complex linear closure.  Given the location of the remaining defect, shape of the defect and the proximity to free margins a single advancement flap was deemed most appropriate for complete closure of the defect.  Using a sterile surgical marker, an appropriate advancement flap was drawn incorporating the defect and placing the expected incisions within the relaxed skin tension lines where possible.    The area thus outlined was incised deep to adipose tissue with a #15 scalpel blade.  The skin margins were undermined to an appropriate distance in all directions utilizing iris scissors.
Skin Substitute Text: The defect edges were debeveled with a #15 scalpel blade.  Given the location of the defect, shape of the defect and the proximity to free margins a skin substitute graft was deemed most appropriate.  The graft material was trimmed to fit the size of the defect. The graft was then placed in the primary defect and oriented appropriately.
Lab Facility: 
Additional Epidermal Closure (Optional): running
Surgeon (Optional): as
Trilobed Flap Text: The defect edges were debeveled with a #15 scalpel blade.  Given the location of the defect and the proximity to free margins a trilobed flap was deemed most appropriate.  Using a sterile surgical marker, an appropriate trilobed flap drawn around the defect.    The area thus outlined was incised deep to adipose tissue with a #15 scalpel blade.  The skin margins were undermined to an appropriate distance in all directions utilizing iris scissors.
Rhombic Flap Text: The defect edges were debeveled with a #15 scalpel blade.  Given the location of the defect and the proximity to free margins a rhombic flap was deemed most appropriate.  Using a sterile surgical marker, an appropriate rhombic flap was drawn incorporating the defect.    The area thus outlined was incised deep to adipose tissue with a #15 scalpel blade.  The skin margins were undermined to an appropriate distance in all directions utilizing iris scissors.
S Plasty Text: Given the location and shape of the defect, and the orientation of relaxed skin tension lines, an S-plasty was deemed most appropriate for repair.  Using a sterile surgical marker, the appropriate outline of the S-plasty was drawn, incorporating the defect and placing the expected incisions within the relaxed skin tension lines where possible.  The area thus outlined was incised deep to adipose tissue with a #15 scalpel blade.  The skin margins were undermined to an appropriate distance in all directions utilizing iris scissors. The skin flaps were advanced over the defect.  The opposing margins were then approximated with interrupted buried subcutaneous sutures.
Melolabial Interpolation Flap Text: A decision was made to reconstruct the defect utilizing an interpolation axial flap and a staged reconstruction.  A telfa template was made of the defect.  This telfa template was then used to outline the melolabial interpolation flap.  The donor area for the pedicle flap was then injected with anesthesia.  The flap was excised through the skin and subcutaneous tissue down to the layer of the underlying musculature.  The pedicle flap was carefully excised within this deep plane to maintain its blood supply.  The edges of the donor site were undermined.   The donor site was closed in a primary fashion.  The pedicle was then rotated into position and sutured.  Once the tube was sutured into place, adequate blood supply was confirmed with blanching and refill.  The pedicle was then wrapped with xeroform gauze and dressed appropriately with a telfa and gauze bandage to ensure continued blood supply and protect the attached pedicle.
Repair Performed By Another Provider Text (Leave Blank If You Do Not Want): After the tissue was excised the defect was repaired by another provider.
Complex Repair And Rotation Flap Text: The defect edges were debeveled with a #15 scalpel blade.  The primary defect was closed partially with a complex linear closure.  Given the location of the remaining defect, shape of the defect and the proximity to free margins a rotation flap was deemed most appropriate for complete closure of the defect.  Using a sterile surgical marker, an appropriate advancement flap was drawn incorporating the defect and placing the expected incisions within the relaxed skin tension lines where possible.    The area thus outlined was incised deep to adipose tissue with a #15 scalpel blade.  The skin margins were undermined to an appropriate distance in all directions utilizing iris scissors.
Wound Care: Petrolatum
Complex Repair And M Plasty Text: The defect edges were debeveled with a #15 scalpel blade.  The primary defect was closed partially with a complex linear closure.  Given the location of the remaining defect, shape of the defect and the proximity to free margins an M plasty was deemed most appropriate for complete closure of the defect.  Using a sterile surgical marker, an appropriate advancement flap was drawn incorporating the defect and placing the expected incisions within the relaxed skin tension lines where possible.    The area thus outlined was incised deep to adipose tissue with a #15 scalpel blade.  The skin margins were undermined to an appropriate distance in all directions utilizing iris scissors.
O-T Advancement Flap Text: The defect edges were debeveled with a #15 scalpel blade.  Given the location of the defect, shape of the defect and the proximity to free margins an O-T advancement flap was deemed most appropriate.  Using a sterile surgical marker, an appropriate advancement flap was drawn incorporating the defect and placing the expected incisions within the relaxed skin tension lines where possible.    The area thus outlined was incised deep to adipose tissue with a #15 scalpel blade.  The skin margins were undermined to an appropriate distance in all directions utilizing iris scissors.
Complex Repair And O-L Flap Text: The defect edges were debeveled with a #15 scalpel blade.  The primary defect was closed partially with a complex linear closure.  Given the location of the remaining defect, shape of the defect and the proximity to free margins an O-L flap was deemed most appropriate for complete closure of the defect.  Using a sterile surgical marker, an appropriate flap was drawn incorporating the defect and placing the expected incisions within the relaxed skin tension lines where possible.    The area thus outlined was incised deep to adipose tissue with a #15 scalpel blade.  The skin margins were undermined to an appropriate distance in all directions utilizing iris scissors.
Consent was obtained from the patient. The risks and benefits to therapy were discussed in detail. Specifically, the risks of infection, scarring, bleeding, prolonged wound healing, incomplete removal, allergy to anesthesia, nerve injury and recurrence were addressed. Prior to the procedure, the treatment site was clearly identified and confirmed by the patient. All components of Universal Protocol/PAUSE Rule completed.
Hemostasis: Electrocautery
Alar Island Pedicle Flap Text: The defect edges were debeveled with a #15 scalpel blade.  Given the location of the defect, shape of the defect and the proximity to the alar rim an island pedicle advancement flap was deemed most appropriate.  Using a sterile surgical marker, an appropriate advancement flap was drawn incorporating the defect, outlining the appropriate donor tissue and placing the expected incisions within the nasal ala running parallel to the alar rim. The area thus outlined was incised with a #15 scalpel blade.  The skin margins were undermined minimally to an appropriate distance in all directions around the primary defect and laterally outward around the island pedicle utilizing iris scissors.  There was minimal undermining beneath the pedicle flap.
Perilesional Excision Additional Text (Leave Blank If You Do Not Want): The margin was drawn around the clinically apparent lesion. Incisions were then made along these lines to the appropriate tissue plane and the lesion was extirpated.
Mastoid Interpolation Flap Text: A decision was made to reconstruct the defect utilizing an interpolation axial flap and a staged reconstruction.  A telfa template was made of the defect.  This telfa template was then used to outline the mastoid interpolation flap.  The donor area for the pedicle flap was then injected with anesthesia.  The flap was excised through the skin and subcutaneous tissue down to the layer of the underlying musculature.  The pedicle flap was carefully excised within this deep plane to maintain its blood supply.  The edges of the donor site were undermined.   The donor site was closed in a primary fashion.  The pedicle was then rotated into position and sutured.  Once the tube was sutured into place, adequate blood supply was confirmed with blanching and refill.  The pedicle was then wrapped with xeroform gauze and dressed appropriately with a telfa and gauze bandage to ensure continued blood supply and protect the attached pedicle.
Complex Repair And Dermal Autograft Text: The defect edges were debeveled with a #15 scalpel blade.  The primary defect was closed partially with a complex linear closure.  Given the location of the defect, shape of the defect and the proximity to free margins an dermal autograft was deemed most appropriate to repair the remaining defect.  The graft was trimmed to fit the size of the remaining defect.  The graft was then placed in the primary defect, oriented appropriately, and sutured into place.
Complex Repair And V-Y Plasty Text: The defect edges were debeveled with a #15 scalpel blade.  The primary defect was closed partially with a complex linear closure.  Given the location of the remaining defect, shape of the defect and the proximity to free margins a V-Y plasty was deemed most appropriate for complete closure of the defect.  Using a sterile surgical marker, an appropriate advancement flap was drawn incorporating the defect and placing the expected incisions within the relaxed skin tension lines where possible.    The area thus outlined was incised deep to adipose tissue with a #15 scalpel blade.  The skin margins were undermined to an appropriate distance in all directions utilizing iris scissors.
Tissue Cultured Epidermal Autograft Text: The defect edges were debeveled with a #15 scalpel blade.  Given the location of the defect, shape of the defect and the proximity to free margins a tissue cultured epidermal autograft was deemed most appropriate.  The graft was then trimmed to fit the size of the defect.  The graft was then placed in the primary defect and oriented appropriately.
Home Suture Removal Text: Patient was provided a home suture removal kit and will remove their sutures at home.  If they have any questions or difficulties they will call the office.
Ftsg Text: The defect edges were debeveled with a #15 scalpel blade.  Given the location of the defect, shape of the defect and the proximity to free margins a full thickness skin graft was deemed most appropriate.  Using a sterile surgical marker, the primary defect shape was transferred to the donor site. The area thus outlined was incised deep to adipose tissue with a #15 scalpel blade.  The harvested graft was then trimmed of adipose tissue until only dermis and epidermis was left.  The skin margins of the secondary defect were undermined to an appropriate distance in all directions utilizing iris scissors.  The secondary defect was closed with interrupted buried subcutaneous sutures.  The skin edges were then re-apposed with running  sutures.  The skin graft was then placed in the primary defect and oriented appropriately.
Dermal Autograft Text: The defect edges were debeveled with a #15 scalpel blade.  Given the location of the defect, shape of the defect and the proximity to free margins a dermal autograft was deemed most appropriate.  Using a sterile surgical marker, the primary defect shape was transferred to the donor site. The area thus outlined was incised deep to adipose tissue with a #15 scalpel blade.  The harvested graft was then trimmed of adipose and epidermal tissue until only dermis was left.  The skin graft was then placed in the primary defect and oriented appropriately.
H Plasty Text: Given the location of the defect, shape of the defect and the proximity to free margins a H-plasty was deemed most appropriate for repair.  Using a sterile surgical marker, the appropriate advancement arms of the H-plasty were drawn incorporating the defect and placing the expected incisions within the relaxed skin tension lines where possible. The area thus outlined was incised deep to adipose tissue with a #15 scalpel blade. The skin margins were undermined to an appropriate distance in all directions utilizing iris scissors.  The opposing advancement arms were then advanced into place in opposite direction and anchored with interrupted buried subcutaneous sutures.
V-Y Plasty Text: The defect edges were debeveled with a #15 scalpel blade.  Given the location of the defect, shape of the defect and the proximity to free margins an V-Y advancement flap was deemed most appropriate.  Using a sterile surgical marker, an appropriate advancement flap was drawn incorporating the defect and placing the expected incisions within the relaxed skin tension lines where possible.    The area thus outlined was incised deep to adipose tissue with a #15 scalpel blade.  The skin margins were undermined to an appropriate distance in all directions utilizing iris scissors.
Advancement-Rotation Flap Text: The defect edges were debeveled with a #15 scalpel blade.  Given the location of the defect, shape of the defect and the proximity to free margins an advancement-rotation flap was deemed most appropriate.  Using a sterile surgical marker, an appropriate flap was drawn incorporating the defect and placing the expected incisions within the relaxed skin tension lines where possible. The area thus outlined was incised deep to adipose tissue with a #15 scalpel blade.  The skin margins were undermined to an appropriate distance in all directions utilizing iris scissors.
Modified Advancement Flap Text: The defect edges were debeveled with a #15 scalpel blade.  Given the location of the defect, shape of the defect and the proximity to free margins a modified advancement flap was deemed most appropriate.  Using a sterile surgical marker, an appropriate advancement flap was drawn incorporating the defect and placing the expected incisions within the relaxed skin tension lines where possible.    The area thus outlined was incised deep to adipose tissue with a #15 scalpel blade.  The skin margins were undermined to an appropriate distance in all directions utilizing iris scissors.
Double Island Pedicle Flap Text: The defect edges were debeveled with a #15 scalpel blade.  Given the location of the defect, shape of the defect and the proximity to free margins a double island pedicle advancement flap was deemed most appropriate.  Using a sterile surgical marker, an appropriate advancement flap was drawn incorporating the defect, outlining the appropriate donor tissue and placing the expected incisions within the relaxed skin tension lines where possible.    The area thus outlined was incised deep to adipose tissue with a #15 scalpel blade.  The skin margins were undermined to an appropriate distance in all directions around the primary defect and laterally outward around the island pedicle utilizing iris scissors.  There was minimal undermining beneath the pedicle flap.
Island Pedicle Flap With Canthal Suspension Text: The defect edges were debeveled with a #15 scalpel blade.  Given the location of the defect, shape of the defect and the proximity to free margins an island pedicle advancement flap was deemed most appropriate.  Using a sterile surgical marker, an appropriate advancement flap was drawn incorporating the defect, outlining the appropriate donor tissue and placing the expected incisions within the relaxed skin tension lines where possible. The area thus outlined was incised deep to adipose tissue with a #15 scalpel blade.  The skin margins were undermined to an appropriate distance in all directions around the primary defect and laterally outward around the island pedicle utilizing iris scissors.  There was minimal undermining beneath the pedicle flap. A suspension suture was placed in the canthal tendon to prevent tension and prevent ectropion.
Rotation Flap Text: The defect edges were debeveled with a #15 scalpel blade.  Given the location of the defect, shape of the defect and the proximity to free margins a rotation flap was deemed most appropriate.  Using a sterile surgical marker, an appropriate rotation flap was drawn incorporating the defect and placing the expected incisions within the relaxed skin tension lines where possible.    The area thus outlined was incised deep to adipose tissue with a #15 scalpel blade.  The skin margins were undermined to an appropriate distance in all directions utilizing iris scissors.
Excisional Biopsy Additional Text (Leave Blank If You Do Not Want): The margin was drawn around the clinically apparent lesion. An elliptical shape was then drawn on the skin incorporating the lesion and margins.  Incisions were then made along these lines to the appropriate tissue plane and the lesion was extirpated.
Complex Repair And Bilobe Flap Text: The defect edges were debeveled with a #15 scalpel blade.  The primary defect was closed partially with a complex linear closure.  Given the location of the remaining defect, shape of the defect and the proximity to free margins a bilobe flap was deemed most appropriate for complete closure of the defect.  Using a sterile surgical marker, an appropriate advancement flap was drawn incorporating the defect and placing the expected incisions within the relaxed skin tension lines where possible.    The area thus outlined was incised deep to adipose tissue with a #15 scalpel blade.  The skin margins were undermined to an appropriate distance in all directions utilizing iris scissors.
Estimated Blood Loss (Cc): minimal
Size Of Margin In Cm: 0.5
Intermediate Repair Preamble Text (Leave Blank If You Do Not Want): Undermining was performed with blunt dissection.
Mucosal Advancement Flap Text: Given the location of the defect, shape of the defect and the proximity to free margins a mucosal advancement flap was deemed most appropriate. Incisions were made with a 15 blade scalpel in the appropriate fashion along the cutaneous vermilion border and the mucosal lip. The remaining actinically damaged mucosal tissue was excised.  The mucosal advancement flap was then elevated to the gingival sulcus with care taken to preserve the neurovascular structures and advanced into the primary defect. Care was taken to ensure that precise realignment of the vermilion border was achieved.
Complex Repair And Epidermal Autograft Text: The defect edges were debeveled with a #15 scalpel blade.  The primary defect was closed partially with a complex linear closure.  Given the location of the defect, shape of the defect and the proximity to free margins an epidermal autograft was deemed most appropriate to repair the remaining defect.  The graft was trimmed to fit the size of the remaining defect.  The graft was then placed in the primary defect, oriented appropriately, and sutured into place.
A-T Advancement Flap Text: The defect edges were debeveled with a #15 scalpel blade.  Given the location of the defect, shape of the defect and the proximity to free margins an A-T advancement flap was deemed most appropriate.  Using a sterile surgical marker, an appropriate advancement flap was drawn incorporating the defect and placing the expected incisions within the relaxed skin tension lines where possible.    The area thus outlined was incised deep to adipose tissue with a #15 scalpel blade.  The skin margins were undermined to an appropriate distance in all directions utilizing iris scissors.
Bilateral Helical Rim Advancement Flap Text: The defect edges were debeveled with a #15 blade scalpel.  Given the location of the defect and the proximity to free margins (helical rim) a bilateral helical rim advancement flap was deemed most appropriate.  Using a sterile surgical marker, the appropriate advancement flaps were drawn incorporating the defect and placing the expected incisions between the helical rim and antihelix where possible.  The area thus outlined was incised through and through with a #15 scalpel blade.  With a skin hook and iris scissors, the flaps were gently and sharply undermined and freed up.
Pre-Excision Curettage Text (Leave Blank If You Do Not Want): Prior to drawing the surgical margin the visible lesion was removed with electrodesiccation and curettage to clearly define the lesion size.
Lab: [id number]
Number Of Epidermal Sutures (Optional): 2
Epidermal Sutures: 3-0 Nylon
Complex Repair And Skin Substitute Graft Text: The defect edges were debeveled with a #15 scalpel blade.  The primary defect was closed partially with a complex linear closure.  Given the location of the remaining defect, shape of the defect and the proximity to free margins a skin substitute graft was deemed most appropriate to repair the remaining defect.  The graft was trimmed to fit the size of the remaining defect.  The graft was then placed in the primary defect, oriented appropriately, and sutured into place.
Complex Repair And W Plasty Text: The defect edges were debeveled with a #15 scalpel blade.  The primary defect was closed partially with a complex linear closure.  Given the location of the remaining defect, shape of the defect and the proximity to free margins a W plasty was deemed most appropriate for complete closure of the defect.  Using a sterile surgical marker, an appropriate advancement flap was drawn incorporating the defect and placing the expected incisions within the relaxed skin tension lines where possible.    The area thus outlined was incised deep to adipose tissue with a #15 scalpel blade.  The skin margins were undermined to an appropriate distance in all directions utilizing iris scissors.
Transposition Flap Text: The defect edges were debeveled with a #15 scalpel blade.  Given the location of the defect and the proximity to free margins a transposition flap was deemed most appropriate.  Using a sterile surgical marker, an appropriate transposition flap was drawn incorporating the defect.    The area thus outlined was incised deep to adipose tissue with a #15 scalpel blade.  The skin margins were undermined to an appropriate distance in all directions utilizing iris scissors.
Complex Repair And Transposition Flap Text: The defect edges were debeveled with a #15 scalpel blade.  The primary defect was closed partially with a complex linear closure.  Given the location of the remaining defect, shape of the defect and the proximity to free margins a transposition flap was deemed most appropriate for complete closure of the defect.  Using a sterile surgical marker, an appropriate advancement flap was drawn incorporating the defect and placing the expected incisions within the relaxed skin tension lines where possible.    The area thus outlined was incised deep to adipose tissue with a #15 scalpel blade.  The skin margins were undermined to an appropriate distance in all directions utilizing iris scissors.
Accession #: [id number]
Excision Depth: adipose tissue
Anesthesia Type: 1% lidocaine with epinephrine
Suture Removal: 14 days
Cheek Interpolation Flap Text: A decision was made to reconstruct the defect utilizing an interpolation axial flap and a staged reconstruction.  A telfa template was made of the defect.  This telfa template was then used to outline the Cheek Interpolation flap.  The donor area for the pedicle flap was then injected with anesthesia.  The flap was excised through the skin and subcutaneous tissue down to the layer of the underlying musculature.  The interpolation flap was carefully excised within this deep plane to maintain its blood supply.  The edges of the donor site were undermined.   The donor site was closed in a primary fashion.  The pedicle was then rotated into position and sutured.  Once the tube was sutured into place, adequate blood supply was confirmed with blanching and refill.  The pedicle was then wrapped with xeroform gauze and dressed appropriately with a telfa and gauze bandage to ensure continued blood supply and protect the attached pedicle.
Complex Repair And Double M Plasty Text: The defect edges were debeveled with a #15 scalpel blade.  The primary defect was closed partially with a complex linear closure.  Given the location of the remaining defect, shape of the defect and the proximity to free margins a double M plasty was deemed most appropriate for complete closure of the defect.  Using a sterile surgical marker, an appropriate advancement flap was drawn incorporating the defect and placing the expected incisions within the relaxed skin tension lines where possible.    The area thus outlined was incised deep to adipose tissue with a #15 scalpel blade.  The skin margins were undermined to an appropriate distance in all directions utilizing iris scissors.
Complex Repair And Split-Thickness Skin Graft Text: The defect edges were debeveled with a #15 scalpel blade.  The primary defect was closed partially with a complex linear closure.  Given the location of the defect, shape of the defect and the proximity to free margins a split thickness skin graft was deemed most appropriate to repair the remaining defect.  The graft was trimmed to fit the size of the remaining defect.  The graft was then placed in the primary defect, oriented appropriately, and sutured into place.
Crescentic Advancement Flap Text: The defect edges were debeveled with a #15 scalpel blade.  Given the location of the defect and the proximity to free margins a crescentic advancement flap was deemed most appropriate.  Using a sterile surgical marker, the appropriate advancement flap was drawn incorporating the defect and placing the expected incisions within the relaxed skin tension lines where possible.    The area thus outlined was incised deep to adipose tissue with a #15 scalpel blade.  The skin margins were undermined to an appropriate distance in all directions utilizing iris scissors.
Xenograft Text: The defect edges were debeveled with a #15 scalpel blade.  Given the location of the defect, shape of the defect and the proximity to free margins a xenograft was deemed most appropriate.  The graft was then trimmed to fit the size of the defect.  The graft was then placed in the primary defect and oriented appropriately.
Ear Star Wedge Flap Text: The defect edges were debeveled with a #15 blade scalpel.  Given the location of the defect and the proximity to free margins (helical rim) an ear star wedge flap was deemed most appropriate.  Using a sterile surgical marker, the appropriate flap was drawn incorporating the defect and placing the expected incisions between the helical rim and antihelix where possible.  The area thus outlined was incised through and through with a #15 scalpel blade.
Complex Repair And Rhombic Flap Text: The defect edges were debeveled with a #15 scalpel blade.  The primary defect was closed partially with a complex linear closure.  Given the location of the remaining defect, shape of the defect and the proximity to free margins a rhombic flap was deemed most appropriate for complete closure of the defect.  Using a sterile surgical marker, an appropriate advancement flap was drawn incorporating the defect and placing the expected incisions within the relaxed skin tension lines where possible.    The area thus outlined was incised deep to adipose tissue with a #15 scalpel blade.  The skin margins were undermined to an appropriate distance in all directions utilizing iris scissors.
Z Plasty Text: The lesion was extirpated to the level of the fat with a #15 scalpel blade.  Given the location of the defect, shape of the defect and the proximity to free margins a Z-plasty was deemed most appropriate for repair.  Using a sterile surgical marker, the appropriate transposition arms of the Z-plasty were drawn incorporating the defect and placing the expected incisions within the relaxed skin tension lines where possible.    The area thus outlined was incised deep to adipose tissue with a #15 scalpel blade.  The skin margins were undermined to an appropriate distance in all directions utilizing iris scissors.  The opposing transposition arms were then transposed into place in opposite direction and anchored with interrupted buried subcutaneous sutures.
Dressing: pressure dressing
Wound Check: 2 days
W Plasty Text: The lesion was extirpated to the level of the fat with a #15 scalpel blade.  Given the location of the defect, shape of the defect and the proximity to free margins a W-plasty was deemed most appropriate for repair.  Using a sterile surgical marker, the appropriate transposition arms of the W-plasty were drawn incorporating the defect and placing the expected incisions within the relaxed skin tension lines where possible.    The area thus outlined was incised deep to adipose tissue with a #15 scalpel blade.  The skin margins were undermined to an appropriate distance in all directions utilizing iris scissors.  The opposing transposition arms were then transposed into place in opposite direction and anchored with interrupted buried subcutaneous sutures.
Cheek-To-Nose Interpolation Flap Text: A decision was made to reconstruct the defect utilizing an interpolation axial flap and a staged reconstruction.  A telfa template was made of the defect.  This telfa template was then used to outline the Cheek-To-Nose Interpolation flap.  The donor area for the pedicle flap was then injected with anesthesia.  The flap was excised through the skin and subcutaneous tissue down to the layer of the underlying musculature.  The interpolation flap was carefully excised within this deep plane to maintain its blood supply.  The edges of the donor site were undermined.   The donor site was closed in a primary fashion.  The pedicle was then rotated into position and sutured.  Once the tube was sutured into place, adequate blood supply was confirmed with blanching and refill.  The pedicle was then wrapped with xeroform gauze and dressed appropriately with a telfa and gauze bandage to ensure continued blood supply and protect the attached pedicle.
Complex Repair And O-T Advancement Flap Text: The defect edges were debeveled with a #15 scalpel blade.  The primary defect was closed partially with a complex linear closure.  Given the location of the remaining defect, shape of the defect and the proximity to free margins an O-T advancement flap was deemed most appropriate for complete closure of the defect.  Using a sterile surgical marker, an appropriate advancement flap was drawn incorporating the defect and placing the expected incisions within the relaxed skin tension lines where possible.    The area thus outlined was incised deep to adipose tissue with a #15 scalpel blade.  The skin margins were undermined to an appropriate distance in all directions utilizing iris scissors.
Hatchet Flap Text: The defect edges were debeveled with a #15 scalpel blade.  Given the location of the defect, shape of the defect and the proximity to free margins a hatchet flap was deemed most appropriate.  Using a sterile surgical marker, an appropriate hatchet flap was drawn incorporating the defect and placing the expected incisions within the relaxed skin tension lines where possible.    The area thus outlined was incised deep to adipose tissue with a #15 scalpel blade.  The skin margins were undermined to an appropriate distance in all directions utilizing iris scissors.
Lip Wedge Excision Repair Text: Given the location of the defect and the proximity to free margins a full thickness wedge repair was deemed most appropriate.  Using a sterile surgical marker, the appropriate repair was drawn incorporating the defect and placing the expected incisions perpendicular to the vermilion border.  The vermilion border was also meticulously outlined to ensure appropriate reapproximation during the repair.  The area thus outlined was incised through and through with a #15 scalpel blade.  The muscularis and dermis were reaproximated with deep sutures following hemostasis. Care was taken to realign the vermilion border before proceeding with the superficial closure.  Once the vermilion was realigned the superfical and mucosal closure was finished.
Complex Repair Preamble Text (Leave Blank If You Do Not Want): Extensive wide undermining was performed.
Path Notes (To The Dermatopathologist): Please check margins.
Complex Repair And Ftsg Text: The defect edges were debeveled with a #15 scalpel blade.  The primary defect was closed partially with a complex linear closure.  Given the location of the defect, shape of the defect and the proximity to free margins a full thickness skin graft was deemed most appropriate to repair the remaining defect.  The graft was trimmed to fit the size of the remaining defect.  The graft was then placed in the primary defect, oriented appropriately, and sutured into place.
Advancement Flap (Double) Text: The defect edges were debeveled with a #15 scalpel blade.  Given the location of the defect and the proximity to free margins a double advancement flap was deemed most appropriate.  Using a sterile surgical marker, the appropriate advancement flaps were drawn incorporating the defect and placing the expected incisions within the relaxed skin tension lines where possible.    The area thus outlined was incised deep to adipose tissue with a #15 scalpel blade.  The skin margins were undermined to an appropriate distance in all directions utilizing iris scissors.
Epidermal Autograft Text: The defect edges were debeveled with a #15 scalpel blade.  Given the location of the defect, shape of the defect and the proximity to free margins an epidermal autograft was deemed most appropriate.  Using a sterile surgical marker, the primary defect shape was transferred to the donor site. The epidermal graft was then harvested.  The skin graft was then placed in the primary defect and oriented appropriately.
Deep Sutures: 4-0 PDO
O-Z Plasty Text: The defect edges were debeveled with a #15 scalpel blade.  Given the location of the defect, shape of the defect and the proximity to free margins an O-Z plasty (double transposition flap) was deemed most appropriate.  Using a sterile surgical marker, the appropriate transposition flaps were drawn incorporating the defect and placing the expected incisions within the relaxed skin tension lines where possible.    The area thus outlined was incised deep to adipose tissue with a #15 scalpel blade.  The skin margins were undermined to an appropriate distance in all directions utilizing iris scissors.  Hemostasis was achieved with electrocautery.  The flaps were then transposed into place, one clockwise and the other counterclockwise, and anchored with interrupted buried subcutaneous sutures.
Scalpel Size: 15 blade
Complex Repair And Xenograft Text: The defect edges were debeveled with a #15 scalpel blade.  The primary defect was closed partially with a complex linear closure.  Given the location of the defect, shape of the defect and the proximity to free margins a xenograft was deemed most appropriate to repair the remaining defect.  The graft was trimmed to fit the size of the remaining defect.  The graft was then placed in the primary defect, oriented appropriately, and sutured into place.
Number Of Deep Sutures (Optional): 5
Size Of Lesion In Cm: 1.5

## 2017-11-21 NOTE — PROCEDURE: SUTURE REMOVAL (GLOBAL PERIOD)
Detail Level: Detailed
Add 51056 Cpt? (Important Note: In 2017 The Use Of 60980 Is Being Tracked By Cms To Determine Future Global Period Reimbursement For Global Periods): yes

## 2017-11-21 NOTE — HPI: SECONDARY COMPLAINT
How Severe Is This Condition?: mild
Additional History: He states it is better, he thinks the doxycycline made him get diarrhea the first few days, that has been better now for the last five days.  He also felt like he was urinating more from the antibiotics. He did not take it today.

## 2017-11-21 NOTE — PROCEDURE: ADDITIONAL NOTES
Additional Notes: Pt instructed to DC doxycycline,start otc probiotic or yogurt. We discussed infection appears cleared up. Call or return to clinic if any problems.

## 2017-11-21 NOTE — HPI: BIOPSY PROVEN BCC
Accession # (Optional): [id number]
Additional History: Pt here to treat biopsy proven done LV on 9/5/17 for a BCC micronodular type.

## 2017-11-29 ENCOUNTER — HOSPITAL ENCOUNTER (OUTPATIENT)
Dept: RADIOLOGY | Facility: MEDICAL CENTER | Age: 67
End: 2017-11-29
Attending: SPECIALIST
Payer: MEDICARE

## 2017-11-29 DIAGNOSIS — C18.2 MALIGNANT NEOPLASM OF ASCENDING COLON (HCC): ICD-10-CM

## 2017-11-29 PROCEDURE — 71260 CT THORAX DX C+: CPT

## 2017-11-29 PROCEDURE — 700117 HCHG RX CONTRAST REV CODE 255: Performed by: SPECIALIST

## 2017-11-29 PROCEDURE — 700111 HCHG RX REV CODE 636 W/ 250 OVERRIDE (IP)

## 2017-11-29 RX ADMIN — HEPARIN 500 UNITS: 100 SYRINGE at 14:50

## 2017-11-29 RX ADMIN — IOHEXOL 75 ML: 350 INJECTION, SOLUTION INTRAVENOUS at 14:50

## 2017-11-29 NOTE — PROGRESS NOTES
1440 Port accessed via kit per protocol with no difficulty.       1450 Port de-accessed per protocol and dressing applied. Heparin 500units/5ml given with de-access.

## 2017-12-05 ENCOUNTER — APPOINTMENT (RX ONLY)
Dept: URBAN - METROPOLITAN AREA CLINIC 31 | Facility: CLINIC | Age: 67
Setting detail: DERMATOLOGY
End: 2017-12-05

## 2017-12-05 DIAGNOSIS — Z48.02 ENCOUNTER FOR REMOVAL OF SUTURES: ICD-10-CM

## 2017-12-05 PROCEDURE — ? SUTURE REMOVAL (GLOBAL PERIOD)

## 2017-12-05 PROCEDURE — 99024 POSTOP FOLLOW-UP VISIT: CPT

## 2017-12-05 PROCEDURE — ? COUNSELING

## 2017-12-05 ASSESSMENT — LOCATION DETAILED DESCRIPTION DERM
LOCATION DETAILED: LEFT MEDIAL UPPER BACK
LOCATION DETAILED: LEFT MEDIAL UPPER BACK

## 2017-12-05 ASSESSMENT — LOCATION SIMPLE DESCRIPTION DERM
LOCATION SIMPLE: LEFT UPPER BACK
LOCATION SIMPLE: LEFT UPPER BACK

## 2017-12-05 ASSESSMENT — LOCATION ZONE DERM
LOCATION ZONE: TRUNK
LOCATION ZONE: TRUNK

## 2017-12-05 NOTE — PROCEDURE: SUTURE REMOVAL (GLOBAL PERIOD)
Detail Level: Detailed
Add 67646 Cpt? (Important Note: In 2017 The Use Of 75231 Is Being Tracked By Cms To Determine Future Global Period Reimbursement For Global Periods): yes

## 2017-12-06 ENCOUNTER — OFFICE VISIT (OUTPATIENT)
Dept: MEDICAL GROUP | Facility: PHYSICIAN GROUP | Age: 67
End: 2017-12-06
Payer: MEDICARE

## 2017-12-06 VITALS
SYSTOLIC BLOOD PRESSURE: 126 MMHG | BODY MASS INDEX: 44.88 KG/M2 | HEIGHT: 69 IN | OXYGEN SATURATION: 94 % | DIASTOLIC BLOOD PRESSURE: 62 MMHG | RESPIRATION RATE: 16 BRPM | TEMPERATURE: 98.6 F | HEART RATE: 82 BPM | WEIGHT: 303 LBS

## 2017-12-06 DIAGNOSIS — G63 NEUROPATHY ASSOCIATED WITH CANCER (HCC): ICD-10-CM

## 2017-12-06 DIAGNOSIS — C18.6 MALIGNANT NEOPLASM OF DESCENDING COLON (HCC): ICD-10-CM

## 2017-12-06 DIAGNOSIS — C80.1 NEUROPATHY ASSOCIATED WITH CANCER (HCC): ICD-10-CM

## 2017-12-06 DIAGNOSIS — C44.90 SKIN CANCER: ICD-10-CM

## 2017-12-06 DIAGNOSIS — R91.1 PULMONARY NODULE: ICD-10-CM

## 2017-12-06 PROCEDURE — 99214 OFFICE O/P EST MOD 30 MIN: CPT | Performed by: FAMILY MEDICINE

## 2017-12-06 ASSESSMENT — PAIN SCALES - GENERAL: PAINLEVEL: NO PAIN

## 2017-12-06 NOTE — LETTER
[facility]  Jocelyn Ramos M.D.  [address]  [place] [address]  Fax: [phone]   Authorization for Release/Disclosure of   Protected Health Information   Name: GERALD ASIF : 1950 SSN: [id number]   Address: [address] [place] [address]  [place] [address] Phone:    [phone] (home)    I authorize the entity listed below to release/disclose the PHI below to:   [facility]/Jocelyn Ramos M.D. and Jocelyn Ramos M.D.   Provider or Entity Name:  [facility]    Address   City, State, Zip   Phone:      Fax:     Reason for request: continuity of care   Information to be released:    [  ] LAST COLONOSCOPY,  including any PATH REPORT and follow-up  [  ] LAST FIT/COLOGUARD RESULT [  ] LAST DEXA  [  ] LAST MAMMOGRAM  [  ] LAST PAP  [  ] LAST LABS [  ] RETINA EXAM REPORT  [  ] IMMUNIZATION RECORDS  [  ] Release all info      [  ] Check here and initial the line next to each item to release ALL health information INCLUDING  _____ Care and treatment for drug and / or alcohol abuse  _____ HIV testing, infection status, or AIDS  _____ Genetic Testing    DATES OF SERVICE OR TIME PERIOD TO BE DISCLOSED: _____________  I understand and acknowledge that:  * This Authorization may be revoked at any time by you in writing, except if your health information has already been used or disclosed.  * Your health information that will be used or disclosed as a result of you signing this authorization could be re-disclosed by the recipient. If this occurs, your re-disclosed health information may no longer be protected by State or Federal laws.  * You may refuse to sign this Authorization. Your refusal will not affect your ability to obtain treatment.  * This Authorization becomes effective upon signing and will  on (date) __________.      If no date is indicated, this Authorization will  one (1) year from the signature date.    Name: Gerald Asif    Signature:   Date:     2017       PLEASE FAX  REQUESTED RECORDS BACK TO: [phone]

## 2017-12-06 NOTE — PROGRESS NOTES
Subjective:   Gerald Oshea is a 67 y.o. male here today for follow-up colon cancer and neuropathy.    Colon cancer (CMS-HCC)  Lung nodule  Patient has been following closely with his oncologist, Dr. Silverman. His most recent CEA level was 5.6. He had a follow-up CT scan to reevaluate a pulmonary nodule. Unfortunately, it has increased in size from 7 mm to over 13 mm in diameter. He is worried that it may be a spread of his cancer. He will be having a needle biopsy later this month. He tells me that if it is cancer he may need to restart chemotherapy.    11/29/2017 2:18 PM    HISTORY/REASON FOR EXAM:  Colon carcinoma follow-up.      TECHNIQUE/EXAM DESCRIPTION:  CT scan of the chest with contrast.    Thin-section helical images were obtained from the lung apices through the adrenal glands following the bolus administration of  75 mL of Omnipaque 350 nonionic contrast. Oral contrast was administered.    Low dose optimization technique was utilized for this CT exam including automated exposure control and adjustment of the mA and/or kV according to patient size.    COMPARISON:  CT CAP 6/13/2017. CT thorax 1/6/2017    FINDINGS:  There has been increase in size of pulmonary nodule in the anterior aspect of the left upper lobe. The nodule appears lobulated and measures 13 mm in maximum diameter compared with 7.2 mm on 6/13/2017 and 7.5 mm on 1/6/2017. This is suspicious for   pulmonary metastasis.  The remainder of the lung parenchyma is clear.    There is no mediastinal, hilar, or axillary adenopathy.    The cardiac chambers, great vessels, and aorta are normal in CT appearance.    Coronary artery calcifications are present.    There are no pleural effusions or other abnormality of the pleura.    The extrathoracic soft tissues and thoracic cage are normal in appearance.    The adrenal glands are normal.  The visualized portions of the liver, spleen, and pancreas appear normal.  Several simple cysts are present in  the solitary left kidney.    There is no upper abdominal adenopathy.       Impression       1.  Interval increase in size of solitary noncalcified pulmonary nodule in the left upper lobe 2 13 mm. Prior dimensions were 7.2 mm and 7.5 mm on 6/13/2017 and 1/6/2017 respectively. This is suspicious for a solitary pulmonary metastasis.    2.  No other pulmonary nodule or consolidation.    3.  No thoracic adenopathy.    4.  No upper abdominal adenopathy identified.    5.  Solitary left kidney.     Neuropathy associated with cancer (CMS-HCC)  No significant change. Patient continues to have numbness and tingling in his fingertips and toes. He is taking gabapentin 4x/day which is helping him. His podiatrist had mentioned trying a new medication, but Dr. Silverman would like him to hold off until they decide what to do about the lung nodule.    Skin cancer  Patient also mentions that he saw his dermatologist last week. He had three skin biopsies and was told that one of them was cancer. He is not sure what kind of cancer was. The entire lesion was removed. He is healing well. He has some Steri-Strips on his back that he has requested I removed today as they are itchy and bothersome.     Current medicines (including changes today)  Current Outpatient Prescriptions   Medication Sig Dispense Refill   • clonazepam (KLONOPIN) 1 MG Tab TAKE 1 TABLET BY MOUTH 2 TIMES DAILY 60 Tab 5   • gabapentin (NEURONTIN) 300 MG Cap Take 300-600 mg by mouth 4 times a day. Pt takes:   300MG in AM  300MG in PM  600MG HS  4   • losartan (COZAAR) 100 MG Tab Take 100 mg by mouth every day. TAKE 1 TAB BY MOUTH EVERY DAY.  3   • triamterene-hctz (MAXZIDE-25/DYAZIDE) 37.5-25 MG Tab Take 1 Tab by mouth every day. TAKE 1 TAB BY MOUTH EVERY DAY.  3   • rosuvastatin (CRESTOR) 40 MG tablet Take 1 Tab by mouth every bedtime. 90 Tab 3     No current facility-administered medications for this visit.      He  has a past medical history of Anemia; Anxiety; Atrophy  "of right kidney; Cancer (CMS-HCC) (2016); Colon cancer (CMS-HCC); DM (diabetes mellitus) (CMS-HCC); Former tobacco use; Gout; Hyperlipidemia; Hypertension; Pneumonia (1991); Psychiatric problem; Sleep apnea; and Snoring.    ROS   See HPI. No chest pain, no shortness of breath, no abdominal pain.     Objective:     Physical Exam:  Blood pressure 126/62, pulse 82, temperature 37 °C (98.6 °F), resp. rate 16, height 1.753 m (5' 9\"), weight (!) 137.4 kg (303 lb), SpO2 94 %. Body mass index is 44.75 kg/m².   Constitutional: Alert, obese, no distress.  Skin: 3-4 healing skin biopsy sites on back. There are several steri-strips that are in the process of falling off. These were removed. Biopsy sites are well-approximated. No purulent drainage or bleeding. No erythema or warmth.  Eye: Equal, round and reactive, conjunctiva clear, lids normal.  ENMT: Lips without lesions, good dentition, oropharynx clear.  Neck: Trachea midline, no masses, no thyromegaly. No cervical or supraclavicular lymphadenopathy.  Respiratory: Unlabored respiratory effort, lungs clear to auscultation, no wheezes, no ronchi.  Cardiovascular: Normal S1, S2, no murmur, no edema.  Abdomen: Soft, non-tender, no masses, no hepatosplenomegaly.  Psych: Alert and oriented x3, normal affect and mood.    Assessment and Plan:     1. Malignant neoplasm of descending colon (CMS-HCC)  2. Pulmonary nodule  Pulmonary nodule has increased in size. He will be having a needle biopsy to determine further management. Following closely with oncology. Continue to monitor.    3. Neuropathy associated with cancer (CMS-HCC)  Chronic and stable. Continue gabapentin.    4. Skin cancer  Per patient report. Biopsy sites appear to be healing well. No evidence of infection on exam. We will request records from his dermatologist.    Followup: Return in about 4 months (around 4/6/2018) for f/u colon cancer, lung nodule, short.         PLEASE NOTE: This dictation was created using voice " recognition software. I have made every reasonable attempt to correct obvious errors, but I expect that there are errors of grammar and possibly content that I did not discover before finalizing the note.

## 2017-12-06 NOTE — ASSESSMENT & PLAN NOTE
Patient also mentions that he saw his dermatologist last week. He had three skin biopsies and was told that one of them was cancer. He is not sure what kind of cancer was. The entire lesion was removed. He is healing well. He has some Steri-Strips on his back that he has requested I removed today as they are itchy and bothersome.

## 2017-12-06 NOTE — ASSESSMENT & PLAN NOTE
No significant change. Patient continues to have numbness and tingling in his fingertips and toes. He is taking gabapentin 4x/day which is helping him. His podiatrist had mentioned trying a new medication, but Dr. Silverman would like him to hold off until they decide what to do about the lung nodule.

## 2017-12-06 NOTE — ASSESSMENT & PLAN NOTE
Lung nodule  Patient has been following closely with his oncologist, Dr. Silverman. His most recent CEA level was 5.6. He had a follow-up CT scan to reevaluate a pulmonary nodule. Unfortunately, it has increased in size from 7 mm to over 13 mm in diameter. He is worried that it may be a spread of his cancer. He will be having a needle biopsy later this month. He tells me that if it is cancer he may need to restart chemotherapy.

## 2017-12-12 ENCOUNTER — APPOINTMENT (OUTPATIENT)
Dept: ADMISSIONS | Facility: MEDICAL CENTER | Age: 67
End: 2017-12-12
Attending: SPECIALIST
Payer: MEDICARE

## 2017-12-12 DIAGNOSIS — Z01.812 PRE-PROCEDURAL LABORATORY EXAMINATION: ICD-10-CM

## 2017-12-12 LAB
INR PPP: 0.95 (ref 0.87–1.13)
PROTHROMBIN TIME: 12.4 SEC (ref 12–14.6)

## 2017-12-12 PROCEDURE — 36415 COLL VENOUS BLD VENIPUNCTURE: CPT

## 2017-12-12 PROCEDURE — 85610 PROTHROMBIN TIME: CPT

## 2017-12-12 RX ORDER — GABAPENTIN 300 MG/1
600 CAPSULE ORAL 2 TIMES DAILY
COMMUNITY
End: 2020-01-27

## 2017-12-14 ENCOUNTER — APPOINTMENT (OUTPATIENT)
Dept: RADIOLOGY | Facility: MEDICAL CENTER | Age: 67
End: 2017-12-14
Attending: SPECIALIST
Payer: MEDICARE

## 2017-12-14 ENCOUNTER — APPOINTMENT (OUTPATIENT)
Dept: RADIOLOGY | Facility: MEDICAL CENTER | Age: 67
End: 2017-12-14
Attending: RADIOLOGY
Payer: MEDICARE

## 2017-12-14 ENCOUNTER — HOSPITAL ENCOUNTER (OUTPATIENT)
Facility: MEDICAL CENTER | Age: 67
End: 2017-12-14
Attending: SPECIALIST | Admitting: SPECIALIST
Payer: MEDICARE

## 2017-12-14 VITALS
TEMPERATURE: 98 F | SYSTOLIC BLOOD PRESSURE: 138 MMHG | HEIGHT: 69 IN | HEART RATE: 57 BPM | WEIGHT: 303.79 LBS | OXYGEN SATURATION: 94 % | BODY MASS INDEX: 45 KG/M2 | DIASTOLIC BLOOD PRESSURE: 66 MMHG | RESPIRATION RATE: 16 BRPM

## 2017-12-14 DIAGNOSIS — R91.1 LUNG NODULE: ICD-10-CM

## 2017-12-14 LAB
INR PPP: 0.99 (ref 0.87–1.13)
PLATELET # BLD AUTO: 202 K/UL (ref 164–446)
PROTHROMBIN TIME: 12.8 SEC (ref 12–14.6)

## 2017-12-14 PROCEDURE — 99153 MOD SED SAME PHYS/QHP EA: CPT

## 2017-12-14 PROCEDURE — 88305 TISSUE EXAM BY PATHOLOGIST: CPT

## 2017-12-14 PROCEDURE — 160002 HCHG RECOVERY MINUTES (STAT)

## 2017-12-14 PROCEDURE — 88342 IMHCHEM/IMCYTCHM 1ST ANTB: CPT

## 2017-12-14 PROCEDURE — 71010 DX-CHEST-PORTABLE (1 VIEW): CPT

## 2017-12-14 PROCEDURE — 700101 HCHG RX REV CODE 250

## 2017-12-14 PROCEDURE — A9270 NON-COVERED ITEM OR SERVICE: HCPCS | Performed by: RADIOLOGY

## 2017-12-14 PROCEDURE — 85049 AUTOMATED PLATELET COUNT: CPT

## 2017-12-14 PROCEDURE — 700111 HCHG RX REV CODE 636 W/ 250 OVERRIDE (IP)

## 2017-12-14 PROCEDURE — 700102 HCHG RX REV CODE 250 W/ 637 OVERRIDE(OP): Performed by: RADIOLOGY

## 2017-12-14 PROCEDURE — 85610 PROTHROMBIN TIME: CPT

## 2017-12-14 PROCEDURE — 88341 IMHCHEM/IMCYTCHM EA ADD ANTB: CPT | Mod: 91

## 2017-12-14 RX ORDER — ACETAMINOPHEN 500 MG
1000 TABLET ORAL EVERY 6 HOURS
Status: DISCONTINUED | OUTPATIENT
Start: 2017-12-14 | End: 2017-12-14 | Stop reason: HOSPADM

## 2017-12-14 RX ORDER — SODIUM CHLORIDE 9 MG/ML
INJECTION, SOLUTION INTRAVENOUS
Status: DISCONTINUED | OUTPATIENT
Start: 2017-12-14 | End: 2017-12-14 | Stop reason: HOSPADM

## 2017-12-14 RX ORDER — ONDANSETRON 2 MG/ML
4 INJECTION INTRAMUSCULAR; INTRAVENOUS PRN
Status: ACTIVE | OUTPATIENT
Start: 2017-12-14 | End: 2017-12-14

## 2017-12-14 RX ORDER — SODIUM CHLORIDE 9 MG/ML
500 INJECTION, SOLUTION INTRAVENOUS
Status: ACTIVE | OUTPATIENT
Start: 2017-12-14 | End: 2017-12-14

## 2017-12-14 RX ORDER — BUPIVACAINE HYDROCHLORIDE 5 MG/ML
INJECTION, SOLUTION EPIDURAL; INTRACAUDAL
Status: COMPLETED
Start: 2017-12-14 | End: 2017-12-14

## 2017-12-14 RX ORDER — MIDAZOLAM HYDROCHLORIDE 1 MG/ML
.5-2 INJECTION INTRAMUSCULAR; INTRAVENOUS PRN
Status: ACTIVE | OUTPATIENT
Start: 2017-12-14 | End: 2017-12-14

## 2017-12-14 RX ORDER — MIDAZOLAM HYDROCHLORIDE 1 MG/ML
INJECTION INTRAMUSCULAR; INTRAVENOUS
Status: COMPLETED
Start: 2017-12-14 | End: 2017-12-14

## 2017-12-14 RX ADMIN — FENTANYL CITRATE 25 MCG: 50 INJECTION, SOLUTION INTRAMUSCULAR; INTRAVENOUS at 08:45

## 2017-12-14 RX ADMIN — MIDAZOLAM HYDROCHLORIDE 1 MG: 1 INJECTION INTRAMUSCULAR; INTRAVENOUS at 08:45

## 2017-12-14 RX ADMIN — MIDAZOLAM HYDROCHLORIDE 1 MG: 1 INJECTION INTRAMUSCULAR; INTRAVENOUS at 09:00

## 2017-12-14 RX ADMIN — BUPIVACAINE HYDROCHLORIDE: 5 INJECTION, SOLUTION EPIDURAL; INTRACAUDAL; PERINEURAL at 08:52

## 2017-12-14 RX ADMIN — MIDAZOLAM HYDROCHLORIDE 1 MG: 1 INJECTION INTRAMUSCULAR; INTRAVENOUS at 09:08

## 2017-12-14 RX ADMIN — MIDAZOLAM HYDROCHLORIDE 2 MG: 1 INJECTION INTRAMUSCULAR; INTRAVENOUS at 08:52

## 2017-12-14 RX ADMIN — MIDAZOLAM 1 MG: 1 INJECTION INTRAMUSCULAR; INTRAVENOUS at 08:45

## 2017-12-14 RX ADMIN — MIDAZOLAM HYDROCHLORIDE 1 MG: 1 INJECTION INTRAMUSCULAR; INTRAVENOUS at 08:54

## 2017-12-14 ASSESSMENT — PAIN SCALES - GENERAL
PAINLEVEL_OUTOF10: 0
PAINLEVEL_OUTOF10: 0

## 2017-12-14 NOTE — OR SURGEON
Immediate Post- Operative Note    PostOp Diagnosis: TONIO LUNG NODULE      Procedure(s): CT GUIDED LEFT LUNG BIOPSY      Estimated Blood Loss: <5CC        Complications: MOD PARENCHYMAL HEMORRHAGE ADJACENT TO BX SITE. NO HEMOPTYSIS          12/14/2017     9:16 AM     Chavez Cruz

## 2017-12-14 NOTE — DISCHARGE INSTRUCTIONS
ACTIVITY: Rest and take it easy for the first 24 hours.  A responsible adult is recommended to remain with you during that time.  It is normal to feel sleepy.  We encourage you to not do anything that requires balance, judgment or coordination.    MILD FLU-LIKE SYMPTOMS ARE NORMAL. YOU MAY EXPERIENCE GENERALIZED MUSCLE ACHES, THROAT IRRITATION, HEADACHE AND/OR SOME NAUSEA.    FOR 24 HOURS DO NOT:  Drive, operate machinery or run household appliances.  Drink beer or alcoholic beverages.   Make important decisions or sign legal documents.    SPECIAL INSTRUCTIONS:     Needle Biopsy of Lung, Care After  Refer to this sheet in the next few weeks. These instructions provide you with information on caring for yourself after your procedure. Your health care provider may also give you more specific instructions. Your treatment has been planned according to current medical practices, but problems sometimes occur. Call your health care provider if you have any problems or questions after your procedure.  WHAT TO EXPECT AFTER THE PROCEDURE  · A bandage will be applied over the area where the needle was inserted. You may be asked to apply pressure to the bandage for several minutes to ensure there is minimal bleeding.  · In most cases, you can leave when your needle biopsy procedure is completed. Do not drive yourself home. Someone else should take you home.  · If you received an IV sedative or general anesthetic, you will be taken to a comfortable place to relax while the medicine wears off.  · If you have upcoming travel scheduled, talk to your health care provider about when it is safe to travel by air after the procedure.  HOME CARE INSTRUCTIONS  · Expect to take it easy for the rest of the day.  · Protect the area where you received the needle biopsy by keeping the bandage in place for as long as instructed.  · You may feel some mild pain or discomfort in the area, but this should stop in a day or two.  · Take medicines  only as directed by your health care provider.  SEEK MEDICAL CARE IF:   · You have pain at the biopsy site that worsens or is not helped by medicine.  · You have swelling or drainage at the needle biopsy site.  · You have a fever.  SEEK IMMEDIATE MEDICAL CARE IF:   · You have new or worsening shortness of breath.  · You have chest pain.  · You are coughing up blood.  · You have bleeding that does not stop with pressure or a bandage.  · You develop light-headedness or fainting.     This information is not intended to replace advice given to you by your health care provider. Make sure you discuss any questions you have with your health care provider.     Document Released: 10/14/2008 Document Revised: 01/08/2016 Document Reviewed: 05/12/2014  [employer] Interactive Patient Education ©2016 [employer]      DIET: To avoid nausea, slowly advance diet as tolerated, avoiding spicy or greasy foods for the first day.  Add more substantial food to your diet according to your physician's instructions.  Babies can be fed formula or breast milk as soon as they are hungry.  INCREASE FLUIDS AND FIBER TO AVOID CONSTIPATION.    SURGICAL DRESSING/BATHING: May remove dressing in 24 hours. May shower in 24 hours. Do not submerge in water for 7 days.    FOLLOW-UP APPOINTMENT:  A follow-up appointment should be arranged with your doctor; call to schedule.    You should CALL YOUR PHYSICIAN if you develop:  Fever greater than 101 degrees F.  Pain not relieved by medication, or persistent nausea or vomiting.  Excessive bleeding (blood soaking through dressing) or unexpected drainage from the wound.  Extreme redness or swelling around the incision site, drainage of pus or foul smelling drainage.  Inability to urinate or empty your bladder within 8 hours.  Problems with breathing or chest pain.    You should call 911 if you develop problems with breathing or chest pain.  If you are unable to contact your doctor or surgical center, you should  go to the nearest emergency room or urgent care center.  Physician's telephone #: Dr. Silverman [phone]    If any questions arise, call your doctor.  If your doctor is not available, please feel free to call the Surgical Center at [phone].  The Center is open Monday through Friday from 7AM to 7PM.  You can also call the HEALTH HOTLINE open 24 hours/day, 7 days/week and speak to a nurse at [phone], or toll free at [phone].    A registered nurse may call you a few days after your surgery to see how you are doing after your procedure.    MEDICATIONS: Resume taking daily medication.  Take prescribed pain medication with food.  If no medication is prescribed, you may take non-aspirin pain medication if needed.  PAIN MEDICATION CAN BE VERY CONSTIPATING.  Take a stool softener or laxative such as senokot, pericolace, or milk of magnesia if needed.        If your physician has prescribed pain medication that includes Acetaminophen (Tylenol), do not take additional Acetaminophen (Tylenol) while taking the prescribed medication.    Depression / Suicide Risk    As you are discharged from this [facility] facility, it is important to learn how to keep safe from harming yourself.    Recognize the warning signs:  · Abrupt changes in personality, positive or negative- including increase in energy   · Giving away possessions  · Change in eating patterns- significant weight changes-  positive or negative  · Change in sleeping patterns- unable to sleep or sleeping all the time   · Unwillingness or inability to communicate  · Depression  · Unusual sadness, discouragement and loneliness  · Talk of wanting to die  · Neglect of personal appearance   · Rebelliousness- reckless behavior  · Withdrawal from people/activities they love  · Confusion- inability to concentrate     If you or a loved one observes any of these behaviors or has concerns about self-harm, here's what you can do:  · Talk about it- your feelings  and reasons for harming yourself  · Remove any means that you might use to hurt yourself (examples: pills, rope, extension cords, firearm)  · Get professional help from the community (Mental Health, Substance Abuse, psychological counseling)  · Do not be alone:Call your Safe Contact- someone whom you trust who will be there for you.  · Call your local CRISIS HOTLINE 570-2350 or 714-233-1571  · Call your local Children's Mobile Crisis Response Team [place] (557) 220-8141 or www.[employer]  · Call the toll free National Suicide Prevention Hotlines   · National Suicide Prevention Lifeline 113-467-XUWB (8855)  · National Hope Line Network 800-SUICIDE (717-5792)

## 2017-12-14 NOTE — PROGRESS NOTES
CT Procedure RN's Note:    CT guided LEFT upper lobe lung biopsy done by Dr. Cruz; anterior left upper chest access site; x6 cores obtained and given to pathology; pt tolerated the procedure well; pt calm and awake throughout the procedure; no signs of distress throughout the procedure and post procedure; ETCO2 monitored throughout the procedure and ranged between 27-39 mmHg; report given to Ward; access site CDI, soft and no signs of hematoma, gauze and tegaderm for dressing; post procedure CT image did not show any signs of pneumothorax at this time; pt has a little cough with some blood, MD aware; pt meets criteria for transport; pt transported back to [facility] by this RN; no family in waiting room.

## 2017-12-19 ENCOUNTER — APPOINTMENT (OUTPATIENT)
Dept: RADIOLOGY | Facility: MEDICAL CENTER | Age: 67
End: 2017-12-19
Attending: EMERGENCY MEDICINE
Payer: MEDICARE

## 2017-12-19 ENCOUNTER — HOSPITAL ENCOUNTER (EMERGENCY)
Facility: MEDICAL CENTER | Age: 67
End: 2017-12-19
Attending: EMERGENCY MEDICINE
Payer: MEDICARE

## 2017-12-19 ENCOUNTER — PATIENT OUTREACH (OUTPATIENT)
Dept: HEALTH INFORMATION MANAGEMENT | Facility: OTHER | Age: 67
End: 2017-12-19

## 2017-12-19 VITALS
HEART RATE: 71 BPM | WEIGHT: 302.03 LBS | DIASTOLIC BLOOD PRESSURE: 64 MMHG | RESPIRATION RATE: 16 BRPM | OXYGEN SATURATION: 95 % | HEIGHT: 70 IN | TEMPERATURE: 98.2 F | BODY MASS INDEX: 43.24 KG/M2 | SYSTOLIC BLOOD PRESSURE: 112 MMHG

## 2017-12-19 DIAGNOSIS — J95.811 POSTPROCEDURAL PNEUMOTHORAX: ICD-10-CM

## 2017-12-19 PROCEDURE — 71020 DX-CHEST-2 VIEWS: CPT | Performed by: EMERGENCY MEDICINE

## 2017-12-19 PROCEDURE — 304561 HCHG STAT O2

## 2017-12-19 PROCEDURE — 71020 DX-CHEST-2 VIEWS: CPT

## 2017-12-19 PROCEDURE — 99283 EMERGENCY DEPT VISIT LOW MDM: CPT | Mod: 25

## 2017-12-19 ASSESSMENT — PAIN SCALES - GENERAL: PAINLEVEL_OUTOF10: 3

## 2017-12-19 NOTE — PROGRESS NOTES
12/19/17 at 9:02 AM--Received phone call from pt s/p hospital discharge 12/14/17.  Pt states he had a lung bx on 12/14/17.  Pt states he has a small blister around site of bx, that just burst and he states the skin is reddened.  Pt also states that his left chest port was used at time of bx and now he is experiencing some pain behind his left shoulder blade.  Pt feels this may be related to his port.  Instructed pt to call his oncologist Dr. Silverman and address these issues for any further direction.  Pt verbalizes understanding and states that he will call Dr. Silverman's office.

## 2017-12-20 ENCOUNTER — PATIENT OUTREACH (OUTPATIENT)
Dept: HEALTH INFORMATION MANAGEMENT | Facility: OTHER | Age: 67
End: 2017-12-20

## 2017-12-20 ENCOUNTER — APPOINTMENT (OUTPATIENT)
Dept: RADIOLOGY | Facility: MEDICAL CENTER | Age: 67
End: 2017-12-20
Attending: SPECIALIST
Payer: MEDICARE

## 2017-12-20 DIAGNOSIS — C18.2 MALIGNANT NEOPLASM OF ASCENDING COLON (HCC): ICD-10-CM

## 2017-12-20 DIAGNOSIS — G62.0 POLYNEUROPATHY DUE TO DRUG (HCC): ICD-10-CM

## 2017-12-20 DIAGNOSIS — R19.7 DIARRHEA, UNSPECIFIED TYPE: ICD-10-CM

## 2017-12-20 DIAGNOSIS — Z51.11 ENCOUNTER FOR ANTINEOPLASTIC CHEMOTHERAPY: ICD-10-CM

## 2017-12-20 DIAGNOSIS — E86.0 DEHYDRATION: ICD-10-CM

## 2017-12-20 PROCEDURE — 71020 DX-CHEST-2 VIEWS: CPT

## 2017-12-20 NOTE — ED NOTES
Released with all follow-up on when to return, paperwork given and this pt will see his PMD tomorrow as planned.

## 2017-12-20 NOTE — ED PROVIDER NOTES
CHIEF COMPLAINT  Chief Complaint   Patient presents with   • Back Pain     left side, upper back, pt had left lung biopsy last Thursday and developed pain on Sunday. Pt denies SOB.        HPI  Gerald Oshea is a 67 y.o. male who presents for evaluation of left back pain.Patient is postop day 5 from a left upper lobe CT-guided biopsy. Repeat chest x-rays at time intervals after the initial procedure were read as negative. Patient called his oncologist today who urged him to come to the emergency department for repeat x-ray as it was possible he might have a pneumothorax. Patient denies any shortness of breath, anterior chest pain, hemoptysis, or fevers.     REVIEW OF SYSTEMS  Constitutional: No fevers, weakness, weight loss   Skin: No rashes, abrasions, lacerations, or pruritus  HEENT: No diplopia or blurred vision, no eye pain, no discharge. No ear pain, ringing in ears, or decreased hearing. No sore throat, runny nose, sores, trouble swallowing, trouble speaking.  Neck: No neck pain, stiffness, or masses.  Chest: No pain or rashes  Pulm: No shortness of breath, cough, wheezing, stridor, or pain with inspiration/expiration  Gastrointestinal: No nausea, vomiting, diarrhea, constipation, bloating, melena, hematochezia or pain.  Genitourinary: No pain, urgency, frequency, dysuria, hematuria, or polyuria.   Musculoskeletal: No recent trauma, pain, swelling, weakness  Neurologic: No sensory or motor changes. No confusion or disorientation.  Heme: No bleeding or bruising problems. No petechiae or purpura  Immuno: No hx of recurrent infections      PAST MEDICAL HISTORY   has a past medical history of Anemia; Anxiety; Atrophy of right kidney; Cancer (CMS-[facility]) (2016); Colon cancer (CMS-HCC); DM (diabetes mellitus) (CMS-HCC); Former tobacco use; Gout; High cholesterol (12/12/2017); Hyperlipidemia; Hypertension (12/12/2017); Neuropathy (CMS-[facility]) (12/12/2017); Pain (12/12/2017); Pneumonia (1991); Psychiatric problem;  "Sleep apnea (12/12/2017); and Snoring.    SOCIAL HISTORY  Social History     Social History Main Topics   • Smoking status: Former Smoker     Packs/day: 1.00     Years: 20.00     Types: Cigarettes     Quit date: 1/1/1998   • Smokeless tobacco: Never Used   • Alcohol use 0.0 oz/week      Comment: \"Two per day\"   • Drug use: No   • Sexual activity: Not Currently       SURGICAL HISTORY   has a past surgical history that includes cath placement (Left, 10/7/2016); closed reduction (10/25/2012); and colon resection (2016).    CURRENT MEDICATIONS  Home Medications    **Home medications have not yet been reviewed for this encounter**         ALLERGIES  Allergies   Allergen Reactions   • Ketamine Unspecified     \"I hallucinate\".     • Lidocaine Rash     IBH=0411  Pt states \"Rash all over my body\".     • Morphine      \"Can not have too much, my oxygen goes down\"       PHYSICAL EXAM  VITAL SIGNS: /72   Pulse 81   Temp 36.7 °C (98 °F)   Resp 18   Ht 1.778 m (5' 10\")   Wt (!) 137 kg (302 lb 0.5 oz)   SpO2 95%   BMI 43.34 kg/m²    Pulse ox interpretation: I interpret this pulse ox as normal.  Gen: Alert in no apparent distress.  HEENT: No signs of trauma, Bilateral external ears normal, Nose normal. Conjunctiva normal, Non-icteric.   Neck:  No tenderness, Supple, No masses  Lymphatic: No cervical lymphadenopathy noted.   Cardiovascular: Regular rate and rhythm, no murmurs.   Thorax & Lungs: Normal breath sounds, No respiratory distress, No wheezing bilateral chest rise  Abdomen: Bowel sounds normal, Soft, No tenderness, No masses, No pulsatile masses. No Guarding or rebound  Skin: Warm, Dry, No erythema, No rash.   Back: No bony tenderness, No CVA tenderness.   Extremities: Intact distal pulses, No edema, No tenderness, range of motion grossly normal all ext noted.   Neurologic: Alert , no facial droop, grossly normal coordination and strength  Psychiatric: Affect normal, Judgment normal, Mood normal. " "      DIAGNOSTIC STUDIES / PROCEDURES  Two-view chest x-ray notable for small left apical pneumothorax  EKG      LABS  Results for orders placed or performed during the hospital encounter of 12/14/17   PROTHROMBIN TIME   Result Value Ref Range    PT 12.8 12.0 - 14.6 sec    INR 0.99 0.87 - 1.13   PLATELET COUNT   Result Value Ref Range    Platelet Count 202 164 - 446 K/uL       RADIOLOGY  DX-CHEST-2 VIEWS   Final Result   Addendum 1 of 1   These findings were discussed with Dr Kaiser Aviles on 12/19/2017 6:37 PM.      Final      1.  Small LEFT apical pneumothorax.   2.  No pneumonia or significant pleural fluid.        Reevaluation   Time:7:18 PM  Vital signs: /72   Pulse 81   Temp 36.7 °C (98 °F)   Resp 18   Ht 1.778 m (5' 10\")   Wt (!) 137 kg (302 lb 0.5 oz)   SpO2 95%   BMI 43.34 kg/m²   Assessment:Discussed case with surgeon, Dr Ganser, who agreed patient was safe for discharge given the timeframe and symptomology. The patient is not hypoxic on room air and is noted to have no shortness of breath or chest pain on room air on my evaluation.    COURSE & MEDICAL DECISION MAKING  Pertinent Labs & Imaging studies reviewed. (See chart for details)  Patient has findings suggestive of an apical pneumothorax on his left lung which is most likely postprocedural from his lung biopsy on the 14th. Patient had 2 post procedure chest x-rays both of which were read as negative. In retrospect, there does appear to be a pleural lining in roughly the same area as the one seen today. It is possible it has improved since then too. Notably, the patient wears a CPAP at night which may be contributing to the slow resolution. Patient is not in any distress but was noted by triage staff to be \"hypoxic\" at 87% on arrival. I removed the patient's oxygen during my exam and watch for significant deterioration. The patient averaged around 93% on room air throughout his stay, he did not appear distressed and stated he had no " shortness of breath. I suspect this is his baseline and I did not feel he required further supplemental oxygen at home or inpatient admission for treatment of his pneumothorax. I discussed the case with the surgeon on call as well as the patient's oncology office. Everyone was in agreement that the patient was safe to go home given the timeframe and symptoms. The patient is noted to have a follow-up with his oncologist tomorrow and can be scheduled for outpatient chest x-rays to ensure resolution. The patient stated clear understanding of return instructions and was discharged in stable condition    The patient will not drink alcohol nor drive with prescribed medications. The patient will return for worsening symptoms and is stable at the time of discharge. The patient verbalizes understanding and will comply.    FINAL IMPRESSION  1. Left apical pneumothorax, postprocedural  2.   3.         Electronically signed by: Saul Aviles, 12/19/2017 6:29 PM

## 2017-12-20 NOTE — DISCHARGE INSTRUCTIONS
Pneumothorax  You have a pneumothorax. This means that you have a partial collapse of one of your lungs.  This condition may occur spontaneously, or may develop from a chest injury. People with a spontaneous pneumothorax can have a problem with repeated episodes. A catheter (chest tube) may be inserted between your ribs. This slowly removes the air from around the lung over a few days. Hospital care will likely be needed if you have a chest tube. Hospital care may be needed if your condition worsens, if the lung does not expand fully, or if you have other significant injuries like fractured ribs.  Your condition does not appear to require hospital care at this time. Rest as much as possible and avoid any strenuous activity until the lung is normal and your doctor approves. Do not smoke. Call your doctor or go to the emergency room at once if you develop increased chest pain, more shortness of breath, pain on both sides of the chest, or a fever.  Document Released: 01/25/2006 Document Revised: 03/11/2013 Document Reviewed: 02/15/2010  [employer]® Patient Information ©2013 [employer], [employer].

## 2017-12-20 NOTE — ED NOTES
"Chief Complaint   Patient presents with   • Back Pain     left side, upper back, pt had left lung biopsy last Thursday and developed pain on Sunday. Pt denies SOB.      /72   Pulse 81   Temp 36.7 °C (98 °F)   Resp 18   Ht 1.778 m (5' 10\")   Wt (!) 137 kg (302 lb 0.5 oz)   SpO2 95%   BMI 43.34 kg/m²     "

## 2018-02-01 DIAGNOSIS — Z01.812 PRE-OPERATIVE LABORATORY EXAMINATION: ICD-10-CM

## 2018-02-01 DIAGNOSIS — Z01.810 PRE-OPERATIVE CARDIOVASCULAR EXAMINATION: ICD-10-CM

## 2018-02-01 LAB
ABO GROUP BLD: NORMAL
ALBUMIN SERPL BCP-MCNC: 4.8 G/DL (ref 3.2–4.9)
ALBUMIN/GLOB SERPL: 2.3 G/DL
ALP SERPL-CCNC: 73 U/L (ref 30–99)
ALT SERPL-CCNC: 25 U/L (ref 2–50)
ANION GAP SERPL CALC-SCNC: 8 MMOL/L (ref 0–11.9)
AST SERPL-CCNC: 20 U/L (ref 12–45)
BASOPHILS # BLD AUTO: 1 % (ref 0–1.8)
BASOPHILS # BLD: 0.07 K/UL (ref 0–0.12)
BILIRUB SERPL-MCNC: 1 MG/DL (ref 0.1–1.5)
BLD GP AB SCN SERPL QL: NORMAL
BUN SERPL-MCNC: 24 MG/DL (ref 8–22)
CALCIUM SERPL-MCNC: 9.8 MG/DL (ref 8.5–10.5)
CHLORIDE SERPL-SCNC: 98 MMOL/L (ref 96–112)
CO2 SERPL-SCNC: 29 MMOL/L (ref 20–33)
CREAT SERPL-MCNC: 1.26 MG/DL (ref 0.5–1.4)
EKG IMPRESSION: NORMAL
EOSINOPHIL # BLD AUTO: 0.29 K/UL (ref 0–0.51)
EOSINOPHIL NFR BLD: 4.3 % (ref 0–6.9)
ERYTHROCYTE [DISTWIDTH] IN BLOOD BY AUTOMATED COUNT: 46.9 FL (ref 35.9–50)
GLOBULIN SER CALC-MCNC: 2.1 G/DL (ref 1.9–3.5)
GLUCOSE SERPL-MCNC: 118 MG/DL (ref 65–99)
HCT VFR BLD AUTO: 46.3 % (ref 42–52)
HGB BLD-MCNC: 15.6 G/DL (ref 14–18)
IMM GRANULOCYTES # BLD AUTO: 0.02 K/UL (ref 0–0.11)
IMM GRANULOCYTES NFR BLD AUTO: 0.3 % (ref 0–0.9)
LYMPHOCYTES # BLD AUTO: 1.42 K/UL (ref 1–4.8)
LYMPHOCYTES NFR BLD: 21.1 % (ref 22–41)
MCH RBC QN AUTO: 30.3 PG (ref 27–33)
MCHC RBC AUTO-ENTMCNC: 33.7 G/DL (ref 33.7–35.3)
MCV RBC AUTO: 89.9 FL (ref 81.4–97.8)
MONOCYTES # BLD AUTO: 0.74 K/UL (ref 0–0.85)
MONOCYTES NFR BLD AUTO: 11 % (ref 0–13.4)
NEUTROPHILS # BLD AUTO: 4.18 K/UL (ref 1.82–7.42)
NEUTROPHILS NFR BLD: 62.3 % (ref 44–72)
NRBC # BLD AUTO: 0 K/UL
NRBC BLD-RTO: 0 /100 WBC
PLATELET # BLD AUTO: 254 K/UL (ref 164–446)
PMV BLD AUTO: 10.4 FL (ref 9–12.9)
POTASSIUM SERPL-SCNC: 4.3 MMOL/L (ref 3.6–5.5)
PROT SERPL-MCNC: 6.9 G/DL (ref 6–8.2)
RBC # BLD AUTO: 5.15 M/UL (ref 4.7–6.1)
RH BLD: NORMAL
SODIUM SERPL-SCNC: 135 MMOL/L (ref 135–145)
WBC # BLD AUTO: 6.7 K/UL (ref 4.8–10.8)

## 2018-02-01 PROCEDURE — 86901 BLOOD TYPING SEROLOGIC RH(D): CPT

## 2018-02-01 PROCEDURE — 36415 COLL VENOUS BLD VENIPUNCTURE: CPT

## 2018-02-01 PROCEDURE — 86900 BLOOD TYPING SEROLOGIC ABO: CPT

## 2018-02-01 PROCEDURE — 86850 RBC ANTIBODY SCREEN: CPT

## 2018-02-01 PROCEDURE — 93010 ELECTROCARDIOGRAM REPORT: CPT | Performed by: INTERNAL MEDICINE

## 2018-02-01 PROCEDURE — 80053 COMPREHEN METABOLIC PANEL: CPT

## 2018-02-01 PROCEDURE — 85025 COMPLETE CBC W/AUTO DIFF WBC: CPT

## 2018-02-01 PROCEDURE — 93005 ELECTROCARDIOGRAM TRACING: CPT

## 2018-02-05 ENCOUNTER — APPOINTMENT (OUTPATIENT)
Dept: RADIOLOGY | Facility: MEDICAL CENTER | Age: 68
DRG: 167 | End: 2018-02-05
Attending: PHYSICIAN ASSISTANT
Payer: MEDICARE

## 2018-02-05 ENCOUNTER — HOSPITAL ENCOUNTER (INPATIENT)
Facility: MEDICAL CENTER | Age: 68
LOS: 2 days | DRG: 167 | End: 2018-02-07
Attending: SURGERY | Admitting: SURGERY
Payer: MEDICARE

## 2018-02-05 DIAGNOSIS — G89.18 POSTOPERATIVE PAIN: ICD-10-CM

## 2018-02-05 DIAGNOSIS — C18.6 MALIGNANT NEOPLASM OF DESCENDING COLON (HCC): ICD-10-CM

## 2018-02-05 DIAGNOSIS — R91.1 LUNG NODULE, SOLITARY: ICD-10-CM

## 2018-02-05 LAB
ABO GROUP BLD: NORMAL
RH BLD: NORMAL

## 2018-02-05 PROCEDURE — 160029 HCHG SURGERY MINUTES - 1ST 30 MINS LEVEL 4: Performed by: SURGERY

## 2018-02-05 PROCEDURE — 500378 HCHG DRAIN, J-VAC ROUND 19FR: Performed by: SURGERY

## 2018-02-05 PROCEDURE — 88307 TISSUE EXAM BY PATHOLOGIST: CPT

## 2018-02-05 PROCEDURE — 700102 HCHG RX REV CODE 250 W/ 637 OVERRIDE(OP)

## 2018-02-05 PROCEDURE — 501838 HCHG SUTURE GENERAL: Performed by: SURGERY

## 2018-02-05 PROCEDURE — A9270 NON-COVERED ITEM OR SERVICE: HCPCS | Performed by: PHYSICIAN ASSISTANT

## 2018-02-05 PROCEDURE — 501463 HCHG STAPLES, UNIV. ROTIC: Performed by: SURGERY

## 2018-02-05 PROCEDURE — 160002 HCHG RECOVERY MINUTES (STAT): Performed by: SURGERY

## 2018-02-05 PROCEDURE — 160009 HCHG ANES TIME/MIN: Performed by: SURGERY

## 2018-02-05 PROCEDURE — A9270 NON-COVERED ITEM OR SERVICE: HCPCS

## 2018-02-05 PROCEDURE — 501583 HCHG TROCAR, THRD CAN&SEAL 5X100: Performed by: SURGERY

## 2018-02-05 PROCEDURE — 71045 X-RAY EXAM CHEST 1 VIEW: CPT

## 2018-02-05 PROCEDURE — 770006 HCHG ROOM/CARE - MED/SURG/GYN SEMI*

## 2018-02-05 PROCEDURE — 160035 HCHG PACU - 1ST 60 MINS PHASE I: Performed by: SURGERY

## 2018-02-05 PROCEDURE — 501581 HCHG TROCAR: Performed by: SURGERY

## 2018-02-05 PROCEDURE — 700101 HCHG RX REV CODE 250: Mod: JW

## 2018-02-05 PROCEDURE — A6402 STERILE GAUZE <= 16 SQ IN: HCPCS | Performed by: SURGERY

## 2018-02-05 PROCEDURE — 88342 IMHCHEM/IMCYTCHM 1ST ANTB: CPT

## 2018-02-05 PROCEDURE — 160036 HCHG PACU - EA ADDL 30 MINS PHASE I: Performed by: SURGERY

## 2018-02-05 PROCEDURE — 500385 HCHG DRAIN, PLEUROVAC ADUL: Performed by: SURGERY

## 2018-02-05 PROCEDURE — 0BBG4ZX EXCISION OF LEFT UPPER LUNG LOBE, PERCUTANEOUS ENDOSCOPIC APPROACH, DIAGNOSTIC: ICD-10-PCS | Performed by: SURGERY

## 2018-02-05 PROCEDURE — 160048 HCHG OR STATISTICAL LEVEL 1-5: Performed by: SURGERY

## 2018-02-05 PROCEDURE — 501571 HCHG TROCAR, SEPARATOR 12X100: Performed by: SURGERY

## 2018-02-05 PROCEDURE — 700102 HCHG RX REV CODE 250 W/ 637 OVERRIDE(OP): Performed by: PHYSICIAN ASSISTANT

## 2018-02-05 PROCEDURE — 700111 HCHG RX REV CODE 636 W/ 250 OVERRIDE (IP)

## 2018-02-05 PROCEDURE — 700105 HCHG RX REV CODE 258: Performed by: SURGERY

## 2018-02-05 PROCEDURE — 160041 HCHG SURGERY MINUTES - EA ADDL 1 MIN LEVEL 4: Performed by: SURGERY

## 2018-02-05 PROCEDURE — 500312 HCHG CONNECTOR, BLAKE DRAIN 1-WAY: Performed by: SURGERY

## 2018-02-05 PROCEDURE — 88341 IMHCHEM/IMCYTCHM EA ADD ANTB: CPT | Mod: 91

## 2018-02-05 PROCEDURE — A6404 STERILE GAUZE > 48 SQ IN: HCPCS | Performed by: SURGERY

## 2018-02-05 PROCEDURE — 501570 HCHG TROCAR, SEPARATOR: Performed by: SURGERY

## 2018-02-05 PROCEDURE — 502571 HCHG PACK, LAP CHOLE: Performed by: SURGERY

## 2018-02-05 RX ORDER — HYDROCODONE BITARTRATE AND ACETAMINOPHEN 5; 325 MG/1; MG/1
1-2 TABLET ORAL EVERY 4 HOURS PRN
Status: DISCONTINUED | OUTPATIENT
Start: 2018-02-05 | End: 2018-02-07 | Stop reason: HOSPADM

## 2018-02-05 RX ORDER — ONDANSETRON 2 MG/ML
4 INJECTION INTRAMUSCULAR; INTRAVENOUS EVERY 4 HOURS PRN
Status: DISCONTINUED | OUTPATIENT
Start: 2018-02-05 | End: 2018-02-07 | Stop reason: HOSPADM

## 2018-02-05 RX ORDER — FAMOTIDINE 20 MG/1
20 TABLET, FILM COATED ORAL 2 TIMES DAILY
Status: DISCONTINUED | OUTPATIENT
Start: 2018-02-05 | End: 2018-02-07 | Stop reason: HOSPADM

## 2018-02-05 RX ORDER — GABAPENTIN 300 MG/1
600 CAPSULE ORAL 2 TIMES DAILY
Status: DISCONTINUED | OUTPATIENT
Start: 2018-02-05 | End: 2018-02-07 | Stop reason: HOSPADM

## 2018-02-05 RX ORDER — PROMETHAZINE HYDROCHLORIDE 25 MG/1
25 SUPPOSITORY RECTAL EVERY 6 HOURS PRN
Status: DISCONTINUED | OUTPATIENT
Start: 2018-02-05 | End: 2018-02-07 | Stop reason: HOSPADM

## 2018-02-05 RX ORDER — ONDANSETRON 4 MG/1
4 TABLET, ORALLY DISINTEGRATING ORAL EVERY 4 HOURS PRN
Status: DISCONTINUED | OUTPATIENT
Start: 2018-02-05 | End: 2018-02-07 | Stop reason: HOSPADM

## 2018-02-05 RX ORDER — HYDROMORPHONE HYDROCHLORIDE 2 MG/ML
.2-1 INJECTION, SOLUTION INTRAMUSCULAR; INTRAVENOUS; SUBCUTANEOUS
Status: DISCONTINUED | OUTPATIENT
Start: 2018-02-05 | End: 2018-02-07 | Stop reason: HOSPADM

## 2018-02-05 RX ORDER — ENALAPRILAT 1.25 MG/ML
2.5 INJECTION INTRAVENOUS EVERY 6 HOURS PRN
Status: DISCONTINUED | OUTPATIENT
Start: 2018-02-05 | End: 2018-02-07 | Stop reason: HOSPADM

## 2018-02-05 RX ORDER — SODIUM CHLORIDE 9 MG/ML
1000 INJECTION, SOLUTION INTRAVENOUS
Status: COMPLETED | OUTPATIENT
Start: 2018-02-05 | End: 2018-02-05

## 2018-02-05 RX ORDER — LIDOCAINE HYDROCHLORIDE 10 MG/ML
0.5 INJECTION, SOLUTION INFILTRATION; PERINEURAL
Status: ACTIVE | OUTPATIENT
Start: 2018-02-05 | End: 2018-02-06

## 2018-02-05 RX ORDER — HYDRALAZINE HYDROCHLORIDE 20 MG/ML
10 INJECTION INTRAMUSCULAR; INTRAVENOUS EVERY 6 HOURS PRN
Status: DISCONTINUED | OUTPATIENT
Start: 2018-02-05 | End: 2018-02-07 | Stop reason: HOSPADM

## 2018-02-05 RX ORDER — ROSUVASTATIN CALCIUM 20 MG/1
40 TABLET, COATED ORAL
Status: DISCONTINUED | OUTPATIENT
Start: 2018-02-05 | End: 2018-02-07 | Stop reason: HOSPADM

## 2018-02-05 RX ORDER — OXYCODONE HCL 5 MG/5 ML
SOLUTION, ORAL ORAL
Status: COMPLETED
Start: 2018-02-05 | End: 2018-02-05

## 2018-02-05 RX ORDER — MEPERIDINE HYDROCHLORIDE 50 MG/ML
INJECTION INTRAMUSCULAR; INTRAVENOUS; SUBCUTANEOUS
Status: COMPLETED
Start: 2018-02-05 | End: 2018-02-05

## 2018-02-05 RX ORDER — LOSARTAN POTASSIUM 50 MG/1
100 TABLET ORAL DAILY
Status: DISCONTINUED | OUTPATIENT
Start: 2018-02-05 | End: 2018-02-07 | Stop reason: HOSPADM

## 2018-02-05 RX ORDER — LORAZEPAM 2 MG/ML
.5-1 INJECTION INTRAMUSCULAR EVERY 4 HOURS PRN
Status: DISCONTINUED | OUTPATIENT
Start: 2018-02-05 | End: 2018-02-07 | Stop reason: HOSPADM

## 2018-02-05 RX ORDER — SODIUM CHLORIDE 9 MG/ML
1000 INJECTION, SOLUTION INTRAVENOUS ONCE
Status: COMPLETED | OUTPATIENT
Start: 2018-02-05 | End: 2018-02-05

## 2018-02-05 RX ORDER — LIDOCAINE AND PRILOCAINE 25; 25 MG/G; MG/G
1 CREAM TOPICAL
Status: ACTIVE | OUTPATIENT
Start: 2018-02-05 | End: 2018-02-06

## 2018-02-05 RX ADMIN — ROSUVASTATIN CALCIUM 40 MG: 20 TABLET, FILM COATED ORAL at 19:55

## 2018-02-05 RX ADMIN — FAMOTIDINE 20 MG: 20 TABLET, FILM COATED ORAL at 19:54

## 2018-02-05 RX ADMIN — SODIUM CHLORIDE 1000 ML: 9 INJECTION, SOLUTION INTRAVENOUS at 10:00

## 2018-02-05 RX ADMIN — GABAPENTIN 600 MG: 300 CAPSULE ORAL at 19:55

## 2018-02-05 RX ADMIN — OXYCODONE HYDROCHLORIDE 5 MG: 5 SOLUTION ORAL at 13:15

## 2018-02-05 RX ADMIN — HYDROCODONE BITARTRATE AND ACETAMINOPHEN 2 TABLET: 5; 325 TABLET ORAL at 17:06

## 2018-02-05 RX ADMIN — MEPERIDINE HYDROCHLORIDE 25 MG: 50 INJECTION INTRAMUSCULAR; INTRAVENOUS; SUBCUTANEOUS at 13:15

## 2018-02-05 RX ADMIN — SODIUM CHLORIDE 1000 ML: 9 INJECTION, SOLUTION INTRAVENOUS at 16:00

## 2018-02-05 ASSESSMENT — PATIENT HEALTH QUESTIONNAIRE - PHQ9
SUM OF ALL RESPONSES TO PHQ9 QUESTIONS 1 AND 2: 0
2. FEELING DOWN, DEPRESSED, IRRITABLE, OR HOPELESS: NOT AT ALL
SUM OF ALL RESPONSES TO PHQ QUESTIONS 1-9: 0
1. LITTLE INTEREST OR PLEASURE IN DOING THINGS: NOT AT ALL

## 2018-02-05 ASSESSMENT — LIFESTYLE VARIABLES
DO YOU DRINK ALCOHOL: YES
TOTAL SCORE: 0
TOTAL SCORE: 0
HAVE PEOPLE ANNOYED YOU BY CRITICIZING YOUR DRINKING: NO
EVER FELT BAD OR GUILTY ABOUT YOUR DRINKING: NO
CONSUMPTION TOTAL: NEGATIVE
ON A TYPICAL DAY WHEN YOU DRINK ALCOHOL HOW MANY DRINKS DO YOU HAVE: 2
EVER HAD A DRINK FIRST THING IN THE MORNING TO STEADY YOUR NERVES TO GET RID OF A HANGOVER: NO
TOTAL SCORE: 0
EVER_SMOKED: YES
HOW MANY TIMES IN THE PAST YEAR HAVE YOU HAD 5 OR MORE DRINKS IN A DAY: 0
EVER_SMOKED: YES
HAVE YOU EVER FELT YOU SHOULD CUT DOWN ON YOUR DRINKING: NO
AVERAGE NUMBER OF DAYS PER WEEK YOU HAVE A DRINK CONTAINING ALCOHOL: 4

## 2018-02-05 ASSESSMENT — PAIN SCALES - GENERAL
PAINLEVEL_OUTOF10: 0
PAINLEVEL_OUTOF10: 5
PAINLEVEL_OUTOF10: 3
PAINLEVEL_OUTOF10: 5
PAINLEVEL_OUTOF10: 5
PAINLEVEL_OUTOF10: 7
PAINLEVEL_OUTOF10: 4
PAINLEVEL_OUTOF10: 6
PAINLEVEL_OUTOF10: 0
PAINLEVEL_OUTOF10: 4
PAINLEVEL_OUTOF10: 5
PAINLEVEL_OUTOF10: 5

## 2018-02-05 ASSESSMENT — COPD QUESTIONNAIRES
DO YOU EVER COUGH UP ANY MUCUS OR PHLEGM?: NO/ONLY WITH OCCASIONAL COLDS OR INFECTIONS
HAVE YOU SMOKED AT LEAST 100 CIGARETTES IN YOUR ENTIRE LIFE: YES
DURING THE PAST 4 WEEKS HOW MUCH DID YOU FEEL SHORT OF BREATH: NONE/LITTLE OF THE TIME
COPD SCREENING SCORE: 5

## 2018-02-05 NOTE — OR NURSING
Pt A&O. VSS on 4 L oxymask. Pt declines nasal cannula at this time stating he is comfortable with the mask as he is used to his CPAP.   Surgical dressing CDI, chest tube patent and draining. No air leak noted.   Pt reports pain tolerable at 4-5/10 and declines further pain medication.   Report called to receiving RN and [place].

## 2018-02-05 NOTE — OR SURGEON
Immediate Post OP Note    PreOp Diagnosis: Left upper lobe lung metastasis    PostOp Diagnosis: Same    Procedure(s):  THORACOSCOPY- WEDGE RESECTION LT UPPER LOBE METASTASIS - Wound Class: Clean    Surgeon(s):  John H Ganser, M.D.    Anesthesiologist/Type of Anesthesia:  Anesthesiologist: Jai Garcia M.D./General    Surgical Staff:  Circulator: Mara Mijares R.N.  Relief Circulator: Alysia Marshall R.N.  Relief Scrub: Rober Bowers  Scrub Person: Sherry Pompa  First Assist: ANCA Tineo    Specimens:  * No specimens in log *    Estimated Blood Loss: 20ml    Findings: Firm nodule, clear margins    Complications: 0        2/5/2018 12:50 PM John H Ganser

## 2018-02-05 NOTE — OP REPORT
DATE OF SERVICE:  02/05/2018    PREOPERATIVE DIAGNOSIS:  Colorectal cancer, lung metastasis, left upper lobe.    POSTOPERATIVE DIAGNOSIS:  Colorectal cancer, lung metastasis, left upper lobe.    PROCEDURE:  Thoracoscopic wedge resection of left upper lobe lung metastasis.    SURGEON:  John Ganser, MD    ASSISTANT:  David Polo PA-C    ANESTHESIA:  General.    ANESTHESIOLOGIST:  Jai Garcia MD    INDICATIONS:  The patient is a 67-year-old male with a prior history of colon   cancer.  Followup scans have shown left upper lobe lung mass, did get smaller   with chemotherapy, but is now enlarged.  The needle biopsy confirms metastatic   colorectal adenocarcinoma.  Risks, benefits, and alternatives to   thoracoscopic wedge resection were outlined in detail.  All questions answered   and he wished to proceed.    DESCRIPTION OF PROCEDURE:  The patient was identified and general anesthetic   administered with a double-lumen endotracheal tube.  He was placed in the   right lateral decubitus position and the left chest was prepped and draped in   the usual sterile fashion.  Local anesthesia was not used due to the patient's   sensitivity in the past.  Small incision was made, mid-axillary line,   approximately 7th inter-costal space and a 5 mm blunt trocar and 5 mm   30-degree scope inserted.  Anterior 5 and 12 mm trocars were placed.    Palpation of the lung confirmed a nodule at the site of CT abnormality   anterior in the left upper lobe.  No other abnormalities were noted.  This   area of lung was elevated and generous wedge resection carried out with   sequential firing of the [place] 45 powered stapler using gold and blue loads.    The specimen was placed in endoscopic bag, withdrawn through the 12-mm   trocar site.  Palpation confirmed the mass within the center of the specimen   with grossly widely clear margins.    The lung was re-expanded.  There was no evidence of air leak.  A 19-[place]   Smooth drain was  passed through the mid-axillary line incision, angled towards   the apex and secured to skin with silk.  The other incisions were closed with   Vicryl.  Sterile dressings were applied and the tube attached to a pleural   suction device.  The patient returned to recovery room in stable condition.    ESTIMATED BLOOD LOSS:  20 mL.    COUNTS:  Sponge and needle counts correct x2 at the end of procedure.       ____________________________________     JOHN H. GANSER, MD [facility] / [facility]    DD:  02/05/2018 12:53:52  DT:  02/05/2018 15:07:07    D#:  [id number]  Job#:  [id number]    cc: BROOKE MURPHY MD, Jocelyn Ramos MD

## 2018-02-06 LAB
ANION GAP SERPL CALC-SCNC: 5 MMOL/L (ref 0–11.9)
BUN SERPL-MCNC: 19 MG/DL (ref 8–22)
CALCIUM SERPL-MCNC: 9.1 MG/DL (ref 8.5–10.5)
CHLORIDE SERPL-SCNC: 100 MMOL/L (ref 96–112)
CO2 SERPL-SCNC: 29 MMOL/L (ref 20–33)
CREAT SERPL-MCNC: 1.07 MG/DL (ref 0.5–1.4)
ERYTHROCYTE [DISTWIDTH] IN BLOOD BY AUTOMATED COUNT: 47.9 FL (ref 35.9–50)
GLUCOSE SERPL-MCNC: 104 MG/DL (ref 65–99)
HCT VFR BLD AUTO: 41.5 % (ref 42–52)
HGB BLD-MCNC: 13.2 G/DL (ref 14–18)
MCH RBC QN AUTO: 29.4 PG (ref 27–33)
MCHC RBC AUTO-ENTMCNC: 31.8 G/DL (ref 33.7–35.3)
MCV RBC AUTO: 92.4 FL (ref 81.4–97.8)
PLATELET # BLD AUTO: 200 K/UL (ref 164–446)
PMV BLD AUTO: 9.5 FL (ref 9–12.9)
POTASSIUM SERPL-SCNC: 4.2 MMOL/L (ref 3.6–5.5)
RBC # BLD AUTO: 4.49 M/UL (ref 4.7–6.1)
SODIUM SERPL-SCNC: 134 MMOL/L (ref 135–145)
WBC # BLD AUTO: 6.6 K/UL (ref 4.8–10.8)

## 2018-02-06 PROCEDURE — 770006 HCHG ROOM/CARE - MED/SURG/GYN SEMI*

## 2018-02-06 PROCEDURE — 700111 HCHG RX REV CODE 636 W/ 250 OVERRIDE (IP): Performed by: PHYSICIAN ASSISTANT

## 2018-02-06 PROCEDURE — 700102 HCHG RX REV CODE 250 W/ 637 OVERRIDE(OP): Performed by: PHYSICIAN ASSISTANT

## 2018-02-06 PROCEDURE — A9270 NON-COVERED ITEM OR SERVICE: HCPCS | Performed by: PHYSICIAN ASSISTANT

## 2018-02-06 PROCEDURE — 80048 BASIC METABOLIC PNL TOTAL CA: CPT

## 2018-02-06 PROCEDURE — 85027 COMPLETE CBC AUTOMATED: CPT

## 2018-02-06 PROCEDURE — 36415 COLL VENOUS BLD VENIPUNCTURE: CPT

## 2018-02-06 RX ORDER — HYDROCODONE BITARTRATE AND ACETAMINOPHEN 5; 325 MG/1; MG/1
1-2 TABLET ORAL EVERY 4 HOURS PRN
Qty: 20 TAB | Refills: 0 | Status: SHIPPED | OUTPATIENT
Start: 2018-02-06 | End: 2018-02-11

## 2018-02-06 RX ADMIN — GABAPENTIN 600 MG: 300 CAPSULE ORAL at 20:54

## 2018-02-06 RX ADMIN — ENOXAPARIN SODIUM 40 MG: 100 INJECTION SUBCUTANEOUS at 08:33

## 2018-02-06 RX ADMIN — HYDROCODONE BITARTRATE AND ACETAMINOPHEN 2 TABLET: 5; 325 TABLET ORAL at 08:40

## 2018-02-06 RX ADMIN — LOSARTAN POTASSIUM 100 MG: 50 TABLET, FILM COATED ORAL at 08:33

## 2018-02-06 RX ADMIN — HYDROCODONE BITARTRATE AND ACETAMINOPHEN 2 TABLET: 5; 325 TABLET ORAL at 16:40

## 2018-02-06 RX ADMIN — GABAPENTIN 600 MG: 300 CAPSULE ORAL at 08:33

## 2018-02-06 RX ADMIN — ROSUVASTATIN CALCIUM 40 MG: 20 TABLET, FILM COATED ORAL at 21:10

## 2018-02-06 RX ADMIN — FAMOTIDINE 20 MG: 20 TABLET, FILM COATED ORAL at 20:54

## 2018-02-06 RX ADMIN — FAMOTIDINE 20 MG: 20 TABLET, FILM COATED ORAL at 08:33

## 2018-02-06 RX ADMIN — HYDROCODONE BITARTRATE AND ACETAMINOPHEN 2 TABLET: 5; 325 TABLET ORAL at 00:01

## 2018-02-06 ASSESSMENT — PAIN SCALES - GENERAL
PAINLEVEL_OUTOF10: 2
PAINLEVEL_OUTOF10: 3
PAINLEVEL_OUTOF10: 2
PAINLEVEL_OUTOF10: 2
PAINLEVEL_OUTOF10: 4
PAINLEVEL_OUTOF10: ASSUMED PAIN PRESENT

## 2018-02-06 ASSESSMENT — LIFESTYLE VARIABLES: EVER_SMOKED: YES

## 2018-02-06 NOTE — PROGRESS NOTES
Walked to bed from gurney steady, O2 at 4 liters per mask and breathing easily, cough congested nonproductive, no air leak in pleuravac, blood in tubing CT dressing dry, CT to 20cm suction. Abd obese and soft, lungs clear and diminished, Call light in hand, IV infusing SCDs intact. Pt oriented to room and call light VS are stable. Taking fluids well, no c/o pain.

## 2018-02-06 NOTE — PROGRESS NOTES
HOB up CT intact, OX does drop when he is sleeping, will replace mask after eating, Cpap at bedside for night. Medicated for pain with good relief.

## 2018-02-06 NOTE — DIETARY
NUTRITION SERVICES: BMI - Pt with BMI >40 (=45.2) - morbid obesity, class III. Weight loss counseling not appropriate in acute care setting.     RECOMMEND - Referral to outpatient nutrition services for weight management after D/C.

## 2018-02-06 NOTE — PROGRESS NOTES
Straight cath patient using sterile technique per active order, bladder scanned showed over 1000 in bladder, able to drain 960ml, patient educated 6hr to void and/or 0400

## 2018-02-06 NOTE — PROGRESS NOTES
"Progress Note:    S: Doing well  Pain controlled    O:  Recent Labs      02/06/18   0151   WBC  6.6   RBC  4.49*   HEMOGLOBIN  13.2*   HEMATOCRIT  41.5*   MCV  92.4   MCH  29.4   MCHC  31.8*   RDW  47.9   PLATELETCT  200   MPV  9.5     Recent Labs      02/06/18   0151   SODIUM  134*   POTASSIUM  4.2   CHLORIDE  100   CO2  29   GLUCOSE  104*   BUN  19   CREATININE  1.07   CALCIUM  9.1         Current Facility-Administered Medications   Medication Dose   • gabapentin (NEURONTIN) capsule 600 mg  600 mg   • rosuvastatin (CRESTOR) tablet 40 mg  40 mg   • losartan (COZAAR) tablet 100 mg  100 mg   • Respiratory Care per Protocol     • enalaprilat (VASOTEC) injection 2.5 mg  2.5 mg   • enoxaparin (LOVENOX) inj 40 mg  40 mg   • famotidine (PEPCID) tablet 20 mg  20 mg    Or   • famotidine (PEPCID) injection 20 mg  20 mg   • hydrALAZINE (APRESOLINE) injection 10 mg  10 mg   • HYDROcodone-acetaminophen (NORCO) 5-325 MG per tablet 1-2 Tab  1-2 Tab   • LORazepam (ATIVAN) injection 0.5-1 mg  0.5-1 mg   • ondansetron (ZOFRAN ODT) dispertab 4 mg  4 mg   • ondansetron (ZOFRAN) syringe/vial injection 4 mg  4 mg   • promethazine (PHENERGAN) suppository 25 mg  25 mg   • HYDROmorphone (DILAUDID) injection 0.2-1 mg  0.2-1 mg       PE:  Blood pressure 116/63, pulse 60, temperature 36.6 °C (97.8 °F), resp. rate 17, height 1.753 m (5' 9\"), weight (!) 138.9 kg (306 lb 3.5 oz), SpO2 96 %.    Intake/Output Summary (Last 24 hours) at 02/06/18 1452  Last data filed at 02/06/18 1315   Gross per 24 hour   Intake             1600 ml   Output             1965 ml   Net             -365 ml       Chest tube: minimal output, no air leak    Rads:  DX-CHEST-PORTABLE (1 VIEW)   Final Result      No definite left pneumothorax identified.   Minimal left lung base atelectasis.          A: There are no active hospital problems to display for this patient.        P: Chest tune removed  Plan discharge for AM    John Ganser M.D.  [facility]  "

## 2018-02-06 NOTE — CARE PLAN
Problem: Respiratory:  Goal: Respiratory status will improve  Outcome: PROGRESSING AS EXPECTED  Patient is still on 4L O2 but sat. In the high 90's, patient is continuing to use IS and reach 2500ml consistently      Problem: Safety  Goal: Will remain free from injury  Outcome: PROGRESSING AS EXPECTED  Patient educated about the need for assistance before trying to get out bed, placed threaded socks on, bed in the lowest position, upper handrails are up

## 2018-02-06 NOTE — PROGRESS NOTES
Assumed care at 1900 received report from day shift RNBarbara SANDS&Ox4.    3/10 Pain, denies nausea,   tolerating regular diet,   patient has not voided since surgery, patient educated about the goal to urinate before 2200 and if he has not will bladder scan and possible straight cath, encouraged fluids, 2/4/2018 last BM,    Patient has a chest tube on left side with moderate drainage, it is on suction set at 20.  Patient is running maintance fluid through right wrist 18g IV with orders to saline lock after the bag finishes,  Patient is on 4L O2 thru nasal canula, Cpap during the night, respiratory has been called to remind them to set it up, O2 is monitored sat. Mid 90's to low 90's Patient was educated about the alarm sounding indicating he needs to take a deep breath.  Patient has an IS    Patient educated about using the call light and wait for assistance to help him get out of bed.        Patient call light within reach, bed in the lowest position, hourly rounding in place.

## 2018-02-06 NOTE — CARE PLAN
Problem: Safety  Goal: Will remain free from falls  Outcome: PROGRESSING AS EXPECTED  Pt up with 1 assist and calls appropriately. Bed locked and in lowest position. Call light within reach.     Problem: Bowel/Gastric:  Goal: Normal bowel function is maintained or improved  Outcome: PROGRESSING AS EXPECTED  Last BM 2/4/2018. Will assess bm and bs qshift and prn

## 2018-02-06 NOTE — PROGRESS NOTES
Patient awake and alert, lying in bed. Pt A&Ox4. Morning meds given.  Pt complaining of 4/10 pain in left chest and was medicated (see MAR).  Left side CT to 20cm wall suction. Dressing C/D/I. Last BM 2/4/2018, pt states he is not passing gas. Skin otherwise intact.  Pt weaned from 4L to 2L O2 NC and will try to wean off to RA today.  Pt on RA at home.  Pt up with 1 assist and calls appropriately. Bed locked and in lowest position. Call light within reach.

## 2018-02-06 NOTE — DOCUMENTATION QUERY
DOCUMENTATION QUERY    PROVIDERS: Please select “Cosign w/ note”to reply to query.    To better represent the severity of illness of your patient, please review the following information and exercise your independent professional judgment in responding to this query.     BMI 45.2 - morbid obesity is documented in the 2/6 Registered Dietician Note. Based upon the clinical findings, risk factors, and treatment, can you clarify if you agree with this assessment?    • Agree with Registered Dietician assessment  • Disagree with Registered Dietician assessment (document your clinical findings)  • Other explanation of clinical findings  • Unable to determine      The medical record reflects the following:   Clinical Findings  Per 2/6 Registered Dietician note: Pt with BMI > 40 (45.2)- morbid obesity, class III   Ht: 5'9'', Weight: 138.9 kg   Per H/P: obesity, sleep apnea   Treatment  Registered Dietician Eval, weight loss counseling not appropriate in acute care setting   Risk Factors  sleep apnea, obesity, htn   Location within medical record  History and Physical and Progress Notes     Thank you,   Amy Whittington RN, BSN  Clinical   [phone]

## 2018-02-07 VITALS
BODY MASS INDEX: 45.36 KG/M2 | HEART RATE: 63 BPM | WEIGHT: 306.22 LBS | TEMPERATURE: 99.4 F | OXYGEN SATURATION: 96 % | HEIGHT: 69 IN | RESPIRATION RATE: 18 BRPM | DIASTOLIC BLOOD PRESSURE: 68 MMHG | SYSTOLIC BLOOD PRESSURE: 129 MMHG

## 2018-02-07 PROCEDURE — 700102 HCHG RX REV CODE 250 W/ 637 OVERRIDE(OP): Performed by: PHYSICIAN ASSISTANT

## 2018-02-07 PROCEDURE — A9270 NON-COVERED ITEM OR SERVICE: HCPCS | Performed by: PHYSICIAN ASSISTANT

## 2018-02-07 RX ADMIN — GABAPENTIN 600 MG: 300 CAPSULE ORAL at 08:04

## 2018-02-07 RX ADMIN — LOSARTAN POTASSIUM 100 MG: 50 TABLET, FILM COATED ORAL at 08:04

## 2018-02-07 RX ADMIN — FAMOTIDINE 20 MG: 20 TABLET, FILM COATED ORAL at 08:04

## 2018-02-07 ASSESSMENT — PAIN SCALES - GENERAL: PAINLEVEL_OUTOF10: 4

## 2018-02-07 NOTE — PROGRESS NOTES
Patient awake and alert, lying in bed. Pt A&Ox4. Pt states pain is 3/10 and declines pain medication.  Morning meds given.  Pt on RA. Pt sitting on bench in room and very anxious to leave this morning.  Pt up self with even and steady gait. Pt calls appropriately. Call light within reach.

## 2018-02-07 NOTE — PROGRESS NOTES
Patient discharged home via private vehicle private vehicle and accompanied by family.  IV dc'd prior to discharge.  Script given to family member yesterday.  All belongings were returned to pt.  Discharge instructions were reviewed with pt and pt stated he had no further questions.

## 2018-02-07 NOTE — PROGRESS NOTES
Pt alert and oriented vss on 2L O2 oxymask. Denies any pain or shortness of breath. Tolerating diet, pt up to bathroom with steady gait, passing lots of gas, no BM yet. dsg c/d/i. Call light within reach, no other needs at this time.

## 2018-02-07 NOTE — DISCHARGE INSTRUCTIONS
Chest tube discontinued, Tegaderm bandage placed   Discharge home in AM when alert, comfortable, ambulatory, and tolerating PO well   Pt counseled re: diet , activity, wound care, I.S., and home med's   May shower POD #2 over Tegaderms   Remove Tegaderms on POD #4   No baths, hot tubs, soaks for 7 days   No lifting >20 lbs for 1 wk   No driving for 4-5 days   F/U with Dr. Ganser in 1-2 weeks      Discharge Instructions    Discharged to home by car with relative. Discharged via wheelchair, hospital escort: Yes.  Special equipment needed: Not Applicable    Be sure to schedule a follow-up appointment with your primary care doctor or any specialists as instructed.     Discharge Plan:   Diet Plan: Discussed  Activity Level: Discussed  Confirmed Follow up Appointment: Patient to Call and Schedule Appointment  Confirmed Symptoms Management: Discussed  Medication Reconciliation Updated: Yes  Influenza Vaccine Indication: Patient Refuses    I understand that a diet low in cholesterol, fat, and sodium is recommended for good health. Unless I have been given specific instructions below for another diet, I accept this instruction as my diet prescription.   Other diet: regular    Special Instructions: None    · Is patient discharged on Warfarin / Coumadin?   No     Depression / Suicide Risk    As you are discharged from this [facility] facility, it is important to learn how to keep safe from harming yourself.    Recognize the warning signs:  · Abrupt changes in personality, positive or negative- including increase in energy   · Giving away possessions  · Change in eating patterns- significant weight changes-  positive or negative  · Change in sleeping patterns- unable to sleep or sleeping all the time   · Unwillingness or inability to communicate  · Depression  · Unusual sadness, discouragement and loneliness  · Talk of wanting to die  · Neglect of personal appearance   · Rebelliousness- reckless behavior  · Withdrawal from  people/activities they love  · Confusion- inability to concentrate     If you or a loved one observes any of these behaviors or has concerns about self-harm, here's what you can do:  · Talk about it- your feelings and reasons for harming yourself  · Remove any means that you might use to hurt yourself (examples: pills, rope, extension cords, firearm)  · Get professional help from the community (Mental Health, Substance Abuse, psychological counseling)  · Do not be alone:Call your Safe Contact- someone whom you trust who will be there for you.  · Call your local CRISIS HOTLINE 541-1660 or 625-630-9328  · Call your local Children's Mobile Crisis Response Team [place] (104) 624-0351 or www.[employer]  · Call the toll free National Suicide Prevention Hotlines   · National Suicide Prevention Lifeline 183-715-DHZZ (1041)  · National Hope Line Network 800-SUICIDE (590-2675)

## 2018-02-08 ENCOUNTER — PATIENT OUTREACH (OUTPATIENT)
Dept: HEALTH INFORMATION MANAGEMENT | Facility: OTHER | Age: 68
End: 2018-02-08

## 2018-02-09 ENCOUNTER — SLEEP CENTER VISIT (OUTPATIENT)
Dept: SLEEP MEDICINE | Facility: MEDICAL CENTER | Age: 68
End: 2018-02-09
Payer: MEDICARE

## 2018-02-09 VITALS
OXYGEN SATURATION: 93 % | DIASTOLIC BLOOD PRESSURE: 82 MMHG | SYSTOLIC BLOOD PRESSURE: 122 MMHG | HEART RATE: 87 BPM | WEIGHT: 312 LBS | RESPIRATION RATE: 18 BRPM | BODY MASS INDEX: 46.21 KG/M2 | HEIGHT: 69 IN

## 2018-02-09 DIAGNOSIS — E66.01 MORBID OBESITY WITH BMI OF 45.0-49.9, ADULT (HCC): ICD-10-CM

## 2018-02-09 DIAGNOSIS — R09.02 HYPOXIA: ICD-10-CM

## 2018-02-09 DIAGNOSIS — G47.33 OBSTRUCTIVE SLEEP APNEA: ICD-10-CM

## 2018-02-09 DIAGNOSIS — I10 ESSENTIAL HYPERTENSION: ICD-10-CM

## 2018-02-09 DIAGNOSIS — C78.00 MALIGNANT NEOPLASM METASTATIC TO LUNG, UNSPECIFIED LATERALITY (HCC): ICD-10-CM

## 2018-02-09 PROCEDURE — 94761 N-INVAS EAR/PLS OXIMETRY MLT: CPT | Performed by: FAMILY MEDICINE

## 2018-02-09 PROCEDURE — 99214 OFFICE O/P EST MOD 30 MIN: CPT | Mod: 25 | Performed by: FAMILY MEDICINE

## 2018-02-09 NOTE — PROGRESS NOTES
[facility] Follow Up Note     Date: 2/9/2018 / Time: 9:21 AM    Patient ID:   Name:             Gerald Oshea   YOB: 1950  Age:                 67 y.o.  male   MRN:               [id number]      Thank you for requesting a sleep medicine consultation on Gerald Oshea at the sleep center. He presents today with the chief complaints of SU follow up.     HISTORY OF PRESENT ILLNESS:        He is currently on BiPAP 20/16 cm of water with 1 L oxygen bleed in. No change in sleep schedule.He is using BiPAP most days of the week. Pt reports 7 hrs of average nightly use of BiPAP. Pt denies snoring, gasping,choking.Pt complained of  significant mask leak that is interfering with sleep.He didn't bring his SD card for compliance download. He has been waking up with headaches. He has a pulse ox at home his oxygen has dropped down to as low as 72%.     He has colon cancer which has metastasized to lung. He has wedge lobectomy of left upper lobe on 2/5/18. Hospital records was reviewed.           REVIEW OF SYSTEMS:       Constitutional: Denies fevers, Denies weight changes  Eyes: Denies changes in vision, no eye pain  Ears/Nose/Throat/Mouth: Denies nasal congestion or sore throat   Cardiovascular: Denies chest pain or palpitations   Respiratory: Denies shortness of breath , Denies cough  Gastrointestinal/Hepatic: Denies abdominal pain, nausea, vomiting, diarrhea, constipation or GI bleeding   Genitourinary: Denies bladder dysfunction, dysuria or frequency  Musculoskeletal/Rheum: Denies  joint pain and swelling   Skin/Breast: Denies rash,   Neurological: Denies headache, confusion, memory loss or focal weakness/parasthesias  Psychiatric: denies mood disorder     Comprehensive review of systems form is reviewed with the patient and is attached in the EMR.     PMH:  has a past medical history of Anemia; Anxiety; Atrophy of right kidney; Breath shortness; Cancer (CMS-HCC) (2016); Colon  "cancer (CMS-HCC); Former tobacco use; Gout; High cholesterol (12/12/2017); Hyperlipidemia; Hypertension (12/12/2017); Neuropathy (CMS-HCC) (12/12/2017); Pain (12/12/2017); Pneumonia (1991); Psychiatric problem; Sleep apnea (12/12/2017); and Snoring.  MEDS:   Current Outpatient Prescriptions:   •  HYDROcodone-acetaminophen (NORCO) 5-325 MG Tab per tablet, Take 1-2 Tabs by mouth every four hours as needed (pain) for up to 5 days., Disp: 20 Tab, Rfl: 0  •  gabapentin (NEURONTIN) 300 MG Cap, Take 600 mg by mouth 2 Times a Day., Disp: , Rfl:   •  clonazepam (KLONOPIN) 1 MG Tab, TAKE 1 TABLET BY MOUTH 2 TIMES DAILY, Disp: 60 Tab, Rfl: 5  •  losartan (COZAAR) 100 MG Tab, Take 100 mg by mouth every day. TAKE 1 TAB BY MOUTH EVERY DAY., Disp: , Rfl: 3  •  triamterene-hctz (MAXZIDE-25/DYAZIDE) 37.5-25 MG Tab, Take 1 Tab by mouth every day. TAKE 1 TAB BY MOUTH EVERY DAY., Disp: , Rfl: 3  •  rosuvastatin (CRESTOR) 40 MG tablet, Take 1 Tab by mouth every bedtime., Disp: 90 Tab, Rfl: 3  ALLERGIES:   Allergies   Allergen Reactions   • Ketamine Unspecified     \"I hallucinate\".     • Lidocaine Rash     GDB=2195  Pt states \"Rash all over my body\".     • Morphine      \"Can not have too much, my oxygen goes down\"     SURGHX:   Past Surgical History:   Procedure Laterality Date   • THORACOSCOPY  2/5/2018    Procedure: THORACOSCOPY- WEDGE RESECTION LT UPPER LOBE METASTASIS;  Surgeon: John H Ganser, M.D.;  Location: SURGERY [facility];  Service: General   • CATH PLACEMENT Left 10/7/2016    Procedure: CATH PLACEMENT - SUBCLAVIAN PORT;  Surgeon: Sho Bajwa M.D.;  Location: SURGERY SAME DAY [facility];  Service:    • COLON RESECTION  2016    Daniel   • CLOSED REDUCTION  10/25/2012    Performed by Chavez Duran M.D. at SURGERY [facility]     SOCHX:  reports that he quit smoking about 20 years ago. His smoking use included Cigarettes. He has a 20.00 pack-year smoking history. He has never used smokeless tobacco. He " reports that he drinks alcohol. He reports that he does not use drugs..  FH:   Family History   Problem Relation Age of Onset   • Cancer Mother      Bladder   • Heart Disease Father    • Heart Disease Brother      CABG x2         Physical Exam:  Vitals/ General Appearance:   Weight/BMI: There is no height or weight on file to calculate BMI.  There were no vitals taken for this visit.  There were no vitals filed for this visit.    Pt. is alert and oriented to time, place and person. Cooperative and in no apparent distress.       1. Head: Atraumatic, normocephalic.   2. Ears: Normal tympanic membrane and no discharge  3. Nose: No inferior turbinate hypertophy, no septal deviation, no polyp.   4. Throat: Oropharynx appears crowded in that the palate is overhanging (Malam Shantel scale 4). 5. Neck: Supple. No thyromegaly  6. Chest: Trachea central, no spine deformity   7. Lungs auscultation: B/L good air entry, vesicular breath sounds, no adventitious sounds  8. Heart auscultation: 1st and 2nd heart sounds normal, regular rhythm. No appreciable murmur.  9. Abdomen: Soft, non tender, no organomegaly. Bowel sounds present  10. Extremities: No, no deformity, no clubbing, no pedal edema.  11. Skin: No rash  12. NEUROLOGICAL EXAMINATION: On neurological exam, the patient was alert and oriented x3. speech was clear and fluent without dysarthria.      INVESTIGATIONS:           ASSESSMENT AND PLAN     1.Obstructive Sleep Apnea (SU).He  Is currently on He is currently on BiPAP 20/16 cm of water with 1 L oxygen bleed in.  The symptoms of excessive daytime, snoring and gasping continues      The pathophysiology of SU and the increased risk of cardiovascular morbidity from untreated SU is discussed in detail with the patient. He  also has HTN which can be worsened by her SU.     He is urged to avoid supine sleep, weight gain and alcoholic beverages since all of these can worsen SU. He is cautioned against drowsy driving. If He  feels sleepy while driving, He must pull over for a break/nap, rather than persist on the road, in the interest of He own safety and that of others on the road.   Plan   - Continue BiPAP at 20/16 cm   - Due to elevated AHI and low O2 , BiPAP titration is ordered    - compliance download was reviewed and discussed with the pt   - compliance was reinforced     2. Hypoxia: he has had colon cancer and mets to the lungs. He had wedge resection of left upper lobe . Hospital records was reviewed.  His saturation was 82% on rron air w/o excertion. With supplemental 2L/min improved to 92%.     - Multi Ox ordered and based on that he needs 3.5L/min on ambulation, 2L/min while at rest, 2 L/min bleed in with  BiPAP    - STAT home and portable concentrator ordered today

## 2018-02-22 DIAGNOSIS — I10 ESSENTIAL HYPERTENSION: ICD-10-CM

## 2018-02-22 RX ORDER — TRIAMTERENE AND HYDROCHLOROTHIAZIDE 37.5; 25 MG/1; MG/1
1 TABLET ORAL DAILY
Qty: 90 TAB | Refills: 0 | Status: SHIPPED | OUTPATIENT
Start: 2018-02-22 | End: 2018-04-11 | Stop reason: SDUPTHER

## 2018-02-22 RX ORDER — LOSARTAN POTASSIUM 100 MG/1
100 TABLET ORAL DAILY
Qty: 90 TAB | Refills: 0 | Status: SHIPPED | OUTPATIENT
Start: 2018-02-22 | End: 2018-04-11 | Stop reason: SDUPTHER

## 2018-02-22 NOTE — TELEPHONE ENCOUNTER
Please call and verify patient is currently taking these medications as there is a note indicating they were discontinued.

## 2018-02-23 ENCOUNTER — TELEPHONE (OUTPATIENT)
Dept: PULMONOLOGY | Facility: HOSPICE | Age: 68
End: 2018-02-23

## 2018-02-23 ENCOUNTER — TELEPHONE (OUTPATIENT)
Dept: MEDICAL GROUP | Facility: PHYSICIAN GROUP | Age: 68
End: 2018-02-23

## 2018-02-23 DIAGNOSIS — C78.00 MALIGNANT NEOPLASM METASTATIC TO LUNG, UNSPECIFIED LATERALITY (HCC): ICD-10-CM

## 2018-02-23 DIAGNOSIS — G47.33 OBSTRUCTIVE SLEEP APNEA: ICD-10-CM

## 2018-02-23 DIAGNOSIS — R91.8 PULMONARY NODULES: ICD-10-CM

## 2018-02-23 NOTE — TELEPHONE ENCOUNTER
Patient was just set up with o2 through [facility] and is very frustrated with their service and is also interested in a POC due his many MD appts    I will switch him out to KEY    Please sign updated o2 order and mask and supply order     Thank you

## 2018-02-23 NOTE — TELEPHONE ENCOUNTER
.ESTABLISHED PATIENT PRE-VISIT PLANNING     Note: Patient will not be contacted if there is no indication to call.     1.  Reviewed notes from the last few office visits within the medical group: Yes    2.  If any orders were placed at last visit or intended to be done for this visit (i.e. 6 mos follow-up), do we have Results/Consult Notes?        •  Labs - Labs ordered, completed on 06/26/17 and results are in chart.   Note: If patient appointment is for lab review and patient did not complete labs, check with provider if OK to reschedule patient until labs completed.       •  Imaging - Imaging was not ordered at last office visit.       •  Referrals - Referral ordered, patient has NOT been seen.    3. Is this appointment scheduled as a Hospital Follow-Up? No    4.  Immunizations were updated in emere using WebIZ?: Yes       •  Web Iz Recommendations: FLU, PREVNAR (PCV13) , TDAP and ZOSTAVAX (Shingles)    5.  Patient is due for the following Health Maintenance Topics:   Health Maintenance Due   Topic Date Due   • IMM INFLUENZA (1) 09/01/2017   • IMM PNEUMOCOCCAL 65+ (ADULT) LOW/MEDIUM RISK SERIES (2 of 2 - PCV13) 11/20/2017       - Patient has completed FLU and PNEUMOVAX (PPSV23) Immunization(s) per WebIZ. Chart has been updated.    6.  Patient was NOT informed to arrive 15 min prior to their scheduled appointment and bring in their medication bottles.

## 2018-02-26 ENCOUNTER — OFFICE VISIT (OUTPATIENT)
Dept: MEDICAL GROUP | Facility: PHYSICIAN GROUP | Age: 68
End: 2018-02-26
Payer: MEDICARE

## 2018-02-26 VITALS
BODY MASS INDEX: 46.65 KG/M2 | WEIGHT: 315 LBS | OXYGEN SATURATION: 93 % | SYSTOLIC BLOOD PRESSURE: 110 MMHG | HEIGHT: 69 IN | HEART RATE: 81 BPM | TEMPERATURE: 98.2 F | DIASTOLIC BLOOD PRESSURE: 60 MMHG

## 2018-02-26 DIAGNOSIS — C80.1 NEUROPATHY ASSOCIATED WITH CANCER (HCC): ICD-10-CM

## 2018-02-26 DIAGNOSIS — G47.33 OSA (OBSTRUCTIVE SLEEP APNEA): ICD-10-CM

## 2018-02-26 DIAGNOSIS — C78.00 COLON CANCER METASTASIZED TO LUNG (HCC): ICD-10-CM

## 2018-02-26 DIAGNOSIS — R09.02 HYPOXIA: ICD-10-CM

## 2018-02-26 DIAGNOSIS — G63 NEUROPATHY ASSOCIATED WITH CANCER (HCC): ICD-10-CM

## 2018-02-26 DIAGNOSIS — I10 ESSENTIAL HYPERTENSION: ICD-10-CM

## 2018-02-26 DIAGNOSIS — R73.01 IMPAIRED FASTING BLOOD SUGAR: ICD-10-CM

## 2018-02-26 DIAGNOSIS — E78.5 DYSLIPIDEMIA: ICD-10-CM

## 2018-02-26 DIAGNOSIS — C18.9 COLON CANCER METASTASIZED TO LUNG (HCC): ICD-10-CM

## 2018-02-26 PROCEDURE — 99214 OFFICE O/P EST MOD 30 MIN: CPT | Performed by: FAMILY MEDICINE

## 2018-02-28 NOTE — PROGRESS NOTES
"Subjective:      Gerald Oshea is a 67 y.o. male who presents with Hospital Follow-up (lung surgery)            HPI     This is a 67-year-old white male patient of Dr. Ramos who is here for follow-up after recent lung surgery.    Patient has history of colon cancer status post resection in 2016. He was found to have pulmonary nodule which was followed closely. The nodule has recently increased in size and biopsy was done that showed metastatic colorectal cancer. He underwent thoracic wedge resection of the lung nodule in the left upper lobe on 2/5/18. He will see his oncologist Dr. Silverman for further recommendation on treatment. He was discharged home on oxygen for continuous use after the lung surgery as he was found to be hypoxic. He has been seen and evaluated by the pulmonologist and her mentation is for him to use 3.5 L/m with ambulation, 2 L/m at rest and 2 L/m with bleed with BiPAP machine. Patient has SU on BiPAP so followed by the pulmonologist.    In terms of his hypertension, blood pressure is well controlled on losartan and triamterene/hydrochlorothiazide.    He has dyslipidemia for which he takes Crestor without myalgias.    He also has impaired fasting blood sugar which is being managed with diet.    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 This involves her hands and her feet for which she takes gabapentin. He said he continues to have the symptoms even on medication. He said he has not tried being off the medication and so he doesn't know if the symptoms are worse without them.    Past medical history, past surgical history, family history reviewed-no changes    Social history reviewed-no changes    Allergies reviewed-no changes    Medications reviewed-no changes    ROS     Review of systems as per history of present illness, the rest are negative.       Objective:     /60   Pulse 81   Temp 36.8 °C (98.2 °F)   Ht 1.753 m (5' 9\")   Wt (!) 142.9 kg (315 " lb 0.6 oz)   SpO2 93%   BMI 46.52 kg/m²      Physical Exam     Examined alert, awake, oriented, not in distress    Neck-supple, no lymphadenopathy, no thyromegaly  Lungs-clear to auscultation, no rales, no wheezes  Chest-well-healed scar left posterior lower chest from recent wedge resection of the left upper lobe  Heart-regular rate and rhythm, no murmur  Extremities-no edema, clubbing, cyanosis       I reviewed his hospital records     Assessment/Plan:     1. Colon cancer metastasized to lung (CMS-HCC)  Status post wedge resection of the left upper lung lung nodule. He will see his oncologist for recommendation on further treatment. We will await recommendation.    2. Hypoxia  He was found to have hypoxia post lung surgery. He will continue home oxygen. He will follow-up with his pulmonologist.    3. SU (obstructive sleep apnea)  Is on BiPAP machine followed by the pulmonologist.    4. Essential hypertension  Controlled Continue blood pressure medications.  - LIPID PROFILE; Future  - COMP METABOLIC PANEL; Future  - HEMOGLOBIN A1C; Future    5. Dyslipidemia  Left lipid panel was in June 2017 and came back all at target. Continue Crestor. We will do follow-up lipid panel prior to his next visit with Dr. Ramos.   - LIPID PROFILE; Future  - COMP METABOLIC PANEL; Future  - HEMOGLOBIN A1C; Future    6. Impaired fasting blood sugar  We will do follow-up fasting blood sugar and hemoglobin A1c to be done before his appointment with Dr. Ramos.  - LIPID PROFILE; Future  - COMP METABOLIC PANEL; Future  - HEMOGLOBIN A1C; Future    7. Neuropathy associated with cancer  He is on gabapentin. He has option of increasing the dose to better control his symptoms because he has not reached the maximum dose yet. He will discuss this with his PCP next visit .    40 minutes were spent on this visit. More than 50% of which were spent going over his hospitalization, medical problems, medications, plan of care, follow-up and coordination  of care.        Please note that this dictation was created using voice recognition software. I have worked with consultants from the vendor as well as technical experts from [facility] to optimize the interface. I have made every reasonable attempt to correct obvious errors, but I expect that there are errors of grammar and possibly content I did not discover before finalizing the note.

## 2018-02-28 NOTE — PATIENT INSTRUCTIONS
Patient instructions given regarding labs, x-rays, medications, referral and followup appointment.

## 2018-03-12 DIAGNOSIS — F41.1 GENERALIZED ANXIETY DISORDER: ICD-10-CM

## 2018-03-12 RX ORDER — CLONAZEPAM 1 MG/1
TABLET ORAL
Qty: 60 TAB | Refills: 2 | Status: SHIPPED
Start: 2018-03-12 | End: 2018-04-11

## 2018-03-12 NOTE — TELEPHONE ENCOUNTER
Refill done. Please fax.  reviewed and appropriate. Patient unable to taper off. See previous notes.  Jocelyn Ramos M.D.

## 2018-03-12 NOTE — TELEPHONE ENCOUNTER
Was the patient seen in the last year in this department? Yes     Does patient have an active prescription for medications requested? No     Received Request Via: Pharmacy     Per clarisa last fill 2/1/18 and 1/2/18

## 2018-03-20 ENCOUNTER — PATIENT OUTREACH (OUTPATIENT)
Dept: HEALTH INFORMATION MANAGEMENT | Facility: OTHER | Age: 68
End: 2018-03-20

## 2018-03-20 NOTE — PROGRESS NOTES
Gerald Oshea was an elective admission for a dissection of the lung. He was admitted on 2/5/2018 and discharged on 2/7/2018. Gerald had difficulty with obtaining consults in regards to symptoms of SOB, obtaining medical equipment, scheduling follow up appointments, and then switching DME providers. Patient Advocate was able to reach out to Dr. Ganser’s office to assist in getting the patient an O2 order from Pulmonary and Dr. Ganser will sign it. Patient Advocate was able to get patient in to Pulmonary the next day and they ended up sending a referral for O2 concentrator as well as POC which was delivered to the patient on 2/27/2018. Patient Advocate also confirmed that the patient’s medications are up to date and he is taking them as prescribed. PPS 70%.

## 2018-03-21 ENCOUNTER — SLEEP STUDY (OUTPATIENT)
Dept: SLEEP MEDICINE | Facility: MEDICAL CENTER | Age: 68
End: 2018-03-21
Attending: FAMILY MEDICINE
Payer: MEDICARE

## 2018-03-21 DIAGNOSIS — G47.33 OBSTRUCTIVE SLEEP APNEA: ICD-10-CM

## 2018-03-21 PROCEDURE — 95811 POLYSOM 6/>YRS CPAP 4/> PARM: CPT | Performed by: FAMILY MEDICINE

## 2018-03-23 NOTE — PROCEDURES
Comments:  The patient underwent a diagnostic polysomnogram using the standard montage for measurement of parameters of sleep, respiratory events, movement abnormalities, and heart rate and rhythm.    A microphone was used to monitor snoring.  Interpretation:  Study start time was 10:26:26 PM.  Diagnostic recording time was 6h 28.0m with a total sleep time of 3h 27.0m resulting in a sleep efficiency of 53.35%%.    Sleep latency from the start of the study was 17 minutes and the latency from sleep to REM was 85 minutes.  In total,39  arousals were scored for an arousal index of 11.3.  Respiratory:  There were a total of 7 apneas consisting of 1 obstructive apneas, 0 mixed apneas, and 6 central apneas.  A total of 37 hypopneas were scored.  The apnea index was 2.03 per hour and the hypopnea index was 10.72 per hour resulting in an overall AHI of 12.75.  AHI during rem was 2.03 and AHI while supine was 12.75.  Oximetry:  There was a mean oxygen saturation of 89.0% with a minimum oxygen saturation of 75.0%.  Time spent with oxygen saturations below 89% was 134.4 minutes.  Cardiac:  The highest heart rate seen while awake was 75 BPM while the highest heart rate during sleep was 62 BPM with an average sleeping heart rate of 52 BPM.  Limb Movements:  There were a total of 0 PLMs during sleep, of which 0 were PLMS arousals.  This resulted in a PLMS index of 0.0 and a PLMS arousal index of 0.0.    BiPAP was tried from 20/16 to 21/17 cm H2O.    Technical summary: The patient underwent a BiPAP titration.  This was a 16 channel montage study to include a 6 channel EEG, a 2 channel EOG, and chin EMG, left and right leg EMG, a snore channel, and a CFLOW pressure transducer.   Respiratory effort was assessed with the use of a thoracic and abdominal monitor and overnight oximetry was obtained. Audio and video recordings were reviewed. This was a fully attended study and sleep stage scoring was performed. The test was technically  adequate.    General sleep summary:  During the overnight study, the sleep latency was 17 min which is normal. The REM latency was 85 min which is normal. The total sleep time was 207 min and sleep efficiency was 53% % which is decreased.  Sleep stage proportions showed decreased REM and no N3 sleep with increased WASO of 180 min.  In regards to sleep quality there was a mild degree of sleep fragmentation as shown by the arousal index of 11 an hour. The arousals were due to spontaneous arousal.    CPAP Titration:  The PAP titration was initiated with BiPAP 20/16 cm of water and the pressure which was slowly titrated up in an attempt to eliminate sleep disordered breathing and snoring. The final pressure tested during the study was BiPAP  21/17 cm water and supplemental oxygen 1L/min and at this final pressure the patient was observed in the supine position and in the REM sleep stage. The apnea hypopnea index was 20.7 per hour and O2 shivani 75%. The average O2 stauration was 90%. He spent 89% of sleep time below 89% O2 saturation. Snoring was resolved. There were no significant periodic limb movements.  The patient demonstrated NSR and an average heart rate of 52 beats per minute.  There was no ventricular ectopy or tachyarrhythmias. The patient utilized large Simplus mask with heated humidification. The CPAP was well-tolerated and there were minimal air leaks. No supplemental oxygen was required.    Impression:  1.  SU   2.  Suboptimal titration       Recommendations:  No definitive pressure can be extrapolated from the titration, I recommend Auto BiPAP 20/16 to 24/21 cm supplemental Oxygen 2L/min with large simplus mask. Recommended 30 day compliance download to assess the efficacy of the recommended pressure and compliance for further outpatient monitoring and management of CPAP therapy. In some cases alternative treatment options may prove effective in resolving sleep apnea and these options include upper airway  surgery, the use of a dental orthotic or weight loss and positional therapy. Clinical correlation is required. In general patients with sleep apnea are advised to avoid alcohol and sedatives and to not operate a motor vehicle while drowsy and are at a greater risk for cardiovascular disease.

## 2018-04-10 LAB
ALBUMIN SERPL-MCNC: 4.3 G/DL (ref 3.6–4.8)
ALBUMIN/GLOB SERPL: 2.4 {RATIO} (ref 1.2–2.2)
ALP SERPL-CCNC: 76 IU/L (ref 39–117)
ALT SERPL-CCNC: 20 IU/L (ref 0–44)
AST SERPL-CCNC: 18 IU/L (ref 0–40)
BILIRUB SERPL-MCNC: 0.7 MG/DL (ref 0–1.2)
BUN SERPL-MCNC: 24 MG/DL (ref 8–27)
BUN/CREAT SERPL: 19 (ref 10–24)
CALCIUM SERPL-MCNC: 9.1 MG/DL (ref 8.6–10.2)
CHLORIDE SERPL-SCNC: 98 MMOL/L (ref 96–106)
CHOLEST SERPL-MCNC: 106 MG/DL (ref 100–199)
CO2 SERPL-SCNC: 22 MMOL/L (ref 18–29)
CREAT SERPL-MCNC: 1.25 MG/DL (ref 0.76–1.27)
GFR SERPLBLD CREATININE-BSD FMLA CKD-EPI: 60 ML/MIN/1.73
GFR SERPLBLD CREATININE-BSD FMLA CKD-EPI: 69 ML/MIN/1.73
GLOBULIN SER CALC-MCNC: 1.8 G/DL (ref 1.5–4.5)
GLUCOSE SERPL-MCNC: 142 MG/DL (ref 65–99)
HBA1C MFR BLD: 6.7 % (ref 4.8–5.6)
HDLC SERPL-MCNC: 36 MG/DL
LABORATORY COMMENT REPORT: ABNORMAL
LDLC SERPL CALC-MCNC: 42 MG/DL (ref 0–99)
POTASSIUM SERPL-SCNC: 4.2 MMOL/L (ref 3.5–5.2)
PROT SERPL-MCNC: 6.1 G/DL (ref 6–8.5)
SODIUM SERPL-SCNC: 138 MMOL/L (ref 134–144)
TRIGL SERPL-MCNC: 141 MG/DL (ref 0–149)
VLDLC SERPL CALC-MCNC: 28 MG/DL (ref 5–40)

## 2018-04-11 ENCOUNTER — OFFICE VISIT (OUTPATIENT)
Dept: MEDICAL GROUP | Facility: PHYSICIAN GROUP | Age: 68
End: 2018-04-11
Payer: MEDICARE

## 2018-04-11 VITALS
BODY MASS INDEX: 45.77 KG/M2 | WEIGHT: 309 LBS | TEMPERATURE: 98.1 F | OXYGEN SATURATION: 95 % | RESPIRATION RATE: 18 BRPM | DIASTOLIC BLOOD PRESSURE: 72 MMHG | SYSTOLIC BLOOD PRESSURE: 132 MMHG | HEART RATE: 72 BPM | HEIGHT: 69 IN

## 2018-04-11 DIAGNOSIS — R97.20 ELEVATED PSA, LESS THAN 10 NG/ML: ICD-10-CM

## 2018-04-11 DIAGNOSIS — E66.01 MORBID OBESITY WITH BMI OF 45.0-49.9, ADULT (HCC): ICD-10-CM

## 2018-04-11 DIAGNOSIS — C78.02 MALIGNANT NEOPLASM METASTATIC TO LEFT LUNG (HCC): ICD-10-CM

## 2018-04-11 DIAGNOSIS — G47.33 OBSTRUCTIVE SLEEP APNEA: ICD-10-CM

## 2018-04-11 DIAGNOSIS — G63 NEUROPATHY ASSOCIATED WITH CANCER (HCC): ICD-10-CM

## 2018-04-11 DIAGNOSIS — C18.6 MALIGNANT NEOPLASM OF DESCENDING COLON (HCC): ICD-10-CM

## 2018-04-11 DIAGNOSIS — I10 ESSENTIAL HYPERTENSION: ICD-10-CM

## 2018-04-11 DIAGNOSIS — J96.11 CHRONIC RESPIRATORY FAILURE WITH HYPOXIA (HCC): ICD-10-CM

## 2018-04-11 DIAGNOSIS — E78.00 PURE HYPERCHOLESTEROLEMIA: ICD-10-CM

## 2018-04-11 DIAGNOSIS — E11.9 DIET-CONTROLLED DIABETES MELLITUS (HCC): ICD-10-CM

## 2018-04-11 DIAGNOSIS — C80.1 NEUROPATHY ASSOCIATED WITH CANCER (HCC): ICD-10-CM

## 2018-04-11 PROBLEM — C78.01 MALIGNANT NEOPLASM METASTATIC TO RIGHT LUNG (HCC): Status: ACTIVE | Noted: 2018-04-11

## 2018-04-11 PROBLEM — R91.1 PULMONARY NODULE: Status: RESOLVED | Noted: 2017-12-06 | Resolved: 2018-04-11

## 2018-04-11 PROCEDURE — 99214 OFFICE O/P EST MOD 30 MIN: CPT | Performed by: FAMILY MEDICINE

## 2018-04-11 RX ORDER — TRIAMTERENE AND HYDROCHLOROTHIAZIDE 37.5; 25 MG/1; MG/1
1 TABLET ORAL DAILY
Qty: 90 TAB | Refills: 3 | Status: SHIPPED | OUTPATIENT
Start: 2018-04-11 | End: 2018-05-22

## 2018-04-11 RX ORDER — LOSARTAN POTASSIUM 100 MG/1
100 TABLET ORAL DAILY
Qty: 90 TAB | Refills: 3 | Status: SHIPPED | OUTPATIENT
Start: 2018-04-11 | End: 2018-05-22

## 2018-04-11 RX ORDER — CAPECITABINE 500 MG/1
1250 TABLET, FILM COATED ORAL
COMMUNITY
End: 2018-09-07

## 2018-04-11 RX ORDER — ROSUVASTATIN CALCIUM 40 MG/1
40 TABLET, COATED ORAL
Qty: 90 TAB | Refills: 3 | Status: SHIPPED | OUTPATIENT
Start: 2018-04-11 | End: 2019-04-05 | Stop reason: SDUPTHER

## 2018-04-11 ASSESSMENT — PAIN SCALES - GENERAL: PAINLEVEL: 2=MINIMAL-SLIGHT

## 2018-04-11 NOTE — ASSESSMENT & PLAN NOTE
Patient has been following closely with his oncologist, Dr. Silverman. He had a pulmonary wedge resection of a metastatic left lung lesion in February 2017. Since the surgery, he has been on supplemental oxygen 24/7 at 2-3 L. He is on chemotherapy.

## 2018-04-11 NOTE — ASSESSMENT & PLAN NOTE
No significant change. Patient continues to have numbness and tingling in his fingertips and toes. He is taking gabapentin 4x/day which is helping him.

## 2018-04-11 NOTE — ASSESSMENT & PLAN NOTE
Following with sleep medicine and pulmonary. He recently had a sleep study and was recommended to start auto-CPAP.

## 2018-04-11 NOTE — PROGRESS NOTES
"Subjective:   Gerald Oshea is a 67 y.o. male here today for multiple issues including follow-up colon cancer and lab results.    Colon cancer (CMS-HCC)  Patient has been following closely with his oncologist, Dr. Silverman. He had a pulmonary wedge resection of a metastatic left lung lesion in February 2017. Since the surgery, he has been on supplemental oxygen 24/7 at 2-3 L. He is on chemotherapy.    Neuropathy associated with cancer (CMS-HCC)  No significant change. Patient continues to have numbness and tingling in his fingertips and toes. He is taking gabapentin 4x/day which is helping him.    Obstructive sleep apnea  Following with sleep medicine and pulmonary. He recently had a sleep study and was recommended to start auto-CPAP.    Essential hypertension  Stable. Monitoring BP at home. Currently taking triamterene-HCTZ 37.5-25mg daily as directed. Also taking baby aspirin. Denies lightheadedness, vision changes, headache, palpitations or leg swelling.    Pure hypercholesterolemia  Doing well. Reviewed labs with the patient. Taking medications as prescribed. No myalgias.    Diet-controlled diabetes mellitus (CMS-HCC)  This is a new problem. Patient has been steadily gaining weight since finishing his first course of chemotherapy last year. He had lab work done which showed an A1c of 6.7%. Patient admits that he has not been watching his diet and \"likes sweets.\"    Elevated PSA, less than 10 ng/ml  Stable. Last set of labs did not include a PSA test.     Current medicines (including changes today)  Current Outpatient Prescriptions   Medication Sig Dispense Refill   • capecitabine (XELODA) 500 MG tablet Take 1,250 mg/m2 by mouth 6 Times a Day.     • losartan (COZAAR) 100 MG Tab Take 1 Tab by mouth every day. 90 Tab 3   • triamterene-hctz (MAXZIDE-25/DYAZIDE) 37.5-25 MG Tab Take 1 Tab by mouth every day. 90 Tab 3   • rosuvastatin (CRESTOR) 40 MG tablet Take 1 Tab by mouth every bedtime. 90 Tab 3   • " "clonazePAM (KLONOPIN) 1 MG Tab TAKE 1 TABLET BY MOUTH 2 TIMES DAILY 60 Tab 2   • gabapentin (NEURONTIN) 300 MG Cap Take 600 mg by mouth 2 Times a Day.       No current facility-administered medications for this visit.      He  has a past medical history of Anemia; Anxiety; Atrophy of right kidney; Breath shortness; Cancer (CMS-HCC) (2016); Colon cancer (CMS-[facility]); Former tobacco use; Gout; High cholesterol (12/12/2017); Hyperlipidemia; Hypertension (12/12/2017); Neuropathy (CMS-HCC) (12/12/2017); Pain (12/12/2017); Pneumonia (1991); Psychiatric problem; Sleep apnea (12/12/2017); and Snoring.    ROS   See HPI. No chest pain, no shortness of breath, no abdominal pain.     Objective:     Physical Exam:  Blood pressure 132/72, pulse 72, temperature 36.7 °C (98.1 °F), resp. rate 18, height 1.753 m (5' 9\"), weight (!) 140.2 kg (309 lb), SpO2 95 %. Body mass index is 45.63 kg/m².   Constitutional: Alert, no distress, obese.  Skin: Warm, dry, good turgor, no rashes in visible areas.  Eye: Equal, round and reactive, conjunctiva clear, lids normal.  ENMT: Lips without lesions, good dentition, oropharynx clear, +NC.  Neck: Trachea midline, no masses, no thyromegaly. No cervical or supraclavicular lymphadenopathy.  Respiratory: Unlabored respiratory effort, lungs clear to auscultation, no wheezes, no ronchi. +O2.  Cardiovascular: Normal S1, S2, no murmur, no edema.  Abdomen: Soft, non-tender, no masses, no hepatosplenomegaly.  Psych: Alert and oriented x3, normal affect and mood.    Assessment and Plan:     1. Malignant neoplasm of descending colon (CMS-HCC)  2. Malignant neoplasm metastatic to left lung (CMS-HCC)  Following closely with oncology. Patient is currently receiving chemotherapy. We'll continue to monitor.    3. Chronic respiratory failure with hypoxia (CMS-HCC)  This is a new issue for the patient. He is on supplemental oxygen. Possibly related to his lung surgery. We'll continue to monitor.    4. Neuropathy " associated with cancer (CMS-HCC)  Chronic and stable. Continue current medications and monitor.    5. Obstructive sleep apnea  Chronic and stable. Patient is on closely with sleep medicine and is on CPAP. We'll continue to monitor.    6. Essential hypertension  Well-controlled. Labs as indicated. Continue antihypertensive medications. Discussed decreasing salt intake. Emphasized benefits of exercise and diet. Continue to monitor.  - losartan (COZAAR) 100 MG Tab; Take 1 Tab by mouth every day.  Dispense: 90 Tab; Refill: 3  - triamterene-hctz (MAXZIDE-25/DYAZIDE) 37.5-25 MG Tab; Take 1 Tab by mouth every day.  Dispense: 90 Tab; Refill: 3    7. Pure hypercholesterolemia  Well controlled. Labs as indicated. Continue statin medication. Continue to monitor.  - rosuvastatin (CRESTOR) 40 MG tablet; Take 1 Tab by mouth every bedtime.  Dispense: 90 Tab; Refill: 3    8. Diet-controlled diabetes mellitus (CMS-HCC)  This is a new problem. Patient's diabetes is consider controlled. No indication for medications at this time. I will refer him for nutrition counseling.  - REFERRAL TO [facility] (HIP) Services Requested: Registered Dietitian for Medical Nutrition Therapy; Reason for Visit: Overweight/Obesity, Medical Condition Requiring Nutrition Counseling    9. Elevated PSA, less than 10 ng/ml  Chronic and stable. Recheck PSA on next set of labs.  - PROSTATE SPECIFIC AG SCREENING; Future    10. Morbid obesity with BMI of 45.0-49.9, adult ([facility])  Patient's weight was discussed today, including healthy low-carb diet, 30-minutes of moderate exercise daily and avoiding sugars and high-fat foods.  - Patient identified as having weight management issue.  Appropriate orders and counseling given.  - REFERRAL TO [facility] (HIP) Services Requested: Registered Dietitian for Medical Nutrition Therapy; Reason for Visit: Overweight/Obesity, Medical Condition Requiring Nutrition  Counseling    Followup: Return in about 4 months (around 8/11/2018) for f/u PSA test, cancer, short.         PLEASE NOTE: This dictation was created using voice recognition software. I have made every reasonable attempt to correct obvious errors, but I expect that there are errors of grammar and possibly content that I did not discover before finalizing the note.

## 2018-04-11 NOTE — ASSESSMENT & PLAN NOTE
Stable. Monitoring BP at home. Currently taking triamterene-HCTZ 37.5-25mg daily as directed. Also taking baby aspirin. Denies lightheadedness, vision changes, headache, palpitations or leg swelling.

## 2018-04-11 NOTE — ASSESSMENT & PLAN NOTE
"This is a new problem. Patient has been steadily gaining weight since finishing his first course of chemotherapy last year. He had lab work done which showed an A1c of 6.7%. Patient admits that he has not been watching his diet and \"likes sweets.\"  "

## 2018-04-23 ENCOUNTER — TELEPHONE (OUTPATIENT)
Dept: MEDICAL GROUP | Facility: PHYSICIAN GROUP | Age: 68
End: 2018-04-23

## 2018-04-23 NOTE — TELEPHONE ENCOUNTER
I would recommend he come in for an appointment first so we can see exactly what is going on. He can see any provider.  Jocelyn Ramos M.D.

## 2018-04-23 NOTE — TELEPHONE ENCOUNTER
Informed pt he needs to be seen. We are booked out a week and so is the rest of the office. I informed pt to try UC and pt was not willing to wait a week for the next appt or go to UC. He states he is going to figure something else out.

## 2018-04-23 NOTE — TELEPHONE ENCOUNTER
"VOICEMAIL  1. Caller Name: Gerald Oshea                        Call Back Number: [phone] (home)       2. Message: Pt is having L eye issues and would like to go see an eye doc. He states his L ear has been red and itchy. He has tried eye drops and an eye \"wash\" with no good results.    3. Patient approves office to leave a detailed voicemail/MyChart message: no    "

## 2018-04-26 ENCOUNTER — OFFICE VISIT (OUTPATIENT)
Dept: URGENT CARE | Facility: CLINIC | Age: 68
End: 2018-04-26
Payer: MEDICARE

## 2018-04-26 VITALS
HEIGHT: 69 IN | SYSTOLIC BLOOD PRESSURE: 122 MMHG | DIASTOLIC BLOOD PRESSURE: 80 MMHG | WEIGHT: 310 LBS | TEMPERATURE: 98 F | RESPIRATION RATE: 20 BRPM | BODY MASS INDEX: 45.91 KG/M2 | HEART RATE: 84 BPM | OXYGEN SATURATION: 95 %

## 2018-04-26 DIAGNOSIS — S05.02XA CORNEA ABRASION, LEFT, INITIAL ENCOUNTER: ICD-10-CM

## 2018-04-26 DIAGNOSIS — H18.892 CORNEAL IRRITATION OF LEFT EYE: ICD-10-CM

## 2018-04-26 PROCEDURE — 99204 OFFICE O/P NEW MOD 45 MIN: CPT | Performed by: NURSE PRACTITIONER

## 2018-04-26 RX ORDER — POLYMYXIN B SULFATE AND TRIMETHOPRIM 1; 10000 MG/ML; [USP'U]/ML
1 SOLUTION OPHTHALMIC EVERY 4 HOURS
Qty: 10 ML | Refills: 0 | Status: SHIPPED | OUTPATIENT
Start: 2018-04-26 | End: 2019-01-11

## 2018-04-26 ASSESSMENT — ENCOUNTER SYMPTOMS
EYE PAIN: 0
DOUBLE VISION: 0
EYE REDNESS: 1
EYE DISCHARGE: 1
BLURRED VISION: 0
PHOTOPHOBIA: 0
FOREIGN BODY SENSATION: 1

## 2018-04-26 NOTE — PROGRESS NOTES
"Subjective:      Gerald Oshea is a 67 y.o. male who presents with Eye Problem (Couple days left eye redness and sore)            Eye Problem    The left eye is affected. This is a new problem. Episode onset: Pt reports he felt like he got something in his left eye about one week ago. When he blinks he feels the sensation of something in his eye, under his upper eyelid. He denies any light sensitivity, changes to vision or pain in eye. + clear discharge to eye. Progression since onset: he admits the feeling of something in his eye is improving. There was no injury mechanism. There is no known exposure to pink eye. He does not wear contacts. Associated symptoms include an eye discharge (clear), eye redness and a foreign body sensation. Pertinent negatives include no blurred vision, double vision or photophobia. He has tried eye drops and water for the symptoms. The treatment provided mild relief.       Review of Systems   Eyes: Positive for discharge (clear) and redness. Negative for blurred vision, double vision, photophobia and pain.   All other systems reviewed and are negative.    Past Medical History:   Diagnosis Date   • Anemia    • Anxiety    • Atrophy of right kidney     one functioning kidney   • Breath shortness    • Cancer (CMS-HCC) 2016    colon   rx surgery and chemo   • Colon cancer (CMS-HCC)    • Former tobacco use    • Gout    • High cholesterol 12/12/2017   • Hyperlipidemia    • Hypertension 12/12/2017   • Neuropathy (CMS-HCC) 12/12/2017   • Pain 12/12/2017    \"Lower Back & Left Leg\"   • Pneumonia 1991    Left upper lobe   • Psychiatric problem     anxiety   • Sleep apnea 12/12/2017    CPAP USE   • Snoring     DX SU      Past Surgical History:   Procedure Laterality Date   • THORACOSCOPY  2/5/2018    Procedure: THORACOSCOPY- WEDGE RESECTION LT UPPER LOBE METASTASIS;  Surgeon: John H Ganser, M.D.;  Location: SURGERY [facility];  Service: General   • CATH PLACEMENT Left 10/7/2016    " "Procedure: CATH PLACEMENT - SUBCLAVIAN PORT;  Surgeon: Sho Bajwa M.D.;  Location: SURGERY SAME DAY [facility];  Service:    • COLON RESECTION  2016    [place]   • CLOSED REDUCTION  10/25/2012    Performed by Chavez Duran M.D. at SURGERY [facility]      Social History     Social History   • Marital status: Single     Spouse name: N/A   • Number of children: N/A   • Years of education: N/A     Occupational History   • Retired    • Former business owner      Social History Main Topics   • Smoking status: Former Smoker     Packs/day: 1.00     Years: 20.00     Types: Cigarettes     Quit date: 1/1/1998   • Smokeless tobacco: Never Used   • Alcohol use 0.0 oz/week      Comment: 3 per day   • Drug use: No   • Sexual activity: Not Currently     Other Topics Concern   • Not on file     Social History Narrative   • No narrative on file          Objective:     /80   Pulse 84   Temp 36.7 °C (98 °F)   Resp 20   Ht 1.753 m (5' 9\")   Wt (!) 140.6 kg (310 lb)   SpO2 95%   BMI 45.78 kg/m²      Physical Exam   Constitutional: He is oriented to person, place, and time. Vital signs are normal. He appears well-developed and well-nourished.   HENT:   Head: Normocephalic and atraumatic.   Eyes: EOM and lids are normal. Pupils are equal, round, and reactive to light. Left conjunctiva is injected.   Slit lamp exam:       The left eye shows no corneal abrasion (no obvious abrasion appreciated) and no fluorescein uptake.   Neck: Normal range of motion.   Cardiovascular: Normal rate.    Pulmonary/Chest: Effort normal.   Musculoskeletal: Normal range of motion.   Neurological: He is alert and oriented to person, place, and time.   Skin: Skin is warm and dry. Capillary refill takes less than 2 seconds.   Psychiatric: He has a normal mood and affect. His speech is normal and behavior is normal. Thought content normal.   Vitals reviewed.              Assessment/Plan:     1. Corneal irritation of left eye  - " polymixin-trimethoprim (POLYTRIM) 62097-6.1 UNIT/ML-% Solution; Place 1 Drop in left eye every 4 hours.  Dispense: 10 mL; Refill: 0    2. Cornea abrasion, left, initial encounter    May continue OTC eye drops as directed  Proper hand washing  Strict ER precautions for any development of pain to eye, blurry vision or any acute changes  Supportive care, differential diagnoses, and indications for immediate follow-up discussed with patient.    Pathogenesis of diagnosis discussed including typical length and natural progression.      Instructed to return to  or nearest emergency department if symptoms fail to improve, for any change in condition, further concerns, or new concerning symptoms.  Patient states understanding of the plan of care and discharge instructions.

## 2018-05-22 DIAGNOSIS — I10 ESSENTIAL HYPERTENSION: ICD-10-CM

## 2018-05-22 RX ORDER — LOSARTAN POTASSIUM 100 MG/1
100 TABLET ORAL DAILY
Qty: 90 TAB | Refills: 3 | Status: SHIPPED | OUTPATIENT
Start: 2018-05-22 | End: 2019-04-05 | Stop reason: SDUPTHER

## 2018-05-22 RX ORDER — TRIAMTERENE AND HYDROCHLOROTHIAZIDE 37.5; 25 MG/1; MG/1
1 TABLET ORAL DAILY
Qty: 90 TAB | Refills: 3 | Status: SHIPPED | OUTPATIENT
Start: 2018-05-22 | End: 2019-04-05 | Stop reason: SDUPTHER

## 2018-06-12 DIAGNOSIS — F51.04 CHRONIC INSOMNIA: ICD-10-CM

## 2018-06-12 DIAGNOSIS — F41.9 ANXIETY: ICD-10-CM

## 2018-06-13 RX ORDER — CLONAZEPAM 1 MG/1
1 TABLET ORAL 2 TIMES DAILY
Qty: 60 TAB | Refills: 2 | Status: SHIPPED
Start: 2018-06-13 | End: 2018-09-07 | Stop reason: SDUPTHER

## 2018-06-13 NOTE — TELEPHONE ENCOUNTER
Rx faxed to [facility] pharmacy at [phone], confirmation received, scanned to media.   I spoke with patient and we have cancelled his August appointment and r/s for 9/7/18 @ 1PM with PCP.

## 2018-06-13 NOTE — TELEPHONE ENCOUNTER
reviewed. Last filled on 5/13/18. Please fax prescription to pharmacy. Please also let patient know that in the future I will need to see him in the office for refills due to the new controlled substance law. As he will not need a new prescription until September, please help him move his appointment to the first or second week in September. Thank you.  Jocelyn Ramos M.D.

## 2018-06-19 NOTE — PROGRESS NOTES
"CC: Follow up for PSG results    HPI: 2/9/2018  Mr. Gerald Oshea is a 66 y/o male who was last seen 2/9/2018.  The PSG performed 3/21/2018 was a bilevel re-titration.  The patient was studied on bilevel of 20/16 where the AHI was 9.9 and 21/17 where the AHI was 20.7.  On bilevel 20/16 the patient's shivani saturation was 78% but the mean saturation was 89%.  Time spent with oxygen saturations below 89% was 134.4 minutes. The patient's baseline ahi was 97. The re-titration was done because of a high residual ahi and desaturations was also waking up with headaches. Dr. Sherman thought the patient would be best served by auto titrating bilevel with an EPAP of 16-21 cmH2O and an IPAP of 20-24 cmH2O with a 2 L/min bleed in.          Patient Active Problem List    Diagnosis Date Noted   • Colon cancer (HCC) 09/28/2016     Priority: Medium   • Essential hypertension 09/28/2016     Priority: Low   • Malignant neoplasm metastatic to left lung (HCC) 04/11/2018   • Diet-controlled diabetes mellitus (HCC) 04/11/2018   • Chronic respiratory failure with hypoxia (HCC) 04/11/2018   • Skin cancer 12/06/2017   • Morbid obesity with BMI of 45.0-49.9, adult (HCC) 09/20/2017   • Benign non-nodular prostatic hyperplasia with lower urinary tract symptoms 07/03/2017   • Obstructive sleep apnea 05/09/2017   • Neuropathy associated with cancer (HCC) 03/28/2017   • History of gout 09/28/2016   • Anxiety 09/28/2016   • Pure hypercholesterolemia 09/28/2016   • Elevated PSA, less than 10 ng/ml 09/28/2016       Past Medical History:   Diagnosis Date   • Anemia    • Anxiety    • Atrophy of right kidney     one functioning kidney   • Breath shortness    • Cancer (HCC) 2016    colon   rx surgery and chemo   • Colon cancer (HCC)    • Former tobacco use    • Gout    • High cholesterol 12/12/2017   • Hyperlipidemia    • Hypertension 12/12/2017   • Neuropathy (HCC) 12/12/2017   • Pain 12/12/2017    \"Lower Back & Left Leg\"   • Pneumonia 1991    " Left upper lobe   • Psychiatric problem     anxiety   • Sleep apnea 12/12/2017    CPAP USE   • Snoring     DX SU        Past Surgical History:   Procedure Laterality Date   • THORACOSCOPY  2/5/2018    Procedure: THORACOSCOPY- WEDGE RESECTION LT UPPER LOBE METASTASIS;  Surgeon: John H Ganser, M.D.;  Location: SURGERY [facility];  Service: General   • CATH PLACEMENT Left 10/7/2016    Procedure: CATH PLACEMENT - SUBCLAVIAN PORT;  Surgeon: Sho Bajwa M.D.;  Location: SURGERY SAME DAY [facility];  Service:    • COLON RESECTION  2016    [place]   • CLOSED REDUCTION  10/25/2012    Performed by Chavez Duran M.D. at SURGERY [facility]       Family History   Problem Relation Age of Onset   • Cancer Mother      Bladder   • Heart Disease Father    • Heart Disease Brother      CABG x2       Social History     Social History   • Marital status: Single     Spouse name: N/A   • Number of children: N/A   • Years of education: N/A     Occupational History   • Retired    • Former business owner      Social History Main Topics   • Smoking status: Former Smoker     Packs/day: 1.00     Years: 20.00     Types: Cigarettes     Quit date: 1/1/1998   • Smokeless tobacco: Never Used   • Alcohol use 0.0 oz/week      Comment: 3 per day   • Drug use: No   • Sexual activity: Not Currently     Other Topics Concern   • Not on file     Social History Narrative   • No narrative on file       Current Outpatient Prescriptions   Medication Sig Dispense Refill   • clonazePAM (KLONOPIN) 1 MG Tab Take 1 Tab by mouth 2 times a day for 30 days. 60 Tab 2   • triamterene-hctz (MAXZIDE-25/DYAZIDE) 37.5-25 MG Tab TAKE 1 TAB BY MOUTH EVERY DAY. 90 Tab 3   • losartan (COZAAR) 100 MG Tab TAKE 1 TAB BY MOUTH EVERY DAY. 90 Tab 3   • polymixin-trimethoprim (POLYTRIM) 59458-5.1 UNIT/ML-% Solution Place 1 Drop in left eye every 4 hours. 10 mL 0   • rosuvastatin (CRESTOR) 40 MG tablet Take 1 Tab by mouth every bedtime. 90 Tab 3   • gabapentin  "(NEURONTIN) 300 MG Cap Take 600 mg by mouth 2 Times a Day.     • capecitabine (XELODA) 500 MG tablet Take 1,250 mg/m2 by mouth 6 Times a Day.       No current facility-administered medications for this visit.     \"CURRENT RX\"    ALLERGIES: Ketamine; Lidocaine; and Morphine    ROS    Constitutional: Denies fever, chills, sweats,  weight loss, fatigue  Cardiovascular: Denies chest pain, tightness, palpitations, swelling in legs/feet, fainting, difficulty breathing when lying down but gets better when sitting up.   Respiratory: Denies shortness of breath, cough, sputum, wheezing, painful breathing, coughing up blood.   Sleep: per HPI  Gastrointestinal: Denies  difficulty swallowing, nausea, abdominal pain, diarrhea, constipation, heartburn..   Musculoskeletal: Denies painful joints, sore muscles, back pain.        PHYSICAL EXAM  MSEW, on supplemental oxygen  /70   Pulse 76   Resp 18   Ht 1.753 m (5' 9\")   Wt (!) 146.5 kg (323 lb)   SpO2 90%   BMI 47.70 kg/m²   Appearance: Well-nourished, well-developed, no acute distress  Eyes:  PERRLA, EOMI  ENMT: without lesions, deformities;hearing grossly intact; tongue normal, posterior pharynx without erythema or exudate; Mallampati classification:   Neck: Supple, trachea midline, no masses  Respiratory effort:  No intercostal retractions or use of accessory muscles  Lung auscultation:  No wheezes rhonchi rubs or rales  Cardiac: No murmurs, rubs, or gallops; regular rhythm, normal rate; no edema  Abdomen:  No tenderness, no organomegaly. Obese.  Musculoskeletal:  Grossly normal; gait and station normal; digits and nails normal  Skin:  No rashes, petechiae, cyanosis  Neurologic: without focal signs; oriented to person, time, place, and purpose; judgement intact  Psychiatric:  No depression, anxiety, agitation        PROBLEMS:    1. Obstructive sleep apnea    - DME BIPAP    2. Pure hypercholesterolemia      3. Morbid obesity with BMI of 45.0-49.9, adult (HCC)    - " OBESITY COUNSELING (No Charge): Patient identified as having weight management issue.  Appropriate orders and counseling given.    4. Essential hypertension      PLAN:     Mr. Gerald Oshea is a 68 y/o male who was last seen 2/9/2018.  The PSG performed 3/21/2018 was a bilevel re-titration.  The patient was studied on bilevel of 20/16 where the AHI was 9.9 and 21/17 where the AHI was 20.7.  On bilevel 20/16 the patient's shivani saturation was 78% but the mean saturation was 89%.  Time spent with oxygen saturations below 89% was 134.4 minutes. The patient's baseline ahi was 97. The re-titration was done because of a high residual ahi and desaturations was also waking up with headaches. Dr. Sherman thought the patient would be best served by auto titrating bilevel with an EPAP of 16-21 cmH2O and an IPAP of 20-24 cmH2O with a 2 L/min bleed in.    We will initiate auto titrating BiPAP with a minimum EPAP of 16 cmH2O and a maximum IPAP of 24 cmH2O with a pressure support of 4 and O2 2 L/min bleed in.  Patient will return in approximately 10 weeks for clinical and data chip review.  Return in about 10 weeks (around 8/29/2018).

## 2018-06-20 ENCOUNTER — SLEEP CENTER VISIT (OUTPATIENT)
Dept: SLEEP MEDICINE | Facility: MEDICAL CENTER | Age: 68
End: 2018-06-20
Payer: MEDICARE

## 2018-06-20 VITALS
WEIGHT: 315 LBS | SYSTOLIC BLOOD PRESSURE: 140 MMHG | HEART RATE: 76 BPM | DIASTOLIC BLOOD PRESSURE: 70 MMHG | RESPIRATION RATE: 18 BRPM | OXYGEN SATURATION: 90 % | BODY MASS INDEX: 46.65 KG/M2 | HEIGHT: 69 IN

## 2018-06-20 DIAGNOSIS — I10 ESSENTIAL HYPERTENSION: ICD-10-CM

## 2018-06-20 DIAGNOSIS — G47.33 OBSTRUCTIVE SLEEP APNEA: ICD-10-CM

## 2018-06-20 DIAGNOSIS — E66.01 MORBID OBESITY WITH BMI OF 45.0-49.9, ADULT (HCC): ICD-10-CM

## 2018-06-20 DIAGNOSIS — E78.00 PURE HYPERCHOLESTEROLEMIA: ICD-10-CM

## 2018-06-20 PROCEDURE — 99214 OFFICE O/P EST MOD 30 MIN: CPT | Performed by: INTERNAL MEDICINE

## 2018-07-06 ENCOUNTER — HOSPITAL ENCOUNTER (OUTPATIENT)
Dept: RADIOLOGY | Facility: MEDICAL CENTER | Age: 68
End: 2018-07-06
Attending: SPECIALIST
Payer: MEDICARE

## 2018-07-06 DIAGNOSIS — C18.2 MALIGNANT NEOPLASM OF ASCENDING COLON (HCC): ICD-10-CM

## 2018-07-06 PROCEDURE — 700117 HCHG RX CONTRAST REV CODE 255: Performed by: SPECIALIST

## 2018-07-06 PROCEDURE — 71260 CT THORAX DX C+: CPT

## 2018-07-06 RX ADMIN — IOHEXOL 50 ML: 240 INJECTION, SOLUTION INTRATHECAL; INTRAVASCULAR; INTRAVENOUS; ORAL at 13:38

## 2018-07-06 RX ADMIN — IOHEXOL 100 ML: 350 INJECTION, SOLUTION INTRAVENOUS at 15:41

## 2018-07-23 ENCOUNTER — APPOINTMENT (RX ONLY)
Dept: URBAN - METROPOLITAN AREA CLINIC 31 | Facility: CLINIC | Age: 68
Setting detail: DERMATOLOGY
End: 2018-07-23

## 2018-07-23 DIAGNOSIS — L81.4 OTHER MELANIN HYPERPIGMENTATION: ICD-10-CM

## 2018-07-23 DIAGNOSIS — L11.1 TRANSIENT ACANTHOLYTIC DERMATOSIS [GROVER]: ICD-10-CM

## 2018-07-23 DIAGNOSIS — Z85.828 PERSONAL HISTORY OF OTHER MALIGNANT NEOPLASM OF SKIN: ICD-10-CM

## 2018-07-23 DIAGNOSIS — Z87.2 PERSONAL HISTORY OF DISEASES OF THE SKIN AND SUBCUTANEOUS TISSUE: ICD-10-CM

## 2018-07-23 DIAGNOSIS — Z71.89 OTHER SPECIFIED COUNSELING: ICD-10-CM

## 2018-07-23 DIAGNOSIS — L82.1 OTHER SEBORRHEIC KERATOSIS: ICD-10-CM

## 2018-07-23 DIAGNOSIS — D18.0 HEMANGIOMA: ICD-10-CM

## 2018-07-23 DIAGNOSIS — D22 MELANOCYTIC NEVI: ICD-10-CM

## 2018-07-23 DIAGNOSIS — L57.0 ACTINIC KERATOSIS: ICD-10-CM

## 2018-07-23 DIAGNOSIS — L91.8 OTHER HYPERTROPHIC DISORDERS OF THE SKIN: ICD-10-CM

## 2018-07-23 PROBLEM — D18.01 HEMANGIOMA OF SKIN AND SUBCUTANEOUS TISSUE: Status: ACTIVE | Noted: 2018-07-23

## 2018-07-23 PROBLEM — D22.5 MELANOCYTIC NEVI OF TRUNK: Status: ACTIVE | Noted: 2018-07-23

## 2018-07-23 PROCEDURE — ? LIQUID NITROGEN

## 2018-07-23 PROCEDURE — 17000 DESTRUCT PREMALG LESION: CPT

## 2018-07-23 PROCEDURE — 99213 OFFICE O/P EST LOW 20 MIN: CPT | Mod: 25

## 2018-07-23 PROCEDURE — ? ADDITIONAL NOTES

## 2018-07-23 PROCEDURE — ? OBSERVATION AND MEASURE

## 2018-07-23 PROCEDURE — 17003 DESTRUCT PREMALG LES 2-14: CPT

## 2018-07-23 PROCEDURE — ? COUNSELING

## 2018-07-23 PROCEDURE — ? PRESCRIPTION SAMPLES PROVIDED

## 2018-07-23 ASSESSMENT — LOCATION DETAILED DESCRIPTION DERM
LOCATION DETAILED: LEFT SUPERIOR HELIX
LOCATION DETAILED: SUPERIOR THORACIC SPINE
LOCATION DETAILED: PERIUMBILICAL SKIN
LOCATION DETAILED: RIGHT CENTRAL FRONTAL SCALP
LOCATION DETAILED: LEFT SUPERIOR MEDIAL MIDBACK
LOCATION DETAILED: RIGHT SUPERIOR MEDIAL MIDBACK
LOCATION DETAILED: RIGHT MEDIAL UPPER BACK
LOCATION DETAILED: LEFT MEDIAL FRONTAL SCALP
LOCATION DETAILED: LEFT INFERIOR UPPER BACK
LOCATION DETAILED: RIGHT CENTRAL LATERAL NECK
LOCATION DETAILED: LEFT ANTERIOR PROXIMAL THIGH
LOCATION DETAILED: RIGHT POSTERIOR EAR

## 2018-07-23 ASSESSMENT — LOCATION ZONE DERM
LOCATION ZONE: TRUNK
LOCATION ZONE: EAR
LOCATION ZONE: LEG
LOCATION ZONE: SCALP
LOCATION ZONE: NECK

## 2018-07-23 ASSESSMENT — LOCATION SIMPLE DESCRIPTION DERM
LOCATION SIMPLE: RIGHT EAR
LOCATION SIMPLE: LEFT LOWER BACK
LOCATION SIMPLE: RIGHT UPPER BACK
LOCATION SIMPLE: ABDOMEN
LOCATION SIMPLE: UPPER BACK
LOCATION SIMPLE: LEFT SCALP
LOCATION SIMPLE: RIGHT LOWER BACK
LOCATION SIMPLE: NECK
LOCATION SIMPLE: RIGHT SCALP
LOCATION SIMPLE: LEFT THIGH
LOCATION SIMPLE: LEFT UPPER BACK
LOCATION SIMPLE: LEFT EAR

## 2018-07-23 NOTE — HPI: UPPER BODY SKIN CHECK
What Is The Reason For Today's Visit?: Upper Body Skin Exam
Additional History: He has not noticed any changes with his moles or any tender or bleeding spots.  He missed his follow ups due to his lung and colon cancer.

## 2018-07-23 NOTE — HPI: SKIN LESION
Is This A New Presentation, Or A Follow-Up?: Growth
How Severe Is Your Skin Lesion?: mild
Has Your Skin Lesion Been Treated?: not been treated
Which Family Member (Optional)?: Father
Is This A New Presentation, Or A Follow-Up?: Skin Lesions

## 2018-08-14 NOTE — ADDENDUM NOTE
Encounter addended by: Eugenie Orozco R.N. on: 8/14/2018 11:07 AM<BR>    Actions taken: Flowsheet accepted, Utilization Review saved

## 2018-08-29 ENCOUNTER — SLEEP CENTER VISIT (OUTPATIENT)
Dept: SLEEP MEDICINE | Facility: MEDICAL CENTER | Age: 68
End: 2018-08-29
Payer: MEDICARE

## 2018-08-29 VITALS
BODY MASS INDEX: 46.65 KG/M2 | WEIGHT: 315 LBS | DIASTOLIC BLOOD PRESSURE: 70 MMHG | RESPIRATION RATE: 16 BRPM | HEIGHT: 69 IN | HEART RATE: 64 BPM | SYSTOLIC BLOOD PRESSURE: 118 MMHG | OXYGEN SATURATION: 95 %

## 2018-08-29 DIAGNOSIS — G47.33 OSA (OBSTRUCTIVE SLEEP APNEA): ICD-10-CM

## 2018-08-29 DIAGNOSIS — I10 ESSENTIAL HYPERTENSION: ICD-10-CM

## 2018-08-29 DIAGNOSIS — E78.5 DYSLIPIDEMIA: ICD-10-CM

## 2018-08-29 DIAGNOSIS — E66.01 MORBID OBESITY WITH BMI OF 45.0-49.9, ADULT (HCC): ICD-10-CM

## 2018-08-29 DIAGNOSIS — Z87.891 HISTORY OF TOBACCO ABUSE: ICD-10-CM

## 2018-08-29 PROCEDURE — 99214 OFFICE O/P EST MOD 30 MIN: CPT | Performed by: INTERNAL MEDICINE

## 2018-08-29 RX ORDER — CLONAZEPAM 1 MG/1
0.5 TABLET ORAL 2 TIMES DAILY
COMMUNITY
End: 2018-09-07

## 2018-08-29 NOTE — PROGRESS NOTES
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  The PSG performed 3/21/2018 was a bilevel re-titration.  The patient was studied on bilevel of 20/16 where the AHI was 9.9 and 21/17 where the AHI was 20.7.  On bilevel 20/16 the patient's shivani saturation was 78% but the mean saturation was 89%.  Time spent with oxygen saturations below 89% was 134.4 minutes. The patient's baseline ahi was 97. The re-titration was done because of a high residual ahi and desaturations.  He was also waking up with headaches. Dr. Sherman thought the patient would be best served by auto titrating bilevel with an EPAP of 16-21 cmH2O and an IPAP of 20-24 cmH2O with a 2 L/min bleed in.  We initiated auto titrating BiPAP with a minimum EPAP of 16 cmH2O and a maximum IPAP of 24 cmH2O with a pressure support of 4 and O2 2 L/min bleed in.    However he is still on his former settings with a EPAP of 16 and an IPAP of 20. He wasn't eligible for a new machine according to Medicare    His 30 day compliance shows usage 100% of the time for an average of 7 hours and 53 minutes.  His residual apnea hypopnea index is 8.3.  He feels like he is deriving significant benefit from the use of his machine.    He is not having any significant issues with mask fit, mask leak, pressure tolerance, aerophagia, excessive airway dryness, or nasal congestion    Main complaint is the peripheral neuropathy 2/2 chemo.    Significant comorbidities and modifying factors include hypertension, history of colon cancer, history of lung metastasis status post resection, s/p chemo. Peripheral neuropathy, diabetes mellitus, chronic respiratory failure, neuropathy, anxiety, hypercholesterolemia, and gout.      Patient Active Problem List    Diagnosis Date Noted   • Colon cancer (HCC) 09/28/2016     Priority: Medium   • Essential hypertension 09/28/2016     Priority: Low   • Malignant  "neoplasm metastatic to left lung (HCC) 04/11/2018   • Diet-controlled diabetes mellitus (HCC) 04/11/2018   • Chronic respiratory failure with hypoxia (HCC) 04/11/2018   • Skin cancer 12/06/2017   • Morbid obesity with BMI of 45.0-49.9, adult (HCC) 09/20/2017   • Benign non-nodular prostatic hyperplasia with lower urinary tract symptoms 07/03/2017   • Obstructive sleep apnea 05/09/2017   • Neuropathy associated with cancer (HCC) 03/28/2017   • History of gout 09/28/2016   • Anxiety 09/28/2016   • Pure hypercholesterolemia 09/28/2016   • Elevated PSA, less than 10 ng/ml 09/28/2016       Past Medical History:   Diagnosis Date   • Anemia    • Anxiety    • Atrophy of right kidney     one functioning kidney   • Breath shortness    • Cancer (HCC) 2016    colon   rx surgery and chemo   • Colon cancer (HCC)    • Former tobacco use    • Gout    • High cholesterol 12/12/2017   • Hyperlipidemia    • Hypertension 12/12/2017   • Neuropathy (HCC) 12/12/2017   • Pain 12/12/2017    \"Lower Back & Left Leg\"   • Pneumonia 1991    Left upper lobe   • Psychiatric problem     anxiety   • Sleep apnea 12/12/2017    CPAP USE   • Snoring     DX SU        Past Surgical History:   Procedure Laterality Date   • THORACOSCOPY  2/5/2018    Procedure: THORACOSCOPY- WEDGE RESECTION LT UPPER LOBE METASTASIS;  Surgeon: John H Ganser, M.D.;  Location: SURGERY [facility];  Service: General   • CATH PLACEMENT Left 10/7/2016    Procedure: CATH PLACEMENT - SUBCLAVIAN PORT;  Surgeon: Sho Bajwa M.D.;  Location: SURGERY SAME DAY [facility];  Service:    • COLON RESECTION  2016    [place]   • CLOSED REDUCTION  10/25/2012    Performed by Chavez Duran M.D. at SURGERY [facility]       Family History   Problem Relation Age of Onset   • Cancer Mother         Bladder   • Heart Disease Father    • Heart Disease Brother         CABG x2       Social History     Social History   • Marital status: Single     Spouse name: N/A   • Number of " "children: N/A   • Years of education: N/A     Occupational History   • Retired    • Former business owner      Social History Main Topics   • Smoking status: Former Smoker     Packs/day: 1.00     Years: 20.00     Types: Cigarettes     Quit date: 1/1/1998   • Smokeless tobacco: Never Used   • Alcohol use 0.0 oz/week      Comment: 3 per day   • Drug use: No   • Sexual activity: Not Currently     Other Topics Concern   • Not on file     Social History Narrative   • No narrative on file       Current Outpatient Prescriptions   Medication Sig Dispense Refill   • clonazePAM (KLONOPIN) 1 MG Tab Take 0.5 mg by mouth 2 times a day.     • triamterene-hctz (MAXZIDE-25/DYAZIDE) 37.5-25 MG Tab TAKE 1 TAB BY MOUTH EVERY DAY. 90 Tab 3   • losartan (COZAAR) 100 MG Tab TAKE 1 TAB BY MOUTH EVERY DAY. 90 Tab 3   • rosuvastatin (CRESTOR) 40 MG tablet Take 1 Tab by mouth every bedtime. 90 Tab 3   • gabapentin (NEURONTIN) 300 MG Cap Take 600 mg by mouth 2 Times a Day.     • polymixin-trimethoprim (POLYTRIM) 78209-4.1 UNIT/ML-% Solution Place 1 Drop in left eye every 4 hours. 10 mL 0   • capecitabine (XELODA) 500 MG tablet Take 1,250 mg/m2 by mouth 6 Times a Day.       No current facility-administered medications for this visit.     \"CURRENT RX\"    ALLERGIES: Ketamine; Lidocaine; and Morphine    ROS    Constitutional: Denies fever, chills, sweats,  weight loss, fatigue  Cardiovascular: Denies chest pain, tightness, palpitations, swelling in legs/feet, fainting, difficulty breathing when lying down but gets better when sitting up.   Respiratory: Denies shortness of breath, cough, sputum, wheezing, painful breathing, coughing up blood.   Sleep: per HPI  Gastrointestinal: Denies  difficulty swallowing, nausea, abdominal pain, diarrhea, constipation, heartburn..   Musculoskeletal: Denies painful joints, sore muscles, back pain.        PHYSICAL EXAM  Obese  O2 on  /70   Pulse 64   Resp 16   Ht 1.753 m (5' 9\")   Wt (!) 143.8 kg (317 " lb)   SpO2 95%   BMI 46.81 kg/m²   Appearance: Well-nourished, well-developed, no acute distress  Eyes:  PERRLA, EOMI  ENMT: without lesions, deformities;hearing grossly intact; tongue normal, posterior pharynx without erythema or exudate; Mallampati classification:   Neck: Supple, trachea midline, no masses  Respiratory effort:  No intercostal retractions or use of accessory muscles  Lung auscultation:  No wheezes rhonchi rubs or rales  Cardiac: No murmurs, rubs, or gallops; regular rhythm, normal rate; no edema  Abdomen:  No tenderness, no organomegaly; obese  Musculoskeletal:  Grossly normal; gait and station normal; digits and nails normal  Skin:  No rashes, petechiae, cyanosis  Neurologic: without focal signs; oriented to person, time, place, and purpose; judgement intact  Psychiatric:  No depression, anxiety, agitation        PROBLEMS:  1. SU (obstructive sleep apnea)      2. Morbid obesity with BMI of 45.0-49.9, adult ([facility])    - OBESITY COUNSELING (No Charge): Patient identified as having weight management issue.  Appropriate orders and counseling given.    3. History of tobacco abuse      4. Essential hypertension      5. Dyslipidemia      PLAN:     Return in about 3 months (around 11/29/2018).    Has been advised to continue the current bilevel 20/16 cmH2O with bleed in oxygen, clean equipment frequently, and get new mask and supplies as allowed by insurance and DME. Advised about potential problems including nasal obstruction/stuffiness, mask leak or discomfort , frequent awakenings, chill or dryness of the upper airway, noise, inconvenience, and lack of benefit. Recommend an earlier appointment, if significant treatment barriers develop.

## 2018-09-05 LAB — PSA SERPL-MCNC: 6.7 NG/ML (ref 0–4)

## 2018-09-07 ENCOUNTER — OFFICE VISIT (OUTPATIENT)
Dept: MEDICAL GROUP | Facility: PHYSICIAN GROUP | Age: 68
End: 2018-09-07
Payer: MEDICARE

## 2018-09-07 VITALS
SYSTOLIC BLOOD PRESSURE: 114 MMHG | HEIGHT: 69 IN | BODY MASS INDEX: 46.65 KG/M2 | RESPIRATION RATE: 18 BRPM | TEMPERATURE: 98.4 F | WEIGHT: 315 LBS | OXYGEN SATURATION: 93 % | DIASTOLIC BLOOD PRESSURE: 68 MMHG | HEART RATE: 74 BPM

## 2018-09-07 DIAGNOSIS — Z23 NEED FOR VACCINATION: ICD-10-CM

## 2018-09-07 DIAGNOSIS — C80.1 NEUROPATHY ASSOCIATED WITH CANCER (HCC): ICD-10-CM

## 2018-09-07 DIAGNOSIS — C78.02 MALIGNANT NEOPLASM METASTATIC TO LEFT LUNG (HCC): ICD-10-CM

## 2018-09-07 DIAGNOSIS — F41.9 ANXIETY: ICD-10-CM

## 2018-09-07 DIAGNOSIS — G63 NEUROPATHY ASSOCIATED WITH CANCER (HCC): ICD-10-CM

## 2018-09-07 DIAGNOSIS — F51.04 CHRONIC INSOMNIA: ICD-10-CM

## 2018-09-07 DIAGNOSIS — R97.20 ELEVATED PSA, LESS THAN 10 NG/ML: ICD-10-CM

## 2018-09-07 DIAGNOSIS — C18.6 MALIGNANT NEOPLASM OF DESCENDING COLON (HCC): ICD-10-CM

## 2018-09-07 PROCEDURE — G0009 ADMIN PNEUMOCOCCAL VACCINE: HCPCS | Performed by: FAMILY MEDICINE

## 2018-09-07 PROCEDURE — 99214 OFFICE O/P EST MOD 30 MIN: CPT | Mod: 25 | Performed by: FAMILY MEDICINE

## 2018-09-07 PROCEDURE — 90670 PCV13 VACCINE IM: CPT | Performed by: FAMILY MEDICINE

## 2018-09-07 RX ORDER — CLONAZEPAM 1 MG/1
1 TABLET ORAL EVERY 12 HOURS PRN
Qty: 30 TAB | Refills: 2 | Status: SHIPPED
Start: 2018-09-13 | End: 2018-09-28 | Stop reason: SDUPTHER

## 2018-09-07 ASSESSMENT — PAIN SCALES - GENERAL: PAINLEVEL: NO PAIN

## 2018-09-08 NOTE — ASSESSMENT & PLAN NOTE
Malignant neoplasm metastatic to left lung  Patient completed chemotherapy approximately two months ago.  He has been following closely with his oncologist, Dr. Silverman. He will be having further imaging soon.

## 2018-09-08 NOTE — ASSESSMENT & PLAN NOTE
Patient's PSA is rising.  We reviewed his most recent lab results.  He has had elevated PSAs in the past.  He also recently had a CT scan that showed an enlarged prostate.  He has not seen a urologist or had a prostate biopsy before.    Results for SELWYN ASIFY GUILLAUME (MRN [id number]) as of 9/7/2018 19:38   Ref. Range 10/3/2016 10:39 6/26/2017 09:16 9/4/2018 10:04   Prostatic Specific Antigen Tot Latest Ref Range: 0.0 - 4.0 ng/mL 7.74 (H) 4.8 (H) 6.7 (H)

## 2018-09-08 NOTE — ASSESSMENT & PLAN NOTE
Chronic insomnia  Mood and sleep improved with current medication and therapy.  Taking medications as prescribed without side effects.  He has been unable to wean off.  When tried in the past, he ended up in the emergency room.  Patient denies SI/HI.

## 2018-09-08 NOTE — ASSESSMENT & PLAN NOTE
Patient continues to struggle with neuropathy in his hands and lower legs.  For this, he is taking gabapentin 600mg up to three times a day.  No adverse effects.  He still has some breakthrough numbness and tingling.  He wonders if there are other medications he can try.

## 2018-09-08 NOTE — PROGRESS NOTES
Subjective:   Gerald Asif is a 68 y.o. male here today for follow-up elevated PSA, colon cancer, anxiety.    Elevated PSA, less than 10 ng/ml  Patient's PSA is rising.  We reviewed his most recent lab results.  He has had elevated PSAs in the past.  He also recently had a CT scan that showed an enlarged prostate.  He has not seen a urologist or had a prostate biopsy before.    Results for GERALD ASIF (MRN [id number]) as of 9/7/2018 19:38   Ref. Range 10/3/2016 10:39 6/26/2017 09:16 9/4/2018 10:04   Prostatic Specific Antigen Tot Latest Ref Range: 0.0 - 4.0 ng/mL 7.74 (H) 4.8 (H) 6.7 (H)     Colon cancer (CMS-HCC)  Malignant neoplasm metastatic to left lung  Patient completed chemotherapy approximately two months ago.  He has been following closely with his oncologist, Dr. Silverman. He will be having further imaging soon.    Neuropathy associated with cancer (CMS-HCC)  Patient continues to struggle with neuropathy in his hands and lower legs.  For this, he is taking gabapentin 600mg up to three times a day.  No adverse effects.  He still has some breakthrough numbness and tingling.  He wonders if there are other medications he can try.    Anxiety  Chronic insomnia  Mood and sleep improved with current medication and therapy.  Taking medications as prescribed without side effects.  He has been unable to wean off.  When tried in the past, he ended up in the emergency room.  Patient denies SI/HI.     Current medicines (including changes today)  Current Outpatient Prescriptions   Medication Sig Dispense Refill   • [START ON 9/13/2018] clonazePAM (KLONOPIN) 1 MG Tab Take 1 Tab by mouth every 12 hours as needed (anxiety or sleep) for up to 30 days. 30 Tab 2   • triamterene-hctz (MAXZIDE-25/DYAZIDE) 37.5-25 MG Tab TAKE 1 TAB BY MOUTH EVERY DAY. 90 Tab 3   • losartan (COZAAR) 100 MG Tab TAKE 1 TAB BY MOUTH EVERY DAY. 90 Tab 3   • rosuvastatin (CRESTOR) 40 MG tablet Take 1 Tab by mouth every bedtime. 90 Tab  "3   • gabapentin (NEURONTIN) 300 MG Cap Take 600 mg by mouth 2 Times a Day.     • polymixin-trimethoprim (POLYTRIM) 66900-4.1 UNIT/ML-% Solution Place 1 Drop in left eye every 4 hours. 10 mL 0     No current facility-administered medications for this visit.      He  has a past medical history of Anemia; Anxiety; Atrophy of right kidney; Breath shortness; Cancer (HCC) (2016); Colon cancer (HCC); Former tobacco use; Gout; High cholesterol (12/12/2017); Hyperlipidemia; Hypertension (12/12/2017); Neuropathy (HCC) (12/12/2017); Pain (12/12/2017); Pneumonia (1991); Psychiatric problem; Sleep apnea (12/12/2017); and Snoring.    ROS   No chest pain, no shortness of breath, no abdominal pain.     Objective:     Physical Exam:  Blood pressure 114/68, pulse 74, temperature 36.9 °C (98.4 °F), resp. rate 18, height 1.753 m (5' 9\"), weight (!) 145.2 kg (320 lb 3.2 oz), SpO2 93 %. Body mass index is 47.29 kg/m².   Constitutional: Alert, no distress, obese.  Skin: Warm, dry, good turgor, no rashes in visible areas.  Eye: Equal, round and reactive, conjunctiva clear, lids normal.  ENMT: Lips without lesions, good dentition, moist mucous membranes.  Neck: Trachea midline, no masses, no thyromegaly.  Respiratory: Unlabored respiratory effort, lungs clear to auscultation, no wheezes, no rhonchi. +NC with supplemental oxygen.  Cardiovascular: Normal S1, S2, no murmur, no edema.  Abdomen: Soft, non-tender, no masses, no hepatosplenomegaly.  Psych: Alert and oriented x3, normal affect and mood.    Assessment and Plan:     1. Elevated PSA, less than 10 ng/ml  PSA has increased from last year.  He does have an enlarged prostate on recent CT scan.  Patient is very hesitant to see a urologist.  We agreed to recheck in 1-2 months.  If PSA does not decrease, will refer to urology.  - PSA TOTAL + %FREE; Future    2. Malignant neoplasm of descending colon (HCC)  3. Malignant neoplasm metastatic to left lung (HCC)  Stable.  Patient has recently " finished his course of chemotherapy.  Following closely with oncology.  Will continue to monitor.    4. Neuropathy associated with cancer (HCC)  Stable.  We briefly discussed a trial of Lyrica, but due to adverse effect of weight gain, patient declined.  Continue gabapentin and monitor.    5. Anxiety  Patient is feeling well on current medications. Will continue. Denies any suicidal or homicidal ideation. Emphasized importance of healthy diet and exercise. Discussed that should the patient have any symptoms they should call suicide prevention hotline or report to the emergency room immediately.  - clonazePAM (KLONOPIN) 1 MG Tab; Take 1 Tab by mouth every 12 hours as needed (anxiety or sleep) for up to 30 days.  Dispense: 30 Tab; Refill: 2    6. Chronic insomnia  Chronic issue for patient. Discussed benefits, risks and alternatives to medication. Emphasized importance of maintaining good sleep hygiene including: no caffeine after 3pm, no napping, using bedroom only for sleep or sex, no TV or phones before bed. Prescription for clonazepam filled. Continue to monitor.  - clonazePAM (KLONOPIN) 1 MG Tab; Take 1 Tab by mouth every 12 hours as needed (anxiety or sleep) for up to 30 days.  Dispense: 30 Tab; Refill: 2    7. Need for vaccination  Prevnar-13 discussed and administered in office today.  - PNEUMOCOCCAL CONJUGATE VACCINE 13-VALENT    Followup: Return in about 3 months (around 12/7/2018) for f/u PSA, clonazepam refill, short.         PLEASE NOTE: This dictation was created using voice recognition software. I have made every reasonable attempt to correct obvious errors, but I expect that there are errors of grammar and possibly content that I did not discover before finalizing the note.

## 2018-09-28 ENCOUNTER — TELEPHONE (OUTPATIENT)
Dept: MEDICAL GROUP | Facility: PHYSICIAN GROUP | Age: 68
End: 2018-09-28

## 2018-09-28 DIAGNOSIS — F41.9 ANXIETY: ICD-10-CM

## 2018-09-28 DIAGNOSIS — F51.04 CHRONIC INSOMNIA: ICD-10-CM

## 2018-09-28 RX ORDER — CLONAZEPAM 1 MG/1
1 TABLET ORAL EVERY 12 HOURS PRN
Qty: 60 TAB | Refills: 2 | Status: SHIPPED
Start: 2018-09-28 | End: 2018-10-28

## 2018-09-28 NOTE — TELEPHONE ENCOUNTER
Yes, it does appear that I entered in the wrong quantity.  I will fax over a new prescription.  -Jocelyn Ramos M.D.

## 2018-09-28 NOTE — TELEPHONE ENCOUNTER
VOICEMAIL  1. Caller Name: [facility] pharmacist                    Call Back Number: [phone]    2. Message: [facility] pharmacy called stating last rx for Klonopin was written for 1 tab Q12H with quantity of 30. Patient is requesting a new rx be written for 60 tablets so he may take BID.  Pharmacy is asking if we would like to change?  They will need a new rx faxed if so and they will delete refills for old rx.     3. Patient approves office to leave a detailed voicemail/MyChart message: yes    Routing to PCP to advise.

## 2018-10-29 ENCOUNTER — HOSPITAL ENCOUNTER (OUTPATIENT)
Dept: RADIOLOGY | Facility: MEDICAL CENTER | Age: 68
End: 2018-10-29
Attending: SPECIALIST
Payer: MEDICARE

## 2018-10-29 DIAGNOSIS — C18.2 MALIGNANT NEOPLASM OF ASCENDING COLON (HCC): ICD-10-CM

## 2018-10-29 PROCEDURE — 700117 HCHG RX CONTRAST REV CODE 255: Performed by: SPECIALIST

## 2018-10-29 PROCEDURE — 71260 CT THORAX DX C+: CPT

## 2018-10-29 RX ADMIN — IOHEXOL 100 ML: 350 INJECTION, SOLUTION INTRAVENOUS at 15:14

## 2018-11-28 NOTE — PROGRESS NOTES
CC: Follow up for sleep disturbance  HPI:  Mr. 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  At that time his 30-day compliance was 100% for an average of 7 hours and 53 minutes.  He had a residual apnea hypopnea index of 8.3 but felt that he was deriving significant benefit from the use of his CPAP unit.  He was not having any significant issues.  We continued his current bilevel 20/16 cm water with bleed-in oxygen and he is here today for a clinical and machine download review.    He is 30-day compliance is 100% for 7 hours and 56 minutes.  However on bilevel 20/16 cm water he has an average residual apnea hypopnea index of 31.2.  He has an average time in large leak a day of 5 hours and 16 minutes.  We will provide him with a mask fit today.    The patient's original PSG showed an AHI of 97.      Significant comorbidities and modifying factors include hypertension, history of colon cancer, history of lung metastases status post wedge resection, status post chemotherapy, history of tobacco abuse, peripheral neuropathy secondary to chemotherapy, diabetes mellitus, chronic respiratory failure, anxiety, hypercholesterolemia, and gout      Patient Active Problem List    Diagnosis Date Noted   • Colon cancer (HCC) 09/28/2016     Priority: Medium   • Essential hypertension 09/28/2016     Priority: Low   • Chronic insomnia 09/07/2018   • Malignant neoplasm metastatic to left lung (HCC) 04/11/2018   • Diet-controlled diabetes mellitus (HCC) 04/11/2018   • Chronic respiratory failure with hypoxia (HCC) 04/11/2018   • Skin cancer 12/06/2017   • Morbid obesity with BMI of 45.0-49.9, adult (HCC) 09/20/2017   • Benign non-nodular prostatic hyperplasia with lower urinary tract symptoms 07/03/2017   • Obstructive sleep apnea 05/09/2017   • Neuropathy associated with cancer (HCC) 03/28/2017   • History of gout 09/28/2016   • Anxiety 09/28/2016   • Pure hypercholesterolemia  "09/28/2016   • Elevated PSA, less than 10 ng/ml 09/28/2016       Past Medical History:   Diagnosis Date   • Anemia    • Anxiety    • Atrophy of right kidney     one functioning kidney   • Breath shortness    • Cancer (HCC) 2016    colon   rx surgery and chemo   • Colon cancer (HCC)    • Former tobacco use    • Gout    • High cholesterol 12/12/2017   • Hyperlipidemia    • Hypertension 12/12/2017   • Neuropathy (HCC) 12/12/2017   • Pain 12/12/2017    \"Lower Back & Left Leg\"   • Pneumonia 1991    Left upper lobe   • Psychiatric problem     anxiety   • Sleep apnea 12/12/2017    CPAP USE   • Snoring     DX SU        Past Surgical History:   Procedure Laterality Date   • THORACOSCOPY  2/5/2018    Procedure: THORACOSCOPY- WEDGE RESECTION LT UPPER LOBE METASTASIS;  Surgeon: John H Ganser, M.D.;  Location: SURGERY [facility];  Service: General   • CATH PLACEMENT Left 10/7/2016    Procedure: CATH PLACEMENT - SUBCLAVIAN PORT;  Surgeon: Sho Bajwa M.D.;  Location: SURGERY SAME DAY [facility];  Service:    • COLON RESECTION  2016    Daniel   • CLOSED REDUCTION  10/25/2012    Performed by Chavez Duran M.D. at SURGERY [facility]       Family History   Problem Relation Age of Onset   • Cancer Mother         Bladder   • Heart Disease Father    • Heart Disease Brother         CABG x2       Social History     Social History   • Marital status: Single     Spouse name: N/A   • Number of children: N/A   • Years of education: N/A     Occupational History   • Retired    • Former business owner      Social History Main Topics   • Smoking status: Former Smoker     Packs/day: 1.00     Years: 20.00     Types: Cigarettes     Quit date: 1/1/1998   • Smokeless tobacco: Never Used   • Alcohol use 1.8 oz/week     3 Glasses of wine per week      Comment: 3 per day   • Drug use: No   • Sexual activity: Not Currently     Other Topics Concern   • Not on file     Social History Narrative   • No narrative on file " "      Current Outpatient Prescriptions   Medication Sig Dispense Refill   • clonazePAM (KLONOPIN) 1 MG Tab Take 0.5 mg by mouth 2 times a day.     • triamterene-hctz (MAXZIDE-25/DYAZIDE) 37.5-25 MG Tab TAKE 1 TAB BY MOUTH EVERY DAY. 90 Tab 3   • losartan (COZAAR) 100 MG Tab TAKE 1 TAB BY MOUTH EVERY DAY. 90 Tab 3   • rosuvastatin (CRESTOR) 40 MG tablet Take 1 Tab by mouth every bedtime. 90 Tab 3   • gabapentin (NEURONTIN) 300 MG Cap Take 600 mg by mouth 2 Times a Day.     • polymixin-trimethoprim (POLYTRIM) 24590-4.1 UNIT/ML-% Solution Place 1 Drop in left eye every 4 hours. 10 mL 0     No current facility-administered medications for this visit.     \"CURRENT RX\"    ALLERGIES: Ketamine; Lidocaine; and Morphine    ROS    Constitutional: Denies fever, chills, sweats,  weight loss, fatigue  Cardiovascular: Denies chest pain, tightness, palpitations, swelling in legs/feet, fainting, difficulty breathing when lying down but gets better when sitting up.   Respiratory: Denies shortness of breath, cough, sputum, wheezing, painful breathing, coughing up blood.   Sleep: per HPI  Gastrointestinal: Denies  difficulty swallowing, nausea, abdominal pain, diarrhea, constipation, heartburn..   Musculoskeletal: Denies painful joints, sore muscles, back pain.    Skin: query folliculitis      PHYSICAL EXAM  Obese    /60 (BP Location: Left arm, Patient Position: Sitting, BP Cuff Size: Large adult)   Pulse 81   Resp 16   Ht 1.753 m (5' 9\")   Wt (!) 137.4 kg (303 lb)   SpO2 93% Comment: 3lpm pulsed  BMI 44.75 kg/m²   Appearance: Well-nourished, well-developed, no acute distress  Eyes:  PERRLA, EOMI; glasses  ENMT: without lesions, deformities;hearing grossly intact; tongue normal, posterior pharynx without erythema or exudate; Mallampati classification:   Neck: Supple, trachea midline, no masses  Respiratory effort:  No intercostal retractions or use of accessory muscles  Lung auscultation:  No wheezes rhonchi rubs or " rales  Cardiac: No murmurs, rubs, or gallops; regular rhythm, normal rate; no edema  Abdomen:  No tenderness, no organomegaly.  Obese  Musculoskeletal:  Grossly normal; gait and station normal; digits and nails normal  Skin:  No rashes, petechiae, cyanosis  Neurologic: without focal signs; oriented to person, time, place, and purpose; judgement intact  Psychiatric:  No depression, anxiety, agitation        PROBLEMS:  1. Obstructive sleep apnea    - DME Mask and Supplies    2. Pure hypercholesterolemia      3. Morbid obesity with BMI of 45.0-49.9, adult (HCC)    - OBESITY COUNSELING (No Charge): Patient identified as having weight management issue.  Appropriate orders and counseling given.    4. Neuropathy associated with cancer (HCC)      5. Malignant neoplasm metastatic to left lung (HCC)      6. Essential hypertension      7. Malignant neoplasm of descending colon (HCC)      8. Chronic respiratory failure with hypoxia (HCC)      9. Benign non-nodular prostatic hyperplasia with lower urinary tract symptoms        PLAN:     Return in about 10 weeks (around 2/7/2019).    Has been advised to continue the current bilevel 20/16 with 3 L/min oxygen bleed in, clean equipment frequently, and get new mask and supplies as allowed by insurance and DME. Advised about potential problems including nasal obstruction/stuffiness, mask leak or discomfort , frequent awakenings, chill or dryness of the upper airway, noise, inconvenience, and lack of benefit. Recommend an earlier appointment, if significant treatment barriers develop.    Will provide him with a mask fit today.  We will have him back in 10 weeks for reevaluation.    Regarding his myriad other medical problems, he will follow up with his health care practitioners per their recommendations.

## 2018-11-29 ENCOUNTER — SLEEP CENTER VISIT (OUTPATIENT)
Dept: SLEEP MEDICINE | Facility: MEDICAL CENTER | Age: 68
End: 2018-11-29
Payer: MEDICARE

## 2018-11-29 VITALS
OXYGEN SATURATION: 93 % | DIASTOLIC BLOOD PRESSURE: 60 MMHG | RESPIRATION RATE: 16 BRPM | HEART RATE: 81 BPM | SYSTOLIC BLOOD PRESSURE: 120 MMHG | WEIGHT: 303 LBS | BODY MASS INDEX: 44.88 KG/M2 | HEIGHT: 69 IN

## 2018-11-29 DIAGNOSIS — G63 NEUROPATHY ASSOCIATED WITH CANCER (HCC): ICD-10-CM

## 2018-11-29 DIAGNOSIS — E78.00 PURE HYPERCHOLESTEROLEMIA: ICD-10-CM

## 2018-11-29 DIAGNOSIS — C18.6 MALIGNANT NEOPLASM OF DESCENDING COLON (HCC): ICD-10-CM

## 2018-11-29 DIAGNOSIS — G47.33 OBSTRUCTIVE SLEEP APNEA: ICD-10-CM

## 2018-11-29 DIAGNOSIS — I10 ESSENTIAL HYPERTENSION: ICD-10-CM

## 2018-11-29 DIAGNOSIS — E66.01 MORBID OBESITY WITH BMI OF 45.0-49.9, ADULT (HCC): ICD-10-CM

## 2018-11-29 DIAGNOSIS — J96.11 CHRONIC RESPIRATORY FAILURE WITH HYPOXIA (HCC): ICD-10-CM

## 2018-11-29 DIAGNOSIS — N40.1 BENIGN NON-NODULAR PROSTATIC HYPERPLASIA WITH LOWER URINARY TRACT SYMPTOMS: ICD-10-CM

## 2018-11-29 DIAGNOSIS — C80.1 NEUROPATHY ASSOCIATED WITH CANCER (HCC): ICD-10-CM

## 2018-11-29 DIAGNOSIS — C78.02 MALIGNANT NEOPLASM METASTATIC TO LEFT LUNG (HCC): ICD-10-CM

## 2018-11-29 PROCEDURE — 99214 OFFICE O/P EST MOD 30 MIN: CPT | Performed by: INTERNAL MEDICINE

## 2018-11-29 RX ORDER — CLONAZEPAM 1 MG/1
0.5 TABLET ORAL 2 TIMES DAILY
COMMUNITY
End: 2018-12-29 | Stop reason: SDUPTHER

## 2018-12-05 LAB
PSA FREE MFR SERPL: 18.4 %
PSA FREE SERPL-MCNC: 1.45 NG/ML
PSA SERPL-MCNC: 7.9 NG/ML (ref 0–4)

## 2018-12-07 ENCOUNTER — OFFICE VISIT (OUTPATIENT)
Dept: MEDICAL GROUP | Facility: PHYSICIAN GROUP | Age: 68
End: 2018-12-07
Payer: MEDICARE

## 2018-12-07 VITALS
SYSTOLIC BLOOD PRESSURE: 110 MMHG | TEMPERATURE: 97.6 F | OXYGEN SATURATION: 95 % | HEART RATE: 78 BPM | DIASTOLIC BLOOD PRESSURE: 74 MMHG | WEIGHT: 303 LBS | BODY MASS INDEX: 44.88 KG/M2 | HEIGHT: 69 IN | RESPIRATION RATE: 18 BRPM

## 2018-12-07 DIAGNOSIS — Z23 NEED FOR VACCINATION: ICD-10-CM

## 2018-12-07 DIAGNOSIS — E11.9 DIET-CONTROLLED DIABETES MELLITUS (HCC): ICD-10-CM

## 2018-12-07 DIAGNOSIS — R97.20 ELEVATED PSA, LESS THAN 10 NG/ML: ICD-10-CM

## 2018-12-07 DIAGNOSIS — J96.11 CHRONIC RESPIRATORY FAILURE WITH HYPOXIA (HCC): ICD-10-CM

## 2018-12-07 DIAGNOSIS — G63 NEUROPATHY ASSOCIATED WITH CANCER (HCC): ICD-10-CM

## 2018-12-07 DIAGNOSIS — E78.00 PURE HYPERCHOLESTEROLEMIA: ICD-10-CM

## 2018-12-07 DIAGNOSIS — C80.1 NEUROPATHY ASSOCIATED WITH CANCER (HCC): ICD-10-CM

## 2018-12-07 DIAGNOSIS — C18.6 MALIGNANT NEOPLASM OF DESCENDING COLON (HCC): ICD-10-CM

## 2018-12-07 PROBLEM — C78.02 MALIGNANT NEOPLASM METASTATIC TO LEFT LUNG (HCC): Status: RESOLVED | Noted: 2018-04-11 | Resolved: 2018-12-07

## 2018-12-07 PROCEDURE — 99214 OFFICE O/P EST MOD 30 MIN: CPT | Mod: 25 | Performed by: FAMILY MEDICINE

## 2018-12-07 PROCEDURE — 90662 IIV NO PRSV INCREASED AG IM: CPT | Performed by: FAMILY MEDICINE

## 2018-12-07 PROCEDURE — G0008 ADMIN INFLUENZA VIRUS VAC: HCPCS | Performed by: FAMILY MEDICINE

## 2018-12-07 RX ORDER — DULOXETIN HYDROCHLORIDE 20 MG/1
20 CAPSULE, DELAYED RELEASE ORAL DAILY
Qty: 30 CAP | Refills: 2 | Status: SHIPPED | OUTPATIENT
Start: 2018-12-07 | End: 2019-04-05

## 2018-12-08 NOTE — ASSESSMENT & PLAN NOTE
This is one of patient's main concerns today.  He continues to struggle with painful pins and needles sensations in his hands and feet.  He is currently taking gabapentin 300mg 4-6 times a day.  It has not improved his pain for him.  He is interested in trying something else.

## 2018-12-08 NOTE — ASSESSMENT & PLAN NOTE
Gerald continues to wear supplemental oxygen.  He was started after surgery to remove a lung metastasis.  He is currently wearing 2-3L 24/7.  He has tried removing his oxygen at home and his oxygen saturation will go down into the mid to high 80's.  Denies cough or shortness of breath.

## 2018-12-08 NOTE — ASSESSMENT & PLAN NOTE
Patient has lost 20 pounds since his last appointment.  He has been working on appropriate portion sizes.  He is due for labs to follow his diabetes.  Not currently on medication.

## 2018-12-08 NOTE — ASSESSMENT & PLAN NOTE
He has completed his second round of chemotherapy and will be having his port surgically removed later this month.  He will also be establishing with a new oncologist as Dr. Silverman has retired.

## 2018-12-08 NOTE — PROGRESS NOTES
Subjective:   Gerald Oshea is a 68 y.o. male here today for follow-up elevated PSA, neuropathy and colon cancer.  He is unaccompanied for today's appointment.    Elevated PSA, less than 10 ng/ml  Patient's PSA is 7.9, this is higher than when previously tested.  Review of his chart shows that his PSA has been elevated for the last several years.  It has fluctuated up and down, but the overall trend is up.  He is very hesitant to see a urologist as he does not want to have a prostate biopsy.  He does have some urinary frequency.  A CT scan from this year showed an enlarged prostate.    Diet-controlled diabetes mellitus (HCC)  Patient has lost 20 pounds since his last appointment.  He has been working on appropriate portion sizes.  He is due for labs to follow his diabetes.  Not currently on medication.    Colon cancer (CMS-HCC)  He has completed his second round of chemotherapy and will be having his port surgically removed later this month.  He will also be establishing with a new oncologist as Dr. Silverman has retired.    Neuropathy associated with cancer (CMS-HCC)  This is one of patient's main concerns today.  He continues to struggle with painful pins and needles sensations in his hands and feet.  He is currently taking gabapentin 300mg 4-6 times a day.  It has not improved his pain for him.  He is interested in trying something else.    Chronic respiratory failure with hypoxia (HCC)  Gerald continues to wear supplemental oxygen.  He was started after surgery to remove a lung metastasis.  He is currently wearing 2-3L 24/7.  He has tried removing his oxygen at home and his oxygen saturation will go down into the mid to high 80's.  Denies cough or shortness of breath.     Current medicines (including changes today)  Current Outpatient Prescriptions   Medication Sig Dispense Refill   • DULoxetine (CYMBALTA) 20 MG Cap DR Particles Take 1 Cap by mouth every day. 30 Cap 2   • clonazePAM (KLONOPIN) 1 MG Tab Take  "0.5 mg by mouth 2 times a day.     • triamterene-hctz (MAXZIDE-25/DYAZIDE) 37.5-25 MG Tab TAKE 1 TAB BY MOUTH EVERY DAY. 90 Tab 3   • losartan (COZAAR) 100 MG Tab TAKE 1 TAB BY MOUTH EVERY DAY. 90 Tab 3   • polymixin-trimethoprim (POLYTRIM) 96856-1.1 UNIT/ML-% Solution Place 1 Drop in left eye every 4 hours. (Patient not taking: Reported on 12/7/2018) 10 mL 0   • rosuvastatin (CRESTOR) 40 MG tablet Take 1 Tab by mouth every bedtime. 90 Tab 3   • gabapentin (NEURONTIN) 300 MG Cap Take 600 mg by mouth 2 Times a Day.       No current facility-administered medications for this visit.      He  has a past medical history of Anemia; Anxiety; Atrophy of right kidney; Breath shortness; Cancer (formerly [facility]) (2016); Colon cancer (HCC); Former tobacco use; Gout; High cholesterol (12/12/2017); Hyperlipidemia; Hypertension (12/12/2017); Neuropathy (formerly [facility]) (12/12/2017); Pain (12/12/2017); Pneumonia (1991); Psychiatric problem; Sleep apnea (12/12/2017); and Snoring.    ROS   No chest pain, no shortness of breath, no abdominal pain.     Objective:     Physical Exam:  Blood pressure 110/74, pulse 78, temperature 36.4 °C (97.6 °F), resp. rate 18, height 1.753 m (5' 9\"), weight (!) 137.4 kg (303 lb), SpO2 95 %. Body mass index is 44.75 kg/m².   Constitutional: Alert, no distress, obese.  Skin: Warm, dry, good turgor, no rashes in visible areas.  Eye: Equal, round and reactive, conjunctiva clear, lids normal.  ENMT: Lips without lesions, good dentition, moist mucous membranes.  Neck: Trachea midline, no masses, no thyromegaly.  Respiratory: Unlabored respiratory effort, lungs clear to auscultation, no wheezes, no rhonchi. +NC with oxygen.  Cardiovascular: Normal S1, S2, no murmur, no edema.  Abdomen: Soft, no gross masses.  Psych: Alert and oriented x3, normal affect and mood.    Assessment and Plan:     1. Elevated PSA, less than 10 ng/ml  PSA continues to trend upwards.  I had a lengthy discussion with patient and he is agreeable to seeing a " urologist.  Referral placed.  - REFERRAL TO UROLOGY    2. Diet-controlled diabetes mellitus (HCC)  Chronic and stable.  Check labs.  - COMP METABOLIC PANEL; Future  - HEMOGLOBIN A1C; Future    3. Pure hypercholesterolemia  Well controlled.  Labs as indicated.  Continue statin medication and lifestyle modifications.  Continue to monitor.  - Lipid Profile; Future    4. Malignant neoplasm of descending colon (HCC)  Patient has completed chemotherapy.  He will be establishing with a new oncologist.  Continue to monitor.    5. Neuropathy associated with cancer (HCC)  Worsening.  Trial of Cymbalta.  Provided instructions to taper down gabapentin by one tablet a week as tolerated.   - DULoxetine (CYMBALTA) 20 MG Cap DR Particles; Take 1 Cap by mouth every day.  Dispense: 30 Cap; Refill: 2    6. Chronic respiratory failure with hypoxia (HCC)  Chronic and stable.  Continue supplemental oxygen.    7. Need for vaccination  Influenza vaccine discussed and administered in office today.  - Influenza Vaccine, High Dose (65+ Only)    Followup: Return in about 4 months (around 4/7/2019) for f/u neuropathy, labs, short.         PLEASE NOTE: This dictation was created using voice recognition software. I have made every reasonable attempt to correct obvious errors, but I expect that there are errors of grammar and possibly content that I did not discover before finalizing the note.

## 2018-12-08 NOTE — ASSESSMENT & PLAN NOTE
Patient's PSA is 7.9, this is higher than when previously tested.  Review of his chart shows that his PSA has been elevated for the last several years.  It has fluctuated up and down, but the overall trend is up.  He is very hesitant to see a urologist as he does not want to have a prostate biopsy.  He does have some urinary frequency.  A CT scan from this year showed an enlarged prostate.

## 2019-01-10 ENCOUNTER — OFFICE VISIT (OUTPATIENT)
Dept: MEDICAL GROUP | Facility: PHYSICIAN GROUP | Age: 69
End: 2019-01-10
Payer: MEDICARE

## 2019-01-10 VITALS
RESPIRATION RATE: 12 BRPM | HEIGHT: 69 IN | HEART RATE: 68 BPM | OXYGEN SATURATION: 96 % | SYSTOLIC BLOOD PRESSURE: 118 MMHG | DIASTOLIC BLOOD PRESSURE: 70 MMHG | BODY MASS INDEX: 45.03 KG/M2 | WEIGHT: 304 LBS | TEMPERATURE: 97.9 F

## 2019-01-10 DIAGNOSIS — L29.0 PERIANAL IRRITATION: ICD-10-CM

## 2019-01-10 PROCEDURE — 99214 OFFICE O/P EST MOD 30 MIN: CPT | Performed by: FAMILY MEDICINE

## 2019-01-10 NOTE — PROGRESS NOTES
"Subjective:      Gerald Oshea is a 68 y.o. male who presents with Other (Rectum problem)        HPI:  Patient presents today with concerns for rectal pain for the past 3 weeks.    Patient has a history of colon cancer in 2016. He has had resection and chemotherapy.  He had metastasis to the lung which was also resected and treated with chemotherapy.  He has been having problems with alternating constipation and diarrhea which he manages with over-the-counter Dulcolax and Imodium as needed.  On December 26th, he hadn't bad a bowel movement in 4 days, and he ran out of his Dulcolax. He tried using a fleet enema attached to the end of a turkey baster. He attached it to the baster because he couldn't reach his anus due to his body habitus. He applied lube to the end of the baster and then administered the enema into his rectum via the baster. He was then able to produce stool, and he has had no issues with his bowel movements since then. He is concerned because the baster had a speck of bright red blood on the end of it after removing it from his rectum. He he has since had rectal pain intermittently described as irritation and soreness. He has not had any melanotic stools since then. He is due for a colonoscopy in one year. Denies any other associated symptoms.  He said his bowels have been moving regularly.  He denies any abdominal pain, nausea, vomiting, disturbances in urination, fever, chills.    Stable on all other medications without any acute complaints.      Past medical history, past surgical history, family history reviewed-no changes    Social history reviewed-no changes    Allergies reviewed-no changes    Medications reviewed-no changes      ROS:  As per the HPI as shown above, the rest are negative.       Objective:     /70 (BP Location: Left arm, Patient Position: Sitting, BP Cuff Size: Large adult)   Pulse 68   Temp 36.6 °C (97.9 °F) (Temporal)   Resp 12   Ht 1.753 m (5' 9\")   Wt (!) " 137.9 kg (304 lb)   SpO2 96%   BMI 44.89 kg/m²     Physical Exam  Examined alert, awake, oriented, not in distress    Neck-supple, no lymphadenopathy, no thyromegaly  Lungs-clear to auscultation, no rales, no wheezes  Heart-regular rate and rhythm, no murmur  Extremities-no edema, clubbing, cyanosis  Rectal exam-diffuse erythema of the perianal skin without any open sores, fissures, external hemorrhoids, bleeding, tight sphincter tone, no masses, no tenderness, anoscopy did not show internal hemorrhoids, bleeding, fissures.  Very minimal pinkish mucus at the tip of the anoscope       Assessment/Plan:   1. Perianal irritation  Upon rectal exam, it appears that he has just irritated perianal area, anus and rectum placing the baster in his rectum.  No active bleeding noted or fissures.  I reassured the patient that he did not appear to have an infection or perforated bowel, which were some of his concerns.  I have recommended that he not attempt this again.  I will give him hydrocortisone cream.  I have given him instructions for the cream for application. He will contact me if his symptoms do not improve; in this case, we will refer him to GI for further consultation.      Edvin ACKERMAN (Sc[employer]ed[employer]), am scribing for, and in the presence of, Tatum Bell MD     Electronically signed by: Edvin Nuno (Viri), 1/10/2019    Tatum ACKERMAN MD personally performed the services described in this documentation, as scribed by Edvin Nuno in my presence, and it is both accurate and complete.

## 2019-01-16 ENCOUNTER — APPOINTMENT (OUTPATIENT)
Dept: MEDICAL GROUP | Facility: PHYSICIAN GROUP | Age: 69
End: 2019-01-16
Payer: MEDICARE

## 2019-01-21 ENCOUNTER — APPOINTMENT (RX ONLY)
Dept: URBAN - METROPOLITAN AREA CLINIC 31 | Facility: CLINIC | Age: 69
Setting detail: DERMATOLOGY
End: 2019-01-21

## 2019-01-21 DIAGNOSIS — L82.1 OTHER SEBORRHEIC KERATOSIS: ICD-10-CM

## 2019-01-21 DIAGNOSIS — L30.0 NUMMULAR DERMATITIS: ICD-10-CM

## 2019-01-21 DIAGNOSIS — Z71.89 OTHER SPECIFIED COUNSELING: ICD-10-CM

## 2019-01-21 DIAGNOSIS — D18.0 HEMANGIOMA: ICD-10-CM

## 2019-01-21 DIAGNOSIS — L11.1 TRANSIENT ACANTHOLYTIC DERMATOSIS [GROVER]: ICD-10-CM

## 2019-01-21 DIAGNOSIS — Z85.828 PERSONAL HISTORY OF OTHER MALIGNANT NEOPLASM OF SKIN: ICD-10-CM

## 2019-01-21 DIAGNOSIS — Z87.2 PERSONAL HISTORY OF DISEASES OF THE SKIN AND SUBCUTANEOUS TISSUE: ICD-10-CM

## 2019-01-21 DIAGNOSIS — D22 MELANOCYTIC NEVI: ICD-10-CM

## 2019-01-21 DIAGNOSIS — L81.4 OTHER MELANIN HYPERPIGMENTATION: ICD-10-CM

## 2019-01-21 DIAGNOSIS — L57.0 ACTINIC KERATOSIS: ICD-10-CM

## 2019-01-21 PROBLEM — D18.01 HEMANGIOMA OF SKIN AND SUBCUTANEOUS TISSUE: Status: ACTIVE | Noted: 2019-01-21

## 2019-01-21 PROBLEM — D22.5 MELANOCYTIC NEVI OF TRUNK: Status: ACTIVE | Noted: 2019-01-21

## 2019-01-21 PROCEDURE — ? PRESCRIPTION

## 2019-01-21 PROCEDURE — 99213 OFFICE O/P EST LOW 20 MIN: CPT | Mod: 25

## 2019-01-21 PROCEDURE — ? COUNSELING

## 2019-01-21 PROCEDURE — ? COUNSELING: TOPICAL STEROIDS

## 2019-01-21 PROCEDURE — ? LIQUID NITROGEN

## 2019-01-21 PROCEDURE — ? ADDITIONAL NOTES

## 2019-01-21 PROCEDURE — 17000 DESTRUCT PREMALG LESION: CPT

## 2019-01-21 PROCEDURE — ? OBSERVATION AND MEASURE

## 2019-01-21 PROCEDURE — 17003 DESTRUCT PREMALG LES 2-14: CPT

## 2019-01-21 RX ORDER — TRIAMCINOLONE ACETONIDE 1 MG/G
CREAM TOPICAL BID
Qty: 1 | Refills: 1 | Status: ERX | COMMUNITY
Start: 2019-01-21

## 2019-01-21 RX ADMIN — TRIAMCINOLONE ACETONIDE THIN LAYER: 1 CREAM TOPICAL at 00:00

## 2019-01-21 ASSESSMENT — LOCATION DETAILED DESCRIPTION DERM
LOCATION DETAILED: LEFT SUPERIOR MEDIAL MIDBACK
LOCATION DETAILED: LEFT DISTAL CALF
LOCATION DETAILED: RIGHT CENTRAL LATERAL NECK
LOCATION DETAILED: RIGHT INFERIOR FOREHEAD
LOCATION DETAILED: RIGHT SUPERIOR MEDIAL MIDBACK
LOCATION DETAILED: LEFT CENTRAL FRONTAL SCALP
LOCATION DETAILED: PERIUMBILICAL SKIN
LOCATION DETAILED: RIGHT MEDIAL UPPER BACK
LOCATION DETAILED: LEFT INFERIOR UPPER BACK
LOCATION DETAILED: SUPERIOR THORACIC SPINE
LOCATION DETAILED: LEFT DISTAL LATERAL CALF

## 2019-01-21 ASSESSMENT — LOCATION SIMPLE DESCRIPTION DERM
LOCATION SIMPLE: ABDOMEN
LOCATION SIMPLE: UPPER BACK
LOCATION SIMPLE: LEFT LOWER BACK
LOCATION SIMPLE: LEFT SCALP
LOCATION SIMPLE: RIGHT UPPER BACK
LOCATION SIMPLE: LEFT UPPER BACK
LOCATION SIMPLE: RIGHT FOREHEAD
LOCATION SIMPLE: RIGHT LOWER BACK
LOCATION SIMPLE: LEFT CALF
LOCATION SIMPLE: NECK

## 2019-01-21 ASSESSMENT — LOCATION ZONE DERM
LOCATION ZONE: FACE
LOCATION ZONE: LEG
LOCATION ZONE: SCALP
LOCATION ZONE: TRUNK
LOCATION ZONE: NECK

## 2019-01-21 NOTE — PROCEDURE: COUNSELING: TOPICAL STEROIDS
Detail Level: Detailed
Topical Steroids Counseling: I discussed with the patient that prolonged use of topical steroids can result in the increased appearance of superficial blood vessels (telangiectasias), lightening (hypopigmentation) and thinning of the skin (atrophy).  Patient understands to avoid using high potency steroids in skin folds, the groin or the face.  The patient verbalized understanding of the proper use and possible adverse effects of topical steroids.  All of the patient's questions and concerns were addressed.

## 2019-01-31 DIAGNOSIS — F51.04 CHRONIC INSOMNIA: ICD-10-CM

## 2019-01-31 DIAGNOSIS — F41.9 ANXIETY: ICD-10-CM

## 2019-02-01 RX ORDER — CLONAZEPAM 1 MG/1
1 TABLET ORAL 2 TIMES DAILY PRN
Qty: 60 TAB | Refills: 2 | Status: SHIPPED
Start: 2019-02-01 | End: 2019-02-04 | Stop reason: SDUPTHER

## 2019-02-01 NOTE — TELEPHONE ENCOUNTER
"Was the patient seen in the last year in this department? Yes    Does patient have an active prescription for medications requested? Yes    Received Request Via: Pharmacy         MAYRA LAYA   Age: 68 demographics Data as of: 02/01/2019          NARCOTIC 240       SEDATIVE 410       STIMULANT 000       NARxSCORES can range from 000 to 999. The first two digits represent the composite percentile risk based on an overall analysis of prescription drug use. The third digit represents the number of active prescriptions. The distribution of scores in the population is such that approximately 75% fall below 200, 95% fall below 500 and 99% fall below 650. The information on this report is not warranted as accurate or complete. This report is based on the search criteria supplied and the data entered by the dispensing pharmacy. For more information about any prescription, please contact the dispensing pharmacy or the prescriber. NARxSCORES and Reports are intended to aid, not replace medical decision making. None of the information presented should be used as sole justification for providing or refusing to provide medications.       Rx Graph Grayed out drugs could not be included in score calculations.   Narcotic Sedative Stimulant Other   \"\"All Prescribers   \"\"Timeline\"\"02/01\"\"2m\"\"6m\"\"1y\"\"2y\"\"Prescribers\"\"1 - Jocelyn L Ramos\"\"2 - Sho C Vandercl\"\"3 - Jocelyn L Raoms\"\"4 - Robert H Ganrima   Morphine MgEq (MME)   \"\"320\"\"200\"\"80\"\"0   \"\"Timeline\"\"02/01\"\"2m\"\"6m\"\"1y\"\"2y   \"\"\"\"\"\"\"\"   \"\"\"\"\"\"\"\"\"\"\"\"   Lorazepam MgEq (LME)   \"\"18\"\"10\"\"2\"\"0   \"\"Timeline\"\"02/01\"\"2m\"\"6m\"\"1y\"\"2y   *Per CDC guidance, the MME conversion factors prescribed or provided as part of the medication-assisted treatment for opioid use disorder should not be used to benchmark against dosage thresholds meant for opioids prescribed for pain. Buprenorphine products have no agreed upon morphine equivalency, and as partial opioid agonists, are not expected to be associated " with overdose risk in the same dose-dependent manner as doses for full agonist opioids. MME = morphine milligram equivalents. LME = Lorazepam milligram equivalents. mg = dose in milligrams.     Data Analysis                                                                                                                         Summary   Total Prescriptions: 25   Total Prescribers: 5   Total Pharmacies: 2   Narcotics*   (excluding buprenorphine)  Current Qty: 0   Current MME/day: 0.00   30 Day Avg MME/day: 0.00   Buprenorphine*   Current Qty: 0   Current mg/day: 0.00   30 Day Avg mg/day: 0.00   Sedatives*   Current Qty: 0   Current LME/day: 0.00   30 Day Avg LME/day: 3.73   *Highlighted drugs could not be included in score calculations   PRESCRIPTIONS   Total Prescriptions: 25   Total Private Pay: 12     Fill Date ID Written Drug Qty Days Prescriber Rx # Pharmacy Refill Daily Dose * Pymt Type    12/31/2018  1  12/31/2018  Clonazepam 1 MG Tablet  60 30 Me War  [id number] Familia (1292)  0 4.00 LME [place]   12/11/2018  1  12/11/2018  Hydrocodone-Acetamin 5-325 MG  8 2 Am Van  [id number] Familia (1292)  0 20.00 MME  [place]   11/30/2018  1  09/28/2018  Clonazepam 1 MG Tablet  60 30 Me War  [id number] Familia (1292)  2 4.00 E  [place]   10/31/2018  1  09/28/2018  Clonazepam 1 MG Tablet  60 30 Me War  [id number] Familia (1292)  1 4.00 [place]   10/02/2018  1  09/28/2018  Clonazepam 1 MG Tablet  60 30 Me War  [id number] Familia (1292)  0 4.00 [place]   09/07/2018  1  09/07/2018  Clonazepam 1 MG Tablet  30 30 Me War  [id number] [facility]amilia (1292)  0 2.00 [place]   08/08/2018  1  06/13/2018  Clonazepam 1 MG Tablet  60 30 Me War  [id number] Familia (1292)  2 4.00 E  [place]   07/10/2018  1  06/13/2018  Clonazepam 1 MG Tablet  60 30 Me War  [id number] Familia (1292)  1 4.00 [place]   06/13/2018  1  06/13/2018  Clonazepam 1 MG Tablet  60 30 Me War  [id number] Familia (2403)  0 4.00 LME  [place]   05/13/2018  1   03/12/2018  Clonazepam 1 MG Tablet  60 30 Me War  [id number] Familia (1292)  2 4.00 L[place]   04/13/2018  1  03/12/2018  Clonazepam 1 MG Tablet  60 30 Me War  [id number] Familia (1292)  1 4.00 [place]   03/12/2018  1  03/12/2018  Clonazepam 1 MG Tablet  60 30 Me War  [id number] Familia (1292)  0 4.00 [place]   02/07/2018  1  02/07/2018  Hydrocodone-Acetamin 5-325 MG  20 5 [facility]  [id number] Familia (1292)  0 20.00 MME  [place]   02/01/2018  1  08/29/2017  Clonazepam 1 MG Tablet  60 30 Me War  [id number] Familia (1292)  5 4.00 L[place]   01/02/2018  1  08/29/2017  Clonazepam 1 MG Tablet  60 30 Me War  [id number] Familia (1292)  4 4.00 L[place]   12/03/2017  1  08/29/2017  Clonazepam 1 MG Tablet  60 30 Me War  [id number] Familia (1292)  3 4.00 LME  [place]   10/30/2017  1  08/29/2017  Clonazepam 1 MG Tablet  60 30 Me War  [id number] Familia (1292)  2 4.00 LME  [place]   09/29/2017  1  08/29/2017  Clonazepam 1 MG Tablet  60 30 Me War  [id number] Familia (1292)  1 4.00 LME  [place]   08/29/2017  1  08/29/2017  Clonazepam 1 MG Tablet  60 30 Me War  [id number] Familia (1292)  0 4.00 LME  [place]   07/27/2017  1  05/15/2017  Clonazepam 1 MG Tablet  60 30 Me War  [id number] Familia (1292)  2 4.00 LME  [place]   06/21/2017  1  05/15/2017  Clonazepam 1 MG Tablet  60 30 Me War  [id number] Familia (1292)  1 4.00 LME  [place]   05/15/2017  1  05/15/2017  Clonazepam 1 MG Tablet  60 30 Me War  [id number] Familia (1292)  0 4.00 LME  [place]   04/01/2017  1  12/14/2016  Clonazepam 1 MG Tablet  60 30 Me War  [id number] Familia (1292)  3 4.00 LME  [place]   03/04/2017  1  12/14/2016  Clonazepam 1 MG Tablet  60 30 Me War  [id number] Familia (1292)  2 4.00 LME  [place]   02/03/2017  1  12/14/2016  Clonazepam 1 MG Tablet  60 30 Me War  [id number] Familia (1292)  1 4.00 LME  [place]   *Per CDC guidance, the MME conversion factors prescribed or provided as part of the medication-assisted treatment  for opioid use disorder should not be used to benchmark against dosage thresholds meant for opioids prescribed for pain. Buprenorphine products have no agreed upon morphine equivalency, and as partial opioid agonists, are not expected to be associated with overdose risk in the same dose-dependent manner as doses for full agonist opioids. MME = morphine milligram equivalents. LME = Lorazepam milligram equivalents. MG = dose in milligrams.   PROVIDERS   Total Providers: 5     Name Address City State Zipcode Phone   Jocelyn WEAVER [facility], [facility] [place] [address] [place] [address]    John H Ganser [address] [facility] [address] [address] [place] [address]    Jocelyn [facility] [address] [address] [place] [address]    Jocelyn [facility] [address]  [place] [address]    Sho Bajwa [address] [place] [address]    PHARMACIES   Total Pharmacies: 2     Name Address City State Zipcode Phone   [facility] ([phone]) [address] [place] [address] [phone]   [facility] [phone]) [address]  [place] [address] [phone]

## 2019-02-04 DIAGNOSIS — F51.04 CHRONIC INSOMNIA: ICD-10-CM

## 2019-02-04 DIAGNOSIS — F41.9 ANXIETY: ICD-10-CM

## 2019-02-04 RX ORDER — CLONAZEPAM 1 MG/1
1 TABLET ORAL 2 TIMES DAILY
Qty: 60 TAB | Refills: 2 | Status: SHIPPED
Start: 2019-02-04 | End: 2019-03-06

## 2019-02-05 NOTE — TELEPHONE ENCOUNTER
Prescription faxed to:    [facility]/pharmacy #[id number] - [place] - [address]  [address] [address]  Phone: [phone] Fax: [phone]  .

## 2019-02-20 PROBLEM — Z87.891 FORMER SMOKER: Status: ACTIVE | Noted: 2019-02-20

## 2019-02-20 PROBLEM — Z92.29 UP TO DATE WITH INFLUENZA VACCINATION: Status: ACTIVE | Noted: 2019-02-20

## 2019-02-20 NOTE — PROGRESS NOTES
CC: 10-week follow-up    HPI:  Mr. Gerald Oshea is a 68 y/o male retired former business owner who was last seen 11/29/2018 for SU on BiPAP 20/16 centimeters water with bleed in oxygen.  His original PSG showed an AHI of 97.  At the time of his last visit he had an average residual AHI of 31.2 and was provided with a mask fit. Tried the new mask but now back to a FFM. Can't use any other masks effectively.    His 30-day compliance is 100% for 7 hours and 9 minutes.  He has an average AHI of 18.1 on bilevel 20/16 cm H2O.  Although this is not operable is certainly better than last average residual apnea hypopnea index and an absolute terms represents an improvement of 79 events per hour compared with his baseline AHI of 97.    The patient reports improved symptoms using positive airway pressure.  Has experienced no significant problems with mask fit, mask leak, pressure tolerance, excessive airway dryness, nasal congestion, aerophagia, or chest pain.    Significant comorbidities and modifying factors include hypertension, history of colon cancer, history of lung metastases status post wedge resection, status post chemotherapy, history of tobacco abuse, peripheral neuropathy secondary to chemotherapy, diabetes mellitus, chronic respiratory failure, anxiety, hypercholesterolemia, and gout.    Has a new oncologist as Dr. Silverman has retired. Dr. Jasmyn Lewis is her name.         Patient Active Problem List    Diagnosis Date Noted   • Colon cancer (HCC) 09/28/2016     Priority: Medium   • Essential hypertension 09/28/2016     Priority: Low   • Former smoker 02/20/2019   • Up to date with influenza vaccination 02/20/2019   • Chronic insomnia 09/07/2018   • Diet-controlled diabetes mellitus (HCC) 04/11/2018   • Chronic respiratory failure with hypoxia (HCC) 04/11/2018   • Skin cancer 12/06/2017   • Morbid obesity with BMI of 45.0-49.9, adult (HCC) 09/20/2017   • Benign non-nodular prostatic hyperplasia with lower  "urinary tract symptoms 07/03/2017   • Obstructive sleep apnea 05/09/2017   • Neuropathy associated with cancer (HCC) 03/28/2017   • History of gout 09/28/2016   • Anxiety 09/28/2016   • Pure hypercholesterolemia 09/28/2016   • Elevated PSA, less than 10 ng/ml 09/28/2016       Past Medical History:   Diagnosis Date   • Anemia    • Anxiety    • Atrophy of right kidney     one functioning kidney   • Breath shortness    • Cancer (HCC) 2016    colon   rx surgery and chemo   • Colon cancer (HCC)    • Former tobacco use    • Gout    • High cholesterol 12/12/2017   • Hyperlipidemia    • Hypertension 12/12/2017   • Neuropathy (HCC) 12/12/2017   • Pain 12/12/2017    \"Lower Back & Left Leg\"   • Pneumonia 1991    Left upper lobe   • Psychiatric problem     anxiety   • Sleep apnea 12/12/2017    CPAP USE   • Snoring     DX SU        Past Surgical History:   Procedure Laterality Date   • THORACOSCOPY  2/5/2018    Procedure: THORACOSCOPY- WEDGE RESECTION LT UPPER LOBE METASTASIS;  Surgeon: John H Ganser, M.D.;  Location: SURGERY [facility];  Service: General   • CATH PLACEMENT Left 10/7/2016    Procedure: CATH PLACEMENT - SUBCLAVIAN PORT;  Surgeon: Sho Bajwa M.D.;  Location: SURGERY SAME DAY [facility];  Service:    • COLON RESECTION  2016    [place]   • CLOSED REDUCTION  10/25/2012    Performed by Chavez Duran M.D. at SURGERY [facility]       Family History   Problem Relation Age of Onset   • Cancer Mother         Bladder   • Heart Disease Father    • Heart Disease Brother         CABG x2       Social History     Social History   • Marital status: Single     Spouse name: N/A   • Number of children: N/A   • Years of education: N/A     Occupational History   • Retired    • Former business owner      Social History Main Topics   • Smoking status: Former Smoker     Packs/day: 1.00     Years: 20.00     Types: Cigarettes     Quit date: 1/1/1998   • Smokeless tobacco: Never Used   • Alcohol use 1.8 oz/week " "    3 Glasses of wine per week      Comment: 3 per day   • Drug use: No   • Sexual activity: Not Currently     Other Topics Concern   • Not on file     Social History Narrative   • No narrative on file       Current Outpatient Prescriptions   Medication Sig Dispense Refill   • clonazePAM (KLONOPIN) 1 MG Tab Take 1 Tab by mouth 2 times a day for 30 days. 60 Tab 2   • hydrocortisone 2.5 % Cream topical cream Apply to affected areas twice daily 1 Tube 0   • triamterene-hctz (MAXZIDE-25/DYAZIDE) 37.5-25 MG Tab TAKE 1 TAB BY MOUTH EVERY DAY. 90 Tab 3   • losartan (COZAAR) 100 MG Tab TAKE 1 TAB BY MOUTH EVERY DAY. 90 Tab 3   • rosuvastatin (CRESTOR) 40 MG tablet Take 1 Tab by mouth every bedtime. 90 Tab 3   • gabapentin (NEURONTIN) 300 MG Cap Take 600 mg by mouth 2 Times a Day.     • Diclofenac Sodium 1 % Gel      • DULoxetine (CYMBALTA) 20 MG Cap DR Particles Take 1 Cap by mouth every day. (Patient not taking: Reported on 2/21/2019) 30 Cap 2     No current facility-administered medications for this visit.     \"CURRENT RX\"    ALLERGIES: Ketamine; Lidocaine; and Morphine    ROS    Constitutional: Denies fever, chills, sweats,  weight loss, fatigue  Cardiovascular: Denies chest pain, tightness, palpitations, swelling in legs/feet, fainting, difficulty breathing when lying down but gets better when sitting up.   Respiratory: Denies shortness of breath, cough, sputum, wheezing, painful breathing, coughing up blood.   Sleep: per HPI  Gastrointestinal: Denies  difficulty swallowing, nausea, abdominal pain, diarrhea, constipation, heartburn.  Musculoskeletal: Denies painful joints, sore muscles, back pain.    : had a recent prostate       PHYSICAL EXAM  Obese  O2 on  /70 (BP Location: Left arm, Patient Position: Sitting, BP Cuff Size: Large adult)   Pulse 73   Temp 36.7 °C (98.1 °F) (Temporal)   Resp 16   Ht 1.753 m (5' 9\")   Wt (!) 133.9 kg (295 lb 4.8 oz)   SpO2 95%   BMI 43.61 kg/m²   Appearance: " Well-nourished, well-developed, no acute distress  Eyes:  PERRLA, EOMI  ENMT: without lesions, deformities;hearing grossly intact; tongue normal, posterior pharynx without erythema or exudate; Mallampati classification:   Neck: Supple, trachea midline, no masses  Respiratory effort:  No intercostal retractions or use of accessory muscles  Lung auscultation:  No wheezes rhonchi rubs or rales  Cardiac: No murmurs, rubs, or gallops; regular rhythm, normal rate; no edema  Abdomen:  No tenderness, no organomegaly.  Obese.  Musculoskeletal:  Grossly normal; gait and station normal; digits and nails normal  Skin:  No rashes, petechiae, cyanosis  Neurologic: without focal signs; oriented to person, time, place, and purpose; judgement intact  Psychiatric:  No depression, anxiety, agitation        PROBLEMS:  1. Obstructive sleep apnea      2. Morbid obesity with BMI of 45.0-49.9, adult ([facility])    - OBESITY COUNSELING (No Charge): Patient identified as having weight management issue.  Appropriate orders and counseling given.    3. Former smoker      4. Up to date with influenza vaccination      5. Essential hypertension      6. Pure hypercholesterolemia        PLAN:     Return in about 6 months (around 8/21/2019).    Has been advised to continue the current bilevel 20/16 cm H2O, clean equipment frequently, and get new mask and supplies as allowed by insurance and DME. Advised about potential problems including nasal obstruction/stuffiness, mask leak or discomfort , frequent awakenings, chill or dryness of the upper airway, noise, inconvenience, and lack of benefit. Recommend an earlier appointment, if significant treatment barriers develop.    Have advised the patient to follow up with the appropriate healthcare practitioners for all other medical problems and issues.

## 2019-02-21 ENCOUNTER — SLEEP CENTER VISIT (OUTPATIENT)
Dept: SLEEP MEDICINE | Facility: MEDICAL CENTER | Age: 69
End: 2019-02-21
Payer: MEDICARE

## 2019-02-21 VITALS
HEART RATE: 73 BPM | RESPIRATION RATE: 16 BRPM | HEIGHT: 69 IN | BODY MASS INDEX: 43.74 KG/M2 | TEMPERATURE: 98.1 F | OXYGEN SATURATION: 95 % | DIASTOLIC BLOOD PRESSURE: 70 MMHG | WEIGHT: 295.3 LBS | SYSTOLIC BLOOD PRESSURE: 124 MMHG

## 2019-02-21 DIAGNOSIS — I10 ESSENTIAL HYPERTENSION: ICD-10-CM

## 2019-02-21 DIAGNOSIS — Z87.891 FORMER SMOKER: ICD-10-CM

## 2019-02-21 DIAGNOSIS — Z92.29 UP TO DATE WITH INFLUENZA VACCINATION: ICD-10-CM

## 2019-02-21 DIAGNOSIS — E78.00 PURE HYPERCHOLESTEROLEMIA: ICD-10-CM

## 2019-02-21 DIAGNOSIS — G47.33 OBSTRUCTIVE SLEEP APNEA: ICD-10-CM

## 2019-02-21 DIAGNOSIS — E66.01 MORBID OBESITY WITH BMI OF 45.0-49.9, ADULT (HCC): ICD-10-CM

## 2019-02-21 PROCEDURE — 99214 OFFICE O/P EST MOD 30 MIN: CPT | Performed by: INTERNAL MEDICINE

## 2019-02-21 RX ORDER — TRIAMCINOLONE ACETONIDE 1 MG/G
CREAM TOPICAL
Refills: 1 | COMMUNITY
Start: 2019-01-21 | End: 2019-02-19

## 2019-02-21 RX ORDER — HYDROCODONE BITARTRATE AND ACETAMINOPHEN 5; 325 MG/1; MG/1
TABLET ORAL
Refills: 0 | COMMUNITY
Start: 2018-12-11 | End: 2019-02-19

## 2019-02-26 ENCOUNTER — APPOINTMENT (RX ONLY)
Dept: URBAN - METROPOLITAN AREA CLINIC 31 | Facility: CLINIC | Age: 69
Setting detail: DERMATOLOGY
End: 2019-02-26

## 2019-02-26 DIAGNOSIS — L30.0 NUMMULAR DERMATITIS: ICD-10-CM

## 2019-02-26 DIAGNOSIS — D22 MELANOCYTIC NEVI: ICD-10-CM

## 2019-02-26 DIAGNOSIS — B35.3 TINEA PEDIS: ICD-10-CM

## 2019-02-26 DIAGNOSIS — L11.1 TRANSIENT ACANTHOLYTIC DERMATOSIS [GROVER]: ICD-10-CM

## 2019-02-26 PROBLEM — D48.5 NEOPLASM OF UNCERTAIN BEHAVIOR OF SKIN: Status: ACTIVE | Noted: 2019-02-26

## 2019-02-26 PROCEDURE — ? OBSERVATION AND MEASURE

## 2019-02-26 PROCEDURE — 11102 TANGNTL BX SKIN SINGLE LES: CPT

## 2019-02-26 PROCEDURE — ? BIOPSY BY SHAVE METHOD

## 2019-02-26 PROCEDURE — ? COUNSELING

## 2019-02-26 PROCEDURE — ? COUNSELING: TOPICAL STEROIDS

## 2019-02-26 PROCEDURE — ? PRESCRIPTION

## 2019-02-26 PROCEDURE — 99213 OFFICE O/P EST LOW 20 MIN: CPT | Mod: 25

## 2019-02-26 PROCEDURE — ? ADDITIONAL NOTES

## 2019-02-26 RX ORDER — ECONAZOLE NITRATE CREAM 10 MG/G
TOPICAL CREAM TOPICAL BID
Qty: 1 | Refills: 2 | Status: ERX | COMMUNITY
Start: 2019-02-26

## 2019-02-26 RX ADMIN — ECONAZOLE NITRATE CREAM TOPICAL: 10 CREAM TOPICAL at 00:00

## 2019-02-26 ASSESSMENT — LOCATION SIMPLE DESCRIPTION DERM
LOCATION SIMPLE: RIGHT LOWER BACK
LOCATION SIMPLE: LEFT CALF
LOCATION SIMPLE: RIGHT GREAT TOE
LOCATION SIMPLE: RIGHT ACHILLES SKIN
LOCATION SIMPLE: LEFT GREAT TOE
LOCATION SIMPLE: RIGHT FOOT

## 2019-02-26 ASSESSMENT — LOCATION ZONE DERM
LOCATION ZONE: LEG
LOCATION ZONE: FEET
LOCATION ZONE: TRUNK
LOCATION ZONE: TOE

## 2019-02-26 ASSESSMENT — LOCATION DETAILED DESCRIPTION DERM
LOCATION DETAILED: RIGHT DORSAL FOOT
LOCATION DETAILED: RIGHT DISTAL PLANTAR GREAT TOE
LOCATION DETAILED: LEFT DISTAL PLANTAR GREAT TOE
LOCATION DETAILED: LEFT DISTAL CALF
LOCATION DETAILED: RIGHT SUPERIOR MEDIAL MIDBACK
LOCATION DETAILED: RIGHT ACHILLES SKIN
LOCATION DETAILED: LEFT DISTAL LATERAL CALF

## 2019-02-26 NOTE — PROCEDURE: ADDITIONAL NOTES
Detail Level: Detailed
Additional Notes: Advised to use Amlactin lotion. Shower using Luke warm water. Dove unscented soap. Continue Triamcinolone as needed for itching.
Detail Level: Simple
Additional Notes: Continue Triamcinolone as needed for itch.
Additional Notes: 0.1cc of lidocaine with epinephrine injected into site, when patient reminded us he is allergic to lidocaine, we stopped and changed it to Marcaine, which he states he does not have any problems with.   He states he has had it multiple times before and it causes \"redness\"  He states he does not have any chest pain or shortness of breath.  Patient asked to wait 30 minutes for evaluation which he declined. Patient has been instructed to call 911 if he develops any chest pain or shortness of breath.
Additional Notes: We discussed not using topical steroids (TAC) on feet , as steroids will make fungus worse. If any rash worsens with creams, stop cream and return to clinic.

## 2019-02-26 NOTE — PROCEDURE: BIOPSY BY SHAVE METHOD
Anesthesia Volume In Cc: 0.5
Electrodesiccation And Curettage Text: The wound bed was treated with electrodesiccation and curettage after the biopsy was performed.
Billing Type: Third-Party Bill
Post-Care Instructions: I reviewed with the patient in detail post-care instructions. Patient is to keep the biopsy site dry overnight, and then apply vaseline twice daily until healed.
Silver Nitrate Text: The wound bed was treated with silver nitrate after the biopsy was performed.
Biopsy Method: Personna blade
Detail Level: Detailed
Was A Bandage Applied: Yes
Dressing: bandage
Hemostasis: Drysol and Electrocautery
Lab: [id number]
Notification Instructions: Patient will be notified of biopsy results. However, patient instructed to call the office if not contacted within 2 weeks.
Curettage Text: The wound bed was treated with curettage after the biopsy was performed.
Bill For Surgical Tray: no
Consent: Written consent was obtained and risks were reviewed including but not limited to scarring, infection, bleeding, scabbing, incomplete removal, nerve damage and allergy to anesthesia.
Size Of Lesion In Cm: 0.4
Cryotherapy Text: The wound bed was treated with cryotherapy after the biopsy was performed.
Additional Anesthesia Volume In Cc (Will Not Render If 0): 0
Lab Facility: 
Wound Care: Vaseline
Depth Of Biopsy: dermis
Anesthesia Type: 0.5% bupivacaine
Biopsy Type: H and E
Electrodesiccation Text: The wound bed was treated with electrodesiccation after the biopsy was performed.
Type Of Destruction Used: Curettage

## 2019-04-02 LAB
ALBUMIN SERPL-MCNC: 4.5 G/DL (ref 3.6–4.8)
ALBUMIN/GLOB SERPL: 2 {RATIO} (ref 1.2–2.2)
ALP SERPL-CCNC: 75 IU/L (ref 39–117)
ALT SERPL-CCNC: 20 IU/L (ref 0–44)
AST SERPL-CCNC: 19 IU/L (ref 0–40)
BILIRUB SERPL-MCNC: 0.6 MG/DL (ref 0–1.2)
BUN SERPL-MCNC: 13 MG/DL (ref 8–27)
BUN/CREAT SERPL: 12 (ref 10–24)
CALCIUM SERPL-MCNC: 9.7 MG/DL (ref 8.6–10.2)
CHLORIDE SERPL-SCNC: 95 MMOL/L (ref 96–106)
CHOLEST SERPL-MCNC: 89 MG/DL (ref 100–199)
CO2 SERPL-SCNC: 23 MMOL/L (ref 20–29)
CREAT SERPL-MCNC: 1.06 MG/DL (ref 0.76–1.27)
GLOBULIN SER CALC-MCNC: 2.2 G/DL (ref 1.5–4.5)
GLUCOSE SERPL-MCNC: 120 MG/DL (ref 65–99)
HBA1C MFR BLD: 6.1 % (ref 4.8–5.6)
HDLC SERPL-MCNC: 38 MG/DL
LABORATORY COMMENT REPORT: ABNORMAL
LDLC SERPL CALC-MCNC: 28 MG/DL (ref 0–99)
POTASSIUM SERPL-SCNC: 4 MMOL/L (ref 3.5–5.2)
PROT SERPL-MCNC: 6.7 G/DL (ref 6–8.5)
SODIUM SERPL-SCNC: 133 MMOL/L (ref 134–144)
TRIGL SERPL-MCNC: 113 MG/DL (ref 0–149)
VLDLC SERPL CALC-MCNC: 23 MG/DL (ref 5–40)

## 2019-04-05 ENCOUNTER — OFFICE VISIT (OUTPATIENT)
Dept: MEDICAL GROUP | Facility: PHYSICIAN GROUP | Age: 69
End: 2019-04-05
Payer: MEDICARE

## 2019-04-05 VITALS
HEART RATE: 76 BPM | DIASTOLIC BLOOD PRESSURE: 72 MMHG | SYSTOLIC BLOOD PRESSURE: 110 MMHG | WEIGHT: 295.4 LBS | BODY MASS INDEX: 43.75 KG/M2 | TEMPERATURE: 98.1 F | RESPIRATION RATE: 18 BRPM | OXYGEN SATURATION: 92 % | HEIGHT: 69 IN

## 2019-04-05 DIAGNOSIS — E11.9 DIET-CONTROLLED DIABETES MELLITUS (HCC): ICD-10-CM

## 2019-04-05 DIAGNOSIS — I10 ESSENTIAL HYPERTENSION: ICD-10-CM

## 2019-04-05 DIAGNOSIS — C61 PROSTATE CANCER (HCC): ICD-10-CM

## 2019-04-05 DIAGNOSIS — Z98.890 HISTORY OF LUNG SURGERY: ICD-10-CM

## 2019-04-05 DIAGNOSIS — F51.04 CHRONIC INSOMNIA: ICD-10-CM

## 2019-04-05 DIAGNOSIS — E78.00 PURE HYPERCHOLESTEROLEMIA: ICD-10-CM

## 2019-04-05 DIAGNOSIS — G63 NEUROPATHY ASSOCIATED WITH CANCER (HCC): ICD-10-CM

## 2019-04-05 DIAGNOSIS — F41.9 ANXIETY: ICD-10-CM

## 2019-04-05 DIAGNOSIS — J96.11 CHRONIC RESPIRATORY FAILURE WITH HYPOXIA (HCC): ICD-10-CM

## 2019-04-05 DIAGNOSIS — C18.6 MALIGNANT NEOPLASM OF DESCENDING COLON (HCC): ICD-10-CM

## 2019-04-05 DIAGNOSIS — C80.1 NEUROPATHY ASSOCIATED WITH CANCER (HCC): ICD-10-CM

## 2019-04-05 PROBLEM — Z92.29 UP TO DATE WITH INFLUENZA VACCINATION: Status: RESOLVED | Noted: 2019-02-20 | Resolved: 2019-04-05

## 2019-04-05 PROBLEM — Z87.891 FORMER SMOKER: Status: RESOLVED | Noted: 2019-02-20 | Resolved: 2019-04-05

## 2019-04-05 PROCEDURE — 99214 OFFICE O/P EST MOD 30 MIN: CPT | Performed by: FAMILY MEDICINE

## 2019-04-05 RX ORDER — TRIAMTERENE AND HYDROCHLOROTHIAZIDE 37.5; 25 MG/1; MG/1
1 TABLET ORAL DAILY
Qty: 90 TAB | Refills: 3 | Status: SHIPPED | OUTPATIENT
Start: 2019-04-05 | End: 2020-05-07

## 2019-04-05 RX ORDER — DULOXETIN HYDROCHLORIDE 20 MG/1
20 CAPSULE, DELAYED RELEASE ORAL
Qty: 1 CAP | Refills: 0
Start: 2019-04-05 | End: 2019-10-11

## 2019-04-05 RX ORDER — LOSARTAN POTASSIUM 100 MG/1
100 TABLET ORAL DAILY
Qty: 90 TAB | Refills: 3 | Status: SHIPPED | OUTPATIENT
Start: 2019-04-05 | End: 2020-04-24

## 2019-04-05 RX ORDER — CLONAZEPAM 1 MG/1
1 TABLET ORAL 2 TIMES DAILY PRN
Qty: 60 TAB | Refills: 2 | Status: SHIPPED
Start: 2019-04-05 | End: 2019-05-05

## 2019-04-05 RX ORDER — ROSUVASTATIN CALCIUM 40 MG/1
40 TABLET, COATED ORAL
Qty: 90 TAB | Refills: 3 | Status: SHIPPED | OUTPATIENT
Start: 2019-04-05 | End: 2020-03-23

## 2019-04-05 ASSESSMENT — PATIENT HEALTH QUESTIONNAIRE - PHQ9: CLINICAL INTERPRETATION OF PHQ2 SCORE: 0

## 2019-04-05 NOTE — PROGRESS NOTES
Subjective:   Gerald Oshea is a 68 y.o. male here today for a 4 month follow up on his elevated PSA and neuropathy.    Prostate cancer (HCC)  Patient has met with Dr. Villegas for his elevated PSA. The patient had a biopsy of his prostate, and has been told he has a low-stage prostate cancer. They agreed to monitor with serial labs. No indication for surgery or radiation treatment at this time. He will have another biopsy in 3 years.     Neuropathy associated with cancer (HCC)  He is no longer taking Cymbalta because on the 6th night of taking it he was unable to urinate for a small time period. He was eventually able to urinate, and did not need medical intervention. He thinks it was working prior to that incident, and does not know if he should keep using it. The patient describes his neuropathy as a 10/10 on the pain scale in his feet on a daily basis. He notes that due to his neuropathy he has to drive to his mail box, and order his groceries for pick-up online. He is unable to walk around a grocery store due to how much pain it causes. He has been trying weekly acupuncture, with mild alleviation. He would like to go more often, but it is not covered by his insurance. He has had work-up in the past and his neuropathy is believed to be caused by his past chemotherapy treatments.    Essential Hypertension  Stable. Monitoring BP at home. Currently taking losartan 100mg daily as directed. Also taking baby aspirin. Denies lightheadedness, vision changes, headache, palpitations or leg swelling.    Chronic Insomnia  This is a chronic and stable problem. Patient has been treated with clonazepam for many years and wishes to continue. He has not been able to taper off in the past. Requests a refill.    Current medicines (including changes today)  Current Outpatient Prescriptions   Medication Sig Dispense Refill   • rosuvastatin (CRESTOR) 40 MG tablet TAKE 1 TAB BY MOUTH EVERY BEDTIME. 90 Tab 3   •  "triamterene-hctz (MAXZIDE-25/DYAZIDE) 37.5-25 MG Tab TAKE 1 TAB BY MOUTH EVERY DAY. 90 Tab 3   • losartan (COZAAR) 100 MG Tab TAKE 1 TAB BY MOUTH EVERY DAY. 90 Tab 3   • gabapentin (NEURONTIN) 300 MG Cap Take 600 mg by mouth 2 Times a Day.     • Diclofenac Sodium 1 % Gel      • hydrocortisone 2.5 % Cream topical cream Apply to affected areas twice daily 1 Tube 0     No current facility-administered medications for this visit.      He  has a past medical history of Anemia; Anxiety; Atrophy of right kidney; Breath shortness; Cancer (HCC) (2016); Colon cancer (HCC); Former tobacco use; Gout; High cholesterol (12/12/2017); Hyperlipidemia; Hypertension (12/12/2017); Neuropathy (HCC) (12/12/2017); Pain (12/12/2017); Pneumonia (1991); Psychiatric problem; Sleep apnea (12/12/2017); and Snoring.    ROS   No chest pain, no shortness of breath, no abdominal pain.     Objective:     Physical Exam:  /72   Pulse 76   Temp 36.7 °C (98.1 °F)   Resp 18   Ht 1.753 m (5' 9\")   Wt (!) 134 kg (295 lb 6.4 oz)   SpO2 92%  Body mass index is 43.62 kg/m².   Constitutional: Alert, no distress, well-groomed.  Skin: Warm, dry, good turgor, no rashes in visible areas.  Eye: Equal, round and reactive, conjunctiva clear, lids normal.  ENMT: Lips without lesions, good dentition, moist mucous membranes.  Neck: Trachea midline, no masses, no thyromegaly.  Respiratory: Unlabored respiratory effort, no cough.  Abdomen: Soft, no gross masses.  MSK: Normal gait, moves all extremities.  Neuro: Grossly non-focal. No cranial nerve deficit. Strength and sensation intact.   Psych: Alert and oriented x3, normal affect and mood.    Labs:  • Glucose 04/01/2019 120* 65 - 99 mg/dL Final   • Bun 04/01/2019 13  8 - 27 mg/dL Final   • Creatinine 04/01/2019 1.06  0.76 - 1.27 mg/dL Final   • GFR If Non  04/01/2019 72  >59 mL/min/1.73 Final   • GFR If  04/01/2019 84  >59 mL/min/1.73 Final   • Bun-Creatinine Ratio 04/01/2019 " 12  10 - 24 Final   • Sodium 04/01/2019 133* 134 - 144 mmol/L Final   • Potassium 04/01/2019 4.0  3.5 - 5.2 mmol/L Final   • Chloride 04/01/2019 95* 96 - 106 mmol/L Final   • Co2 04/01/2019 23  20 - 29 mmol/L Final   • Calcium 04/01/2019 9.7  8.6 - 10.2 mg/dL Final   • Total Protein 04/01/2019 6.7  6.0 - 8.5 g/dL Final   • Albumin 04/01/2019 4.5  3.6 - 4.8 g/dL Final   • Globulin 04/01/2019 2.2  1.5 - 4.5 g/dL Final   • A-G Ratio 04/01/2019 2.0  1.2 - 2.2 Final   • Total Bilirubin 04/01/2019 0.6  0.0 - 1.2 mg/dL Final   • Alkaline Phosphatase 04/01/2019 75  39 - 117 IU/L Final   • AST(SGOT) 04/01/2019 19  0 - 40 IU/L Final   • ALT(SGPT) 04/01/2019 20  0 - 44 IU/L Final   • Cholesterol,Tot 04/01/2019 89* 100 - 199 mg/dL Final   • Triglycerides 04/01/2019 113  0 - 149 mg/dL Final   • HDL 04/01/2019 38* >39 mg/dL Final   • VLDL Cholesterol Calc 04/01/2019 23  5 - 40 mg/dL Final   • LDL 04/01/2019 28  0 - 99 mg/dL Final   • Comment: 04/01/2019 CANCELED   Final    Result canceled by the ancillary   • Glycohemoglobin 04/01/2019 6.1* 4.8 - 5.6 % Final    Comment: Prediabetes: 5.7 - 6.4  Diabetes: >6.4  Glycemic control for adults with diabetes: <7.0       Assessment and Plan:     1. Prostate cancer (HCC)  This is a new diagnosis. Fortunately, his prostate cancer is low-grade. He is on active surveillance. He will continue following up with him every 6 months with repeat blood work, and will have another biopsy in 3 years.     2. Malignant neoplasm of descending colon (HCC)  Chronic and stable. He is not on treatment at this time. Following with his oncologist.    3. Neuropathy associated with cancer (HCC)  I have advised the patient to try taking the Cymbalta every other day instead of daily and to contact me if he has any urinary retention. He will also cut back on his Gabapentin as this is no longer effective. If it does not improve his symptoms we will try Lyrica.   - DULoxetine (CYMBALTA) 20 MG Cap DR Particles; Take 1  "Cap by mouth every 48 hours.  Dispense: 1 Cap; Refill: 0    4. Diet-controlled diabetes mellitus (HCC)  The patient's A1c has improved from 6.7 to 6.1, and he is now in the \"prediabetic\" range. His kidney and liver are functioning within normal limits. Weight loss is encouraged.  - HEMOGLOBIN A1C; Future  - Comp Metabolic Panel; Future    5. Chronic respiratory failure with hypoxia (HCC)  6. History of lung surgery  Chronic and stable.  Secondary to his lung surgery for metastatic cancer.  Continue supplemental oxygen and monitor.    7. Essential hypertension  Well-controlled on current regimen.  Labs as indicated.  Continue antihypertensive medications.  Discussed decreasing salt intake.  Emphasized benefits of exercise and diet. Continue to monitor.  - losartan (COZAAR) 100 MG Tab; Take 1 Tab by mouth every day.  Dispense: 90 Tab; Refill: 3  - triamterene-hctz (MAXZIDE-25/DYAZIDE) 37.5-25 MG Tab; Take 1 Tab by mouth every day.  Dispense: 90 Tab; Refill: 3    8. Anxiety  9. Chronic insomnia  Chronic issue for patient.  Discussed benefits, risks and alternatives to medication.  Emphasized importance of maintaining good sleep hygiene including: no caffeine after 3pm, no napping, using bedroom only for sleep or sex, no TV or phones before bed. Prescription for clonazepam filled.  Continue to monitor.  - clonazePAM (KLONOPIN) 1 MG Tab; Take 1 Tab by mouth 2 times a day as needed (anxiety or sleep) for up to 30 days.  Dispense: 60 Tab; Refill: 2    His total cholesterol is low at 89. His LDL is within range at 28. Due to his good results and persistent neuropathy I have instructed the patient to cut back on his Crestor to 20mg daily instead of 40mg.    Followup: Return for Labs, Short.          Tari ACKERMAN (Scribe), am scribing for, and in the presence of, Jocelyn Ramos MD    Electronically signed by: Tari Ash (Scribe), 4/5/2019    Jocelyn ACKERMAN MD personally performed the services described in this " documentation, as scribed by Tari Ash in my presence, and it is both accurate and complete.

## 2019-06-17 ENCOUNTER — HOSPITAL ENCOUNTER (OUTPATIENT)
Dept: RADIOLOGY | Facility: MEDICAL CENTER | Age: 69
End: 2019-06-17
Attending: INTERNAL MEDICINE
Payer: MEDICARE

## 2019-06-17 DIAGNOSIS — C18.2 MALIGNANT NEOPLASM OF ASCENDING COLON (HCC): ICD-10-CM

## 2019-06-17 PROCEDURE — 71250 CT THORAX DX C-: CPT

## 2019-06-26 ENCOUNTER — HOSPITAL ENCOUNTER (OUTPATIENT)
Dept: LAB | Facility: MEDICAL CENTER | Age: 69
End: 2019-06-26
Attending: INTERNAL MEDICINE
Payer: MEDICARE

## 2019-06-26 LAB
ALBUMIN SERPL BCP-MCNC: 4.4 G/DL (ref 3.2–4.9)
ALBUMIN/GLOB SERPL: 2.3 G/DL
ALP SERPL-CCNC: 70 U/L (ref 30–99)
ALT SERPL-CCNC: 18 U/L (ref 2–50)
ANION GAP SERPL CALC-SCNC: 10 MMOL/L (ref 0–11.9)
AST SERPL-CCNC: 15 U/L (ref 12–45)
BILIRUB SERPL-MCNC: 0.6 MG/DL (ref 0.1–1.5)
BUN SERPL-MCNC: 19 MG/DL (ref 8–22)
CALCIUM SERPL-MCNC: 9.9 MG/DL (ref 8.5–10.5)
CHLORIDE SERPL-SCNC: 102 MMOL/L (ref 96–112)
CO2 SERPL-SCNC: 25 MMOL/L (ref 20–33)
CREAT SERPL-MCNC: 1.04 MG/DL (ref 0.5–1.4)
GLOBULIN SER CALC-MCNC: 1.9 G/DL (ref 1.9–3.5)
GLUCOSE SERPL-MCNC: 140 MG/DL (ref 65–99)
POTASSIUM SERPL-SCNC: 4.1 MMOL/L (ref 3.6–5.5)
PROT SERPL-MCNC: 6.3 G/DL (ref 6–8.2)
SODIUM SERPL-SCNC: 137 MMOL/L (ref 135–145)

## 2019-06-26 PROCEDURE — 80053 COMPREHEN METABOLIC PANEL: CPT

## 2019-06-26 PROCEDURE — 82378 CARCINOEMBRYONIC ANTIGEN: CPT

## 2019-06-28 LAB — TEST NAME 95000: ABNORMAL

## 2019-07-11 ENCOUNTER — APPOINTMENT (RX ONLY)
Dept: URBAN - METROPOLITAN AREA CLINIC 31 | Facility: CLINIC | Age: 69
Setting detail: DERMATOLOGY
End: 2019-07-11

## 2019-07-11 DIAGNOSIS — L57.0 ACTINIC KERATOSIS: ICD-10-CM

## 2019-07-11 DIAGNOSIS — L71.8 OTHER ROSACEA: ICD-10-CM

## 2019-07-11 DIAGNOSIS — L82.1 OTHER SEBORRHEIC KERATOSIS: ICD-10-CM

## 2019-07-11 DIAGNOSIS — B35.3 TINEA PEDIS: ICD-10-CM

## 2019-07-11 PROCEDURE — ? LIQUID NITROGEN

## 2019-07-11 PROCEDURE — 17000 DESTRUCT PREMALG LESION: CPT

## 2019-07-11 PROCEDURE — ? COUNSELING

## 2019-07-11 PROCEDURE — 99212 OFFICE O/P EST SF 10 MIN: CPT | Mod: 25

## 2019-07-11 PROCEDURE — ? MEDICATION COUNSELING

## 2019-07-11 PROCEDURE — ? ADDITIONAL NOTES

## 2019-07-11 PROCEDURE — ? PRESCRIPTION

## 2019-07-11 RX ORDER — METRONIDAZOLE 7.5 MG/G
CREAM TOPICAL BID
Qty: 1 | Refills: 3 | Status: ERX | COMMUNITY
Start: 2019-07-11

## 2019-07-11 RX ORDER — ECONAZOLE NITRATE CREAM 10 MG/G
TOPICAL CREAM TOPICAL BID
Qty: 1 | Refills: 2 | Status: ERX | COMMUNITY
Start: 2019-07-11

## 2019-07-11 RX ADMIN — METRONIDAZOLE THIN LAYER: 7.5 CREAM TOPICAL at 00:00

## 2019-07-11 RX ADMIN — ECONAZOLE NITRATE CREAM TOPICAL: 10 CREAM TOPICAL at 00:00

## 2019-07-11 ASSESSMENT — LOCATION DETAILED DESCRIPTION DERM
LOCATION DETAILED: RIGHT FOREHEAD
LOCATION DETAILED: RIGHT NASAL ROOT
LOCATION DETAILED: LEFT FOREHEAD
LOCATION DETAILED: NASAL ROOT
LOCATION DETAILED: NASAL DORSUM

## 2019-07-11 ASSESSMENT — LOCATION SIMPLE DESCRIPTION DERM
LOCATION SIMPLE: LEFT FOREHEAD
LOCATION SIMPLE: RIGHT FOREHEAD
LOCATION SIMPLE: NOSE

## 2019-07-11 ASSESSMENT — LOCATION ZONE DERM
LOCATION ZONE: FACE
LOCATION ZONE: NOSE

## 2019-07-11 NOTE — HPI: RASH
How Severe Is Your Rash?: mild
Is This A New Presentation, Or A Follow-Up?: Rash
Additional History: Pt reports he uses a CPAP machine and states he has used it for years.

## 2019-07-11 NOTE — PROCEDURE: ADDITIONAL NOTES
Additional Notes: Discussed biopsy per patients request, due to not having Marcaine available we did not do a biopsy and we will refer patient to Dr. Johnson for allergy testing to see if patient is truly allergic to lidocaine or allergic to preservative in lidocaine.

## 2019-07-11 NOTE — PROCEDURE: ADDITIONAL NOTES
Additional Notes: We discussed not using topical steroids (TAC) on feet , as steroids will make fungus worse. If any rash worsens with creams, stop cream and return to clinic. Pt requested new rx be sent, states he never received the last prescription.

## 2019-07-11 NOTE — PROCEDURE: MEDICATION COUNSELING
Elidel Pregnancy And Lactation Text: This medication is Pregnancy Category C. It is unknown if this medication is excreted in breast milk.
Odomzo Counseling- I discussed with the patient the risks of Odomzo including but not limited to nausea, vomiting, diarrhea, constipation, weight loss, changes in the sense of taste, decreased appetite, muscle spasms, and hair loss.  The patient verbalized understanding of the proper use and possible adverse effects of Odomzo.  All of the patient's questions and concerns were addressed.
Clofazimine Pregnancy And Lactation Text: This medication is Pregnancy Category C and isn't considered safe during pregnancy. It is excreted in breast milk.
Rifampin Pregnancy And Lactation Text: This medication is Pregnancy Category C and it isn't know if it is safe during pregnancy. It is also excreted in breast milk and should not be used if you are breast feeding.
Azithromycin Pregnancy And Lactation Text: This medication is considered safe during pregnancy and is also secreted in breast milk.
Tetracycline Counseling: Patient counseled regarding possible photosensitivity and increased risk for sunburn.  Patient instructed to avoid sunlight, if possible.  When exposed to sunlight, patients should wear protective clothing, sunglasses, and sunscreen.  The patient was instructed to call the office immediately if the following severe adverse effects occur:  hearing changes, easy bruising/bleeding, severe headache, or vision changes.  The patient verbalized understanding of the proper use and possible adverse effects of tetracycline.  All of the patient's questions and concerns were addressed. Patient understands to avoid pregnancy while on therapy due to potential birth defects.
Ilumya Counseling: I discussed with the patient the risks of tildrakizumab including but not limited to immunosuppression, malignancy, posterior leukoencephalopathy syndrome, and serious infections.  The patient understands that monitoring is required including a PPD at baseline and must alert us or the primary physician if symptoms of infection or other concerning signs are noted.
Hydroxyzine Pregnancy And Lactation Text: This medication is not safe during pregnancy and should not be taken. It is also excreted in breast milk and breast feeding isn't recommended.
Tremfya Counseling: I discussed with the patient the risks of guselkumab including but not limited to immunosuppression, serious infections, worsening of inflammatory bowel disease and drug reactions.  The patient understands that monitoring is required including a PPD at baseline and must alert us or the primary physician if symptoms of infection or other concerning signs are noted.
Topical Retinoid counseling:  Patient advised to apply a pea-sized amount only at bedtime and wait 30 minutes after washing their face before applying.  If too drying, patient may add a non-comedogenic moisturizer. The patient verbalized understanding of the proper use and possible adverse effects of retinoids.  All of the patient's questions and concerns were addressed.
Acitretin Counseling:  I discussed with the patient the risks of acitretin including but not limited to hair loss, dry lips/skin/eyes, liver damage, hyperlipidemia, depression/suicidal ideation, photosensitivity.  Serious rare side effects can include but are not limited to pancreatitis, pseudotumor cerebri, bony changes, clot formation/stroke/heart attack.  Patient understands that alcohol is contraindicated since it can result in liver toxicity and significantly prolong the elimination of the drug by many years.
Tremfya Pregnancy And Lactation Text: The risk during pregnancy and breastfeeding is uncertain with this medication.
Odomzo Pregnancy And Lactation Text: This medication is Pregnancy Category X and is absolutely contraindicated during pregnancy. It is unknown if it is excreted in breast milk.
Eucrisa Counseling: Patient may experience a mild burning sensation during topical application. Eucrisa is not approved in children less than 2 years of age.
Colchicine Counseling:  Patient counseled regarding adverse effects including but not limited to stomach upset (nausea, vomiting, stomach pain, or diarrhea).  Patient instructed to limit alcohol consumption while taking this medication.  Colchicine may reduce blood counts especially with prolonged use.  The patient understands that monitoring of kidney function and blood counts may be required, especially at baseline. The patient verbalized understanding of the proper use and possible adverse effects of colchicine.  All of the patient's questions and concerns were addressed.
Tetracycline Pregnancy And Lactation Text: This medication is Pregnancy Category D and not consider safe during pregnancy. It is also excreted in breast milk.
Bactrim Counseling:  I discussed with the patient the risks of sulfa antibiotics including but not limited to GI upset, allergic reaction, drug rash, diarrhea, dizziness, photosensitivity, and yeast infections.  Rarely, more serious reactions can occur including but not limited to aplastic anemia, agranulocytosis, methemoglobinemia, blood dyscrasias, liver or kidney failure, lung infiltrates or desquamative/blistering drug rashes.
Infliximab Counseling:  I discussed with the patient the risks of infliximab including but not limited to myelosuppression, immunosuppression, autoimmune hepatitis, demyelinating diseases, lymphoma, and serious infections.  The patient understands that monitoring is required including a PPD at baseline and must alert us or the primary physician if symptoms of infection or other concerning signs are noted.
Acitretin Pregnancy And Lactation Text: This medication is Pregnancy Category X and should not be given to women who are pregnant or may become pregnant in the future. This medication is excreted in breast milk.
Bactrim Pregnancy And Lactation Text: This medication is Pregnancy Category D and is known to cause fetal risk.  It is also excreted in breast milk.
Albendazole Counseling:  I discussed with the patient the risks of albendazole including but not limited to cytopenia, kidney damage, nausea/vomiting and severe allergy.  The patient understands that this medication is being used in an off-label manner.
Tazorac Counseling:  Patient advised that medication is irritating and drying.  Patient may need to apply sparingly and wash off after an hour before eventually leaving it on overnight.  The patient verbalized understanding of the proper use and possible adverse effects of tazorac.  All of the patient's questions and concerns were addressed.
Xeljanz Counseling: I discussed with the patient the risks of Xeljanz therapy including increased risk of infection, liver issues, headache, diarrhea, or cold symptoms. Live vaccines should be avoided. They were instructed to call if they have any problems.
Otezla Counseling: The side effects of Otezla were discussed with the patient, including but not limited to worsening or new depression, weight loss, diarrhea, nausea, upper respiratory tract infection, and headache. Patient instructed to call the office should any adverse effect occur.  The patient verbalized understanding of the proper use and possible adverse effects of Otezla.  All the patient's questions and concerns were addressed.
Eucrisa Pregnancy And Lactation Text: This medication has not been assigned a Pregnancy Risk Category but animal studies failed to show danger with the topical medication. It is unknown if the medication is excreted in breast milk.
Hydroquinone Counseling:  Patient advised that medication may result in skin irritation, lightening (hypopigmentation), dryness, and burning.  In the event of skin irritation, the patient was advised to reduce the amount of the drug applied or use it less frequently.  Rarely, spots that are treated with hydroquinone can become darker (pseudoochronosis).  Should this occur, patient instructed to stop medication and call the office. The patient verbalized understanding of the proper use and possible adverse effects of hydroquinone.  All of the patient's questions and concerns were addressed.
Otezla Pregnancy And Lactation Text: This medication is Pregnancy Category C and it isn't known if it is safe during pregnancy. It is unknown if it is excreted in breast milk.
Dapsone Counseling: I discussed with the patient the risks of dapsone including but not limited to hemolytic anemia, agranulocytosis, rashes, methemoglobinemia, kidney failure, peripheral neuropathy, headaches, GI upset, and liver toxicity.  Patients who start dapsone require monitoring including baseline LFTs and weekly CBCs for the first month, then every month thereafter.  The patient verbalized understanding of the proper use and possible adverse effects of dapsone.  All of the patient's questions and concerns were addressed.
Fluconazole Counseling:  Patient counseled regarding adverse effects of fluconazole including but not limited to headache, diarrhea, nausea, upset stomach, liver function test abnormalities, taste disturbance, and stomach pain.  There is a rare possibility of liver failure that can occur when taking fluconazole.  The patient understands that monitoring of LFTs and kidney function test may be required, especially at baseline. The patient verbalized understanding of the proper use and possible adverse effects of fluconazole.  All of the patient's questions and concerns were addressed.
Cephalexin Counseling: I counseled the patient regarding use of cephalexin as an antibiotic for prophylactic and/or therapeutic purposes. Cephalexin (commonly prescribed under brand name Keflex) is a cephalosporin antibiotic which is active against numerous classes of bacteria, including most skin bacteria. Side effects may include nausea, diarrhea, gastrointestinal upset, rash, hives, yeast infections, and in rare cases, hepatitis, kidney disease, seizures, fever, confusion, neurologic symptoms, and others. Patients with severe allergies to penicillin medications are cautioned that there is about a 10% incidence of cross-reactivity with cephalosporins. When possible, patients with penicillin allergies should use alternatives to cephalosporins for antibiotic therapy.
Infliximab Pregnancy And Lactation Text: This medication is Pregnancy Category B and is considered safe during pregnancy. It is unknown if this medication is excreted in breast milk.
Albendazole Pregnancy And Lactation Text: This medication is Pregnancy Category C and it isn't known if it is safe during pregnancy. It is also excreted in breast milk.
Bexarotene Counseling:  I discussed with the patient the risks of bexarotene including but not limited to hair loss, dry lips/skin/eyes, liver abnormalities, hyperlipidemia, pancreatitis, depression/suicidal ideation, photosensitivity, drug rash/allergic reactions, hypothyroidism, anemia, leukopenia, infection, cataracts, and teratogenicity.  Patient understands that they will need regular blood tests to check lipid profile, liver function tests, white blood cell count, thyroid function tests and pregnancy test if applicable.
Xeladamz Pregnancy And Lactation Text: This medication is Pregnancy Category D and is not considered safe during pregnancy.  The risk during breast feeding is also uncertain.
Tazorac Pregnancy And Lactation Text: This medication is not safe during pregnancy. It is unknown if this medication is excreted in breast milk.
Bexarotene Pregnancy And Lactation Text: This medication is Pregnancy Category X and should not be given to women who are pregnant or may become pregnant. This medication should not be used if you are breast feeding.
Ivermectin Counseling:  Patient instructed to take medication on an empty stomach with a full glass of water.  Patient informed of potential adverse effects including but not limited to nausea, diarrhea, dizziness, itching, and swelling of the extremities or lymph nodes.  The patient verbalized understanding of the proper use and possible adverse effects of ivermectin.  All of the patient's questions and concerns were addressed.
Topical Clindamycin Counseling: Patient counseled that this medication may cause skin irritation or allergic reactions.  In the event of skin irritation, the patient was advised to reduce the amount of the drug applied or use it less frequently.   The patient verbalized understanding of the proper use and possible adverse effects of clindamycin.  All of the patient's questions and concerns were addressed.
Xolair Counseling:  Patient informed of potential adverse effects including but not limited to fever, muscle aches, rash and allergic reactions.  The patient verbalized understanding of the proper use and possible adverse effects of Xolair.  All of the patient's questions and concerns were addressed.
Oxybutynin Counseling:  I discussed with the patient the risks of oxybutynin including but not limited to skin rash, drowsiness, dry mouth, difficulty urinating, and blurred vision.
Dapsone Pregnancy And Lactation Text: This medication is Pregnancy Category C and is not considered safe during pregnancy or breast feeding.
Erivedge Counseling- I discussed with the patient the risks of Erivedge including but not limited to nausea, vomiting, diarrhea, constipation, weight loss, changes in the sense of taste, decreased appetite, muscle spasms, and hair loss.  The patient verbalized understanding of the proper use and possible adverse effects of Erivedge.  All of the patient's questions and concerns were addressed.
Fluconazole Pregnancy And Lactation Text: This medication is Pregnancy Category C and it isn't know if it is safe during pregnancy. It is also excreted in breast milk.
Rituxan Counseling:  I discussed with the patient the risks of Rituxan infusions. Side effects can include infusion reactions, severe drug rashes including mucocutaneous reactions, reactivation of latent hepatitis and other infections and rarely progressive multifocal leukoencephalopathy.  All of the patient's questions and concerns were addressed.
Cephalexin Pregnancy And Lactation Text: This medication is Pregnancy Category B and considered safe during pregnancy.  It is also excreted in breast milk but can be used safely for shorter doses.
Rituxan Pregnancy And Lactation Text: This medication is Pregnancy Category C and it isn't know if it is safe during pregnancy. It is unknown if this medication is excreted in breast milk but similar antibodies are known to be excreted.
Clindamycin Counseling: I counseled the patient regarding use of clindamycin as an antibiotic for prophylactic and/or therapeutic purposes. Clindamycin is active against numerous classes of bacteria, including skin bacteria. Side effects may include nausea, diarrhea, gastrointestinal upset, rash, hives, yeast infections, and in rare cases, colitis.
Isotretinoin Counseling: Patient should get monthly blood tests, not donate blood, not drive at night if vision affected, not share medication, and not undergo elective surgery for 6 months after tx completed. Side effects reviewed, pt to contact office should one occur.
Xolair Pregnancy And Lactation Text: This medication is Pregnancy Category B and is considered safe during pregnancy. This medication is excreted in breast milk.
Topical Clindamycin Pregnancy And Lactation Text: This medication is Pregnancy Category B and is considered safe during pregnancy. It is unknown if it is excreted in breast milk.
Imiquimod Counseling:  I discussed with the patient the risks of imiquimod including but not limited to erythema, scaling, itching, weeping, crusting, and pain.  Patient understands that the inflammatory response to imiquimod is variable from person to person and was educated regarded proper titration schedule.  If flu-like symptoms develop, patient knows to discontinue the medication and contact us.
Topical Sulfur Applications Counseling: Topical Sulfur Counseling: Patient counseled that this medication may cause skin irritation or allergic reactions.  In the event of skin irritation, the patient was advised to reduce the amount of the drug applied or use it less frequently.   The patient verbalized understanding of the proper use and possible adverse effects of topical sulfur application.  All of the patient's questions and concerns were addressed.
Propranolol Counseling:  I discussed with the patient the risks of propranolol including but not limited to low heart rate, low blood pressure, low blood sugar, restlessness and increased cold sensitivity. They should call the office if they experience any of these side effects.
Griseofulvin Counseling:  I discussed with the patient the risks of griseofulvin including but not limited to photosensitivity, cytopenia, liver damage, nausea/vomiting and severe allergy.  The patient understands that this medication is best absorbed when taken with a fatty meal (e.g., ice cream or french fries).
Griseofulvin Pregnancy And Lactation Text: This medication is Pregnancy Category X and is known to cause serious birth defects. It is unknown if this medication is excreted in breast milk but breast feeding should be avoided.
Clindamycin Pregnancy And Lactation Text: This medication can be used in pregnancy if certain situations. Clindamycin is also present in breast milk.
Siliq Counseling:  I discussed with the patient the risks of Siliq including but not limited to new or worsening depression, suicidal thoughts and behavior, immunosuppression, malignancy, posterior leukoencephalopathy syndrome, and serious infections.  The patient understands that monitoring is required including a PPD at baseline and must alert us or the primary physician if symptoms of infection or other concerning signs are noted. There is also a special program designed to monitor depression which is required with Siliq.
Isotretinoin Pregnancy And Lactation Text: This medication is Pregnancy Category X and is considered extremely dangerous during pregnancy. It is unknown if it is excreted in breast milk.
High Dose Vitamin A Counseling: Side effects reviewed, pt to contact office should one occur.
Topical Sulfur Applications Pregnancy And Lactation Text: This medication is Pregnancy Category C and has an unknown safety profile during pregnancy. It is unknown if this topical medication is excreted in breast milk.
Azathioprine Counseling:  I discussed with the patient the risks of azathioprine including but not limited to myelosuppression, immunosuppression, hepatotoxicity, lymphoma, and infections.  The patient understands that monitoring is required including baseline LFTs, Creatinine, possible TPMP genotyping and weekly CBCs for the first month and then every 2 weeks thereafter.  The patient verbalized understanding of the proper use and possible adverse effects of azathioprine.  All of the patient's questions and concerns were addressed.
Minoxidil Counseling: Minoxidil is a topical medication which can increase blood flow where it is applied. It is uncertain how this medication increases hair growth. Side effects are uncommon and include stinging and allergic reactions.
Finasteride Male Counseling: Finasteride Counseling:  I discussed with the patient the risks of use of finasteride including but not limited to decreased libido, decreased ejaculate volume, gynecomastia, and depression. Women should not handle medication.  All of the patient's questions and concerns were addressed.
Finasteride Pregnancy And Lactation Text: This medication is absolutely contraindicated during pregnancy. It is unknown if it is excreted in breast milk.
Propranolol Pregnancy And Lactation Text: This medication is Pregnancy Category C and it isn't known if it is safe during pregnancy. It is excreted in breast milk.
Doxycycline Counseling:  Patient counseled regarding possible photosensitivity and increased risk for sunburn.  Patient instructed to avoid sunlight, if possible.  When exposed to sunlight, patients should wear protective clothing, sunglasses, and sunscreen.  The patient was instructed to call the office immediately if the following severe adverse effects occur:  hearing changes, easy bruising/bleeding, severe headache, or vision changes.  The patient verbalized understanding of the proper use and possible adverse effects of doxycycline.  All of the patient's questions and concerns were addressed.
Itraconazole Counseling:  I discussed with the patient the risks of itraconazole including but not limited to liver damage, nausea/vomiting, neuropathy, and severe allergy.  The patient understands that this medication is best absorbed when taken with acidic beverages such as non-diet cola or ginger ale.  The patient understands that monitoring is required including baseline LFTs and repeat LFTs at intervals.  The patient understands that they are to contact us or the primary physician if concerning signs are noted.
Simponi Counseling:  I discussed with the patient the risks of golimumab including but not limited to myelosuppression, immunosuppression, autoimmune hepatitis, demyelinating diseases, lymphoma, and serious infections.  The patient understands that monitoring is required including a PPD at baseline and must alert us or the primary physician if symptoms of infection or other concerning signs are noted.
Doxycycline Pregnancy And Lactation Text: This medication is Pregnancy Category D and not consider safe during pregnancy. It is also excreted in breast milk but is considered safe for shorter treatment courses.
High Dose Vitamin A Pregnancy And Lactation Text: High dose vitamin A therapy is contraindicated during pregnancy and breast feeding.
Azathioprine Pregnancy And Lactation Text: This medication is Pregnancy Category D and isn't considered safe during pregnancy. It is unknown if this medication is excreted in breast milk.
Birth Control Pills Pregnancy And Lactation Text: This medication should be avoided if pregnant and for the first 30 days post-partum.
Wartpeel Counseling:  I discussed with the patient the risks of Wartpeel including but not limited to erythema, scaling, itching, weeping, crusting, and pain.
Wartpeel Pregnancy And Lactation Text: This medication is Pregnancy Category X and contraindicated in pregnancy and in women who may become pregnant. It is unknown if this medication is excreted in breast milk.
Birth Control Pills Counseling: Birth Control Pill Counseling: I discussed with the patient the potential side effects of OCPs including but not limited to increased risk of stroke, heart attack, thrombophlebitis, deep venous thrombosis, hepatic adenomas, breast changes, GI upset, headaches, and depression.  The patient verbalized understanding of the proper use and possible adverse effects of OCPs. All of the patient's questions and concerns were addressed.
Cellcept Counseling:  I discussed with the patient the risks of mycophenolate mofetil including but not limited to infection/immunosuppression, GI upset, hypokalemia, hypercholesterolemia, bone marrow suppression, lymphoproliferative disorders, malignancy, GI ulceration/bleed/perforation, colitis, interstitial lung disease, kidney failure, progressive multifocal leukoencephalopathy, and birth defects.  The patient understands that monitoring is required including a baseline creatinine and regular CBC testing. In addition, patient must alert us immediately if symptoms of infection or other concerning signs are noted.
Mirvaso Counseling: Mirvaso is a topical medication which can decrease superficial blood flow where applied. Side effects are uncommon and include stinging, redness and allergic reactions.
Gabapentin Counseling: I discussed with the patient the risks of gabapentin including but not limited to dizziness, somnolence, fatigue and ataxia.
Ketoconazole Counseling:   Patient counseled regarding improving absorption with orange juice.  Adverse effects include but are not limited to breast enlargement, headache, diarrhea, nausea, upset stomach, liver function test abnormalities, taste disturbance, and stomach pain.  There is a rare possibility of liver failure that can occur when taking ketoconazole. The patient understands that monitoring of LFTs may be required, especially at baseline. The patient verbalized understanding of the proper use and possible adverse effects of ketoconazole.  All of the patient's questions and concerns were addressed.
Erythromycin Counseling:  I discussed with the patient the risks of erythromycin including but not limited to GI upset, allergic reaction, drug rash, diarrhea, increase in liver enzymes, and yeast infections.
Spironolactone Counseling: Patient advised regarding risks of diarrhea, abdominal pain, hyperkalemia, birth defects (for female patients), liver toxicity and renal toxicity. The patient may need blood work to monitor liver and kidney function and potassium levels while on therapy. The patient verbalized understanding of the proper use and possible adverse effects of spironolactone.  All of the patient's questions and concerns were addressed.
Cimzia Counseling:  I discussed with the patient the risks of Cimzia including but not limited to immunosuppression, allergic reactions and infections.  The patient understands that monitoring is required including a PPD at baseline and must alert us or the primary physician if symptoms of infection or other concerning signs are noted.
Benzoyl Peroxide Counseling: Patient counseled that medicine may cause skin irritation and bleach clothing.  In the event of skin irritation, the patient was advised to reduce the amount of the drug applied or use it less frequently.   The patient verbalized understanding of the proper use and possible adverse effects of benzoyl peroxide.  All of the patient's questions and concerns were addressed.
Cimzia Pregnancy And Lactation Text: This medication crosses the placenta but can be considered safe in certain situations. Cimzia may be excreted in breast milk.
Zyclara Counseling:  I discussed with the patient the risks of imiquimod including but not limited to erythema, scaling, itching, weeping, crusting, and pain.  Patient understands that the inflammatory response to imiquimod is variable from person to person and was educated regarded proper titration schedule.  If flu-like symptoms develop, patient knows to discontinue the medication and contact us.
Mirvaso Pregnancy And Lactation Text: This medication has not been assigned a Pregnancy Risk Category. It is unknown if the medication is excreted in breast milk.
Glycopyrrolate Counseling:  I discussed with the patient the risks of glycopyrrolate including but not limited to skin rash, drowsiness, dry mouth, difficulty urinating, and blurred vision.
Ketoconazole Pregnancy And Lactation Text: This medication is Pregnancy Category C and it isn't know if it is safe during pregnancy. It is also excreted in breast milk and breast feeding isn't recommended.
Erythromycin Pregnancy And Lactation Text: This medication is Pregnancy Category B and is considered safe during pregnancy. It is also excreted in breast milk.
Stelara Counseling:  I discussed with the patient the risks of ustekinumab including but not limited to immunosuppression, malignancy, posterior leukoencephalopathy syndrome, and serious infections.  The patient understands that monitoring is required including a PPD at baseline and must alert us or the primary physician if symptoms of infection or other concerning signs are noted.
Metronidazole Counseling:  I discussed with the patient the risks of metronidazole including but not limited to seizures, nausea/vomiting, a metallic taste in the mouth, nausea/vomiting and severe allergy.
Cosentyx Counseling:  I discussed with the patient the risks of Cosentyx including but not limited to worsening of Crohn's disease, immunosuppression, allergic reactions and infections.  The patient understands that monitoring is required including a PPD at baseline and must alert us or the primary physician if symptoms of infection or other concerning signs are noted.
Benzoyl Peroxide Pregnancy And Lactation Text: This medication is Pregnancy Category C. It is unknown if benzoyl peroxide is excreted in breast milk.
Cyclophosphamide Counseling:  I discussed with the patient the risks of cyclophosphamide including but not limited to hair loss, hormonal abnormalities, decreased fertility, abdominal pain, diarrhea, nausea and vomiting, bone marrow suppression and infection. The patient understands that monitoring is required while taking this medication.
Picato Counseling:  I discussed with the patient the risks of Picato including but not limited to erythema, scaling, itching, weeping, crusting, and pain.
Spironolactone Pregnancy And Lactation Text: This medication can cause feminization of the male fetus and should be avoided during pregnancy. The active metabolite is also found in breast milk.
Cyclophosphamide Pregnancy And Lactation Text: This medication is Pregnancy Category D and it isn't considered safe during pregnancy. This medication is excreted in breast milk.
Terbinafine Counseling: Patient counseling regarding adverse effects of terbinafine including but not limited to headache, diarrhea, rash, upset stomach, liver function test abnormalities, itching, taste/smell disturbance, nausea, abdominal pain, and flatulence.  There is a rare possibility of liver failure that can occur when taking terbinafine.  The patient understands that a baseline LFT and kidney function test may be required. The patient verbalized understanding of the proper use and possible adverse effects of terbinafine.  All of the patient's questions and concerns were addressed.
Terbinafine Pregnancy And Lactation Text: This medication is Pregnancy Category B and is considered safe during pregnancy. It is also excreted in breast milk and breast feeding isn't recommended.
Metronidazole Pregnancy And Lactation Text: This medication is Pregnancy Category B and considered safe during pregnancy.  It is also excreted in breast milk.
SSKI Counseling:  I discussed with the patient the risks of SSKI including but not limited to thyroid abnormalities, metallic taste, GI upset, fever, headache, acne, arthralgias, paraesthesias, lymphadenopathy, easy bleeding, arrhythmias, and allergic reaction.
Taltz Counseling: I discussed with the patient the risks of ixekizumab including but not limited to immunosuppression, serious infections, worsening of inflammatory bowel disease and drug reactions.  The patient understands that monitoring is required including a PPD at baseline and must alert us or the primary physician if symptoms of infection or other concerning signs are noted.
Carac Counseling:  I discussed with the patient the risks of Carac including but not limited to erythema, scaling, itching, weeping, crusting, and pain.
Glycopyrrolate Pregnancy And Lactation Text: This medication is Pregnancy Category B and is considered safe during pregnancy. It is unknown if it is excreted breast milk.
Dupixent Counseling: I discussed with the patient the risks of dupilumab including but not limited to eye infection and irritation, cold sores, injection site reactions, worsening of asthma, allergic reactions and increased risk of parasitic infection.  Live vaccines should be avoided while taking dupilumab. Dupilumab will also interact with certain medications such as warfarin and cyclosporine. The patient understands that monitoring is required and they must alert us or the primary physician if symptoms of infection or other concerning signs are noted.
Hydroxychloroquine Counseling:  I discussed with the patient that a baseline ophthalmologic exam is needed at the start of therapy and every year thereafter while on therapy. A CBC may also be warranted for monitoring.  The side effects of this medication were discussed with the patient, including but not limited to agranulocytosis, aplastic anemia, seizures, rashes, retinopathy, and liver toxicity. Patient instructed to call the office should any adverse effect occur.  The patient verbalized understanding of the proper use and possible adverse effects of Plaquenil.  All the patient's questions and concerns were addressed.
Cyclosporine Counseling:  I discussed with the patient the risks of cyclosporine including but not limited to hypertension, gingival hyperplasia,myelosuppression, immunosuppression, liver damage, kidney damage, neurotoxicity, lymphoma, and serious infections. The patient understands that monitoring is required including baseline blood pressure, CBC, CMP, lipid panel and uric acid, and then 1-2 times monthly CMP and blood pressure.
Opioid Counseling: I discussed with the patient the potential side effects of opioids including but not limited to addiction, altered mental status, and depression. I stressed avoiding alcohol, benzodiazepines, muscle relaxants and sleep aids unless specifically okayed by a physician. The patient verbalized understanding of the proper use and possible adverse effects of opioids. All of the patient's questions and concerns were addressed. They were instructed to flush the remaining pills down the toilet if they did not need them for pain.
Detail Level: Simple
Protopic Counseling: Patient may experience a mild burning sensation during topical application. Protopic is not approved in children less than 2 years of age. There have been case reports of hematologic and skin malignancies in patients using topical calcineurin inhibitors although causality is questionable.
Thalidomide Counseling: I discussed with the patient the risks of thalidomide including but not limited to birth defects, anxiety, weakness, chest pain, dizziness, cough and severe allergy.
Protopic Pregnancy And Lactation Text: This medication is Pregnancy Category C. It is unknown if this medication is excreted in breast milk when applied topically.
Sski Pregnancy And Lactation Text: This medication is Pregnancy Category D and isn't considered safe during pregnancy. It is excreted in breast milk.
Minocycline Counseling: Patient advised regarding possible photosensitivity and discoloration of the teeth, skin, lips, tongue and gums.  Patient instructed to avoid sunlight, if possible.  When exposed to sunlight, patients should wear protective clothing, sunglasses, and sunscreen.  The patient was instructed to call the office immediately if the following severe adverse effects occur:  hearing changes, easy bruising/bleeding, severe headache, or vision changes.  The patient verbalized understanding of the proper use and possible adverse effects of minocycline.  All of the patient's questions and concerns were addressed.
Dupixent Pregnancy And Lactation Text: This medication likely crosses the placenta but the risk for the fetus is uncertain. This medication is excreted in breast milk.
5-Fu Counseling: 5-Fluorouracil Counseling:  I discussed with the patient the risks of 5-fluorouracil including but not limited to erythema, scaling, itching, weeping, crusting, and pain.
Hydroxychloroquine Pregnancy And Lactation Text: This medication has been shown to cause fetal harm but it isn't assigned a Pregnancy Risk Category. There are small amounts excreted in breast milk.
Cyclosporine Pregnancy And Lactation Text: This medication is Pregnancy Category C and it isn't know if it is safe during pregnancy. This medication is excreted in breast milk.
Opioid Pregnancy And Lactation Text: These medications can lead to premature delivery and should be avoided during pregnancy. These medications are also present in breast milk in small amounts.
Methotrexate Counseling:  Patient counseled regarding adverse effects of methotrexate including but not limited to nausea, vomiting, abnormalities in liver function tests. Patients may develop mouth sores, rash, diarrhea, and abnormalities in blood counts. The patient understands that monitoring is required including LFT's and blood counts.  There is a rare possibility of scarring of the liver and lung problems that can occur when taking methotrexate. Persistent nausea, loss of appetite, pale stools, dark urine, cough, and shortness of breath should be reported immediately. Patient advised to discontinue methotrexate treatment at least three months before attempting to become pregnant.  I discussed the need for folate supplements while taking methotrexate.  These supplements can decrease side effects during methotrexate treatment. The patient verbalized understanding of the proper use and possible adverse effects of methotrexate.  All of the patient's questions and concerns were addressed.
Rhofade Counseling: Rhofade is a topical medication which can decrease superficial blood flow where applied. Side effects are uncommon and include stinging, redness and allergic reactions.
Cimetidine Counseling:  I discussed with the patient the risks of Cimetidine including but not limited to gynecomastia, headache, diarrhea, nausea, drowsiness, arrhythmias, pancreatitis, skin rashes, psychosis, bone marrow suppression and kidney toxicity.
Include Pregnancy/Lactation Warning?: No
Enbrel Counseling:  I discussed with the patient the risks of etanercept including but not limited to myelosuppression, immunosuppression, autoimmune hepatitis, demyelinating diseases, lymphoma, and infections.  The patient understands that monitoring is required including a PPD at baseline and must alert us or the primary physician if symptoms of infection or other concerning signs are noted.
Valtrex Counseling: I discussed with the patient the risks of valacyclovir including but not limited to kidney damage, nausea, vomiting and severe allergy.  The patient understands that if the infection seems to be worsening or is not improving, they are to call.
Quinolones Counseling:  I discussed with the patient the risks of fluoroquinolones including but not limited to GI upset, allergic reaction, drug rash, diarrhea, dizziness, photosensitivity, yeast infections, liver function test abnormalities, tendonitis/tendon rupture.
Niacinamide Counseling: I recommended taking niacin or niacinamide, also know as vitamin B3, twice daily. Recent evidence suggests that taking vitamin B3 (500 mg twice daily) can reduce the risk of actinic keratoses and non-melanoma skin cancers. Side effects of vitamin B3 include flushing and headache.
Drysol Counseling:  I discussed with the patient the risks of drysol/aluminum chloride including but not limited to skin rash, itching, irritation, burning.
Niacinamide Pregnancy And Lactation Text: These medications are considered safe during pregnancy.
Methotrexate Pregnancy And Lactation Text: This medication is Pregnancy Category X and is known to cause fetal harm. This medication is excreted in breast milk.
Arava Counseling:  Patient counseled regarding adverse effects of Arava including but not limited to nausea, vomiting, abnormalities in liver function tests. Patients may develop mouth sores, rash, diarrhea, and abnormalities in blood counts. The patient understands that monitoring is required including LFTs and blood counts.  There is a rare possibility of scarring of the liver and lung problems that can occur when taking methotrexate. Persistent nausea, loss of appetite, pale stools, dark urine, cough, and shortness of breath should be reported immediately. Patient advised to discontinue Arava treatment and consult with a physician prior to attempting conception. The patient will have to undergo a treatment to eliminate Arava from the body prior to conception.
Doxepin Counseling:  Patient advised that the medication is sedating and not to drive a car after taking this medication. Patient informed of potential adverse effects including but not limited to dry mouth, urinary retention, and blurry vision.  The patient verbalized understanding of the proper use and possible adverse effects of doxepin.  All of the patient's questions and concerns were addressed.
Solaraze Counseling:  I discussed with the patient the risks of Solaraze including but not limited to erythema, scaling, itching, weeping, crusting, and pain.
Nsaids Counseling: NSAID Counseling: I discussed with the patient that NSAIDs should be taken with food. Prolonged use of NSAIDs can result in the development of stomach ulcers.  Patient advised to stop taking NSAIDs if abdominal pain occurs.  The patient verbalized understanding of the proper use and possible adverse effects of NSAIDs.  All of the patient's questions and concerns were addressed.
Drysol Pregnancy And Lactation Text: This medication is considered safe during pregnancy and breast feeding.
Prednisone Counseling:  I discussed with the patient the risks of prolonged use of prednisone including but not limited to weight gain, insomnia, osteoporosis, mood changes, diabetes, susceptibility to infection, glaucoma and high blood pressure.  In cases where prednisone use is prolonged, patients should be monitored with blood pressure checks, serum glucose levels and an eye exam.  Additionally, the patient may need to be placed on GI prophylaxis, PCP prophylaxis, and calcium and vitamin D supplementation and/or a bisphosphonate.  The patient verbalized understanding of the proper use and the possible adverse effects of prednisone.  All of the patient's questions and concerns were addressed.
Valtrex Pregnancy And Lactation Text: this medication is Pregnancy Category B and is considered safe during pregnancy. This medication is not directly found in breast milk but it's metabolite acyclovir is present.
Clofazimine Counseling:  I discussed with the patient the risks of clofazimine including but not limited to skin and eye pigmentation, liver damage, nausea/vomiting, gastrointestinal bleeding and allergy.
Rifampin Counseling: I discussed with the patient the risks of rifampin including but not limited to liver damage, kidney damage, red-orange body fluids, nausea/vomiting and severe allergy.
Humira Counseling:  I discussed with the patient the risks of adalimumab including but not limited to myelosuppression, immunosuppression, autoimmune hepatitis, demyelinating diseases, lymphoma, and serious infections.  The patient understands that monitoring is required including a PPD at baseline and must alert us or the primary physician if symptoms of infection or other concerning signs are noted.
Doxepin Pregnancy And Lactation Text: This medication is Pregnancy Category C and it isn't known if it is safe during pregnancy. It is also excreted in breast milk and breast feeding isn't recommended.
Azithromycin Counseling:  I discussed with the patient the risks of azithromycin including but not limited to GI upset, allergic reaction, drug rash, diarrhea, and yeast infections.
Hydroxyzine Counseling: Patient advised that the medication is sedating and not to drive a car after taking this medication.  Patient informed of potential adverse effects including but not limited to dry mouth, urinary retention, and blurry vision.  The patient verbalized understanding of the proper use and possible adverse effects of hydroxyzine.  All of the patient's questions and concerns were addressed.
Solaraze Pregnancy And Lactation Text: This medication is Pregnancy Category B and is considered safe. There is some data to suggest avoiding during the third trimester. It is unknown if this medication is excreted in breast milk.
Elidel Counseling: Patient may experience a mild burning sensation during topical application. Elidel is not approved in children less than 2 years of age. There have been case reports of hematologic and skin malignancies in patients using topical calcineurin inhibitors although causality is questionable.
Nsaids Pregnancy And Lactation Text: These medications are considered safe up to 30 weeks gestation. It is excreted in breast milk.

## 2019-08-27 ENCOUNTER — APPOINTMENT (RX ONLY)
Dept: URBAN - METROPOLITAN AREA CLINIC 31 | Facility: CLINIC | Age: 69
Setting detail: DERMATOLOGY
End: 2019-08-27

## 2019-08-27 DIAGNOSIS — L57.0 ACTINIC KERATOSIS: ICD-10-CM

## 2019-08-27 DIAGNOSIS — Z71.89 OTHER SPECIFIED COUNSELING: ICD-10-CM

## 2019-08-27 DIAGNOSIS — Z85.828 PERSONAL HISTORY OF OTHER MALIGNANT NEOPLASM OF SKIN: ICD-10-CM

## 2019-08-27 DIAGNOSIS — D22 MELANOCYTIC NEVI: ICD-10-CM

## 2019-08-27 DIAGNOSIS — L82.1 OTHER SEBORRHEIC KERATOSIS: ICD-10-CM

## 2019-08-27 DIAGNOSIS — L81.4 OTHER MELANIN HYPERPIGMENTATION: ICD-10-CM

## 2019-08-27 DIAGNOSIS — L30.0 NUMMULAR DERMATITIS: ICD-10-CM

## 2019-08-27 DIAGNOSIS — B35.3 TINEA PEDIS: ICD-10-CM

## 2019-08-27 DIAGNOSIS — D18.0 HEMANGIOMA: ICD-10-CM

## 2019-08-27 DIAGNOSIS — L71.8 OTHER ROSACEA: ICD-10-CM

## 2019-08-27 DIAGNOSIS — Z87.2 PERSONAL HISTORY OF DISEASES OF THE SKIN AND SUBCUTANEOUS TISSUE: ICD-10-CM

## 2019-08-27 PROBLEM — T78.40XA ALLERGY, UNSPECIFIED, INITIAL ENCOUNTER: Status: ACTIVE | Noted: 2019-08-27

## 2019-08-27 PROBLEM — D48.5 NEOPLASM OF UNCERTAIN BEHAVIOR OF SKIN: Status: ACTIVE | Noted: 2019-08-27

## 2019-08-27 PROBLEM — D22.5 MELANOCYTIC NEVI OF TRUNK: Status: ACTIVE | Noted: 2019-08-27

## 2019-08-27 PROBLEM — D18.01 HEMANGIOMA OF SKIN AND SUBCUTANEOUS TISSUE: Status: ACTIVE | Noted: 2019-08-27

## 2019-08-27 PROCEDURE — ? ADDITIONAL NOTES

## 2019-08-27 PROCEDURE — ? COUNSELING

## 2019-08-27 PROCEDURE — ? PRESCRIPTION

## 2019-08-27 PROCEDURE — 17000 DESTRUCT PREMALG LESION: CPT

## 2019-08-27 PROCEDURE — ? LIQUID NITROGEN

## 2019-08-27 PROCEDURE — ? COUNSELING: TOPICAL STEROIDS

## 2019-08-27 PROCEDURE — ? PRESCRIPTION SAMPLES PROVIDED

## 2019-08-27 PROCEDURE — ? REFERRAL

## 2019-08-27 PROCEDURE — ? OBSERVATION AND MEASURE

## 2019-08-27 PROCEDURE — 99213 OFFICE O/P EST LOW 20 MIN: CPT | Mod: 25

## 2019-08-27 RX ORDER — TRIAMCINOLONE ACETONIDE 1 MG/G
CREAM TOPICAL BID
Qty: 1 | Refills: 1 | Status: ERX

## 2019-08-27 RX ORDER — ECONAZOLE NITRATE 10 MG/G
CREAM TOPICAL BID
Qty: 1 | Refills: 2 | Status: ERX

## 2019-08-27 ASSESSMENT — LOCATION DETAILED DESCRIPTION DERM
LOCATION DETAILED: RIGHT INFERIOR LATERAL UPPER BACK
LOCATION DETAILED: RIGHT CENTRAL LATERAL NECK
LOCATION DETAILED: LEFT MEDIAL PLANTAR MIDFOOT
LOCATION DETAILED: LEFT MEDIAL UPPER BACK
LOCATION DETAILED: NASAL DORSUM
LOCATION DETAILED: LEFT CENTRAL PARIETAL SCALP
LOCATION DETAILED: LEFT ANTERIOR PROXIMAL THIGH
LOCATION DETAILED: LEFT SUPERIOR MEDIAL MIDBACK
LOCATION DETAILED: LEFT RIB CAGE
LOCATION DETAILED: SUPERIOR THORACIC SPINE
LOCATION DETAILED: EPIGASTRIC SKIN
LOCATION DETAILED: RIGHT FOREHEAD
LOCATION DETAILED: RIGHT MEDIAL UPPER BACK
LOCATION DETAILED: RIGHT MEDIAL PLANTAR MIDFOOT
LOCATION DETAILED: LEFT FOREHEAD

## 2019-08-27 ASSESSMENT — LOCATION SIMPLE DESCRIPTION DERM
LOCATION SIMPLE: NECK
LOCATION SIMPLE: LEFT UPPER BACK
LOCATION SIMPLE: LEFT THIGH
LOCATION SIMPLE: ABDOMEN
LOCATION SIMPLE: RIGHT FOREHEAD
LOCATION SIMPLE: LEFT LOWER BACK
LOCATION SIMPLE: UPPER BACK
LOCATION SIMPLE: RIGHT PLANTAR SURFACE
LOCATION SIMPLE: LEFT PLANTAR SURFACE
LOCATION SIMPLE: SCALP
LOCATION SIMPLE: NOSE
LOCATION SIMPLE: LEFT FOREHEAD
LOCATION SIMPLE: RIGHT UPPER BACK

## 2019-08-27 ASSESSMENT — LOCATION ZONE DERM
LOCATION ZONE: NOSE
LOCATION ZONE: NECK
LOCATION ZONE: TRUNK
LOCATION ZONE: FEET
LOCATION ZONE: LEG
LOCATION ZONE: FACE
LOCATION ZONE: SCALP

## 2019-08-27 NOTE — PROCEDURE: ADDITIONAL NOTES
Additional Notes: Recommended biopsy, patient to return to clinic for biopsy after allergy testing is done by Dr Johnson is we know what we can use to numb the location with.
Detail Level: Simple
Additional Notes: AAD eczema handout and fragrance free handout given to patient
Additional Notes: Patient to continue using metrocream and was given AAD rosacea handout

## 2019-09-03 ENCOUNTER — HOSPITAL ENCOUNTER (OUTPATIENT)
Dept: RADIOLOGY | Facility: MEDICAL CENTER | Age: 69
End: 2019-09-03
Attending: INTERNAL MEDICINE
Payer: MEDICARE

## 2019-09-03 DIAGNOSIS — C18.2 CANCER OF RIGHT COLON (HCC): ICD-10-CM

## 2019-09-03 PROCEDURE — 71250 CT THORAX DX C-: CPT

## 2019-10-01 LAB
ALBUMIN SERPL-MCNC: 4.6 G/DL (ref 3.6–4.8)
ALBUMIN/GLOB SERPL: 2.9 {RATIO} (ref 1.2–2.2)
ALP SERPL-CCNC: 77 IU/L (ref 39–117)
ALT SERPL-CCNC: 20 IU/L (ref 0–44)
AST SERPL-CCNC: 17 IU/L (ref 0–40)
BILIRUB SERPL-MCNC: 0.4 MG/DL (ref 0–1.2)
BUN SERPL-MCNC: 21 MG/DL (ref 8–27)
BUN/CREAT SERPL: 15 (ref 10–24)
CALCIUM SERPL-MCNC: 8.7 MG/DL (ref 8.6–10.2)
CHLORIDE SERPL-SCNC: 100 MMOL/L (ref 96–106)
CO2 SERPL-SCNC: 24 MMOL/L (ref 20–29)
CREAT SERPL-MCNC: 1.38 MG/DL (ref 0.76–1.27)
GLOBULIN SER CALC-MCNC: 1.6 G/DL (ref 1.5–4.5)
GLUCOSE SERPL-MCNC: 118 MG/DL (ref 65–99)
HBA1C MFR BLD: 6.3 % (ref 4.8–5.6)
POTASSIUM SERPL-SCNC: 4.4 MMOL/L (ref 3.5–5.2)
PROT SERPL-MCNC: 6.2 G/DL (ref 6–8.5)
SODIUM SERPL-SCNC: 140 MMOL/L (ref 134–144)

## 2019-10-11 ENCOUNTER — OFFICE VISIT (OUTPATIENT)
Dept: MEDICAL GROUP | Facility: PHYSICIAN GROUP | Age: 69
End: 2019-10-11
Payer: MEDICARE

## 2019-10-11 VITALS
HEIGHT: 69 IN | TEMPERATURE: 97.8 F | WEIGHT: 304 LBS | SYSTOLIC BLOOD PRESSURE: 122 MMHG | BODY MASS INDEX: 45.03 KG/M2 | OXYGEN SATURATION: 98 % | HEART RATE: 75 BPM | RESPIRATION RATE: 20 BRPM | DIASTOLIC BLOOD PRESSURE: 58 MMHG

## 2019-10-11 DIAGNOSIS — E11.9 DIET-CONTROLLED DIABETES MELLITUS (HCC): ICD-10-CM

## 2019-10-11 DIAGNOSIS — F51.04 CHRONIC INSOMNIA: ICD-10-CM

## 2019-10-11 DIAGNOSIS — R94.4 DECREASED GFR: ICD-10-CM

## 2019-10-11 DIAGNOSIS — C18.6 MALIGNANT NEOPLASM OF DESCENDING COLON (HCC): ICD-10-CM

## 2019-10-11 DIAGNOSIS — G63 NEUROPATHY ASSOCIATED WITH CANCER (HCC): ICD-10-CM

## 2019-10-11 DIAGNOSIS — C61 PROSTATE CANCER (HCC): ICD-10-CM

## 2019-10-11 DIAGNOSIS — C80.1 NEUROPATHY ASSOCIATED WITH CANCER (HCC): ICD-10-CM

## 2019-10-11 PROCEDURE — 99214 OFFICE O/P EST MOD 30 MIN: CPT | Performed by: FAMILY MEDICINE

## 2019-10-11 RX ORDER — CLONAZEPAM 1 MG/1
1 TABLET ORAL 2 TIMES DAILY
Refills: 2 | COMMUNITY
Start: 2019-09-30 | End: 2019-10-11 | Stop reason: SDUPTHER

## 2019-10-11 RX ORDER — CLONAZEPAM 1 MG/1
1 TABLET ORAL 2 TIMES DAILY
Qty: 60 TAB | Refills: 2 | Status: SHIPPED
Start: 2019-10-11 | End: 2019-11-10

## 2019-10-11 NOTE — PROGRESS NOTES
Subjective:   Gerald Oshea is a 69 y.o. male here today for follow-up prostate cancer and to review labs.  He is unaccompanied for today's appointment.    Gerald tells me that his PSA recently increased to 15.  He has had a follow-up appointment with Dr. Villegas who discussed radiation therapy with him.  They have opted to recheck his PSA as his previous biopsy only showed cancer in one core sample.  He denies any issues with urination.    In regards to his colon cancer, this is stable.  He is under surveillance currently.  Follows with oncology regularly.  Recent CT scan of his chest showed resolution of his lung nodule.  He continues to be on oxygen.  Neuropathy is stable.  He did not feel well on duloxetine.    His diabetes is stable with an A1 of 6.3%.  He is not on medication at this time.  He does mention that he was drinking more alcohol than usual after his last appointment with Dr. Villegas.  For a few days, he was drinking two bottles of wine a night.  He tells me that he is back to 1-2 glasses at dinnertime.  He plans to quit completely.  Denies any problems drinking.    Lastly, in regards to his difficulty sleeping, this is stable.  He feels well on clonazepam and requests a refill.  He has been unable to taper down in the past.  No adverse effects.    Current medicines (including changes today)  Current Outpatient Medications   Medication Sig Dispense Refill   • clonazePAM (KLONOPIN) 1 MG Tab Take 1 Tab by mouth 2 Times a Day for 30 days. 60 Tab 2   • rosuvastatin (CRESTOR) 40 MG tablet TAKE 1 TAB BY MOUTH EVERY BEDTIME. 90 Tab 3   • losartan (COZAAR) 100 MG Tab Take 1 Tab by mouth every day. 90 Tab 3   • triamterene-hctz (MAXZIDE-25/DYAZIDE) 37.5-25 MG Tab Take 1 Tab by mouth every day. 90 Tab 3   • DULoxetine (CYMBALTA) 20 MG Cap DR Particles Take 1 Cap by mouth every 48 hours. 1 Cap 0   • Diclofenac Sodium 1 % Gel      • hydrocortisone 2.5 % Cream topical cream Apply to affected areas twice  "daily 1 Tube 0   • gabapentin (NEURONTIN) 300 MG Cap Take 600 mg by mouth 2 Times a Day.       No current facility-administered medications for this visit.      He  has a past medical history of Anemia, Anxiety, Atrophy of right kidney, Breath shortness, Cancer (HCC) (2016), Colon cancer (HCC), Former tobacco use, Gout, High cholesterol (12/12/2017), Hyperlipidemia, Hypertension (12/12/2017), Neuropathy (HCC) (12/12/2017), Pain (12/12/2017), Pneumonia (1991), Psychiatric problem, Sleep apnea (12/12/2017), and Snoring.    ROS   No chest pain, no shortness of breath, no abdominal pain.     Objective:     Physical Exam:  /58   Pulse 75   Temp 36.6 °C (97.8 °F)   Resp 20   Ht 1.753 m (5' 9\")   Wt (!) 137.9 kg (304 lb)   SpO2 98%  Body mass index is 44.89 kg/m².   Constitutional: Alert, no distress, well-groomed.  Skin: Warm, dry, good turgor, no rashes in visible areas.  Eye: Equal, round and reactive, conjunctiva clear, lids normal.  ENMT: Lips without lesions, good dentition, moist mucous membranes.  Neck: Trachea midline, no masses, no thyromegaly.  Respiratory: Unlabored respiratory effort, no cough. On supplemental oxygen.  Abdomen: Soft, no gross masses.  MSK: Normal gait, moves all extremities.  Neuro: Grossly non-focal. No cranial nerve deficit. Strength and sensation intact.   Psych: Alert and oriented x3, normal affect and mood.    Assessment and Plan:     1. Prostate cancer (HCC)  PSA recently increased.  Unclear if this is due to progression of cancer or for another reason.  Urology has ordered a recheck of his PSA in December.  At that time, he will meet with Dr. Villegas to decide on a plan.    2. Malignant neoplasm of descending colon (HCC)  3. Neuropathy associated with cancer (HCC)  Chronic and stable.  Neuropathy tolerable at this time.  Following with oncology.    4. Diet-controlled diabetes mellitus (HCC)  Chronic and stable.  No indications for medication at this time.  Will plan to " recheck A1c in 6 months.    5. Decreased GFR  Noted on labwork.  Will recheck in 2 months.  - Basic Metabolic Panel; Future    6. Chronic insomnia  Chronic issue for patient.  Discussed benefits, risks and alternatives to medication.  Emphasized importance of maintaining good sleep hygiene including: no caffeine after 3pm, no napping, using bedroom only for sleep or sex, no TV or phones before bed.  Prescription for clonazepam filled.  Continue to monitor.  - clonazePAM (KLONOPIN) 1 MG Tab; Take 1 Tab by mouth 2 Times a Day for 30 days.  Dispense: 60 Tab; Refill: 2    Followup: Return in about 15 weeks (around 1/24/2020) for f/u urology appointment, short.         PLEASE NOTE: This dictation was created using voice recognition software. I have made every reasonable attempt to correct obvious errors, but I expect that there are errors of grammar and possibly content that I did not discover before finalizing the note.

## 2019-10-17 ENCOUNTER — NON-PROVIDER VISIT (OUTPATIENT)
Dept: MEDICAL GROUP | Facility: PHYSICIAN GROUP | Age: 69
End: 2019-10-17
Payer: MEDICARE

## 2019-10-17 DIAGNOSIS — Z23 NEED FOR VACCINATION: ICD-10-CM

## 2019-10-17 PROCEDURE — G0008 ADMIN INFLUENZA VIRUS VAC: HCPCS | Performed by: FAMILY MEDICINE

## 2019-10-17 PROCEDURE — 90662 IIV NO PRSV INCREASED AG IM: CPT | Performed by: FAMILY MEDICINE

## 2019-10-17 NOTE — PROGRESS NOTES
"Gerald Oshea is a 69 y.o. male here for a non-provider visit for:   FLU HD     Reason for immunization: Annual Flu Vaccine  Immunization records indicate need for vaccine: Yes, confirmed with [facility]  Minimum interval has been met for this vaccine: Yes  ABN completed: No    Order and dose verified by: lew  VIS Dated  8/15/2019 was given to patient: Yes  All IAC Questionnaire questions were answered \"No.\"    Patient tolerated injection and no adverse effects were observed or reported: Yes    Pt scheduled for next dose in series: No    "

## 2019-12-12 ENCOUNTER — APPOINTMENT (RX ONLY)
Dept: URBAN - METROPOLITAN AREA CLINIC 31 | Facility: CLINIC | Age: 69
Setting detail: DERMATOLOGY
End: 2019-12-12

## 2019-12-12 DIAGNOSIS — L82.1 OTHER SEBORRHEIC KERATOSIS: ICD-10-CM

## 2019-12-12 DIAGNOSIS — L30.0 NUMMULAR DERMATITIS: ICD-10-CM

## 2019-12-12 DIAGNOSIS — L71.8 OTHER ROSACEA: ICD-10-CM

## 2019-12-12 DIAGNOSIS — D22 MELANOCYTIC NEVI: ICD-10-CM

## 2019-12-12 DIAGNOSIS — L21.8 OTHER SEBORRHEIC DERMATITIS: ICD-10-CM

## 2019-12-12 PROBLEM — D48.5 NEOPLASM OF UNCERTAIN BEHAVIOR OF SKIN: Status: ACTIVE | Noted: 2019-12-12

## 2019-12-12 PROCEDURE — ? COUNSELING: TOPICAL STEROIDS

## 2019-12-12 PROCEDURE — 11103 TANGNTL BX SKIN EA SEP/ADDL: CPT

## 2019-12-12 PROCEDURE — ? PRESCRIPTION SAMPLES PROVIDED

## 2019-12-12 PROCEDURE — ? BIOPSY BY SHAVE METHOD

## 2019-12-12 PROCEDURE — ? ADDITIONAL NOTES

## 2019-12-12 PROCEDURE — 11102 TANGNTL BX SKIN SINGLE LES: CPT

## 2019-12-12 PROCEDURE — ? COUNSELING

## 2019-12-12 PROCEDURE — 99213 OFFICE O/P EST LOW 20 MIN: CPT | Mod: 25

## 2019-12-12 ASSESSMENT — LOCATION DETAILED DESCRIPTION DERM
LOCATION DETAILED: RIGHT INFERIOR UPPER BACK
LOCATION DETAILED: RIGHT MID TEMPLE
LOCATION DETAILED: LEFT LATERAL MALAR CHEEK
LOCATION DETAILED: NASAL DORSUM
LOCATION DETAILED: RIGHT DISTAL CALF
LOCATION DETAILED: RIGHT SUPERIOR LATERAL MALAR CHEEK

## 2019-12-12 ASSESSMENT — LOCATION ZONE DERM
LOCATION ZONE: TRUNK
LOCATION ZONE: FACE
LOCATION ZONE: LEG
LOCATION ZONE: NOSE

## 2019-12-12 ASSESSMENT — LOCATION SIMPLE DESCRIPTION DERM
LOCATION SIMPLE: RIGHT TEMPLE
LOCATION SIMPLE: LEFT CHEEK
LOCATION SIMPLE: NOSE
LOCATION SIMPLE: RIGHT UPPER BACK
LOCATION SIMPLE: RIGHT CALF
LOCATION SIMPLE: RIGHT CHEEK

## 2019-12-12 NOTE — PROCEDURE: ADDITIONAL NOTES
Detail Level: Detailed
Additional Notes: Advised using the TAC until clear. FF products, luke warm water, no scrubbing. Hands only.
Additional Notes: Discussed with Dr Snowden before doing biopsy.  Discussed injecting with Saline to site, pt declines and would like shave biopsy today. Per pt he has not had any reaction when we have used plain Lidocaine in past. Pt did wait in waiting room for 40 minutes after biopsies for observation. Pt reported no side effects and left office with no complications.
Additional Notes: Advised to use Metrocream on sites
Additional Notes: okay to DC metrocream to nose  unless breaking out with pimples

## 2019-12-12 NOTE — PROCEDURE: BIOPSY BY SHAVE METHOD
Electrodesiccation And Curettage Text: The wound bed was treated with electrodesiccation and curettage after the biopsy was performed.
Was A Bandage Applied: Yes
Dressing: bandage
Hide Biopsy Depth?: No
Anesthesia Volume In Cc: 0.5
Type Of Destruction Used: Curettage
Consent: Written consent was obtained and risks were reviewed including but not limited to scarring, infection, bleeding, scabbing, incomplete removal, nerve damage and allergy to anesthesia.
Hemostasis: Drysol
Silver Nitrate Text: The wound bed was treated with silver nitrate after the biopsy was performed.
Biopsy Method: Personna blade
Detail Level: Detailed
Depth Of Biopsy: dermis
Size Of Lesion In Cm: 0.7
Post-Care Instructions: I reviewed with the patient in detail post-care instructions. Patient is to keep the biopsy site dry overnight, and then apply vaseline twice daily until healed.
Curettage Text: The wound bed was treated with curettage after the biopsy was performed.
Lab: [id number]
Billing Type: Third-Party Bill
Electrodesiccation Text: The wound bed was treated with electrodesiccation after the biopsy was performed.
Biopsy Type: H and E
Notification Instructions: Patient will be notified of biopsy results. However, patient instructed to call the office if not contacted within 2 weeks.
Wound Care: Vaseline
Lab Facility: 
X Size Of Lesion In Cm: 0
Anesthesia Type: preservative-free lidocaine
Cryotherapy Text: The wound bed was treated with cryotherapy after the biopsy was performed.
Lab Facility: [id number]
Lab: [id number]

## 2020-01-01 NOTE — PROCEDURES
Multi-Ox Readings  Multi Ox #1 Room air   O2 sat % at rest 84   O2 sat % on exertion     O2 sat average on exertion     Multi Ox #2     O2 sat % at rest 93 with 2LPM   O2 sat % on exertion 92 with 3.5LPM   O2 sat average on exertion       Oxygen Use     Oxygen Frequency     Duration of need     Is the patient mobile within the home?     CPAP Use?     BIPAP Use?     Servo Titration          This note was copied from the mother's chart. Per mom, baby just finished nursing and has been nursing well. L nipple is sore and appears to have a small blister. Discussed importance of deep latch and using U hold instead of C hold to assist with this. Lanolin cream given for comfort. Mom to page for assistance at next feed. Mom verbalized understanding and no questions at this time. 46 Infant latched and nursing well--instructed mom on how to assist infant to get deeper latch by bringing baby to breast.    1500 Infant latched and nursing well, but when infant came off, nipple is compressed on bottom half. Adjusted infant into farther back football position and able to latch better and mom states it feels more comfortable.

## 2020-01-05 NOTE — PROGRESS NOTES
CC: F/u for SU on bilevel 20/16 cm water and 2 lpm O2  HPI:   Mr. 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 His original PSG showed an AHI of 97.  When last seen 2/21/2019 his 30-day compliance was 100% for 7 hours and 9 minutes. He had an average AHI of 18.1 on bilevel 20/16 cm H2O. Although this was not optimal it was certainly better than last average residual apnea hypopnea index and an absolute terms represents an improvement of 79 events per hour compared with his baseline AHI of 97.  Today, 1/7/2020, his 30-day compliance is 100% for 8 hours and 25 minutes he has an average residual apnea hypopnea index of 33.5 on bilevel 20/16 cm water.  He had a 2018 re-titration and consideration was given to auto titrating BiPAP but he did not qualify for a new machine and so therefore remains on simply bilevel. Has had large leak 4 hours 43 minutes.  The patient reports improved symptoms using positive airway pressure. Has experienced no significant problems with mask fit, mask leak, pressure tolerance, excessive airway dryness, nasal congestion, aerophagia, or chest pain.     Has had some head gear issues but can't get new equipment until 1/17/20.  Significant comorbidities and modifying factors include hypertension, history of colon cancer,recent prostate bx showing prostate possible cancer,  history of lung metastases status post wedge resection, status post chemotherapy, history of tobacco abuse, peripheral neuropathy secondary to chemotherapy, diabetes mellitus, chronic respiratory failure, anxiety, hypercholesterolemia, and gout. Will see Dr Villegas 1/20/20.  His oncologist is. Dr. Jasmyn Lewis MD.          Patient Active Problem List    Diagnosis Date Noted   • Colon cancer (HCC) 09/28/2016     Priority: Medium   • Essential hypertension 09/28/2016     Priority: Low   • History of lung surgery  "04/05/2019   • Former smoker 02/20/2019   • Up to date with influenza vaccination 02/20/2019   • Chronic insomnia 09/07/2018   • Diet-controlled diabetes mellitus (HCC) 04/11/2018   • Chronic respiratory failure with hypoxia (HCC) 04/11/2018   • Skin cancer 12/06/2017   • Morbid obesity with BMI of 45.0-49.9, adult (HCC) 09/20/2017   • Benign non-nodular prostatic hyperplasia with lower urinary tract symptoms 07/03/2017   • Obstructive sleep apnea 05/09/2017   • Neuropathy associated with cancer (HCC) 03/28/2017   • History of gout 09/28/2016   • Anxiety 09/28/2016   • Pure hypercholesterolemia 09/28/2016   • Prostate cancer (HCC) 09/28/2016       Past Medical History:   Diagnosis Date   • Anemia    • Anxiety    • Atrophy of right kidney     one functioning kidney   • Breath shortness    • Cancer (HCC) 2016    colon   rx surgery and chemo   • Colon cancer (HCC)    • Former tobacco use    • Gout    • High cholesterol 12/12/2017   • Hyperlipidemia    • Hypertension 12/12/2017   • Neuropathy (HCC) 12/12/2017   • Pain 12/12/2017    \"Lower Back & Left Leg\"   • Pneumonia 1991    Left upper lobe   • Psychiatric problem     anxiety   • Sleep apnea 12/12/2017    CPAP USE   • Snoring     DX SU        Past Surgical History:   Procedure Laterality Date   • THORACOSCOPY  2/5/2018    Procedure: THORACOSCOPY- WEDGE RESECTION LT UPPER LOBE METASTASIS;  Surgeon: John H Ganser, M.D.;  Location: SURGERY [facility];  Service: General   • CATH PLACEMENT Left 10/7/2016    Procedure: CATH PLACEMENT - SUBCLAVIAN PORT;  Surgeon: Sho Bajwa M.D.;  Location: SURGERY SAME DAY [facility];  Service:    • COLON RESECTION  2016    Daniel   • CLOSED REDUCTION  10/25/2012    Performed by Chavez Duran M.D. at SURGERY [facility]       Family History   Problem Relation Age of Onset   • Cancer Mother         Bladder   • Heart Disease Father    • Heart Disease Brother         CABG x2       Social History     Socioeconomic " History   • Marital status: Single     Spouse name: Not on file   • Number of children: Not on file   • Years of education: Not on file   • Highest education level: Not on file   Occupational History   • Occupation: Retired   • Occupation: Former business owner   Social Needs   • Financial resource strain: Not on file   • Food insecurity:     Worry: Not on file     Inability: Not on file   • Transportation needs:     Medical: Not on file     Non-medical: Not on file   Tobacco Use   • Smoking status: Former Smoker     Packs/day: 1.00     Years: 20.00     Pack years: 20.00     Types: Cigarettes     Last attempt to quit: 1998     Years since quittin.0   • Smokeless tobacco: Never Used   Substance and Sexual Activity   • Alcohol use: Yes     Alcohol/week: 1.8 oz     Types: 3 Glasses of wine per week     Comment: 2-3 per day   • Drug use: No   • Sexual activity: Not Currently   Lifestyle   • Physical activity:     Days per week: Not on file     Minutes per session: Not on file   • Stress: Not on file   Relationships   • Social connections:     Talks on phone: Not on file     Gets together: Not on file     Attends Holiness service: Not on file     Active member of club or organization: Not on file     Attends meetings of clubs or organizations: Not on file     Relationship status: Not on file   • Intimate partner violence:     Fear of current or ex partner: Not on file     Emotionally abused: Not on file     Physically abused: Not on file     Forced sexual activity: Not on file   Other Topics Concern   • Not on file   Social History Narrative   • Not on file       Current Outpatient Medications   Medication Sig Dispense Refill   • clonazePAM (KLONOPIN) 1 MG Tab TAKE 1 TABLET BY MOUTH TWICE A DAY AS NEEDED FOR ANXIETY OR SLEEP **F51.04     • rosuvastatin (CRESTOR) 40 MG tablet TAKE 1 TAB BY MOUTH EVERY BEDTIME. 90 Tab 3   • losartan (COZAAR) 100 MG Tab Take 1 Tab by mouth every day. 90 Tab 3   • triamterene-hctz  "(MAXZIDE-25/DYAZIDE) 37.5-25 MG Tab Take 1 Tab by mouth every day. 90 Tab 3   • Diclofenac Sodium 1 % Gel      • hydrocortisone 2.5 % Cream topical cream Apply to affected areas twice daily 1 Tube 0   • gabapentin (NEURONTIN) 300 MG Cap Take 600 mg by mouth 2 Times a Day.       No current facility-administered medications for this visit.     \"CURRENT RX\"    ALLERGIES: Ketamine; Lidocaine; Duloxetine; and Morphine    ROS    Constitutional: Denies fever, chills, sweats,  weight loss, fatigue  Cardiovascular: Denies chest pain, tightness, palpitations, swelling in legs/feet, fainting, difficulty breathing when lying down but gets better when sitting up.   Respiratory: Denies shortness of breath, cough, sputum, wheezing, painful breathing, coughing up blood.   Sleep: per HPI  Gastrointestinal: Denies  difficulty swallowing, nausea, abdominal pain, diarrhea, constipation, heartburn.  Musculoskeletal: Denies painful joints, sore muscles, back pain.        PHYSICAL EXAM  Obese  O2    /64 (BP Location: Left arm, Patient Position: Sitting, BP Cuff Size: Large adult)   Pulse 74   Resp 16   Ht 1.753 m (5' 9\")   Wt (!) 139.7 kg (308 lb)   SpO2 91% Comment: 3lpm pulsed  BMI 45.48 kg/m²   Appearance: Well-nourished, well-developed, no acute distress  Eyes:  PERRLA, EOMI  ENMT: without lesions, deformities;hearing grossly intact; tongue normal, posterior pharynx without erythema or exudate;   Modified Mallampati (AKA Freidman) Score: 4  Neck: Supple, trachea midline, no masses  Respiratory effort:  No intercostal retractions or use of accessory muscles  Lung auscultation:  No wheezes rhonchi rubs or rales  Cardiac: No murmurs, rubs, or gallops; regular rhythm, normal rate; no edema  Abdomen:  No tenderness, no organomegaly. Obese.  Musculoskeletal:  Grossly normal; gait and station normal; digits and nails normal  Skin:  No rashes, petechiae, cyanosis  Neurologic: without focal signs; oriented to person, time, place, " and purpose; judgement intact  Psychiatric:  No depression, anxiety, agitation        PROBLEMS:  1. Obstructive sleep apnea    - DME Mask and Supplies    2. Morbid obesity with BMI of 45.0-49.9, adult ([facility])    - OBESITY COUNSELING (No Charge): Patient identified as having weight management issue.  Appropriate orders and counseling given.    3. Prostate cancer ([facility])      4. Pure hypercholesterolemia      5. Former smoker      6. History of lung surgery      7. Essential hypertension        PLAN:     Has been advised to continue the current bilevel 20/16, clean equipment frequently, and get new mask and supplies as allowed by insurance and DME. Advised about potential problems including nasal obstruction/stuffiness, mask leak or discomfort , frequent awakenings, chill or dryness of the upper airway, noise, inconvenience, and lack of benefit. Recommend an earlier appointment, if significant treatment barriers develop.    Have advised the patient to follow up with the appropriate healthcare practitioners for all other medical problems and issues.    When he qualities for a new machine, he prefers to try the previously determined auto bipap settings rather than have a new titration.    Will get his new head gear in 10 days which hopefully will help the leak and decrease the ahi.    Return in about 1 year (around 1/7/2021).

## 2020-01-07 ENCOUNTER — SLEEP CENTER VISIT (OUTPATIENT)
Dept: SLEEP MEDICINE | Facility: MEDICAL CENTER | Age: 70
End: 2020-01-07
Payer: MEDICARE

## 2020-01-07 VITALS
HEART RATE: 74 BPM | WEIGHT: 308 LBS | SYSTOLIC BLOOD PRESSURE: 120 MMHG | RESPIRATION RATE: 16 BRPM | DIASTOLIC BLOOD PRESSURE: 64 MMHG | BODY MASS INDEX: 45.62 KG/M2 | OXYGEN SATURATION: 91 % | HEIGHT: 69 IN

## 2020-01-07 DIAGNOSIS — Z87.891 FORMER SMOKER: ICD-10-CM

## 2020-01-07 DIAGNOSIS — E66.01 MORBID OBESITY WITH BMI OF 45.0-49.9, ADULT (HCC): ICD-10-CM

## 2020-01-07 DIAGNOSIS — C61 PROSTATE CANCER (HCC): ICD-10-CM

## 2020-01-07 DIAGNOSIS — I10 ESSENTIAL HYPERTENSION: ICD-10-CM

## 2020-01-07 DIAGNOSIS — Z98.890 HISTORY OF LUNG SURGERY: ICD-10-CM

## 2020-01-07 DIAGNOSIS — G47.33 OBSTRUCTIVE SLEEP APNEA: ICD-10-CM

## 2020-01-07 DIAGNOSIS — E78.00 PURE HYPERCHOLESTEROLEMIA: ICD-10-CM

## 2020-01-07 PROCEDURE — 99214 OFFICE O/P EST MOD 30 MIN: CPT | Performed by: INTERNAL MEDICINE

## 2020-01-07 RX ORDER — CLONAZEPAM 1 MG/1
TABLET ORAL
COMMUNITY
Start: 2019-12-29 | End: 2020-01-27 | Stop reason: SDUPTHER

## 2020-01-23 LAB
BUN SERPL-MCNC: 14 MG/DL (ref 8–27)
BUN/CREAT SERPL: 13 (ref 10–24)
CALCIUM SERPL-MCNC: 9.5 MG/DL (ref 8.6–10.2)
CHLORIDE SERPL-SCNC: 93 MMOL/L (ref 96–106)
CO2 SERPL-SCNC: 23 MMOL/L (ref 20–29)
CREAT SERPL-MCNC: 1.04 MG/DL (ref 0.76–1.27)
GLUCOSE SERPL-MCNC: 143 MG/DL (ref 65–99)
POTASSIUM SERPL-SCNC: 4.4 MMOL/L (ref 3.5–5.2)
SODIUM SERPL-SCNC: 133 MMOL/L (ref 134–144)

## 2020-01-27 ENCOUNTER — OFFICE VISIT (OUTPATIENT)
Dept: MEDICAL GROUP | Facility: PHYSICIAN GROUP | Age: 70
End: 2020-01-27
Payer: MEDICARE

## 2020-01-27 VITALS
TEMPERATURE: 97.8 F | HEART RATE: 69 BPM | DIASTOLIC BLOOD PRESSURE: 64 MMHG | OXYGEN SATURATION: 92 % | HEIGHT: 69 IN | RESPIRATION RATE: 18 BRPM | BODY MASS INDEX: 45.77 KG/M2 | WEIGHT: 309 LBS | SYSTOLIC BLOOD PRESSURE: 124 MMHG

## 2020-01-27 DIAGNOSIS — G63 NEUROPATHY ASSOCIATED WITH CANCER (HCC): ICD-10-CM

## 2020-01-27 DIAGNOSIS — C18.6 MALIGNANT NEOPLASM OF DESCENDING COLON (HCC): ICD-10-CM

## 2020-01-27 DIAGNOSIS — G47.33 OBSTRUCTIVE SLEEP APNEA: ICD-10-CM

## 2020-01-27 DIAGNOSIS — Z98.890 HISTORY OF LUNG SURGERY: ICD-10-CM

## 2020-01-27 DIAGNOSIS — C80.1 NEUROPATHY ASSOCIATED WITH CANCER (HCC): ICD-10-CM

## 2020-01-27 DIAGNOSIS — E11.9 DIET-CONTROLLED DIABETES MELLITUS (HCC): ICD-10-CM

## 2020-01-27 DIAGNOSIS — F51.04 CHRONIC INSOMNIA: ICD-10-CM

## 2020-01-27 DIAGNOSIS — C61 PROSTATE CANCER (HCC): ICD-10-CM

## 2020-01-27 DIAGNOSIS — E78.00 PURE HYPERCHOLESTEROLEMIA: ICD-10-CM

## 2020-01-27 DIAGNOSIS — I10 ESSENTIAL HYPERTENSION: ICD-10-CM

## 2020-01-27 DIAGNOSIS — J96.11 CHRONIC RESPIRATORY FAILURE WITH HYPOXIA (HCC): ICD-10-CM

## 2020-01-27 PROBLEM — Z92.29 UP TO DATE WITH INFLUENZA VACCINATION: Status: RESOLVED | Noted: 2019-02-20 | Resolved: 2020-01-27

## 2020-01-27 PROCEDURE — 99214 OFFICE O/P EST MOD 30 MIN: CPT | Performed by: FAMILY MEDICINE

## 2020-01-27 RX ORDER — GABAPENTIN 800 MG/1
800 TABLET ORAL 3 TIMES DAILY
Qty: 270 TAB | Refills: 3 | Status: SHIPPED | OUTPATIENT
Start: 2020-01-27 | End: 2020-11-09 | Stop reason: SDUPTHER

## 2020-01-27 RX ORDER — CLONAZEPAM 1 MG/1
1 TABLET ORAL 2 TIMES DAILY
Qty: 60 TAB | Refills: 2 | Status: SHIPPED
Start: 2020-01-27 | End: 2020-02-26

## 2020-01-27 ASSESSMENT — PATIENT HEALTH QUESTIONNAIRE - PHQ9: CLINICAL INTERPRETATION OF PHQ2 SCORE: 0

## 2020-01-27 NOTE — PROGRESS NOTES
Subjective:   Gerald Oshea is a 69 y.o. male here today for follow up on prostate cancer, neuropathy and lab review.     1. Prostate cancer (HCC)  Acute history. The patient is currently seeing Dr. Villegas (Urology) for management and labs. He reports that labs taken on 12/30/19 were reassuring with a PSA of 6 and he is scheduled for recurrent labs every 60 days for an unspecified period of time. He denies any acute urinary symptoms during today's visit.      2. Malignant neoplasm of descending colon (HCC)  Stable history. He does not report any exacerbations or complications during today's visit. He will continue to follow up with his oncologist as needed. He denies any acute gastrointestinal symptoms or pain during today's visit.     3. Neuropathy associated with cancer (HCC)  Chronic, waxing and waning history. The patient reports that his neuropathy has been worsening for the past few weeks. Today he does not report pain above baseline, but most days he has difficulty walking. He does not walk often and has someone even go to his mailbox for him. He takes Neurontin 600 mg twice daily and uses acupuncture to manage his symptoms. He states that the dosage of his Neurontin has not been alleviating his symptoms very well and inquires about a change in his dosage.     4. Diet-controlled diabetes mellitus (HCC)  Patient has a chronic history of type 1 diabetes mellitus. Currently on managing with dietary changes as needed. He denies any vision changes, polyuria, polydipsia, polyphagia, numbness or tingling to their feet, lesions or opens wounds to their feet. Retinal screening not reported by patient.  He is not following consistent carb diet or counting carbs. He has a regular exercise routine. Vaccines are up to date.    5. Chronic respiratory failure with hypoxia (HCC)  6. Obstructive sleep apnea  7. History of lung surgery  Chronic, stable history. The patient presents today on oxygen and has a cannula  in place. He does not report any exacerbations or complications during today's visit.     8. Pure hypercholesterolemia  The patient has a chronic history of pure hypercholesterolemia. Currently taking Crestor 40 mg daily. No medication side effects or acute complaints of myalgias, abdominal pain, dizziness, claudication or chest pain. Lipid panel was last completed on 04/01/19 as shown below.     Ref. Range 4/1/2019 08:57   Cholesterol,Tot Latest Ref Range: 100 - 199 mg/dL 89 (L)   Triglycerides Latest Ref Range: 0 - 149 mg/dL 113   HDL Latest Ref Range: >39 mg/dL 38 (L)   LDL Latest Ref Range: 0 - 99 mg/dL 28   VLDL Cholesterol Calc Latest Ref Range: 5 - 40 mg/dL 23     9. Essential hypertension  Patient has a history of chronic hypertension and is taking Cozaar 100 mg daily. No medication side effects were reported. He reportedly monitors his blood pressure regularly at home. Blood pressure is good today at 124/64.  He reports following a low sodium diet and denies any related complaints including chronic cough, chest pain, headaches or dizziness.     10. Chronic insomnia  Chronic, stable history. He does not report any exacerbations or complications during today's visit. He currently takes Klonopin 1 mg twice daily.     Current medicines (including changes today)  Current Outpatient Medications   Medication Sig Dispense Refill   • clonazePAM (KLONOPIN) 1 MG Tab Take 1 Tab by mouth 2 times a day for 30 days. 60 Tab 2   • gabapentin (NEURONTIN) 800 MG tablet Take 1 Tab by mouth 3 times a day. 270 Tab 3   • rosuvastatin (CRESTOR) 40 MG tablet TAKE 1 TAB BY MOUTH EVERY BEDTIME. 90 Tab 3   • losartan (COZAAR) 100 MG Tab Take 1 Tab by mouth every day. 90 Tab 3   • triamterene-hctz (MAXZIDE-25/DYAZIDE) 37.5-25 MG Tab Take 1 Tab by mouth every day. 90 Tab 3   • Diclofenac Sodium 1 % Gel      • hydrocortisone 2.5 % Cream topical cream Apply to affected areas twice daily 1 Tube 0     No current facility-administered  "medications for this visit.      He  has a past medical history of Anemia, Anxiety, Atrophy of right kidney, Breath shortness, Cancer (HCC) (2016), Colon cancer (HCC), Former tobacco use, Gout, High cholesterol (12/12/2017), Hyperlipidemia, Hypertension (12/12/2017), Neuropathy (HCC) (12/12/2017), Pain (12/12/2017), Pneumonia (1991), Psychiatric problem, Sleep apnea (12/12/2017), and Snoring.    ROS   Positive for bilateral foot and hand numbness secondary to neuropathy.   No chest pain, no shortness of breath, no abdominal pain.     Objective:     Physical Exam:  /64   Pulse 69   Temp 36.6 °C (97.8 °F)   Resp 18   Ht 1.753 m (5' 9\")   Wt (!) 140.2 kg (309 lb)   SpO2 92%  Body mass index is 45.63 kg/m².   Constitutional: Alert, no distress.  Skin: Warm, dry, good turgor, no rashes in visible areas.  Eye: Equal, round and reactive, conjunctiva clear, lids normal.  ENMT: Lips without lesions, good dentition, oropharynx clear.  Neck: Trachea midline, no masses, no thyromegaly.  Respiratory: On supplemental oxygen. Unlabored respiratory effort, lungs clear to auscultation, no wheezes, no rhonchi.  Cardiovascular: Normal S1, S2, no murmur, no edema.  Abdomen: Soft, non-tender, no masses, no hepatosplenomegaly.  Psych: Alert and oriented x3, normal affect and mood.  Monofilament testing with a 10 gram force: sensation intact: decreased bilaterally  Visual Inspection: Feet without maceration, ulcers, fissures.  Pedal pulses: intact bilaterally    Assessment and Plan:     1. Prostate cancer (HCC)  Under good control. Continue same regimen. Advised continuing follow up with Dr. Villegas (Urology).  - CBC WITH DIFFERENTIAL; Future    2. Malignant neoplasm of descending colon (HCC)  Under good control. Continue same regimen. Advised continuing follow up with his oncologist.   - CBC WITH DIFFERENTIAL; Future    3. Neuropathy associated with cancer (HCC)  Chronic history. I increased his Neurontin prescription from " 600 mg to 800 mg three times daily. I informed the patient that is his symptoms do not improve and he reaches the maximum dosage of Neurontin, then we can consider Lyrica for management of hid neuropathy.  The patient verbalizes agreement with this plan.     4. Diet-controlled diabetes mellitus (HCC)  Type 1 diabetes is well controlled with dietary changes. A1c was 6.3 on 09/30/19.  - Recommended the patient follow a low fat, low carbohydrate and high protein diet. Avoid foods with processed sugars.  - Plan to reevaluate labs 1-2 weeks prior to their next follow up appointment.  - Comp Metabolic Panel; Future  - CBC WITH DIFFERENTIAL; Future  - HEMOGLOBIN A1C; Future  - MICROALBUMIN CREAT RATIO URINE; Future  - Lipid Profile; Future    5. Chronic respiratory failure with hypoxia (HCC)  6. Obstructive sleep apnea  7. History of lung surgery  Chronic, stable history. The patient will stay on oxygen support. I will follow up with him in the future about improvement and possible discontinuation of oxygen.     8. Pure hypercholesterolemia  Well-controlled. Continue current regimen of Crestor 40 mg daily. Reviewed the risks and benefits of treatment and potential side effects of medication.  - Obtained and reviewed lipid panel dated 04/01/19 which reveals triglycerides 113, HDL of 38, and LDL of 28.  - Recommended they follow low fat, low carbohydrate and high fiber diet. Additionally, patient was asked to exercise regularly including frequent cardio.  - Recheck lab 1-2 weeks before next follow up visit.  - Lipid Profile; Future    9. Essential hypertension  Chronic, stable history. Under good control with current medication regimen: Cozaar 100 mg daily. No changes in dosage today. Blood pressure today was 124/64. Patient was asked to continue monitoring his blood pressure at home. He was encouraged to follow a low sodium diet.   - Repeat labs in 1-2 weeks prior to their next appointment.   - Comp Metabolic Panel;  Future  - CBC WITH DIFFERENTIAL; Future    10. Chronic insomnia  Under good control. Continue same regimen.    Followup: Return in about 4 months (around 5/27/2020) for Labs, Short.          IEpi (Scribe), am scribing for, and in the presence of, Jocelyn Ramos MD    Electronically signed by: Epi Styles (Scribe), 1/27/2020    I, Jocelyn Ramos MD personally performed the services described in this documentation, as scribed by Epi Styles in my presence, and it is both accurate and complete.

## 2020-03-04 ENCOUNTER — HOSPITAL ENCOUNTER (OUTPATIENT)
Dept: RADIOLOGY | Facility: MEDICAL CENTER | Age: 70
End: 2020-03-04
Attending: INTERNAL MEDICINE
Payer: MEDICARE

## 2020-03-04 DIAGNOSIS — C18.2 MALIGNANT NEOPLASM OF ASCENDING COLON (HCC): ICD-10-CM

## 2020-03-04 PROCEDURE — 700117 HCHG RX CONTRAST REV CODE 255: Performed by: INTERNAL MEDICINE

## 2020-03-04 PROCEDURE — 71250 CT THORAX DX C-: CPT

## 2020-03-04 RX ADMIN — IOHEXOL 25 ML: 240 INJECTION, SOLUTION INTRATHECAL; INTRAVASCULAR; INTRAVENOUS; ORAL at 15:12

## 2020-03-23 DIAGNOSIS — E78.00 PURE HYPERCHOLESTEROLEMIA: ICD-10-CM

## 2020-03-23 RX ORDER — ROSUVASTATIN CALCIUM 40 MG/1
TABLET, COATED ORAL
Qty: 90 TAB | Refills: 0 | Status: SHIPPED | OUTPATIENT
Start: 2020-03-23 | End: 2020-06-15

## 2020-04-07 ENCOUNTER — TELEPHONE (OUTPATIENT)
Dept: MEDICAL GROUP | Facility: PHYSICIAN GROUP | Age: 70
End: 2020-04-07

## 2020-04-07 DIAGNOSIS — M54.9 BACK PAIN, UNSPECIFIED BACK LOCATION, UNSPECIFIED BACK PAIN LATERALITY, UNSPECIFIED CHRONICITY: ICD-10-CM

## 2020-04-07 NOTE — TELEPHONE ENCOUNTER
VOICEMAIL  1. Caller Name: Gerald Oshea                       Call Back Number: [phone] (home)     2. Message: Patient LVM requesting referral to [facility] - for epidural or steroid shots for his back pain.  Patient states clinic requires referral.     3. Patient approves office to leave a detailed voicemail/MyChart message: yes      Referral pended - if ok to proceed with referral please sign.

## 2020-04-16 ENCOUNTER — TELEPHONE (OUTPATIENT)
Dept: MEDICAL GROUP | Facility: PHYSICIAN GROUP | Age: 70
End: 2020-04-16

## 2020-04-16 NOTE — TELEPHONE ENCOUNTER
Phone Number Called: [phone] (home)     Call outcome: Did not leave a detailed message. Requested patient to call back.    Message: LVm for pt to call and schedule an appt for his ear.

## 2020-04-16 NOTE — TELEPHONE ENCOUNTER
VOICEMAIL  1. Caller Name: Gerald Oshea                     Call Back Number: [phone] (home)     2. Message: His ear has been feeling plugged and is wondering if it could be an ear infection and if there is there anything he can take? Do you want him to be seen?     3. Patient approves office to leave a detailed voicemail/MyChart message: N\A

## 2020-04-24 DIAGNOSIS — I10 ESSENTIAL HYPERTENSION: ICD-10-CM

## 2020-04-24 RX ORDER — LOSARTAN POTASSIUM 100 MG/1
TABLET ORAL
Qty: 90 TAB | Refills: 0 | Status: SHIPPED | OUTPATIENT
Start: 2020-04-24 | End: 2020-07-15

## 2020-05-02 DIAGNOSIS — F51.04 CHRONIC INSOMNIA: ICD-10-CM

## 2020-05-04 RX ORDER — CLONAZEPAM 1 MG/1
TABLET ORAL
Qty: 60 TAB | Refills: 2 | Status: SHIPPED | OUTPATIENT
Start: 2020-05-04 | End: 2020-08-04

## 2020-05-07 DIAGNOSIS — I10 ESSENTIAL HYPERTENSION: ICD-10-CM

## 2020-05-07 RX ORDER — TRIAMTERENE AND HYDROCHLOROTHIAZIDE 37.5; 25 MG/1; MG/1
TABLET ORAL
Qty: 90 TAB | Refills: 0 | Status: SHIPPED | OUTPATIENT
Start: 2020-05-07 | End: 2020-07-29 | Stop reason: SDUPTHER

## 2020-06-15 DIAGNOSIS — E78.00 PURE HYPERCHOLESTEROLEMIA: ICD-10-CM

## 2020-06-15 RX ORDER — ROSUVASTATIN CALCIUM 40 MG/1
TABLET, COATED ORAL
Qty: 90 TAB | Refills: 3 | Status: SHIPPED | OUTPATIENT
Start: 2020-06-15 | End: 2021-04-05

## 2020-06-18 LAB
ALBUMIN SERPL-MCNC: 4.6 G/DL (ref 3.8–4.8)
ALBUMIN/CREAT UR: 36 MG/G CREAT (ref 0–29)
ALBUMIN/GLOB SERPL: 2.4 {RATIO} (ref 1.2–2.2)
ALP SERPL-CCNC: 69 IU/L (ref 39–117)
ALT SERPL-CCNC: 17 IU/L (ref 0–44)
AST SERPL-CCNC: 18 IU/L (ref 0–40)
BASOPHILS # BLD AUTO: 0.1 X10E3/UL (ref 0–0.2)
BASOPHILS NFR BLD AUTO: 1 %
BILIRUB SERPL-MCNC: 0.7 MG/DL (ref 0–1.2)
BUN SERPL-MCNC: 15 MG/DL (ref 8–27)
BUN/CREAT SERPL: 14 (ref 10–24)
CALCIUM SERPL-MCNC: 9.2 MG/DL (ref 8.6–10.2)
CHLORIDE SERPL-SCNC: 95 MMOL/L (ref 96–106)
CHOLEST SERPL-MCNC: 124 MG/DL (ref 100–199)
CO2 SERPL-SCNC: 25 MMOL/L (ref 20–29)
CREAT SERPL-MCNC: 1.05 MG/DL (ref 0.76–1.27)
CREAT UR-MCNC: 22.5 MG/DL
EOSINOPHIL # BLD AUTO: 0.2 X10E3/UL (ref 0–0.4)
EOSINOPHIL NFR BLD AUTO: 3 %
ERYTHROCYTE [DISTWIDTH] IN BLOOD BY AUTOMATED COUNT: 14 % (ref 11.6–15.4)
GLOBULIN SER CALC-MCNC: 1.9 G/DL (ref 1.5–4.5)
GLUCOSE SERPL-MCNC: 118 MG/DL (ref 65–99)
HBA1C MFR BLD: 6.1 % (ref 4.8–5.6)
HCT VFR BLD AUTO: 42.1 % (ref 37.5–51)
HDLC SERPL-MCNC: 48 MG/DL
HGB BLD-MCNC: 13.9 G/DL (ref 13–17.7)
IMM GRANULOCYTES # BLD AUTO: 0 X10E3/UL (ref 0–0.1)
IMM GRANULOCYTES NFR BLD AUTO: 0 %
IMMATURE CELLS  115398: NORMAL
LABORATORY COMMENT REPORT: NORMAL
LDLC SERPL CALC-MCNC: 48 MG/DL (ref 0–99)
LYMPHOCYTES # BLD AUTO: 1.4 X10E3/UL (ref 0.7–3.1)
LYMPHOCYTES NFR BLD AUTO: 18 %
MCH RBC QN AUTO: 31.2 PG (ref 26.6–33)
MCHC RBC AUTO-ENTMCNC: 33 G/DL (ref 31.5–35.7)
MCV RBC AUTO: 94 FL (ref 79–97)
MICROALBUMIN UR-MCNC: 8.2 UG/ML
MONOCYTES # BLD AUTO: 0.6 X10E3/UL (ref 0.1–0.9)
MONOCYTES NFR BLD AUTO: 8 %
MORPHOLOGY BLD-IMP: NORMAL
NEUTROPHILS # BLD AUTO: 5.4 X10E3/UL (ref 1.4–7)
NEUTROPHILS NFR BLD AUTO: 70 %
NRBC BLD AUTO-RTO: NORMAL %
PLATELET # BLD AUTO: 227 X10E3/UL (ref 150–450)
POTASSIUM SERPL-SCNC: 4.6 MMOL/L (ref 3.5–5.2)
PROT SERPL-MCNC: 6.5 G/DL (ref 6–8.5)
RBC # BLD AUTO: 4.46 X10E6/UL (ref 4.14–5.8)
SODIUM SERPL-SCNC: 136 MMOL/L (ref 134–144)
TRIGL SERPL-MCNC: 139 MG/DL (ref 0–149)
VLDLC SERPL CALC-MCNC: 28 MG/DL (ref 5–40)
WBC # BLD AUTO: 7.7 X10E3/UL (ref 3.4–10.8)

## 2020-06-22 ENCOUNTER — TELEMEDICINE (OUTPATIENT)
Dept: MEDICAL GROUP | Facility: PHYSICIAN GROUP | Age: 70
End: 2020-06-22
Payer: MEDICARE

## 2020-06-22 VITALS
WEIGHT: 300 LBS | DIASTOLIC BLOOD PRESSURE: 75 MMHG | SYSTOLIC BLOOD PRESSURE: 132 MMHG | BODY MASS INDEX: 44.43 KG/M2 | HEIGHT: 69 IN

## 2020-06-22 DIAGNOSIS — E66.01 MORBID OBESITY WITH BMI OF 40.0-44.9, ADULT (HCC): ICD-10-CM

## 2020-06-22 DIAGNOSIS — R80.9 MICROALBUMINURIA DUE TO TYPE 2 DIABETES MELLITUS (HCC): ICD-10-CM

## 2020-06-22 DIAGNOSIS — E11.9 DIET-CONTROLLED DIABETES MELLITUS (HCC): ICD-10-CM

## 2020-06-22 DIAGNOSIS — C61 MALIGNANT NEOPLASM OF PROSTATE (HCC): ICD-10-CM

## 2020-06-22 DIAGNOSIS — C18.6 MALIGNANT NEOPLASM OF DESCENDING COLON (HCC): ICD-10-CM

## 2020-06-22 DIAGNOSIS — E11.29 MICROALBUMINURIA DUE TO TYPE 2 DIABETES MELLITUS (HCC): ICD-10-CM

## 2020-06-22 DIAGNOSIS — C80.1 NEUROPATHY ASSOCIATED WITH CANCER (HCC): ICD-10-CM

## 2020-06-22 DIAGNOSIS — G63 NEUROPATHY ASSOCIATED WITH CANCER (HCC): ICD-10-CM

## 2020-06-22 PROCEDURE — 99214 OFFICE O/P EST MOD 30 MIN: CPT | Mod: 95,CR | Performed by: FAMILY MEDICINE

## 2020-06-22 RX ORDER — METHOCARBAMOL 750 MG/1
750 TABLET, FILM COATED ORAL 2 TIMES DAILY PRN
COMMUNITY

## 2020-06-22 ASSESSMENT — FIBROSIS 4 INDEX: FIB4 SCORE: 1.31

## 2020-06-22 NOTE — PROGRESS NOTES
"Telemedicine Visit: Established Patient     This encounter was conducted via [employer] Receive.   Verbal consent was obtained. Patient's identity was verified.    Subjective:   CC: follow-up neuropathy, lab results    Gerald Oshea is a 69 y.o. male presenting for evaluation and management of:    -Continues to have issues with neuropathy pain in his bilateral lower legs  -Taking gabapentin without adverse effect  -The gabapentin is helpful  -Has had both cataracts replaced, seeing much better  -Has gained weight during quarantine, admits to overeating  -Following with oncology and urology, has upcoming labs  -A1c down from 6.3% to 6.1%, not on any diabetic medications  -Still on oxygen 24/7, started after lung surgery for colon cancer metastasis  -No issues with chest pain or shortness of breath  -Trying to stay home as much as possible    ROS   Denies any recent fevers or chills. No nausea or vomiting. No chest pains or shortness of breath.     Allergies   Allergen Reactions   • Ketamine Unspecified     \"I hallucinate\".     • Lidocaine Rash     [id number]  Pt states \"Rash all over my body\".     • Duloxetine      Unable to urinate    • Morphine      \"Can not have too much, my oxygen goes down\"     Current medicines (including changes today)  Current Outpatient Medications   Medication Sig Dispense Refill   • methocarbamol (ROBAXIN) 750 MG Tab Take 750 mg by mouth 4 times a day.     • rosuvastatin (CRESTOR) 40 MG tablet TAKE 1 TABLET BY MOUTH EVERYDAY AT BEDTIME 90 Tab 3   • triamterene-hctz (MAXZIDE-25/DYAZIDE) 37.5-25 MG Tab TAKE 1 TABLET BY MOUTH EVERY DAY 90 Tab 0   • losartan (COZAAR) 100 MG Tab TAKE 1 TABLET BY MOUTH EVERY DAY 90 Tab 0   • gabapentin (NEURONTIN) 800 MG tablet Take 1 Tab by mouth 3 times a day. 270 Tab 3   • Diclofenac Sodium 1 % Gel      • hydrocortisone 2.5 % Cream topical cream Apply to affected areas twice daily 1 Tube 0     No current facility-administered medications for this visit.  " "    Patient Active Problem List    Diagnosis Date Noted   • Colon cancer (HCC) 09/28/2016     Priority: Medium   • Essential hypertension 09/28/2016     Priority: Low   • Microalbuminuria due to type 2 diabetes mellitus ([facility]) 06/22/2020   • History of lung surgery 04/05/2019   • Former smoker 02/20/2019   • Chronic insomnia 09/07/2018   • Diet-controlled diabetes mellitus (HCC) 04/11/2018   • Chronic respiratory failure with hypoxia ([facility]) 04/11/2018   • Skin cancer 12/06/2017   • Morbid obesity with BMI of 40.0-44.9, adult ([facility]) 09/20/2017   • Benign non-nodular prostatic hyperplasia with lower urinary tract symptoms 07/03/2017   • Obstructive sleep apnea 05/09/2017   • Neuropathy associated with cancer ([facility]) 03/28/2017   • History of gout 09/28/2016   • Anxiety 09/28/2016   • Pure hypercholesterolemia 09/28/2016   • Prostate cancer ([facility]) 09/28/2016     Family History   Problem Relation Age of Onset   • Cancer Mother         Bladder   • Heart Disease Father    • Heart Disease Brother         CABG x2     He  has a past medical history of Anemia, Anxiety, Atrophy of right kidney, Breath shortness, Cancer (HCC) (2016), Colon cancer (HCC), Former tobacco use, Gout, High cholesterol (12/12/2017), Hyperlipidemia, Hypertension (12/12/2017), Neuropathy (12/12/2017), Pain (12/12/2017), Pneumonia (1991), Psychiatric problem, Sleep apnea (12/12/2017), and Snoring.  He  has a past surgical history that includes cath placement (Left, 10/7/2016); closed reduction (10/25/2012); colon resection (2016); and thoracoscopy (2/5/2018).     Objective:   /75   Ht 1.753 m (5' 9\")   Wt (!) 136.1 kg (300 lb)   BMI 44.30 kg/m² Respirations 16    Physical Exam:  Constitutional: Alert, no distress, well-groomed.  Skin: No rashes in visible areas.  Eye: Round. Conjunctiva clear, lids normal. No icterus.   ENMT: Lips pink without lesions, good dentition, moist mucous membranes. Phonation normal. +Nasal cannual.  Neck: No masses, no " thyromegaly. Moves freely without pain.  CV: Pulse as reported by patient  Respiratory: Unlabored respiratory effort, no cough or audible wheeze  Psych: Alert and oriented x3, normal affect and mood.     Assessment and Plan:   The following treatment plan was discussed:     1. Prostate cancer (HCC)  2. Malignant neoplasm of descending colon (HCC)  Chronic and stable.  Following with oncology and urology regularly.  Has labwork scheduled for next week.    3. Neuropathy associated with cancer (HCC)  Chronic and stable.  Still an ongoing issue for Gerald.  He is on higher doses of gabapentin to help control his neuropathy.  No adverse effects.  Cautioned him that he would need to slowly taper down if it was every discontinued.    4. Diet-controlled diabetes mellitus (HCC)  5. Microalbuminuria due to type 2 diabetes mellitus (HCC)  6. Morbid obesity with BMI of 40.0-44.9, adult (HCC)  Stable.  A1c has improved to 6.1%.  Not on any medication.  He was noted to have mild microalbuminuria on recent labs.  He is on ARB.  Will continue to monitor.  Referred to registered dietician as he has had difficulty losing weight.  - REFERRAL TO [facility] HEALTH IMPROVEMENT PROGRAMS (HIP) Services Requested: Registered Dietitian for Medical Nutrition Therapy; Reason for Visit: Overweight/Obesity, Management of Chronic Condition    Follow-up: Return in about 4 months (around 10/22/2020) for VV to check-in, short.

## 2020-06-30 ENCOUNTER — NON-PROVIDER VISIT (OUTPATIENT)
Dept: MEDICAL GROUP | Facility: PHYSICIAN GROUP | Age: 70
End: 2020-06-30
Payer: MEDICARE

## 2020-06-30 VITALS — BODY MASS INDEX: 45.48 KG/M2 | WEIGHT: 308 LBS

## 2020-06-30 DIAGNOSIS — E11.9 DIET-CONTROLLED DIABETES MELLITUS (HCC): ICD-10-CM

## 2020-06-30 DIAGNOSIS — E66.01 MORBID OBESITY WITH BMI OF 40.0-44.9, ADULT (HCC): ICD-10-CM

## 2020-06-30 PROCEDURE — 98967 PH1 ASSMT&MGMT NQHP 11-20: CPT | Mod: 95,CR | Performed by: DIETITIAN, REGISTERED

## 2020-06-30 ASSESSMENT — FIBROSIS 4 INDEX: FIB4 SCORE: 1.31

## 2020-06-30 NOTE — PROGRESS NOTES
"IBT INITIAL ASSESMENT    Author: Yarely Godwin R.D. Date & Time created: 6/30/2020  11:11 AM   Visit #: 1   Referring Provider: No ref. provider found  Patient Age: 69 y.o.  Time in/Out:  10:55-11:10am    COVID-19 VIRTUAL VISIT   This encounter was conducted via telephone call.   Patient initiated/verbally consented to telephone visit. Patient's identity was verified.      ASSESS:  Vitals:    06/30/20 1104   Weight: (!) 139.7 kg (308 lb)      Vitals:    06/30/20 1104   Weight: (!) 139.7 kg (308 lb)    Body mass index is 45.48 kg/m².     The patient states overall improved health and \"tired of being this heavy\" as a motivator for weight loss. Comorbidities include gout, SU, HLD, HTN, T2DM with neuropathy, hx of skin, colon, and prostate cancer     Current Dietary/Exercise Habits  Gerald eats two meals/day (lunch and dinner) and sometimes has a light meal with his coffee in the morning (nutrigrain bar). Lunch usually consists of a sandwich and chips and dinner varies - burgers, steaks, seafood, meatballs - and he usually has a salad with this. States he doesn't eat many greens/vegetables. He does the grocery shopping and cooking for himself. Gerald does not get any physical activity at this time, he has several limitations that inhibit this including neuropathy and back pain.     Estimated Stage of Change   Preparationas evidenced by apparent commitment to IBT program and willingness to track intake.    ADVISE:    Physical Activity:  Not addressed at this visit   Dietary Guidelines:  Track food intake at least 3-5 days    The patient has been advised of how weight management and physical activity impacts their health and will help to reduce complications and health risk factors.      AGREE:  Gerald is agreeable to keeping a food log and sending to me prior to his next visit    ASSIST:  Gerald is looking to improve his health through weight loss and better manage his comorbidities. I asked Gerald to begin tracking his " intake so that I can gauge his portion sizes and food choices and make adjustments appropriately. At next visit would like to start by reviewing nutrition basics and the Plate Planner. Pt verbalized understanding and all of his/her nutrition-related questions were answered    ARRANGE:     Return for follow-up in 1 week   The patient was assisted in making follow-up appointment per orders.

## 2020-07-07 ENCOUNTER — NON-PROVIDER VISIT (OUTPATIENT)
Dept: MEDICAL GROUP | Facility: PHYSICIAN GROUP | Age: 70
End: 2020-07-07
Payer: MEDICARE

## 2020-07-07 VITALS — BODY MASS INDEX: 45.48 KG/M2 | WEIGHT: 308 LBS

## 2020-07-07 DIAGNOSIS — E11.9 DIET-CONTROLLED DIABETES MELLITUS (HCC): ICD-10-CM

## 2020-07-07 DIAGNOSIS — E66.01 MORBID OBESITY WITH BMI OF 40.0-44.9, ADULT (HCC): ICD-10-CM

## 2020-07-07 PROCEDURE — G0447 BEHAVIOR COUNSEL OBESITY 15M: HCPCS | Mod: 95,CR | Performed by: FAMILY MEDICINE

## 2020-07-07 ASSESSMENT — FIBROSIS 4 INDEX: FIB4 SCORE: 1.31

## 2020-07-07 NOTE — PROGRESS NOTES
7/7/2020     Visit #2       No ref. provider found      69 y.o.           Time in/Out:  11:00-11:35am    This encounter was conducted via Zoom .   Verbal consent was obtained. Patient's identity was verified..c    ASSESS:    Vitals:    07/07/20 1257   Weight: (!) 139.7 kg (308 lb)            Wt Readings from Last 2 Encounters:   07/07/20 (!) 139.7 kg (308 lb)   06/30/20 (!) 139.7 kg (308 lb)            Body mass index is 45.48 kg/m².        Starting Weight: 308 lbs (6/30/20)   Difference: no difference  Total Weight Change: no difference       Current Dietary/Exercise Habits:  Gerald did not track his food intake, but he described his typical meals which appear to be lacking in NS vegetables and excessive in both CHO and protein. He says he doesn't eat vegetables because he does not feel like making them. He states he doesn't drink enough water; he drinks diet soda, starbucks frappucinos, and has 14+ glasses of wine/week. Gerald is not getting any physical d/t neuropathy and pain.    Estimated Stage of Change:    Preparation as evidenced by lack of tracking but continued participation in program.      ADVISE:      Physical Activity:  Not addressed at this visit     Dietary Guidelines:  Follow Plate Planner for balance and portion control    The patient has been advised of how weight management and physical activity impacts their health and will help to reduce complications and health risk factors.        AGREE:  Gerald is agreeable to trying to plan his meals to resemble the Plate Planner as well as track his food intake      ASSIST:  At the start of the visit Gerald was uncertain that he wanted to continue the program because he recently had some tests that may indicate the return of his colon cancer. I suggested that we continue the visits until he has more solid information, and Gerald agreed to this and acknowledged the need to lose weight. We reviewed the Plate Planner along with Nutrition Basics, emphasizing overall  meal balance and portion control; I explained the importance of NS vegetables to aid in weight loss and improve overall health, and I described why lean meats and unsaturated fats are preferred for heart health. Moving forward, I recommended that Gerald start by trying to mimic the Plate Planner, advising him to work towards it by making small changes. In addition, Gerald says he is going to begin tracking his intake, which I supported to increase self-awareness and provide me with information about his food choices/portion sizes. Pt verbalized understanding and all of his/her nutrition-related questions were answered         ARRANGE:     Return for follow-up in 1 week     The patient was assisted in making follow-up appointment per orders.

## 2020-07-14 ENCOUNTER — NON-PROVIDER VISIT (OUTPATIENT)
Dept: MEDICAL GROUP | Facility: PHYSICIAN GROUP | Age: 70
End: 2020-07-14
Payer: MEDICARE

## 2020-07-14 VITALS — BODY MASS INDEX: 45.78 KG/M2 | WEIGHT: 310 LBS

## 2020-07-14 DIAGNOSIS — E66.01 MORBID OBESITY WITH BMI OF 40.0-44.9, ADULT (HCC): ICD-10-CM

## 2020-07-14 DIAGNOSIS — E11.9 DIET-CONTROLLED DIABETES MELLITUS (HCC): ICD-10-CM

## 2020-07-14 PROCEDURE — G0447 BEHAVIOR COUNSEL OBESITY 15M: HCPCS | Mod: 95,CR | Performed by: FAMILY MEDICINE

## 2020-07-14 ASSESSMENT — FIBROSIS 4 INDEX: FIB4 SCORE: 1.31

## 2020-07-14 NOTE — PROGRESS NOTES
7/14/2020     Visit #3      No ref. provider found      69 y.o.           Time in/Out: 11:05-11:22am    This encounter was conducted via Zoom .   Verbal consent was obtained. Patient's identity was verified.      ASSESS:    Vitals:    07/14/20 1218   Weight: (!) 140.6 kg (310 lb)            Wt Readings from Last 2 Encounters:   07/14/20 (!) 140.6 kg (310 lb)   07/07/20 (!) 139.7 kg (308 lb)            Body mass index is 45.78 kg/m².        Starting Weight: 308 lbs (7/7/20)   Difference: +2 lbs  Total Weight Change: +2 lbs       Current Dietary/Exercise Habits:  Gerald tracked his intake this week; overall his portions are excessive and his diet is severely lacking in NS vegetables, but he has purchased vegetables for the week ahead. He typically has a dessert each night but he is now out of sweets and does not plan to buy more. He is drinking 2-3 glasses wine/night, but doesn't plan to buy more bottles when he runs out. Gerald is not getting any physical activity at this time.     Estimated Stage of Change:    Preparation as evidenced by food tracking and purchasing of vegetables.      ADVISE:      Physical Activity:  Not addressed at this visit     Dietary Guidelines:  Follow Plate Planner for balance and portion control    The patient has been advised of how weight management and physical activity impacts their health and will help to reduce complications and health risk factors.        AGREE:  Gerald is agreeable to working on the following goals:  1) reduce wine to no more than 8 glasses/week  2) reduce portion of salami/ham on sandwich and add lettuce & tomato  3) add 1 cup of vegetables to lunch and dinner      ASSIST:  I thanked Gerald for sending his food log and he says he has been trying to be be better by buying some vegetables (asparagus, artichokes, broccoli) and trying to cut back on wine. Rather than blowing through his wine until he is out of bottles, I suggested he cap the number of drinks he can have per  week, and he thinks he can easily reduce his intake to no more than 8 glasses/week. We discussed ways to tweak his existing meals to better the resemble the Plate Planner, and Gerald thinks he can realistically work towards the goals mentioned above. I reminded Gerald that fruit goes in the CHO section of the plate and Gerald plans to replacing dessert or snacks with a piece of fruit. Pt verbalized understanding and all of his/her nutrition-related questions were answered      ARRANGE:     Return for follow-up in 1 week     The patient was assisted in making follow-up appointment per orders.

## 2020-07-15 DIAGNOSIS — I10 ESSENTIAL HYPERTENSION: ICD-10-CM

## 2020-07-15 RX ORDER — LOSARTAN POTASSIUM 100 MG/1
TABLET ORAL
Qty: 90 TAB | Refills: 0 | Status: SHIPPED | OUTPATIENT
Start: 2020-07-15 | End: 2020-10-06 | Stop reason: SDUPTHER

## 2020-07-21 ENCOUNTER — NON-PROVIDER VISIT (OUTPATIENT)
Dept: MEDICAL GROUP | Facility: PHYSICIAN GROUP | Age: 70
End: 2020-07-21
Payer: MEDICARE

## 2020-07-21 VITALS — BODY MASS INDEX: 45.42 KG/M2 | WEIGHT: 307.6 LBS

## 2020-07-21 DIAGNOSIS — E66.01 MORBID OBESITY WITH BMI OF 40.0-44.9, ADULT (HCC): ICD-10-CM

## 2020-07-21 DIAGNOSIS — E11.9 DIET-CONTROLLED DIABETES MELLITUS (HCC): ICD-10-CM

## 2020-07-21 PROCEDURE — G0447 BEHAVIOR COUNSEL OBESITY 15M: HCPCS | Mod: 95 | Performed by: FAMILY MEDICINE

## 2020-07-21 ASSESSMENT — FIBROSIS 4 INDEX: FIB4 SCORE: 1.31

## 2020-07-21 NOTE — PROGRESS NOTES
7/21/2020     Visit #4      No ref. provider found      69 y.o.           Time in/Out:  10:50-11:02am    This encounter was conducted via Zoom .   Verbal consent was obtained. Patient's identity was verified.      ASSESS:    Vitals:    07/21/20 1103   Weight: (!) 139.5 kg (307 lb 9.6 oz)            Wt Readings from Last 2 Encounters:   07/21/20 (!) 139.5 kg (307 lb 9.6 oz)   07/14/20 (!) 140.6 kg (310 lb)            Body mass index is 45.42 kg/m².        Starting Weight: 308 lbs (7/7/20)   Difference: -3 lb  Total Weight Change: -1 lb       Current Dietary/Exercise Habits:  Gerald has added NS vegetables to some of his dinner meals such as asparagus, artichokes, and cauliflower. He also reduced the number of days that he had dessert and he totally abstained from wine this week.     Estimated Stage of Change:    Action as evidenced by compliance with recommended diet modifications.      ADVISE:      Physical Activity:  Not addressed at this visit     Dietary Guidelines:  Follow Plate Planner; add NS vegetables, reduce intake of sweets and etoh    The patient has been advised of how weight management and physical activity impacts their health and will help to reduce complications and health risk factors.        AGREE:  Gerald is going to continue working on his previous goals:  1) no more than 8 glasses of wine per week  2) reduce portion of salami/ham on sandwich, add L&T      ASSIST:  Gerald has made some very positive adjustments to his diet, including avoidance of etoh and adding NS vegetables to his dinner. He is not buying certain foods such as cookies and wine because if they're in the house he will eat them. He is about to place a grocery order with [employer] and plans to include a variety of NS vegetables and bagged salads. We discussed some ideas for adding vegetables at lunch such as cauliflower, baby carrots, or bagged salads. I encouraged Gerald to continue practicing his previous goals listed above, advising him  that small incremental changes are more sustainable and effective than doing too much at once. Pt verbalized understanding and all of his/her nutrition-related questions were answered      ARRANGE:     Return for follow-up in 2 weeks     The patient was assisted in making follow-up appointment per orders.

## 2020-07-29 DIAGNOSIS — I10 ESSENTIAL HYPERTENSION: ICD-10-CM

## 2020-07-29 RX ORDER — TRIAMTERENE AND HYDROCHLOROTHIAZIDE 37.5; 25 MG/1; MG/1
1 TABLET ORAL
Qty: 90 TAB | Refills: 3 | Status: SHIPPED | OUTPATIENT
Start: 2020-07-29 | End: 2020-07-29 | Stop reason: SDUPTHER

## 2020-07-29 RX ORDER — TRIAMTERENE AND HYDROCHLOROTHIAZIDE 37.5; 25 MG/1; MG/1
1 TABLET ORAL
Qty: 90 TAB | Refills: 3 | Status: SHIPPED | OUTPATIENT
Start: 2020-07-29 | End: 2021-08-02 | Stop reason: SDUPTHER

## 2020-08-04 ENCOUNTER — NON-PROVIDER VISIT (OUTPATIENT)
Dept: MEDICAL GROUP | Facility: PHYSICIAN GROUP | Age: 70
End: 2020-08-04
Payer: MEDICARE

## 2020-08-04 VITALS — BODY MASS INDEX: 45.34 KG/M2 | WEIGHT: 307 LBS

## 2020-08-04 DIAGNOSIS — F51.04 CHRONIC INSOMNIA: ICD-10-CM

## 2020-08-04 DIAGNOSIS — E66.01 MORBID OBESITY WITH BMI OF 40.0-44.9, ADULT (HCC): ICD-10-CM

## 2020-08-04 PROCEDURE — G0447 BEHAVIOR COUNSEL OBESITY 15M: HCPCS | Mod: 95,CR | Performed by: FAMILY MEDICINE

## 2020-08-04 RX ORDER — CLONAZEPAM 1 MG/1
TABLET ORAL
Qty: 60 TAB | Refills: 2 | Status: SHIPPED | OUTPATIENT
Start: 2020-08-04 | End: 2020-11-03

## 2020-08-04 ASSESSMENT — FIBROSIS 4 INDEX: FIB4 SCORE: 1.31

## 2020-08-04 NOTE — PROGRESS NOTES
8/4/2020     Visit #5       No ref. provider found      69 y.o.           Time in/Out:  10:56-11:11am    This encounter was conducted via Zoom .   Verbal consent was obtained. Patient's identity was verified.      ASSESS:    Vitals:    08/04/20 1112   Weight: (!) 139.3 kg (307 lb)            Wt Readings from Last 2 Encounters:   08/04/20 (!) 139.3 kg (307 lb)   07/21/20 (!) 139.5 kg (307 lb 9.6 oz)            Body mass index is 45.34 kg/m².        Starting Weight: 308 lbs (7/7/20)   Difference: no difference  Total Weight Change: -1 lb       Current Dietary/Exercise Habits:  Gerald continued to track his intake and sent me his food log prior to his appointment. His meals appear to be excessive in his portions of CHO and proteins and severely lacking in NS vegetables. Gerald has been drinking more wine than his previous weeks and eating less NS vegetables. He has significantly cut back on desserts/sweets. Gerald is not getting any physical activity at this time d/t neuropathy    Estimated Stage of Change:    Preparation as evidenced by continued food tracking.      ADVISE:      Physical Activity:  Not addressed at this visit     Dietary Guidelines:  Follow Plate Planner for balance and portion control; add NS vegetables, reduce portion of meat, reduce etoh intake    The patient has been advised of how weight management and physical activity impacts their health and will help to reduce complications and health risk factors.        AGREE:  Gerald is agreeable to working towards the following goals:  1) no more than 8 glasses of wine/week  2) replace chips with NS vegetables at least 3-4x/week  3) continue tracking food intake      ASSIST:  Gerald did not lose weight at this visit, and I pointed out the correlation between his weight and his wine intake, as well as impact of diet modification (eating fewer vegetables, for example). I reminded him to utilize the Plate Planner and we practiced adjusting his current meals and  staples to better resemble the Plate. I encouraged him to try different bagged salads that are more nutrients, advising him to go for dark, leafy greens and ones with a variety of vegetables. We reviewed portion sizes, particularly of proteins and fats. I encouraged Gerald to keep the wine to a minimum, keeping to his previous set goal of </= 8 glasses per week. Pt verbalized understanding and all of his/her nutrition-related questions were answered.      ARRANGE:     Return for follow-up in 2 weeks     The patient was assisted in making follow-up appointment per orders.

## 2020-08-04 NOTE — TELEPHONE ENCOUNTER
"<HTML><META HTTP-EQUIV=\"content-type\" CONTENT=\"text/html;charset=utf-8\"><p class=\"nc-main\"><p class=\"nc-title-bar\"><IMG class=\"ur-bgvmj-efx__dopb\" alt=\"Narxcheck Logo\" src=\"[email or web address]/content/images/[email or web address]-logo-[email or web address]\">       <H1 class=\"nc-title\">        MAYRA ASIF       </H1></DIV><p class=\"nc-info-bar\"><SPAN class=\"qt-bhhr-lge__gcfy\">        Age: 69       </SPAN>       <A class=\"xo-tivm-ehu__eoru nc-link\" yqos-rcliduu-dvibyg=\"#demographics-overlay\">        demographics       </A>       <SPAN class=\"tb-sbbs-xjx__scsts\">        Data as of: 08/04/2020       </SPAN>     </DIV><p class=\"nc-scores\"><p class=\"nc-scores__col nc-scores__col--scores\"><TABLE class=\"nc-scores__table\"><THEAD><TR><TH></TH><TH class=\"nc-scores-col--value\"> </TH></TR></THEAD><TBODY><TR><TH>NARCOTIC</TH><TD class=\"nc-scores-col--main\">                  160                 </TD></TR><TR><TH>SEDATIVE</TH><TD class=\"nc-scores-col--main\">                  371                 </TD></TR><TR><TH>STIMULANT</TH><TD class=\"nc-scores-col--main\">                  000                 </TD></TR></TBODY></TABLE><P class=\"nc-scores__legend\">            NARxSCORES can range from 000 to 999. The first            two digits represent the composite percentile risk based on an overall            analysis of prescription drug use. The third digit represents the number            of active prescriptions. The distribution of scores in the population            is such that approximately 75% fall below 200, 95% fall below 500 and            99% fall below 650.  The information on this report is not warranted            as accurate or complete. This report is based on the search            criteria supplied and the data entered by the dispensing pharmacy.  For            more information about any prescription, please contact the dispensing            pharmacy or the prescriber. NARxSCORES and Reports are intended to aid,            " "not replace medical decision making. None of the information presented            should be used as sole justification for providing or refusing to            provide medications.           </P></DIV><p class=\"nc-scores__col nc-scores__col--graph\"><p class=\"percent-graph\" id=\"percent-graph\"><svg xmlns=\"http://www.[employer].org/2000/svg\" class=\"svg-percent-graph\" id=\"JsfqnJzw0214\" viewBox=\"-3 -3 327 266\" width=\"321\" height=\"260\" version=\"1.1\" xmlns=\"http://www.[employer].org/2000/svg\"><polygon class=\"pg-area\" id=\"QlijiLctsymq6803\" points=\"0,15 0,248 276,248 128.4,216.667 64.2,181.818\"></polygon><line class=\"nl-cjgag-zlpwcdhrh\" id=\"NkdhzRuxf1937\" x1=\"64.2\" y1=\"248\" x2=\"64.2\" y2=\"50\"></line><text class=\"wz-bwanm-ffzdrvebo-label\" id=\"QnyodKsyc1967\" x=\"68.2\" y=\"45.86\">75%</text><line class=\"of-zwicz-ymasawbzn\" id=\"ExjpaSgil3329\" x1=\"160.5\" y1=\"248\" x2=\"160.5\" y2=\"50\"></line><text class=\"sf-wvgsj-ymxrgzbil-label\" id=\"YhdfaZmaj3282\" x=\"164.5\" y=\"45.86\">95%</text><line class=\"zw-fnsam-gteshtghb\" id=\"VsndhWops2565\" x1=\"208.65\" y1=\"248\" x2=\"208.65\" y2=\"50\"></line><text class=\"nj-menwj-fhsxemmdv-label\" id=\"QqtevNjuy7293\" x=\"212.65\" y=\"45.86\">99%</text><text class=\"pg-axis-label\" id=\"EnbeeQjin1327\" x=\"0\" y=\"261.86\">0</text><text class=\"pg-axis-label\" id=\"PqzcuAufj7511\" x=\"32.1\" y=\"261.86\">100</text><text class=\"pg-axis-label\" id=\"BngiwEcit8290\" x=\"64.2\" y=\"261.86\">200</text><text class=\"pg-axis-label\" id=\"JzhbwBupf5318\" x=\"96.3\" y=\"261.86\">300</text><text class=\"pg-axis-label\" id=\"FoalcYslj0392\" x=\"128.4\" y=\"261.86\">400</text><text class=\"pg-axis-label\" id=\"XgqqrPigg0434\" x=\"160.5\" y=\"261.86\">500</text><text class=\"pg-axis-label\" id=\"AuwhgZzjc6724\" x=\"192.6\" y=\"261.86\">600</text><text class=\"pg-axis-label\" id=\"IlfacXscp0656\" x=\"224.7\" y=\"261.86\">700</text><text class=\"pg-axis-label\" id=\"JogrjDibv0767\" x=\"256.8\" y=\"261.86\">800</text><text class=\"pg-axis-label\" id=\"OauiyGgup5145\" x=\"288.9\" y=\"261.86\">900</text><line class=\"pg-score " "pg-score--narcotics\" id=\"VumguMprf7229\" x1=\"51.36\" y1=\"248\" x2=\"51.36\" y2=\"50\"></line><line class=\"pg-score pg-score--sedatives\" id=\"RcsuoFbpn2635\" x1=\"119.091\" y1=\"248\" x2=\"119.091\" y2=\"50\"></line><line class=\"pg-score pg-score--stimulants\" id=\"QfkqyRtaq0754\" x1=\"0\" y1=\"248\" x2=\"0\" y2=\"50\"></line><line class=\"pg-axis\" id=\"PzgfvMnle5336\" x1=\"0\" y1=\"0\" x2=\"0\" y2=\"248\"></line><line class=\"pg-axis\" id=\"DsgyaVxdj5752\" x1=\"0\" y1=\"248\" x2=\"321\" y2=\"248\"></line></svg></DIV></DIV></DIV><p class=\"nc-panel nc-panel--borderless\"><p class=\"nc-panel__header nc-panel__header--extra-controls\"><H4>          Rx Graph           <SPAN class=\"ve-uxnpqdpv-lcmj\" hnub-rwnymqn-mjmxuk=\"#graph-help-popover\"></SPAN>           <p class=\"nc-panel__notice\">Grayed out drugs could not be included in score calculations.</DIV></H4><p class=\"nc-extra-controls\"><LABEL class=\"nc-checkbox nc-checkbox--narcotic\"><INPUT id=\"cb-narcotic\" type=\"checkbox\" checked=\"\">             <SPAN class=\"nc-checkbox__checkmark\"></SPAN>            Narcotic           </LABEL>           <LABEL class=\"nc-checkbox nc-checkbox--buprenorphine\"><INPUT id=\"cb-buprenorphine\" type=\"checkbox\" checked=\"\">             <SPAN class=\"nc-checkbox__checkmark\"></SPAN>            Buprenorphine           </LABEL>           <LABEL class=\"nc-checkbox nc-checkbox--sedative\"><INPUT id=\"cb-sedative\" type=\"checkbox\" checked=\"\">             <SPAN class=\"nc-checkbox__checkmark\"></SPAN>            Sedative           </LABEL>           <LABEL class=\"nc-checkbox nc-checkbox--stimulant\"><INPUT id=\"cb-stimulant\" type=\"checkbox\" checked=\"\">             <SPAN class=\"nc-checkbox__checkmark\"></SPAN>            Stimulant           </LABEL>           <LABEL class=\"nc-checkbox nc-checkbox--other\"><INPUT id=\"cb-other\" type=\"checkbox\" checked=\"\">             <SPAN class=\"nc-checkbox__checkmark\"></SPAN>            Other           </LABEL>         </DIV></DIV><p class=\"nc-graph\"><p class=\"prescription-graph\" " "id=\"prescription-graph-canvas\"><svg xmlns=\"http://www.[employer].org/2000/svg\" class=\"svg-prescription-graph\" id=\"YlhjhTex3031\" width=\"1005\" height=\"148\" version=\"1.1\" xmlns=\"http://www.[employer].org/2000/svg\"><line class=\"prescription-g-border\" id=\"PbmgmSejh3860\" x1=\"230\" y1=\"46\" x2=\"230\" y2=\"126\"></line><line class=\"prescription-g-border\" id=\"HdecbTrhe0950\" x1=\"0\" y1=\"1\" x2=\"1005\" y2=\"1\"></line><text class=\"prescription-g-summary-label\" id=\"VdasqPeye6388\" x=\"10\" y=\"15.86\">All Prescribers</text><line class=\"prescription-g-border\" id=\"GflziUgzj1455\" x1=\"0\" y1=\"21\" x2=\"1005\" y2=\"21\"></line><line class=\"prescription-g-border\" id=\"DopcrNpuu9326\" x1=\"170\" y1=\"1\" x2=\"170\" y2=\"21\"></line><line class=\"prescription-g-border\" id=\"OjkosDjkj3032\" x1=\"230\" y1=\"1\" x2=\"230\" y2=\"21\"></line><line class=\"prescription-g-border\" id=\"DwrwkRwqm8738\" x1=\"0\" y1=\"46\" x2=\"1005\" y2=\"46\"></line><text id=\"LoiwqDqkp5961\" x=\"10\" y=\"59.86\">Prescribers</text><line class=\"prescription-g-grid\" id=\"XzgjnXnlh0919\" x1=\"0\" y1=\"66\" x2=\"1005\" y2=\"66\"></line><text id=\"VbvhhNllh4422\" x=\"10\" y=\"79.86\">3 - Jocelyn L Ramos</text><rect id=\"OjfieOwkh5974\" style=\"cursor: pointer; opacity: 0;\" fill=\"rgba(0, 0, 0, 0)\" stroke=\"none\" x=\"0\" y=\"66\" width=\"170\" height=\"20\"></rect><line class=\"prescription-g-grid\" id=\"PtrozIxqj3527\" x1=\"0\" y1=\"86\" x2=\"1005\" y2=\"86\"></line><text id=\"UvfxfUapy0009\" x=\"10\" y=\"99.86\">2 - Sho C Vandercl</text><rect id=\"GodtuIubr2838\" style=\"cursor: pointer; opacity: 0;\" fill=\"rgba(0, 0, 0, 0)\" stroke=\"none\" x=\"0\" y=\"86\" width=\"170\" height=\"20\"></rect><line class=\"prescription-g-grid\" id=\"PccxnRtme0126\" x1=\"0\" y1=\"106\" x2=\"1005\" y2=\"106\"></line><text id=\"ZfizeTtbz3636\" x=\"10\" y=\"119.86\">1 - Jocelyn L Ramos</text><rect id=\"MrajtMvvo4003\" style=\"cursor: pointer; opacity: 0;\" fill=\"rgba(0, 0, 0, 0)\" stroke=\"none\" x=\"0\" y=\"106\" width=\"170\" height=\"20\"></rect><rect class=\"prescription-g-drug--sedative\" id=\"HjugwVajs6435\" x=\"229\" y=\"1\" width=\"30\" " "height=\"20\"></rect><rect class=\"prescription-g-drug--sedative\" id=\"JcmtwQrvt7347\" x=\"229\" y=\"66\" width=\"30\" height=\"20\"></rect><rect class=\"prescription-g-drug--sedative\" id=\"TserzJvzc5923\" x=\"263\" y=\"1\" width=\"30\" height=\"20\"></rect><rect class=\"prescription-g-drug--sedative\" id=\"IzhntUqdy4874\" x=\"263\" y=\"66\" width=\"30\" height=\"20\"></rect><rect class=\"prescription-g-drug--sedative\" id=\"IzqtvAbda2606\" x=\"292\" y=\"1\" width=\"30\" height=\"20\"></rect><rect class=\"prescription-g-drug--sedative\" id=\"PytahDvfx0925\" x=\"292\" y=\"66\" width=\"30\" height=\"20\"></rect><rect class=\"prescription-g-drug--sedative\" id=\"HimajDepu1665\" x=\"326\" y=\"1\" width=\"30\" height=\"20\"></rect><rect class=\"prescription-g-drug--sedative\" id=\"NkxpcTydi5510\" x=\"326\" y=\"66\" width=\"30\" height=\"20\"></rect><rect class=\"prescription-g-drug--sedative\" id=\"EqtqpLjvj0849\" x=\"356\" y=\"1\" width=\"30\" height=\"20\"></rect><rect class=\"prescription-g-drug--sedative\" id=\"AfmiwJsyp8701\" x=\"356\" y=\"66\" width=\"30\" height=\"20\"></rect><rect class=\"prescription-g-drug--sedative\" id=\"VujliKenb9488\" x=\"390\" y=\"1\" width=\"30\" height=\"20\"></rect><rect class=\"prescription-g-drug--sedative\" id=\"YxmrjQgxi9451\" x=\"390\" y=\"66\" width=\"30\" height=\"20\"></rect><rect class=\"prescription-g-drug--sedative\" id=\"AxworHapj2270\" x=\"419\" y=\"1\" width=\"30\" height=\"20\"></rect><rect class=\"prescription-g-drug--sedative\" id=\"IkssgGymv3220\" x=\"419\" y=\"66\" width=\"30\" height=\"20\"></rect><rect class=\"prescription-g-drug--sedative\" id=\"TkhsjGrjc6982\" x=\"448\" y=\"1\" width=\"30\" height=\"20\"></rect><rect class=\"prescription-g-drug--sedative\" id=\"IrvrxVboe6354\" x=\"448\" y=\"66\" width=\"30\" height=\"20\"></rect><rect class=\"prescription-g-drug--sedative\" id=\"AtlflLdys8310\" x=\"481\" y=\"1\" width=\"30\" height=\"20\"></rect><rect class=\"prescription-g-drug--sedative\" id=\"OwnzbLoer6956\" x=\"481\" y=\"66\" width=\"30\" height=\"20\"></rect><rect class=\"prescription-g-drug--sedative\" id=\"BrpfcPjeu6658\" x=\"509\" y=\"1\" width=\"30\" " "height=\"20\"></rect><rect class=\"prescription-g-drug--sedative\" id=\"AteisJdpa4016\" x=\"509\" y=\"66\" width=\"30\" height=\"20\"></rect><rect class=\"prescription-g-drug--sedative\" id=\"VobidVqyk8971\" x=\"539\" y=\"1\" width=\"30\" height=\"20\"></rect><rect class=\"prescription-g-drug--sedative\" id=\"EurjaQzyt3863\" x=\"539\" y=\"66\" width=\"30\" height=\"20\"></rect><rect class=\"prescription-g-drug--sedative\" id=\"CjjgsMlwi1351\" x=\"570\" y=\"1\" width=\"30\" height=\"20\"></rect><rect class=\"prescription-g-drug--sedative\" id=\"NuqtsWzbu9175\" x=\"570\" y=\"66\" width=\"30\" height=\"20\"></rect><rect class=\"prescription-g-drug--sedative\" id=\"DtubkOzja8024\" x=\"600\" y=\"1\" width=\"30\" height=\"20\"></rect><rect class=\"prescription-g-drug--sedative\" id=\"LrzqvKmvc6420\" x=\"600\" y=\"66\" width=\"30\" height=\"20\"></rect><rect class=\"prescription-g-drug--sedative\" id=\"DgcrgYnwz6714\" x=\"631\" y=\"1\" width=\"30\" height=\"20\"></rect><rect class=\"prescription-g-drug--sedative\" id=\"BrhekSqae1162\" x=\"631\" y=\"66\" width=\"30\" height=\"20\"></rect><rect class=\"prescription-g-drug--sedative\" id=\"HbanrWtji8028\" x=\"661\" y=\"1\" width=\"30\" height=\"20\"></rect><rect class=\"prescription-g-drug--sedative\" id=\"NegmfZwau6840\" x=\"661\" y=\"66\" width=\"30\" height=\"20\"></rect><rect class=\"prescription-g-drug--sedative\" id=\"DaufeMvcd6734\" x=\"692\" y=\"1\" width=\"30\" height=\"20\"></rect><rect class=\"prescription-g-drug--sedative\" id=\"NulmsUzqu7749\" x=\"692\" y=\"66\" width=\"30\" height=\"20\"></rect><rect class=\"prescription-g-drug--sedative\" id=\"VpunkNkbk7957\" x=\"720\" y=\"1\" width=\"30\" height=\"20\"></rect><rect class=\"prescription-g-drug--sedative\" id=\"DjvesNeoj0016\" x=\"720\" y=\"66\" width=\"30\" height=\"20\"></rect><rect class=\"prescription-g-drug--sedative\" id=\"UynftOeln0397\" x=\"750\" y=\"1\" width=\"30\" height=\"20\"></rect><rect class=\"prescription-g-drug--sedative\" id=\"AkyspCcix5314\" x=\"750\" y=\"66\" width=\"30\" height=\"20\"></rect><rect class=\"prescription-g-drug--sedative\" id=\"CgejuJvwz6675\" x=\"782\" y=\"1\" width=\"30\" " "height=\"20\"></rect><rect class=\"prescription-g-drug--sedative\" id=\"KotivYjjc4528\" x=\"782\" y=\"66\" width=\"30\" height=\"20\"></rect><rect class=\"prescription-g-drug--narcotic\" id=\"EqzjoBnjk4165\" x=\"830\" y=\"1\" width=\"2\" height=\"20\"></rect><rect class=\"prescription-g-drug--narcotic\" id=\"DswwwWrwh5412\" x=\"830\" y=\"86\" width=\"2\" height=\"20\"></rect><rect class=\"prescription-g-drug--sedative\" id=\"QujldDdbi9692\" x=\"813\" y=\"1\" width=\"30\" height=\"20\"></rect><rect class=\"prescription-g-drug--sedative\" id=\"OmlyzRvol1596\" x=\"813\" y=\"66\" width=\"30\" height=\"20\"></rect><rect class=\"prescription-g-drug--sedative\" id=\"UuwygIqwa8485\" x=\"843\" y=\"1\" width=\"30\" height=\"20\"></rect><rect class=\"prescription-g-drug--sedative\" id=\"SvnvrZcfe8739\" x=\"843\" y=\"66\" width=\"30\" height=\"20\"></rect><rect class=\"prescription-g-drug--sedative\" id=\"DmcimCfou3635\" x=\"872\" y=\"1\" width=\"30\" height=\"20\"></rect><rect class=\"prescription-g-drug--sedative\" id=\"PxekpAbpb7631\" x=\"872\" y=\"66\" width=\"30\" height=\"20\"></rect><rect class=\"prescription-g-drug--sedative\" id=\"FxfdnTcrh6241\" x=\"897\" y=\"1\" width=\"30\" height=\"20\"></rect><rect class=\"prescription-g-drug--sedative\" id=\"JuekbBvzn8124\" x=\"897\" y=\"66\" width=\"30\" height=\"20\"></rect><rect class=\"prescription-g-drug--sedative\" id=\"ChckmBsbq6696\" x=\"927\" y=\"1\" width=\"30\" height=\"20\"></rect><rect class=\"prescription-g-drug--sedative\" id=\"GrehhDlcp2507\" x=\"927\" y=\"106\" width=\"30\" height=\"20\"></rect><rect class=\"prescription-g-timeline\" id=\"WnorkShkq0778\" x=\"0\" y=\"126\" width=\"1005\" height=\"22\"></rect><line class=\"prescription-g-timeline-border\" id=\"NbuecGmre4552\" x1=\"0\" y1=\"126\" x2=\"1005\" y2=\"126\"></line><text class=\"prescription-g-timeline-label\" id=\"RnmzpMktl9142\" x=\"10\" y=\"141.86\">Timeline</text><text class=\"prescription-g-timeline-legend\" id=\"XmrafTbhs6206\" x=\"230\" y=\"141.86\">08/04</text><text class=\"prescription-g-timeline-legend\" id=\"BhdtoBjcj1614\" x=\"290\" y=\"141.86\">2m</text><text " "class=\"prescription-g-timeline-legend\" id=\"GxqwtBgjk6606\" x=\"410\" y=\"141.86\">6m</text><text class=\"prescription-g-timeline-legend\" id=\"ZkjmbByjj7899\" x=\"595\" y=\"141.86\">1y</text><text class=\"prescription-g-timeline-legend\" id=\"FscrjByul1960\" x=\"945\" y=\"141.86\">2y</text><line class=\"prescription-g-border\" id=\"UfxmtUwxc1977\" x1=\"170\" y1=\"46\" x2=\"170\" y2=\"126\"></line><rect id=\"KukleQcnv6668\" style=\"cursor: pointer; opacity: 0;\" fill=\"rgba(0, 0, 0, 0)\" stroke=\"none\" x=\"170\" y=\"1\" width=\"835\" height=\"20\"></rect><rect id=\"KafycPbwm1764\" style=\"cursor: pointer; opacity: 0;\" fill=\"rgba(0, 0, 0, 0)\" stroke=\"none\" x=\"170\" y=\"46\" width=\"835\" height=\"80\"></rect></svg></DIV><p id=\"mme-narcotic-graph\"><p class=\"nc-graph__header\">Morphine MgEq (MME)</DIV><p class=\"mme-graph\" id=\"mme-narcotic-graph-canvas\"><svg xmlns=\"http://www.w3.org/2000/svg\" class=\"svg-mme-graph\" id=\"NcociXbh7955\" width=\"1005\" height=\"125\" version=\"1.1\" xmlns=\"http://www.w3.org/2000/svg\"><line class=\"mme-g-scale\" id=\"XwurfSzux7365\" x1=\"170\" y1=\"40\" x2=\"1005\" y2=\"40\"></line><text class=\"mme-g-scale-legend\" id=\"OrejaPxaw0575\" x=\"160\" y=\"43.86\">200</text><line class=\"mme-g-scale\" id=\"ZrdoxCewz3381\" x1=\"170\" y1=\"70\" x2=\"1005\" y2=\"70\"></line><text class=\"mme-g-scale-legend\" id=\"KwzllPkxg4247\" x=\"160\" y=\"73.86\">80</text><line class=\"mme-g-axis\" id=\"LipstBiwu4409\" x1=\"170\" y1=\"10\" x2=\"170\" y2=\"90\"></line><line class=\"mme-g-axis\" id=\"KtmcoKxmf6121\" x1=\"170\" y1=\"10\" x2=\"1005\" y2=\"10\"></line><line class=\"mme-g-axis\" id=\"DwhcvKmew0949\" x1=\"170\" y1=\"90\" x2=\"1005\" y2=\"90\"></line><line class=\"mme-g-axis\" id=\"UemrdBorv3503\" x1=\"230\" y1=\"10\" x2=\"230\" y2=\"98\"></line><text class=\"mme-g-scale-legend\" id=\"NcpanJcbh9586\" x=\"160\" y=\"95.86\">0</text><text class=\"mme-g-scale-legend\" id=\"MbfatEnnl1649\" x=\"160\" y=\"13.86\">320</text><rect class=\"nmx-z-disbwfmq\" id=\"ZahbrZkjt3712\" x=\"0\" y=\"103\" width=\"1005\" height=\"22\"></rect><line class=\"ukf-f-xdtotwmy-border\" id=\"GgjsqDjel4988\" x1=\"0\" " "y1=\"103\" x2=\"1005\" y2=\"103\"></line><text class=\"wsd-p-yjeuyozi-label\" id=\"HdyybZwck3749\" x=\"10\" y=\"118.86\">Timeline</text><text class=\"wrt-k-nzfnekjj-legend\" id=\"QmpswYatg0363\" x=\"230\" y=\"118.86\">08/04</text><text class=\"gxp-s-ghqerkug-legend\" id=\"HefppRvmg6569\" x=\"290\" y=\"118.86\">2m</text><text class=\"pxp-z-hhaghryj-legend\" id=\"SivmqOkay8228\" x=\"410\" y=\"118.86\">6m</text><text class=\"qof-u-dgnevssb-legend\" id=\"EdztgUqra1115\" x=\"595\" y=\"118.86\">1y</text><text class=\"qak-j-ayjfmces-legend\" id=\"WktseLlav8412\" x=\"945\" y=\"118.86\">2y</text><rect class=\"mme-g-drug--narcotic\" id=\"ScsmkNjkd9178\" x=\"830\" y=\"85\" width=\"1\" height=\"5\"></rect><rect class=\"mme-g-drug--narcotic\" id=\"CpldsAxoe4044\" x=\"831\" y=\"85\" width=\"1\" height=\"5\"></rect></svg></DIV></DIV><p id=\"mme-buprenorphine-graph\"><p class=\"nc-graph__header\">Buprenorphine mg</DIV><p class=\"mme-graph\" id=\"mme-buprenorphine-graph-canvas\"><svg xmlns=\"http://www.[employer].org/2000/svg\" class=\"svg-mme-graph\" id=\"PgofmAxj4677\" width=\"1005\" height=\"125\" version=\"1.1\" xmlns=\"http://www.[employer].org/2000/svg\"><line class=\"mme-g-scale\" id=\"IsmpzIknw5197\" x1=\"170\" y1=\"44.2857\" x2=\"1005\" y2=\"44.2857\"></line><text class=\"mme-g-scale-legend\" id=\"SumyoPxow4296\" x=\"160\" y=\"48.1457\">16</text><line class=\"mme-g-scale\" id=\"EhhcnBxob7359\" x1=\"170\" y1=\"78.5714\" x2=\"1005\" y2=\"78.5714\"></line><text class=\"mme-g-scale-legend\" id=\"RgpeiFlxh5541\" x=\"160\" y=\"82.4314\">4</text><line class=\"mme-g-axis\" id=\"VdjekWgwh5988\" x1=\"170\" y1=\"10\" x2=\"170\" y2=\"90\"></line><line class=\"mme-g-axis\" id=\"KjshpWssb8619\" x1=\"170\" y1=\"10\" x2=\"1005\" y2=\"10\"></line><line class=\"mme-g-axis\" id=\"AbdpxYlxy3465\" x1=\"170\" y1=\"90\" x2=\"1005\" y2=\"90\"></line><line class=\"mme-g-axis\" id=\"LstnaAaxr1001\" x1=\"230\" y1=\"10\" x2=\"230\" y2=\"98\"></line><text class=\"mme-g-scale-legend\" id=\"DidjmJsgn1808\" x=\"160\" y=\"95.86\">0</text><text class=\"mme-g-scale-legend\" id=\"QsysxXcyr9830\" x=\"160\" y=\"13.86\">28</text><rect class=\"bty-l-hineznvz\" id=\"UukbjQzxg4298\" " "x=\"0\" y=\"103\" width=\"1005\" height=\"22\"></rect><line class=\"btl-m-idfdrqpn-border\" id=\"BqjnbOzof5969\" x1=\"0\" y1=\"103\" x2=\"1005\" y2=\"103\"></line><text class=\"sfa-f-rkbqrirk-label\" id=\"JutadVeno2756\" x=\"10\" y=\"118.86\">Timeline</text><text class=\"mlo-o-slthlvyv-legend\" id=\"NxklbCxmo5598\" x=\"230\" y=\"118.86\">08/04</text><text class=\"btk-e-lixamiyf-legend\" id=\"JarpgUlsc6276\" x=\"290\" y=\"118.86\">2m</text><text class=\"wql-e-xvfqfohs-legend\" id=\"OkjkjEuik8842\" x=\"410\" y=\"118.86\">6m</text><text class=\"jgq-r-xrtestwr-legend\" id=\"VvglfSovg2664\" x=\"595\" y=\"118.86\">1y</text><text class=\"ext-r-wkiffgkl-legend\" id=\"YujfmQccv7005\" x=\"945\" y=\"118.86\">2y</text></svg></DIV></DIV><p id=\"mme-sedative-graph\"><p class=\"nc-graph__header\">Lorazepam MgEq (LME)</DIV><p class=\"mme-graph\" id=\"mme-sedative-graph-canvas\"><svg xmlns=\"http://www.[employer]org/2000/svg\" class=\"svg-mme-graph\" id=\"KalflPmd5500\" width=\"1005\" height=\"125\" version=\"1.1\" xmlns=\"http://www.[employer].c.a.r.e..org/2000/svg\"><line class=\"mme-g-scale\" id=\"YsdngPezg3485\" x1=\"170\" y1=\"45.5556\" x2=\"1005\" y2=\"45.5556\"></line><text class=\"mme-g-scale-legend\" id=\"KhscsOjju0534\" x=\"160\" y=\"49.4156\">10</text><line class=\"mme-g-scale\" id=\"KhpdwDiaj7578\" x1=\"170\" y1=\"81.1111\" x2=\"1005\" y2=\"81.1111\"></line><text class=\"mme-g-scale-legend\" id=\"VqgzqBupj3027\" x=\"160\" y=\"84.9711\">2</text><line class=\"mme-g-axis\" id=\"OfisgUtpq0604\" x1=\"170\" y1=\"10\" x2=\"170\" y2=\"90\"></line><line class=\"mme-g-axis\" id=\"VsxvpHvpl3930\" x1=\"170\" y1=\"10\" x2=\"1005\" y2=\"10\"></line><line class=\"mme-g-axis\" id=\"NgfypAjyz8065\" x1=\"170\" y1=\"90\" x2=\"1005\" y2=\"90\"></line><line class=\"mme-g-axis\" id=\"YmcaoLgeq9841\" x1=\"230\" y1=\"10\" x2=\"230\" y2=\"98\"></line><text class=\"mme-g-scale-legend\" id=\"EgjvqVxih7883\" x=\"160\" y=\"95.86\">0</text><text class=\"mme-g-scale-legend\" id=\"TiaveXzqm4827\" x=\"160\" y=\"13.86\">18</text><rect class=\"gcz-a-bdjyewcs\" id=\"PmtiyPzqc4572\" x=\"0\" y=\"103\" width=\"1005\" height=\"22\"></rect><line class=\"tsp-h-jgnquuoz-border\" " "id=\"CtaubZgyu0782\" x1=\"0\" y1=\"103\" x2=\"1005\" y2=\"103\"></line><text class=\"ehk-u-mgcxnyuj-label\" id=\"OuysbLvwj7817\" x=\"10\" y=\"118.86\">Timeline</text><text class=\"prk-a-zeopdyhe-legend\" id=\"KexabJzyl3511\" x=\"230\" y=\"118.86\">08/04</text><text class=\"hrd-q-xdpqgwci-legend\" id=\"OezapSivm0602\" x=\"290\" y=\"118.86\">2m</text><text class=\"kez-k-mriaujuo-legend\" id=\"IckmfDcxt7418\" x=\"410\" y=\"118.86\">6m</text><text class=\"zaz-t-xgulmwuu-legend\" id=\"CtftoVgnv6474\" x=\"595\" y=\"118.86\">1y</text><text class=\"vxl-o-yrbvuljz-legend\" id=\"HhbqoXaco5999\" x=\"945\" y=\"118.86\">2y</text><rect class=\"mme-g-drug--sedative\" id=\"GezamVoid7024\" x=\"229\" y=\"72.2222\" width=\"1\" height=\"17.7778\"></rect><rect class=\"mme-g-drug--sedative\" id=\"KmaurGoed6828\" x=\"230\" y=\"72.2222\" width=\"1\" height=\"17.7778\"></rect><rect class=\"mme-g-drug--sedative\" id=\"TulpzNwxy9222\" x=\"231\" y=\"72.2222\" width=\"1\" height=\"17.7778\"></rect><rect class=\"mme-g-drug--sedative\" id=\"CwhelHahm4689\" x=\"232\" y=\"72.2222\" width=\"1\" height=\"17.7778\"></rect><rect class=\"mme-g-drug--sedative\" id=\"QfijcRbud8185\" x=\"233\" y=\"72.2222\" width=\"1\" height=\"17.7778\"></rect><rect class=\"mme-g-drug--sedative\" id=\"AfewfNrdg1389\" x=\"234\" y=\"72.2222\" width=\"1\" height=\"17.7778\"></rect><rect class=\"mme-g-drug--sedative\" id=\"SwpyoGwti6424\" x=\"235\" y=\"72.2222\" width=\"1\" height=\"17.7778\"></rect><rect class=\"mme-g-drug--sedative\" id=\"DuresKzdo1466\" x=\"236\" y=\"72.2222\" width=\"1\" height=\"17.7778\"></rect><rect class=\"mme-g-drug--sedative\" id=\"TwrnoKpya0276\" x=\"237\" y=\"72.2222\" width=\"1\" height=\"17.7778\"></rect><rect class=\"mme-g-drug--sedative\" id=\"FyoieGilt5027\" x=\"238\" y=\"72.2222\" width=\"1\" height=\"17.7778\"></rect><rect class=\"mme-g-drug--sedative\" id=\"YovoqNvnz1143\" x=\"239\" y=\"72.2222\" width=\"1\" height=\"17.7778\"></rect><rect class=\"mme-g-drug--sedative\" id=\"UhpzuKtzl3848\" x=\"240\" y=\"72.2222\" width=\"1\" height=\"17.7778\"></rect><rect class=\"mme-g-drug--sedative\" id=\"OorumWvew5563\" x=\"241\" y=\"72.2222\" width=\"1\" " "height=\"17.7778\"></rect><rect class=\"mme-g-drug--sedative\" id=\"YvtzhRjsi3882\" x=\"242\" y=\"72.2222\" width=\"1\" height=\"17.7778\"></rect><rect class=\"mme-g-drug--sedative\" id=\"YpbpwZqke7082\" x=\"243\" y=\"72.2222\" width=\"1\" height=\"17.7778\"></rect><rect class=\"mme-g-drug--sedative\" id=\"SicpxVerd3451\" x=\"244\" y=\"72.2222\" width=\"1\" height=\"17.7778\"></rect><rect class=\"mme-g-drug--sedative\" id=\"PlennGpez6664\" x=\"245\" y=\"72.2222\" width=\"1\" height=\"17.7778\"></rect><rect class=\"mme-g-drug--sedative\" id=\"GqdyyPsau4097\" x=\"246\" y=\"72.2222\" width=\"1\" height=\"17.7778\"></rect><rect class=\"mme-g-drug--sedative\" id=\"YirfqBfso0562\" x=\"247\" y=\"72.2222\" width=\"1\" height=\"17.7778\"></rect><rect class=\"mme-g-drug--sedative\" id=\"WzathLdwq4536\" x=\"248\" y=\"72.2222\" width=\"1\" height=\"17.7778\"></rect><rect class=\"mme-g-drug--sedative\" id=\"EliolXwgl1438\" x=\"249\" y=\"72.2222\" width=\"1\" height=\"17.7778\"></rect><rect class=\"mme-g-drug--sedative\" id=\"PfsjhEvzt1946\" x=\"250\" y=\"72.2222\" width=\"1\" height=\"17.7778\"></rect><rect class=\"mme-g-drug--sedative\" id=\"VpmqtZypa8677\" x=\"251\" y=\"72.2222\" width=\"1\" height=\"17.7778\"></rect><rect class=\"mme-g-drug--sedative\" id=\"CsrpfCasy0226\" x=\"252\" y=\"72.2222\" width=\"1\" height=\"17.7778\"></rect><rect class=\"mme-g-drug--sedative\" id=\"DzdnoAcbu0665\" x=\"253\" y=\"72.2222\" width=\"1\" height=\"17.7778\"></rect><rect class=\"mme-g-drug--sedative\" id=\"PzddbTedc8446\" x=\"254\" y=\"72.2222\" width=\"1\" height=\"17.7778\"></rect><rect class=\"mme-g-drug--sedative\" id=\"BgxafEole8040\" x=\"255\" y=\"72.2222\" width=\"1\" height=\"17.7778\"></rect><rect class=\"mme-g-drug--sedative\" id=\"MpdueXjnq0716\" x=\"256\" y=\"72.2222\" width=\"1\" height=\"17.7778\"></rect><rect class=\"mme-g-drug--sedative\" id=\"GvqghYcdq1342\" x=\"257\" y=\"72.2222\" width=\"1\" height=\"17.7778\"></rect><rect class=\"mme-g-drug--sedative\" id=\"WipxoGqqs5237\" x=\"258\" y=\"72.2222\" width=\"1\" height=\"17.7778\"></rect><rect class=\"mme-g-drug--sedative\" id=\"WvxltZxmk2876\" x=\"263\" y=\"72.2222\" width=\"1\" " "height=\"17.7778\"></rect><rect class=\"mme-g-drug--sedative\" id=\"TajspIkma7431\" x=\"264\" y=\"72.2222\" width=\"1\" height=\"17.7778\"></rect><rect class=\"mme-g-drug--sedative\" id=\"AsjhiFazy4678\" x=\"265\" y=\"72.2222\" width=\"1\" height=\"17.7778\"></rect><rect class=\"mme-g-drug--sedative\" id=\"ZzgvaDzjf1525\" x=\"266\" y=\"72.2222\" width=\"1\" height=\"17.7778\"></rect><rect class=\"mme-g-drug--sedative\" id=\"OlryqDexp9561\" x=\"267\" y=\"72.2222\" width=\"1\" height=\"17.7778\"></rect><rect class=\"mme-g-drug--sedative\" id=\"FkfmkIgvu7885\" x=\"268\" y=\"72.2222\" width=\"1\" height=\"17.7778\"></rect><rect class=\"mme-g-drug--sedative\" id=\"LvrjbQaja9458\" x=\"269\" y=\"72.2222\" width=\"1\" height=\"17.7778\"></rect><rect class=\"mme-g-drug--sedative\" id=\"YefjoSffz1450\" x=\"270\" y=\"72.2222\" width=\"1\" height=\"17.7778\"></rect><rect class=\"mme-g-drug--sedative\" id=\"YhvbmAskq5450\" x=\"271\" y=\"72.2222\" width=\"1\" height=\"17.7778\"></rect><rect class=\"mme-g-drug--sedative\" id=\"PnqxaQupt8994\" x=\"272\" y=\"72.2222\" width=\"1\" height=\"17.7778\"></rect><rect class=\"mme-g-drug--sedative\" id=\"DcpjrMnlh4176\" x=\"273\" y=\"72.2222\" width=\"1\" height=\"17.7778\"></rect><rect class=\"mme-g-drug--sedative\" id=\"TvslrIues3809\" x=\"274\" y=\"72.2222\" width=\"1\" height=\"17.7778\"></rect><rect class=\"mme-g-drug--sedative\" id=\"LylwnWtbg7697\" x=\"275\" y=\"72.2222\" width=\"1\" height=\"17.7778\"></rect><rect class=\"mme-g-drug--sedative\" id=\"KvdjcFjbd3877\" x=\"276\" y=\"72.2222\" width=\"1\" height=\"17.7778\"></rect><rect class=\"mme-g-drug--sedative\" id=\"PanfjJatz9722\" x=\"277\" y=\"72.2222\" width=\"1\" height=\"17.7778\"></rect><rect class=\"mme-g-drug--sedative\" id=\"JejwaApda9860\" x=\"278\" y=\"72.2222\" width=\"1\" height=\"17.7778\"></rect><rect class=\"mme-g-drug--sedative\" id=\"WqtpjCwuc6150\" x=\"279\" y=\"72.2222\" width=\"1\" height=\"17.7778\"></rect><rect class=\"mme-g-drug--sedative\" id=\"NsamrMzhn0695\" x=\"280\" y=\"72.2222\" width=\"1\" height=\"17.7778\"></rect><rect class=\"mme-g-drug--sedative\" id=\"OjvhoHokq1024\" x=\"281\" y=\"72.2222\" width=\"1\" " "height=\"17.7778\"></rect><rect class=\"mme-g-drug--sedative\" id=\"TqzjwXzlm1766\" x=\"282\" y=\"72.2222\" width=\"1\" height=\"17.7778\"></rect><rect class=\"mme-g-drug--sedative\" id=\"WesnjLhjw4938\" x=\"283\" y=\"72.2222\" width=\"1\" height=\"17.7778\"></rect><rect class=\"mme-g-drug--sedative\" id=\"AvkygIsow4295\" x=\"284\" y=\"72.2222\" width=\"1\" height=\"17.7778\"></rect><rect class=\"mme-g-drug--sedative\" id=\"KicsqCqrn0399\" x=\"285\" y=\"72.2222\" width=\"1\" height=\"17.7778\"></rect><rect class=\"mme-g-drug--sedative\" id=\"SvtmmQsdt6977\" x=\"286\" y=\"72.2222\" width=\"1\" height=\"17.7778\"></rect><rect class=\"mme-g-drug--sedative\" id=\"ZaimhPkuc7666\" x=\"287\" y=\"72.2222\" width=\"1\" height=\"17.7778\"></rect><rect class=\"mme-g-drug--sedative\" id=\"PwmefUgpd8450\" x=\"288\" y=\"72.2222\" width=\"1\" height=\"17.7778\"></rect><rect class=\"mme-g-drug--sedative\" id=\"ZwjbmUoqb8237\" x=\"289\" y=\"72.2222\" width=\"1\" height=\"17.7778\"></rect><rect class=\"mme-g-drug--sedative\" id=\"UvzhhGlik3354\" x=\"290\" y=\"72.2222\" width=\"1\" height=\"17.7778\"></rect><rect class=\"mme-g-drug--sedative\" id=\"ZodthTorj1654\" x=\"291\" y=\"72.2222\" width=\"1\" height=\"17.7778\"></rect><rect class=\"mme-g-drug--sedative\" id=\"PvbzbAbim3900\" x=\"292\" y=\"54.4444\" width=\"1\" height=\"35.5556\"></rect><rect class=\"mme-g-drug--sedative\" id=\"HwqvnSrec9995\" x=\"293\" y=\"72.2222\" width=\"1\" height=\"17.7778\"></rect><rect class=\"mme-g-drug--sedative\" id=\"GyreeBlyz5466\" x=\"294\" y=\"72.2222\" width=\"1\" height=\"17.7778\"></rect><rect class=\"mme-g-drug--sedative\" id=\"InkzdRddu9375\" x=\"295\" y=\"72.2222\" width=\"1\" height=\"17.7778\"></rect><rect class=\"mme-g-drug--sedative\" id=\"UtewaLnyy8435\" x=\"296\" y=\"72.2222\" width=\"1\" height=\"17.7778\"></rect><rect class=\"mme-g-drug--sedative\" id=\"QjpevWsms2583\" x=\"297\" y=\"72.2222\" width=\"1\" height=\"17.7778\"></rect><rect class=\"mme-g-drug--sedative\" id=\"EhhujQcnb5117\" x=\"298\" y=\"72.2222\" width=\"1\" height=\"17.7778\"></rect><rect class=\"mme-g-drug--sedative\" id=\"CjaihIujh0843\" x=\"299\" y=\"72.2222\" width=\"1\" " "height=\"17.7778\"></rect><rect class=\"mme-g-drug--sedative\" id=\"IoegeExch5069\" x=\"300\" y=\"72.2222\" width=\"1\" height=\"17.7778\"></rect><rect class=\"mme-g-drug--sedative\" id=\"LbcvtSvoc2831\" x=\"301\" y=\"72.2222\" width=\"1\" height=\"17.7778\"></rect><rect class=\"mme-g-drug--sedative\" id=\"UbyjiFnmm6861\" x=\"302\" y=\"72.2222\" width=\"1\" height=\"17.7778\"></rect><rect class=\"mme-g-drug--sedative\" id=\"QcewdIdri4830\" x=\"303\" y=\"72.2222\" width=\"1\" height=\"17.7778\"></rect><rect class=\"mme-g-drug--sedative\" id=\"LfdciPxby8517\" x=\"304\" y=\"72.2222\" width=\"1\" height=\"17.7778\"></rect><rect class=\"mme-g-drug--sedative\" id=\"LwkmqLlkq9513\" x=\"305\" y=\"72.2222\" width=\"1\" height=\"17.7778\"></rect><rect class=\"mme-g-drug--sedative\" id=\"HtzgbQauj2606\" x=\"306\" y=\"72.2222\" width=\"1\" height=\"17.7778\"></rect><rect class=\"mme-g-drug--sedative\" id=\"DhmdnAteq4809\" x=\"307\" y=\"72.2222\" width=\"1\" height=\"17.7778\"></rect><rect class=\"mme-g-drug--sedative\" id=\"MckrmZqpp6330\" x=\"308\" y=\"72.2222\" width=\"1\" height=\"17.7778\"></rect><rect class=\"mme-g-drug--sedative\" id=\"ShcfaZfly3595\" x=\"309\" y=\"72.2222\" width=\"1\" height=\"17.7778\"></rect><rect class=\"mme-g-drug--sedative\" id=\"LrskvNkjf2024\" x=\"310\" y=\"72.2222\" width=\"1\" height=\"17.7778\"></rect><rect class=\"mme-g-drug--sedative\" id=\"RqpuiQeeu9824\" x=\"311\" y=\"72.2222\" width=\"1\" height=\"17.7778\"></rect><rect class=\"mme-g-drug--sedative\" id=\"TszkePbva7430\" x=\"312\" y=\"72.2222\" width=\"1\" height=\"17.7778\"></rect><rect class=\"mme-g-drug--sedative\" id=\"VlzzsZilm9129\" x=\"313\" y=\"72.2222\" width=\"1\" height=\"17.7778\"></rect><rect class=\"mme-g-drug--sedative\" id=\"IlnwwFlad8762\" x=\"314\" y=\"72.2222\" width=\"1\" height=\"17.7778\"></rect><rect class=\"mme-g-drug--sedative\" id=\"RzxavLbrj3957\" x=\"315\" y=\"72.2222\" width=\"1\" height=\"17.7778\"></rect><rect class=\"mme-g-drug--sedative\" id=\"VmtvyYqgn6463\" x=\"316\" y=\"72.2222\" width=\"1\" height=\"17.7778\"></rect><rect class=\"mme-g-drug--sedative\" id=\"OoafqZagt3413\" x=\"317\" y=\"72.2222\" width=\"1\" " "height=\"17.7778\"></rect><rect class=\"mme-g-drug--sedative\" id=\"NsirsGbrn4639\" x=\"318\" y=\"72.2222\" width=\"1\" height=\"17.7778\"></rect><rect class=\"mme-g-drug--sedative\" id=\"EhsqsLuaq4712\" x=\"319\" y=\"72.2222\" width=\"1\" height=\"17.7778\"></rect><rect class=\"mme-g-drug--sedative\" id=\"IaebnQljf9381\" x=\"320\" y=\"72.2222\" width=\"1\" height=\"17.7778\"></rect><rect class=\"mme-g-drug--sedative\" id=\"EbixuEnfl2599\" x=\"321\" y=\"72.2222\" width=\"1\" height=\"17.7778\"></rect><rect class=\"mme-g-drug--sedative\" id=\"SqtjkLpjj5333\" x=\"326\" y=\"72.2222\" width=\"1\" height=\"17.7778\"></rect><rect class=\"mme-g-drug--sedative\" id=\"EixtlEsdj6748\" x=\"327\" y=\"72.2222\" width=\"1\" height=\"17.7778\"></rect><rect class=\"mme-g-drug--sedative\" id=\"NmzmfPurm3579\" x=\"328\" y=\"72.2222\" width=\"1\" height=\"17.7778\"></rect><rect class=\"mme-g-drug--sedative\" id=\"RierzUmtz9857\" x=\"329\" y=\"72.2222\" width=\"1\" height=\"17.7778\"></rect><rect class=\"mme-g-drug--sedative\" id=\"EzauoNyup4597\" x=\"330\" y=\"72.2222\" width=\"1\" height=\"17.7778\"></rect><rect class=\"mme-g-drug--sedative\" id=\"SesjfFxir7722\" x=\"331\" y=\"72.2222\" width=\"1\" height=\"17.7778\"></rect><rect class=\"mme-g-drug--sedative\" id=\"XujreQpkh1573\" x=\"332\" y=\"72.2222\" width=\"1\" height=\"17.7778\"></rect><rect class=\"mme-g-drug--sedative\" id=\"BkgunVjjw2126\" x=\"333\" y=\"72.2222\" width=\"1\" height=\"17.7778\"></rect><rect class=\"mme-g-drug--sedative\" id=\"KqqnkVerf7003\" x=\"334\" y=\"72.2222\" width=\"1\" height=\"17.7778\"></rect><rect class=\"mme-g-drug--sedative\" id=\"WaffmYghf0535\" x=\"335\" y=\"72.2222\" width=\"1\" height=\"17.7778\"></rect><rect class=\"mme-g-drug--sedative\" id=\"NohjvOzaa3059\" x=\"336\" y=\"72.2222\" width=\"1\" height=\"17.7778\"></rect><rect class=\"mme-g-drug--sedative\" id=\"YvdwcAmck3531\" x=\"337\" y=\"72.2222\" width=\"1\" height=\"17.7778\"></rect><rect class=\"mme-g-drug--sedative\" id=\"JdjmsTkbc1483\" x=\"338\" y=\"72.2222\" width=\"1\" height=\"17.7778\"></rect><rect class=\"mme-g-drug--sedative\" id=\"UufpyVcpc3966\" x=\"339\" y=\"72.2222\" width=\"1\" " "height=\"17.7778\"></rect><rect class=\"mme-g-drug--sedative\" id=\"NsucmThpm1160\" x=\"340\" y=\"72.2222\" width=\"1\" height=\"17.7778\"></rect><rect class=\"mme-g-drug--sedative\" id=\"VuragLbjv7124\" x=\"341\" y=\"72.2222\" width=\"1\" height=\"17.7778\"></rect><rect class=\"mme-g-drug--sedative\" id=\"YvgogVxkm4162\" x=\"342\" y=\"72.2222\" width=\"1\" height=\"17.7778\"></rect><rect class=\"mme-g-drug--sedative\" id=\"RewgtOxka0277\" x=\"343\" y=\"72.2222\" width=\"1\" height=\"17.7778\"></rect><rect class=\"mme-g-drug--sedative\" id=\"GipfcNcau4113\" x=\"344\" y=\"72.2222\" width=\"1\" height=\"17.7778\"></rect><rect class=\"mme-g-drug--sedative\" id=\"TtrzbWccp6562\" x=\"345\" y=\"72.2222\" width=\"1\" height=\"17.7778\"></rect><rect class=\"mme-g-drug--sedative\" id=\"UppauPbdk2900\" x=\"346\" y=\"72.2222\" width=\"1\" height=\"17.7778\"></rect><rect class=\"mme-g-drug--sedative\" id=\"SxmpmFxeg2553\" x=\"347\" y=\"72.2222\" width=\"1\" height=\"17.7778\"></rect><rect class=\"mme-g-drug--sedative\" id=\"PgstsHdas6883\" x=\"348\" y=\"72.2222\" width=\"1\" height=\"17.7778\"></rect><rect class=\"mme-g-drug--sedative\" id=\"GqhldMzdl4759\" x=\"349\" y=\"72.2222\" width=\"1\" height=\"17.7778\"></rect><rect class=\"mme-g-drug--sedative\" id=\"UtymhWixc6813\" x=\"350\" y=\"72.2222\" width=\"1\" height=\"17.7778\"></rect><rect class=\"mme-g-drug--sedative\" id=\"FwxksCder4725\" x=\"351\" y=\"72.2222\" width=\"1\" height=\"17.7778\"></rect><rect class=\"mme-g-drug--sedative\" id=\"DyqtaJdft4727\" x=\"352\" y=\"72.2222\" width=\"1\" height=\"17.7778\"></rect><rect class=\"mme-g-drug--sedative\" id=\"QlojjDeub6111\" x=\"353\" y=\"72.2222\" width=\"1\" height=\"17.7778\"></rect><rect class=\"mme-g-drug--sedative\" id=\"LybwsNvhe9568\" x=\"354\" y=\"72.2222\" width=\"1\" height=\"17.7778\"></rect><rect class=\"mme-g-drug--sedative\" id=\"XlpbeUnvy6076\" x=\"355\" y=\"72.2222\" width=\"1\" height=\"17.7778\"></rect><rect class=\"mme-g-drug--sedative\" id=\"VuujxUzkr0620\" x=\"356\" y=\"72.2222\" width=\"1\" height=\"17.7778\"></rect><rect class=\"mme-g-drug--sedative\" id=\"BhewyIfsc8846\" x=\"357\" y=\"72.2222\" width=\"1\" " "height=\"17.7778\"></rect><rect class=\"mme-g-drug--sedative\" id=\"EdzuaFior7464\" x=\"358\" y=\"72.2222\" width=\"1\" height=\"17.7778\"></rect><rect class=\"mme-g-drug--sedative\" id=\"AnbxaIvyn0418\" x=\"359\" y=\"72.2222\" width=\"1\" height=\"17.7778\"></rect><rect class=\"mme-g-drug--sedative\" id=\"CshbcCsni7543\" x=\"360\" y=\"72.2222\" width=\"1\" height=\"17.7778\"></rect><rect class=\"mme-g-drug--sedative\" id=\"PswooUvcv5558\" x=\"361\" y=\"72.2222\" width=\"1\" height=\"17.7778\"></rect><rect class=\"mme-g-drug--sedative\" id=\"KyrhpJjdz4705\" x=\"362\" y=\"72.2222\" width=\"1\" height=\"17.7778\"></rect><rect class=\"mme-g-drug--sedative\" id=\"KkimhKuia9976\" x=\"363\" y=\"72.2222\" width=\"1\" height=\"17.7778\"></rect><rect class=\"mme-g-drug--sedative\" id=\"SkyfmAumv5523\" x=\"364\" y=\"72.2222\" width=\"1\" height=\"17.7778\"></rect><rect class=\"mme-g-drug--sedative\" id=\"FibkhMzuu1814\" x=\"365\" y=\"72.2222\" width=\"1\" height=\"17.7778\"></rect><rect class=\"mme-g-drug--sedative\" id=\"DgphtPjyb2067\" x=\"366\" y=\"72.2222\" width=\"1\" height=\"17.7778\"></rect><rect class=\"mme-g-drug--sedative\" id=\"XcfvkLiku3989\" x=\"367\" y=\"72.2222\" width=\"1\" height=\"17.7778\"></rect><rect class=\"mme-g-drug--sedative\" id=\"OoboqVzme8181\" x=\"368\" y=\"72.2222\" width=\"1\" height=\"17.7778\"></rect><rect class=\"mme-g-drug--sedative\" id=\"SstcvUcoo5023\" x=\"369\" y=\"72.2222\" width=\"1\" height=\"17.7778\"></rect><rect class=\"mme-g-drug--sedative\" id=\"FqwmxFyku3222\" x=\"370\" y=\"72.2222\" width=\"1\" height=\"17.7778\"></rect><rect class=\"mme-g-drug--sedative\" id=\"LqgtgXtef8125\" x=\"371\" y=\"72.2222\" width=\"1\" height=\"17.7778\"></rect><rect class=\"mme-g-drug--sedative\" id=\"CrcbbSlbe8930\" x=\"372\" y=\"72.2222\" width=\"1\" height=\"17.7778\"></rect><rect class=\"mme-g-drug--sedative\" id=\"MklreHulk1077\" x=\"373\" y=\"72.2222\" width=\"1\" height=\"17.7778\"></rect><rect class=\"mme-g-drug--sedative\" id=\"AzvjbKnvr5240\" x=\"374\" y=\"72.2222\" width=\"1\" height=\"17.7778\"></rect><rect class=\"mme-g-drug--sedative\" id=\"QqfehLdax9081\" x=\"375\" y=\"72.2222\" width=\"1\" " "height=\"17.7778\"></rect><rect class=\"mme-g-drug--sedative\" id=\"RcrblWoea5594\" x=\"376\" y=\"72.2222\" width=\"1\" height=\"17.7778\"></rect><rect class=\"mme-g-drug--sedative\" id=\"PpmerOryd8602\" x=\"377\" y=\"72.2222\" width=\"1\" height=\"17.7778\"></rect><rect class=\"mme-g-drug--sedative\" id=\"VpprdXkdh4655\" x=\"378\" y=\"72.2222\" width=\"1\" height=\"17.7778\"></rect><rect class=\"mme-g-drug--sedative\" id=\"AjajcFish0633\" x=\"379\" y=\"72.2222\" width=\"1\" height=\"17.7778\"></rect><rect class=\"mme-g-drug--sedative\" id=\"HlifkEtuz0696\" x=\"380\" y=\"72.2222\" width=\"1\" height=\"17.7778\"></rect><rect class=\"mme-g-drug--sedative\" id=\"RhymuLuqa1752\" x=\"381\" y=\"72.2222\" width=\"1\" height=\"17.7778\"></rect><rect class=\"mme-g-drug--sedative\" id=\"JramhSdcm5942\" x=\"382\" y=\"72.2222\" width=\"1\" height=\"17.7778\"></rect><rect class=\"mme-g-drug--sedative\" id=\"BchviNvwu7868\" x=\"383\" y=\"72.2222\" width=\"1\" height=\"17.7778\"></rect><rect class=\"mme-g-drug--sedative\" id=\"UriwjNyck2962\" x=\"384\" y=\"72.2222\" width=\"1\" height=\"17.7778\"></rect><rect class=\"mme-g-drug--sedative\" id=\"PgbnxHnhp0562\" x=\"385\" y=\"72.2222\" width=\"1\" height=\"17.7778\"></rect><rect class=\"mme-g-drug--sedative\" id=\"JtnjmWkjx2734\" x=\"390\" y=\"72.2222\" width=\"1\" height=\"17.7778\"></rect><rect class=\"mme-g-drug--sedative\" id=\"HqcwwTtao8719\" x=\"391\" y=\"72.2222\" width=\"1\" height=\"17.7778\"></rect><rect class=\"mme-g-drug--sedative\" id=\"JorjeBneq1698\" x=\"392\" y=\"72.2222\" width=\"1\" height=\"17.7778\"></rect><rect class=\"mme-g-drug--sedative\" id=\"HrquvPnwc4602\" x=\"393\" y=\"72.2222\" width=\"1\" height=\"17.7778\"></rect><rect class=\"mme-g-drug--sedative\" id=\"GjxraQkyq7170\" x=\"394\" y=\"72.2222\" width=\"1\" height=\"17.7778\"></rect><rect class=\"mme-g-drug--sedative\" id=\"AmnfeTfhc4294\" x=\"395\" y=\"72.2222\" width=\"1\" height=\"17.7778\"></rect><rect class=\"mme-g-drug--sedative\" id=\"VhjzuFasl2495\" x=\"396\" y=\"72.2222\" width=\"1\" height=\"17.7778\"></rect><rect class=\"mme-g-drug--sedative\" id=\"HmgmcMxpg8330\" x=\"397\" y=\"72.2222\" width=\"1\" " "height=\"17.7778\"></rect><rect class=\"mme-g-drug--sedative\" id=\"ZwhyxQbvq4266\" x=\"398\" y=\"72.2222\" width=\"1\" height=\"17.7778\"></rect><rect class=\"mme-g-drug--sedative\" id=\"RrsgwIgzh9217\" x=\"399\" y=\"72.2222\" width=\"1\" height=\"17.7778\"></rect><rect class=\"mme-g-drug--sedative\" id=\"BtizoNeqd6653\" x=\"400\" y=\"72.2222\" width=\"1\" height=\"17.7778\"></rect><rect class=\"mme-g-drug--sedative\" id=\"VgillXxmp2830\" x=\"401\" y=\"72.2222\" width=\"1\" height=\"17.7778\"></rect><rect class=\"mme-g-drug--sedative\" id=\"FqnjpNchr8234\" x=\"402\" y=\"72.2222\" width=\"1\" height=\"17.7778\"></rect><rect class=\"mme-g-drug--sedative\" id=\"IjvlcXjct8889\" x=\"403\" y=\"72.2222\" width=\"1\" height=\"17.7778\"></rect><rect class=\"mme-g-drug--sedative\" id=\"SbvdpPorw2118\" x=\"404\" y=\"72.2222\" width=\"1\" height=\"17.7778\"></rect><rect class=\"mme-g-drug--sedative\" id=\"JxqlqVmcr0116\" x=\"405\" y=\"72.2222\" width=\"1\" height=\"17.7778\"></rect><rect class=\"mme-g-drug--sedative\" id=\"UkbusEiig4136\" x=\"406\" y=\"72.2222\" width=\"1\" height=\"17.7778\"></rect><rect class=\"mme-g-drug--sedative\" id=\"NboszWggb8640\" x=\"407\" y=\"72.2222\" width=\"1\" height=\"17.7778\"></rect><rect class=\"mme-g-drug--sedative\" id=\"BwhmbOmyh6467\" x=\"408\" y=\"72.2222\" width=\"1\" height=\"17.7778\"></rect><rect class=\"mme-g-drug--sedative\" id=\"GurrxCyux5615\" x=\"409\" y=\"72.2222\" width=\"1\" height=\"17.7778\"></rect><rect class=\"mme-g-drug--sedative\" id=\"MaercLgyk4509\" x=\"410\" y=\"72.2222\" width=\"1\" height=\"17.7778\"></rect><rect class=\"mme-g-drug--sedative\" id=\"QxpfvSqpn0824\" x=\"411\" y=\"72.2222\" width=\"1\" height=\"17.7778\"></rect><rect class=\"mme-g-drug--sedative\" id=\"AimmmChis0731\" x=\"412\" y=\"72.2222\" width=\"1\" height=\"17.7778\"></rect><rect class=\"mme-g-drug--sedative\" id=\"YjtrpQmor0013\" x=\"413\" y=\"72.2222\" width=\"1\" height=\"17.7778\"></rect><rect class=\"mme-g-drug--sedative\" id=\"OngfjMxmy4974\" x=\"414\" y=\"72.2222\" width=\"1\" height=\"17.7778\"></rect><rect class=\"mme-g-drug--sedative\" id=\"ZrzwkMzbb5934\" x=\"415\" y=\"72.2222\" width=\"1\" " "height=\"17.7778\"></rect><rect class=\"mme-g-drug--sedative\" id=\"XvcvhAcum1031\" x=\"416\" y=\"72.2222\" width=\"1\" height=\"17.7778\"></rect><rect class=\"mme-g-drug--sedative\" id=\"NhkdjErwc1802\" x=\"417\" y=\"72.2222\" width=\"1\" height=\"17.7778\"></rect><rect class=\"mme-g-drug--sedative\" id=\"ZzsuuUxgx8683\" x=\"418\" y=\"72.2222\" width=\"1\" height=\"17.7778\"></rect><rect class=\"mme-g-drug--sedative\" id=\"LtaoeGyqd2297\" x=\"419\" y=\"54.4444\" width=\"1\" height=\"35.5556\"></rect><rect class=\"mme-g-drug--sedative\" id=\"LlyfuKvgn9303\" x=\"420\" y=\"72.2222\" width=\"1\" height=\"17.7778\"></rect><rect class=\"mme-g-drug--sedative\" id=\"KbkcnGoqv8010\" x=\"421\" y=\"72.2222\" width=\"1\" height=\"17.7778\"></rect><rect class=\"mme-g-drug--sedative\" id=\"EfmlwDube3554\" x=\"422\" y=\"72.2222\" width=\"1\" height=\"17.7778\"></rect><rect class=\"mme-g-drug--sedative\" id=\"YvjvaNypl7624\" x=\"423\" y=\"72.2222\" width=\"1\" height=\"17.7778\"></rect><rect class=\"mme-g-drug--sedative\" id=\"TxmftRehw7354\" x=\"424\" y=\"72.2222\" width=\"1\" height=\"17.7778\"></rect><rect class=\"mme-g-drug--sedative\" id=\"QbrloBoyl2714\" x=\"425\" y=\"72.2222\" width=\"1\" height=\"17.7778\"></rect><rect class=\"mme-g-drug--sedative\" id=\"HkbxcRkox0755\" x=\"426\" y=\"72.2222\" width=\"1\" height=\"17.7778\"></rect><rect class=\"mme-g-drug--sedative\" id=\"NxlotOkdz8288\" x=\"427\" y=\"72.2222\" width=\"1\" height=\"17.7778\"></rect><rect class=\"mme-g-drug--sedative\" id=\"GjtgdEvpt6441\" x=\"428\" y=\"72.2222\" width=\"1\" height=\"17.7778\"></rect><rect class=\"mme-g-drug--sedative\" id=\"EugfzSdeo0358\" x=\"429\" y=\"72.2222\" width=\"1\" height=\"17.7778\"></rect><rect class=\"mme-g-drug--sedative\" id=\"LpiinGaww9368\" x=\"430\" y=\"72.2222\" width=\"1\" height=\"17.7778\"></rect><rect class=\"mme-g-drug--sedative\" id=\"TkwxiTjwg2660\" x=\"431\" y=\"72.2222\" width=\"1\" height=\"17.7778\"></rect><rect class=\"mme-g-drug--sedative\" id=\"BktsaFphj3117\" x=\"432\" y=\"72.2222\" width=\"1\" height=\"17.7778\"></rect><rect class=\"mme-g-drug--sedative\" id=\"ZlyjqPjpo8148\" x=\"433\" y=\"72.2222\" width=\"1\" " "height=\"17.7778\"></rect><rect class=\"mme-g-drug--sedative\" id=\"EbxyoHadj5034\" x=\"434\" y=\"72.2222\" width=\"1\" height=\"17.7778\"></rect><rect class=\"mme-g-drug--sedative\" id=\"VxaseFgdh0049\" x=\"435\" y=\"72.2222\" width=\"1\" height=\"17.7778\"></rect><rect class=\"mme-g-drug--sedative\" id=\"DedneRsae7087\" x=\"436\" y=\"72.2222\" width=\"1\" height=\"17.7778\"></rect><rect class=\"mme-g-drug--sedative\" id=\"CoeaaJvpr0727\" x=\"437\" y=\"72.2222\" width=\"1\" height=\"17.7778\"></rect><rect class=\"mme-g-drug--sedative\" id=\"OhnvdRchs3184\" x=\"438\" y=\"72.2222\" width=\"1\" height=\"17.7778\"></rect><rect class=\"mme-g-drug--sedative\" id=\"CwoeiRnyq3865\" x=\"439\" y=\"72.2222\" width=\"1\" height=\"17.7778\"></rect><rect class=\"mme-g-drug--sedative\" id=\"QzomsTqkh3082\" x=\"440\" y=\"72.2222\" width=\"1\" height=\"17.7778\"></rect><rect class=\"mme-g-drug--sedative\" id=\"IylmpTryp0086\" x=\"441\" y=\"72.2222\" width=\"1\" height=\"17.7778\"></rect><rect class=\"mme-g-drug--sedative\" id=\"BhgdmCcfi2520\" x=\"442\" y=\"72.2222\" width=\"1\" height=\"17.7778\"></rect><rect class=\"mme-g-drug--sedative\" id=\"IxdlyOnlo7510\" x=\"443\" y=\"72.2222\" width=\"1\" height=\"17.7778\"></rect><rect class=\"mme-g-drug--sedative\" id=\"FfajyRtcf4814\" x=\"444\" y=\"72.2222\" width=\"1\" height=\"17.7778\"></rect><rect class=\"mme-g-drug--sedative\" id=\"RmyvmQdld2825\" x=\"445\" y=\"72.2222\" width=\"1\" height=\"17.7778\"></rect><rect class=\"mme-g-drug--sedative\" id=\"RpdffFtus0018\" x=\"446\" y=\"72.2222\" width=\"1\" height=\"17.7778\"></rect><rect class=\"mme-g-drug--sedative\" id=\"XxqjtFbon6297\" x=\"447\" y=\"72.2222\" width=\"1\" height=\"17.7778\"></rect><rect class=\"mme-g-drug--sedative\" id=\"LpdncLteh9038\" x=\"448\" y=\"54.4444\" width=\"1\" height=\"35.5556\"></rect><rect class=\"mme-g-drug--sedative\" id=\"ZhroaHsys2999\" x=\"449\" y=\"72.2222\" width=\"1\" height=\"17.7778\"></rect><rect class=\"mme-g-drug--sedative\" id=\"PpdlfZodq9927\" x=\"450\" y=\"72.2222\" width=\"1\" height=\"17.7778\"></rect><rect class=\"mme-g-drug--sedative\" id=\"HmtevMqpg1502\" x=\"451\" y=\"72.2222\" width=\"1\" " "height=\"17.7778\"></rect><rect class=\"mme-g-drug--sedative\" id=\"FvlodBfyu3134\" x=\"452\" y=\"72.2222\" width=\"1\" height=\"17.7778\"></rect><rect class=\"mme-g-drug--sedative\" id=\"HjbweAhju9279\" x=\"453\" y=\"72.2222\" width=\"1\" height=\"17.7778\"></rect><rect class=\"mme-g-drug--sedative\" id=\"ZjauqLwzc2529\" x=\"454\" y=\"72.2222\" width=\"1\" height=\"17.7778\"></rect><rect class=\"mme-g-drug--sedative\" id=\"GoaqrCxgg3196\" x=\"455\" y=\"72.2222\" width=\"1\" height=\"17.7778\"></rect><rect class=\"mme-g-drug--sedative\" id=\"QeascTbmq1799\" x=\"456\" y=\"72.2222\" width=\"1\" height=\"17.7778\"></rect><rect class=\"mme-g-drug--sedative\" id=\"YedajIrlt1782\" x=\"457\" y=\"72.2222\" width=\"1\" height=\"17.7778\"></rect><rect class=\"mme-g-drug--sedative\" id=\"CcsavLvdf3485\" x=\"458\" y=\"72.2222\" width=\"1\" height=\"17.7778\"></rect><rect class=\"mme-g-drug--sedative\" id=\"BiaogDfgh8047\" x=\"459\" y=\"72.2222\" width=\"1\" height=\"17.7778\"></rect><rect class=\"mme-g-drug--sedative\" id=\"EsczyWvsr2301\" x=\"460\" y=\"72.2222\" width=\"1\" height=\"17.7778\"></rect><rect class=\"mme-g-drug--sedative\" id=\"ApdmuHvws0123\" x=\"461\" y=\"72.2222\" width=\"1\" height=\"17.7778\"></rect><rect class=\"mme-g-drug--sedative\" id=\"IcmzrAqmj2142\" x=\"462\" y=\"72.2222\" width=\"1\" height=\"17.7778\"></rect><rect class=\"mme-g-drug--sedative\" id=\"UpgxbHohj8038\" x=\"463\" y=\"72.2222\" width=\"1\" height=\"17.7778\"></rect><rect class=\"mme-g-drug--sedative\" id=\"NmiljCysh4133\" x=\"464\" y=\"72.2222\" width=\"1\" height=\"17.7778\"></rect><rect class=\"mme-g-drug--sedative\" id=\"NzzhoDbho6983\" x=\"465\" y=\"72.2222\" width=\"1\" height=\"17.7778\"></rect><rect class=\"mme-g-drug--sedative\" id=\"EoivpIndy4713\" x=\"466\" y=\"72.2222\" width=\"1\" height=\"17.7778\"></rect><rect class=\"mme-g-drug--sedative\" id=\"JttcyGiwj0770\" x=\"467\" y=\"72.2222\" width=\"1\" height=\"17.7778\"></rect><rect class=\"mme-g-drug--sedative\" id=\"ZhgioVpga4524\" x=\"468\" y=\"72.2222\" width=\"1\" height=\"17.7778\"></rect><rect class=\"mme-g-drug--sedative\" id=\"LciwsIofc1575\" x=\"469\" y=\"72.2222\" width=\"1\" " "height=\"17.7778\"></rect><rect class=\"mme-g-drug--sedative\" id=\"UcnolWfla5978\" x=\"470\" y=\"72.2222\" width=\"1\" height=\"17.7778\"></rect><rect class=\"mme-g-drug--sedative\" id=\"JneafPwfp1201\" x=\"471\" y=\"72.2222\" width=\"1\" height=\"17.7778\"></rect><rect class=\"mme-g-drug--sedative\" id=\"RznewWstv3204\" x=\"472\" y=\"72.2222\" width=\"1\" height=\"17.7778\"></rect><rect class=\"mme-g-drug--sedative\" id=\"CrkzaYmtx9053\" x=\"473\" y=\"72.2222\" width=\"1\" height=\"17.7778\"></rect><rect class=\"mme-g-drug--sedative\" id=\"OcrfvIjoe3386\" x=\"474\" y=\"72.2222\" width=\"1\" height=\"17.7778\"></rect><rect class=\"mme-g-drug--sedative\" id=\"PlomvPmor5852\" x=\"475\" y=\"72.2222\" width=\"1\" height=\"17.7778\"></rect><rect class=\"mme-g-drug--sedative\" id=\"XmdmiRemg8541\" x=\"476\" y=\"72.2222\" width=\"1\" height=\"17.7778\"></rect><rect class=\"mme-g-drug--sedative\" id=\"QkmnhIdhn4253\" x=\"477\" y=\"72.2222\" width=\"1\" height=\"17.7778\"></rect><rect class=\"mme-g-drug--sedative\" id=\"NmdbqRllw4914\" x=\"481\" y=\"72.2222\" width=\"1\" height=\"17.7778\"></rect><rect class=\"mme-g-drug--sedative\" id=\"WfcwcYwel6417\" x=\"482\" y=\"72.2222\" width=\"1\" height=\"17.7778\"></rect><rect class=\"mme-g-drug--sedative\" id=\"WoafxOpgw9567\" x=\"483\" y=\"72.2222\" width=\"1\" height=\"17.7778\"></rect><rect class=\"mme-g-drug--sedative\" id=\"MjacyOroi6651\" x=\"484\" y=\"72.2222\" width=\"1\" height=\"17.7778\"></rect><rect class=\"mme-g-drug--sedative\" id=\"RxnwcOyfj2025\" x=\"485\" y=\"72.2222\" width=\"1\" height=\"17.7778\"></rect><rect class=\"mme-g-drug--sedative\" id=\"VlojtWllv7184\" x=\"486\" y=\"72.2222\" width=\"1\" height=\"17.7778\"></rect><rect class=\"mme-g-drug--sedative\" id=\"TqzwoLxny8360\" x=\"487\" y=\"72.2222\" width=\"1\" height=\"17.7778\"></rect><rect class=\"mme-g-drug--sedative\" id=\"SoapmJnab0939\" x=\"488\" y=\"72.2222\" width=\"1\" height=\"17.7778\"></rect><rect class=\"mme-g-drug--sedative\" id=\"LzzuoQajm8505\" x=\"489\" y=\"72.2222\" width=\"1\" height=\"17.7778\"></rect><rect class=\"mme-g-drug--sedative\" id=\"OcbjpIctp3101\" x=\"490\" y=\"72.2222\" width=\"1\" " "height=\"17.7778\"></rect><rect class=\"mme-g-drug--sedative\" id=\"OmeprJdsh8016\" x=\"491\" y=\"72.2222\" width=\"1\" height=\"17.7778\"></rect><rect class=\"mme-g-drug--sedative\" id=\"BxjrcYoxv5013\" x=\"492\" y=\"72.2222\" width=\"1\" height=\"17.7778\"></rect><rect class=\"mme-g-drug--sedative\" id=\"LrdetTqfn6353\" x=\"493\" y=\"72.2222\" width=\"1\" height=\"17.7778\"></rect><rect class=\"mme-g-drug--sedative\" id=\"TkwhgZjmr8798\" x=\"494\" y=\"72.2222\" width=\"1\" height=\"17.7778\"></rect><rect class=\"mme-g-drug--sedative\" id=\"XcprzVczh2611\" x=\"495\" y=\"72.2222\" width=\"1\" height=\"17.7778\"></rect><rect class=\"mme-g-drug--sedative\" id=\"RwrmhMnfd7616\" x=\"496\" y=\"72.2222\" width=\"1\" height=\"17.7778\"></rect><rect class=\"mme-g-drug--sedative\" id=\"AeheiKffe1020\" x=\"497\" y=\"72.2222\" width=\"1\" height=\"17.7778\"></rect><rect class=\"mme-g-drug--sedative\" id=\"EsaqlBolm7746\" x=\"498\" y=\"72.2222\" width=\"1\" height=\"17.7778\"></rect><rect class=\"mme-g-drug--sedative\" id=\"NmvqiTfke7832\" x=\"499\" y=\"72.2222\" width=\"1\" height=\"17.7778\"></rect><rect class=\"mme-g-drug--sedative\" id=\"FwpkcXrrx4293\" x=\"500\" y=\"72.2222\" width=\"1\" height=\"17.7778\"></rect><rect class=\"mme-g-drug--sedative\" id=\"MnghsPcyr2191\" x=\"501\" y=\"72.2222\" width=\"1\" height=\"17.7778\"></rect><rect class=\"mme-g-drug--sedative\" id=\"XkotxJier5979\" x=\"502\" y=\"72.2222\" width=\"1\" height=\"17.7778\"></rect><rect class=\"mme-g-drug--sedative\" id=\"ZfwucLihm8971\" x=\"503\" y=\"72.2222\" width=\"1\" height=\"17.7778\"></rect><rect class=\"mme-g-drug--sedative\" id=\"OxrpoQpak7815\" x=\"504\" y=\"72.2222\" width=\"1\" height=\"17.7778\"></rect><rect class=\"mme-g-drug--sedative\" id=\"QuqozQbiu2424\" x=\"505\" y=\"72.2222\" width=\"1\" height=\"17.7778\"></rect><rect class=\"mme-g-drug--sedative\" id=\"QoosuYxol5624\" x=\"506\" y=\"72.2222\" width=\"1\" height=\"17.7778\"></rect><rect class=\"mme-g-drug--sedative\" id=\"ZewzwLiwp7282\" x=\"507\" y=\"72.2222\" width=\"1\" height=\"17.7778\"></rect><rect class=\"mme-g-drug--sedative\" id=\"MkpkrLpyh0183\" x=\"508\" y=\"72.2222\" width=\"1\" " "height=\"17.7778\"></rect><rect class=\"mme-g-drug--sedative\" id=\"VssnbUpso1890\" x=\"509\" y=\"54.4444\" width=\"1\" height=\"35.5556\"></rect><rect class=\"mme-g-drug--sedative\" id=\"JxdawFnff4217\" x=\"510\" y=\"54.4444\" width=\"1\" height=\"35.5556\"></rect><rect class=\"mme-g-drug--sedative\" id=\"WtivmVsvl4344\" x=\"511\" y=\"72.2222\" width=\"1\" height=\"17.7778\"></rect><rect class=\"mme-g-drug--sedative\" id=\"PandkTgku4009\" x=\"512\" y=\"72.2222\" width=\"1\" height=\"17.7778\"></rect><rect class=\"mme-g-drug--sedative\" id=\"NmjieMfkl6907\" x=\"513\" y=\"72.2222\" width=\"1\" height=\"17.7778\"></rect><rect class=\"mme-g-drug--sedative\" id=\"OwltvUygm1574\" x=\"514\" y=\"72.2222\" width=\"1\" height=\"17.7778\"></rect><rect class=\"mme-g-drug--sedative\" id=\"HworkZxad4030\" x=\"515\" y=\"72.2222\" width=\"1\" height=\"17.7778\"></rect><rect class=\"mme-g-drug--sedative\" id=\"RswquTzaj6974\" x=\"516\" y=\"72.2222\" width=\"1\" height=\"17.7778\"></rect><rect class=\"mme-g-drug--sedative\" id=\"HeocjRkkg9597\" x=\"517\" y=\"72.2222\" width=\"1\" height=\"17.7778\"></rect><rect class=\"mme-g-drug--sedative\" id=\"SregjDuak8742\" x=\"518\" y=\"72.2222\" width=\"1\" height=\"17.7778\"></rect><rect class=\"mme-g-drug--sedative\" id=\"TfystZqfb2128\" x=\"519\" y=\"72.2222\" width=\"1\" height=\"17.7778\"></rect><rect class=\"mme-g-drug--sedative\" id=\"XzzreIjey6612\" x=\"520\" y=\"72.2222\" width=\"1\" height=\"17.7778\"></rect><rect class=\"mme-g-drug--sedative\" id=\"OjuivJwzd6946\" x=\"521\" y=\"72.2222\" width=\"1\" height=\"17.7778\"></rect><rect class=\"mme-g-drug--sedative\" id=\"XdyayIobq5044\" x=\"522\" y=\"72.2222\" width=\"1\" height=\"17.7778\"></rect><rect class=\"mme-g-drug--sedative\" id=\"NbvfwKuqk8242\" x=\"523\" y=\"72.2222\" width=\"1\" height=\"17.7778\"></rect><rect class=\"mme-g-drug--sedative\" id=\"IgrdoDywg2083\" x=\"524\" y=\"72.2222\" width=\"1\" height=\"17.7778\"></rect><rect class=\"mme-g-drug--sedative\" id=\"BaqrqDbed6423\" x=\"525\" y=\"72.2222\" width=\"1\" height=\"17.7778\"></rect><rect class=\"mme-g-drug--sedative\" id=\"CpqyuBibk6286\" x=\"526\" y=\"72.2222\" width=\"1\" " "height=\"17.7778\"></rect><rect class=\"mme-g-drug--sedative\" id=\"UxburYnda4743\" x=\"527\" y=\"72.2222\" width=\"1\" height=\"17.7778\"></rect><rect class=\"mme-g-drug--sedative\" id=\"DzcamPczu3197\" x=\"528\" y=\"72.2222\" width=\"1\" height=\"17.7778\"></rect><rect class=\"mme-g-drug--sedative\" id=\"WmgdyCtfh9321\" x=\"529\" y=\"72.2222\" width=\"1\" height=\"17.7778\"></rect><rect class=\"mme-g-drug--sedative\" id=\"DfmfkNrkh5064\" x=\"530\" y=\"72.2222\" width=\"1\" height=\"17.7778\"></rect><rect class=\"mme-g-drug--sedative\" id=\"XdbjlGdtl0080\" x=\"531\" y=\"72.2222\" width=\"1\" height=\"17.7778\"></rect><rect class=\"mme-g-drug--sedative\" id=\"LsqeyPgod3514\" x=\"532\" y=\"72.2222\" width=\"1\" height=\"17.7778\"></rect><rect class=\"mme-g-drug--sedative\" id=\"MebieBsnr0062\" x=\"533\" y=\"72.2222\" width=\"1\" height=\"17.7778\"></rect><rect class=\"mme-g-drug--sedative\" id=\"LuhelXlde8653\" x=\"534\" y=\"72.2222\" width=\"1\" height=\"17.7778\"></rect><rect class=\"mme-g-drug--sedative\" id=\"HjlpgEbpo7606\" x=\"535\" y=\"72.2222\" width=\"1\" height=\"17.7778\"></rect><rect class=\"mme-g-drug--sedative\" id=\"VrpwpZapd0447\" x=\"536\" y=\"72.2222\" width=\"1\" height=\"17.7778\"></rect><rect class=\"mme-g-drug--sedative\" id=\"XlhjoMski6454\" x=\"537\" y=\"72.2222\" width=\"1\" height=\"17.7778\"></rect><rect class=\"mme-g-drug--sedative\" id=\"MkvhyIiho0203\" x=\"538\" y=\"72.2222\" width=\"1\" height=\"17.7778\"></rect><rect class=\"mme-g-drug--sedative\" id=\"RjjrjVyyx9327\" x=\"539\" y=\"72.2222\" width=\"1\" height=\"17.7778\"></rect><rect class=\"mme-g-drug--sedative\" id=\"AypkdKbml0519\" x=\"540\" y=\"72.2222\" width=\"1\" height=\"17.7778\"></rect><rect class=\"mme-g-drug--sedative\" id=\"IsdsmLowe8887\" x=\"541\" y=\"72.2222\" width=\"1\" height=\"17.7778\"></rect><rect class=\"mme-g-drug--sedative\" id=\"BfzzoTkqy8616\" x=\"542\" y=\"72.2222\" width=\"1\" height=\"17.7778\"></rect><rect class=\"mme-g-drug--sedative\" id=\"EjjzbCjig7825\" x=\"543\" y=\"72.2222\" width=\"1\" height=\"17.7778\"></rect><rect class=\"mme-g-drug--sedative\" id=\"QllpxPxep7868\" x=\"544\" y=\"72.2222\" width=\"1\" " "height=\"17.7778\"></rect><rect class=\"mme-g-drug--sedative\" id=\"CxohiLaco5357\" x=\"545\" y=\"72.2222\" width=\"1\" height=\"17.7778\"></rect><rect class=\"mme-g-drug--sedative\" id=\"AnipgDjwd3515\" x=\"546\" y=\"72.2222\" width=\"1\" height=\"17.7778\"></rect><rect class=\"mme-g-drug--sedative\" id=\"HqzvsGbej8027\" x=\"547\" y=\"72.2222\" width=\"1\" height=\"17.7778\"></rect><rect class=\"mme-g-drug--sedative\" id=\"RfnvlFfis6675\" x=\"548\" y=\"72.2222\" width=\"1\" height=\"17.7778\"></rect><rect class=\"mme-g-drug--sedative\" id=\"CjjrsFngy9295\" x=\"549\" y=\"72.2222\" width=\"1\" height=\"17.7778\"></rect><rect class=\"mme-g-drug--sedative\" id=\"XgrebJbro0055\" x=\"550\" y=\"72.2222\" width=\"1\" height=\"17.7778\"></rect><rect class=\"mme-g-drug--sedative\" id=\"RwqfbJsbf4953\" x=\"551\" y=\"72.2222\" width=\"1\" height=\"17.7778\"></rect><rect class=\"mme-g-drug--sedative\" id=\"JjdbpYdms4529\" x=\"552\" y=\"72.2222\" width=\"1\" height=\"17.7778\"></rect><rect class=\"mme-g-drug--sedative\" id=\"AxulnOeab9882\" x=\"553\" y=\"72.2222\" width=\"1\" height=\"17.7778\"></rect><rect class=\"mme-g-drug--sedative\" id=\"CnzbfNhhr5529\" x=\"554\" y=\"72.2222\" width=\"1\" height=\"17.7778\"></rect><rect class=\"mme-g-drug--sedative\" id=\"RleujZzic8999\" x=\"555\" y=\"72.2222\" width=\"1\" height=\"17.7778\"></rect><rect class=\"mme-g-drug--sedative\" id=\"JcuddDtfv0411\" x=\"556\" y=\"72.2222\" width=\"1\" height=\"17.7778\"></rect><rect class=\"mme-g-drug--sedative\" id=\"GrgbvArfd1266\" x=\"557\" y=\"72.2222\" width=\"1\" height=\"17.7778\"></rect><rect class=\"mme-g-drug--sedative\" id=\"YkrbfGmzq6928\" x=\"558\" y=\"72.2222\" width=\"1\" height=\"17.7778\"></rect><rect class=\"mme-g-drug--sedative\" id=\"QqazfSyjv6303\" x=\"559\" y=\"72.2222\" width=\"1\" height=\"17.7778\"></rect><rect class=\"mme-g-drug--sedative\" id=\"MspskDqpn8368\" x=\"560\" y=\"72.2222\" width=\"1\" height=\"17.7778\"></rect><rect class=\"mme-g-drug--sedative\" id=\"PqlrgBlbq7296\" x=\"561\" y=\"72.2222\" width=\"1\" height=\"17.7778\"></rect><rect class=\"mme-g-drug--sedative\" id=\"EjbdeYsjx5221\" x=\"562\" y=\"72.2222\" width=\"1\" " "height=\"17.7778\"></rect><rect class=\"mme-g-drug--sedative\" id=\"UahcfPswu5968\" x=\"563\" y=\"72.2222\" width=\"1\" height=\"17.7778\"></rect><rect class=\"mme-g-drug--sedative\" id=\"MynywMluj8930\" x=\"564\" y=\"72.2222\" width=\"1\" height=\"17.7778\"></rect><rect class=\"mme-g-drug--sedative\" id=\"XodstBiqh4036\" x=\"565\" y=\"72.2222\" width=\"1\" height=\"17.7778\"></rect><rect class=\"mme-g-drug--sedative\" id=\"PdnwpEntc0280\" x=\"566\" y=\"72.2222\" width=\"1\" height=\"17.7778\"></rect><rect class=\"mme-g-drug--sedative\" id=\"MwurxYerb4556\" x=\"567\" y=\"72.2222\" width=\"1\" height=\"17.7778\"></rect><rect class=\"mme-g-drug--sedative\" id=\"LupfcIpdm8937\" x=\"568\" y=\"72.2222\" width=\"1\" height=\"17.7778\"></rect><rect class=\"mme-g-drug--sedative\" id=\"EgxogKffz6518\" x=\"570\" y=\"72.2222\" width=\"1\" height=\"17.7778\"></rect><rect class=\"mme-g-drug--sedative\" id=\"MoynmTeze8967\" x=\"571\" y=\"72.2222\" width=\"1\" height=\"17.7778\"></rect><rect class=\"mme-g-drug--sedative\" id=\"FrapiEdcz5701\" x=\"572\" y=\"72.2222\" width=\"1\" height=\"17.7778\"></rect><rect class=\"mme-g-drug--sedative\" id=\"PhxsuCimj1073\" x=\"573\" y=\"72.2222\" width=\"1\" height=\"17.7778\"></rect><rect class=\"mme-g-drug--sedative\" id=\"YnltaShvx2652\" x=\"574\" y=\"72.2222\" width=\"1\" height=\"17.7778\"></rect><rect class=\"mme-g-drug--sedative\" id=\"JqafrFctj6379\" x=\"575\" y=\"72.2222\" width=\"1\" height=\"17.7778\"></rect><rect class=\"mme-g-drug--sedative\" id=\"KjvpaGtnz8683\" x=\"576\" y=\"72.2222\" width=\"1\" height=\"17.7778\"></rect><rect class=\"mme-g-drug--sedative\" id=\"UaunnGzfd7803\" x=\"577\" y=\"72.2222\" width=\"1\" height=\"17.7778\"></rect><rect class=\"mme-g-drug--sedative\" id=\"IisrsAlvm4543\" x=\"578\" y=\"72.2222\" width=\"1\" height=\"17.7778\"></rect><rect class=\"mme-g-drug--sedative\" id=\"QlkcgAaek5882\" x=\"579\" y=\"72.2222\" width=\"1\" height=\"17.7778\"></rect><rect class=\"mme-g-drug--sedative\" id=\"TkrjqUhet5059\" x=\"580\" y=\"72.2222\" width=\"1\" height=\"17.7778\"></rect><rect class=\"mme-g-drug--sedative\" id=\"TedmeWghc0672\" x=\"581\" y=\"72.2222\" width=\"1\" " "height=\"17.7778\"></rect><rect class=\"mme-g-drug--sedative\" id=\"UrtefZfae4624\" x=\"582\" y=\"72.2222\" width=\"1\" height=\"17.7778\"></rect><rect class=\"mme-g-drug--sedative\" id=\"JxigmYjml9657\" x=\"583\" y=\"72.2222\" width=\"1\" height=\"17.7778\"></rect><rect class=\"mme-g-drug--sedative\" id=\"IeocaJsmt7173\" x=\"584\" y=\"72.2222\" width=\"1\" height=\"17.7778\"></rect><rect class=\"mme-g-drug--sedative\" id=\"AkqtmPgcy8578\" x=\"585\" y=\"72.2222\" width=\"1\" height=\"17.7778\"></rect><rect class=\"mme-g-drug--sedative\" id=\"JnregVunn7848\" x=\"586\" y=\"72.2222\" width=\"1\" height=\"17.7778\"></rect><rect class=\"mme-g-drug--sedative\" id=\"DbgeiPpfb4244\" x=\"587\" y=\"72.2222\" width=\"1\" height=\"17.7778\"></rect><rect class=\"mme-g-drug--sedative\" id=\"WqmqmHxpm6504\" x=\"588\" y=\"72.2222\" width=\"1\" height=\"17.7778\"></rect><rect class=\"mme-g-drug--sedative\" id=\"SpdshUjzd6303\" x=\"589\" y=\"72.2222\" width=\"1\" height=\"17.7778\"></rect><rect class=\"mme-g-drug--sedative\" id=\"FuneqScfl7386\" x=\"590\" y=\"72.2222\" width=\"1\" height=\"17.7778\"></rect><rect class=\"mme-g-drug--sedative\" id=\"GeoxaYmuc5605\" x=\"591\" y=\"72.2222\" width=\"1\" height=\"17.7778\"></rect><rect class=\"mme-g-drug--sedative\" id=\"ZwjfcIrye1975\" x=\"592\" y=\"72.2222\" width=\"1\" height=\"17.7778\"></rect><rect class=\"mme-g-drug--sedative\" id=\"RxhzgJzjm4577\" x=\"593\" y=\"72.2222\" width=\"1\" height=\"17.7778\"></rect><rect class=\"mme-g-drug--sedative\" id=\"HvdwgOmps4608\" x=\"594\" y=\"72.2222\" width=\"1\" height=\"17.7778\"></rect><rect class=\"mme-g-drug--sedative\" id=\"ZhjuyFiwf3304\" x=\"595\" y=\"72.2222\" width=\"1\" height=\"17.7778\"></rect><rect class=\"mme-g-drug--sedative\" id=\"AxbdpUevt9144\" x=\"596\" y=\"72.2222\" width=\"1\" height=\"17.7778\"></rect><rect class=\"mme-g-drug--sedative\" id=\"TkgmdZwzc3600\" x=\"597\" y=\"72.2222\" width=\"1\" height=\"17.7778\"></rect><rect class=\"mme-g-drug--sedative\" id=\"XfajcOkue8262\" x=\"598\" y=\"72.2222\" width=\"1\" height=\"17.7778\"></rect><rect class=\"mme-g-drug--sedative\" id=\"CrlzyVwrw3788\" x=\"599\" y=\"72.2222\" width=\"1\" " "height=\"17.7778\"></rect><rect class=\"mme-g-drug--sedative\" id=\"WfspeNeib0722\" x=\"600\" y=\"72.2222\" width=\"1\" height=\"17.7778\"></rect><rect class=\"mme-g-drug--sedative\" id=\"VislgFuzm1425\" x=\"601\" y=\"72.2222\" width=\"1\" height=\"17.7778\"></rect><rect class=\"mme-g-drug--sedative\" id=\"BxmicYesc2515\" x=\"602\" y=\"72.2222\" width=\"1\" height=\"17.7778\"></rect><rect class=\"mme-g-drug--sedative\" id=\"HujzqUndy5595\" x=\"603\" y=\"72.2222\" width=\"1\" height=\"17.7778\"></rect><rect class=\"mme-g-drug--sedative\" id=\"AvabsYfjt5575\" x=\"604\" y=\"72.2222\" width=\"1\" height=\"17.7778\"></rect><rect class=\"mme-g-drug--sedative\" id=\"YfdvlSkbs4572\" x=\"605\" y=\"72.2222\" width=\"1\" height=\"17.7778\"></rect><rect class=\"mme-g-drug--sedative\" id=\"WeuzoNkpp8823\" x=\"606\" y=\"72.2222\" width=\"1\" height=\"17.7778\"></rect><rect class=\"mme-g-drug--sedative\" id=\"PsgeaGlgk1324\" x=\"607\" y=\"72.2222\" width=\"1\" height=\"17.7778\"></rect><rect class=\"mme-g-drug--sedative\" id=\"WhixvBtsi0820\" x=\"608\" y=\"72.2222\" width=\"1\" height=\"17.7778\"></rect><rect class=\"mme-g-drug--sedative\" id=\"SihwjHjum9930\" x=\"609\" y=\"72.2222\" width=\"1\" height=\"17.7778\"></rect><rect class=\"mme-g-drug--sedative\" id=\"DbogcCvlg4896\" x=\"610\" y=\"72.2222\" width=\"1\" height=\"17.7778\"></rect><rect class=\"mme-g-drug--sedative\" id=\"OwmrrJahz1684\" x=\"611\" y=\"72.2222\" width=\"1\" height=\"17.7778\"></rect><rect class=\"mme-g-drug--sedative\" id=\"OcxokPlcb8093\" x=\"612\" y=\"72.2222\" width=\"1\" height=\"17.7778\"></rect><rect class=\"mme-g-drug--sedative\" id=\"IbkzdDfbn9341\" x=\"613\" y=\"72.2222\" width=\"1\" height=\"17.7778\"></rect><rect class=\"mme-g-drug--sedative\" id=\"XemqmTuqa4346\" x=\"614\" y=\"72.2222\" width=\"1\" height=\"17.7778\"></rect><rect class=\"mme-g-drug--sedative\" id=\"FslywYvui1053\" x=\"615\" y=\"72.2222\" width=\"1\" height=\"17.7778\"></rect><rect class=\"mme-g-drug--sedative\" id=\"ThegvZaqe6147\" x=\"616\" y=\"72.2222\" width=\"1\" height=\"17.7778\"></rect><rect class=\"mme-g-drug--sedative\" id=\"PbfxfYqgk9978\" x=\"617\" y=\"72.2222\" width=\"1\" " "height=\"17.7778\"></rect><rect class=\"mme-g-drug--sedative\" id=\"HklmqQtkg1157\" x=\"618\" y=\"72.2222\" width=\"1\" height=\"17.7778\"></rect><rect class=\"mme-g-drug--sedative\" id=\"JudkcAbwe6817\" x=\"619\" y=\"72.2222\" width=\"1\" height=\"17.7778\"></rect><rect class=\"mme-g-drug--sedative\" id=\"NbjmjSyso3975\" x=\"620\" y=\"72.2222\" width=\"1\" height=\"17.7778\"></rect><rect class=\"mme-g-drug--sedative\" id=\"TuyiiZgfu0033\" x=\"621\" y=\"72.2222\" width=\"1\" height=\"17.7778\"></rect><rect class=\"mme-g-drug--sedative\" id=\"VkrcaZdyr5367\" x=\"622\" y=\"72.2222\" width=\"1\" height=\"17.7778\"></rect><rect class=\"mme-g-drug--sedative\" id=\"JjlytBlsf9401\" x=\"623\" y=\"72.2222\" width=\"1\" height=\"17.7778\"></rect><rect class=\"mme-g-drug--sedative\" id=\"UpsgyKepf0891\" x=\"624\" y=\"72.2222\" width=\"1\" height=\"17.7778\"></rect><rect class=\"mme-g-drug--sedative\" id=\"RgvgiHjxu8848\" x=\"625\" y=\"72.2222\" width=\"1\" height=\"17.7778\"></rect><rect class=\"mme-g-drug--sedative\" id=\"BvfqnRzaa4207\" x=\"626\" y=\"72.2222\" width=\"1\" height=\"17.7778\"></rect><rect class=\"mme-g-drug--sedative\" id=\"TnwhtBihu3421\" x=\"627\" y=\"72.2222\" width=\"1\" height=\"17.7778\"></rect><rect class=\"mme-g-drug--sedative\" id=\"XgcawNknv4815\" x=\"628\" y=\"72.2222\" width=\"1\" height=\"17.7778\"></rect><rect class=\"mme-g-drug--sedative\" id=\"KwjsmQsfa0711\" x=\"629\" y=\"72.2222\" width=\"1\" height=\"17.7778\"></rect><rect class=\"mme-g-drug--sedative\" id=\"LritdMmgo9293\" x=\"631\" y=\"72.2222\" width=\"1\" height=\"17.7778\"></rect><rect class=\"mme-g-drug--sedative\" id=\"OilsdWgxz1836\" x=\"632\" y=\"72.2222\" width=\"1\" height=\"17.7778\"></rect><rect class=\"mme-g-drug--sedative\" id=\"VaybuSqkj2842\" x=\"633\" y=\"72.2222\" width=\"1\" height=\"17.7778\"></rect><rect class=\"mme-g-drug--sedative\" id=\"OuymgSljf4039\" x=\"634\" y=\"72.2222\" width=\"1\" height=\"17.7778\"></rect><rect class=\"mme-g-drug--sedative\" id=\"KvknvNfmh6641\" x=\"635\" y=\"72.2222\" width=\"1\" height=\"17.7778\"></rect><rect class=\"mme-g-drug--sedative\" id=\"HrilfKfqf2176\" x=\"636\" y=\"72.2222\" width=\"1\" " "height=\"17.7778\"></rect><rect class=\"mme-g-drug--sedative\" id=\"BeswuEwfg3960\" x=\"637\" y=\"72.2222\" width=\"1\" height=\"17.7778\"></rect><rect class=\"mme-g-drug--sedative\" id=\"FcrvgIbde8281\" x=\"638\" y=\"72.2222\" width=\"1\" height=\"17.7778\"></rect><rect class=\"mme-g-drug--sedative\" id=\"FghtoExdd0221\" x=\"639\" y=\"72.2222\" width=\"1\" height=\"17.7778\"></rect><rect class=\"mme-g-drug--sedative\" id=\"VojdpGtgl8120\" x=\"640\" y=\"72.2222\" width=\"1\" height=\"17.7778\"></rect><rect class=\"mme-g-drug--sedative\" id=\"IbydlZdrq9113\" x=\"641\" y=\"72.2222\" width=\"1\" height=\"17.7778\"></rect><rect class=\"mme-g-drug--sedative\" id=\"BinplFvvi1697\" x=\"642\" y=\"72.2222\" width=\"1\" height=\"17.7778\"></rect><rect class=\"mme-g-drug--sedative\" id=\"XmotbWlnf5770\" x=\"643\" y=\"72.2222\" width=\"1\" height=\"17.7778\"></rect><rect class=\"mme-g-drug--sedative\" id=\"FcdctAygu3879\" x=\"644\" y=\"72.2222\" width=\"1\" height=\"17.7778\"></rect><rect class=\"mme-g-drug--sedative\" id=\"UykdvAuuu7691\" x=\"645\" y=\"72.2222\" width=\"1\" height=\"17.7778\"></rect><rect class=\"mme-g-drug--sedative\" id=\"ElddqZjfz6687\" x=\"646\" y=\"72.2222\" width=\"1\" height=\"17.7778\"></rect><rect class=\"mme-g-drug--sedative\" id=\"PkvfyAfwd8437\" x=\"647\" y=\"72.2222\" width=\"1\" height=\"17.7778\"></rect><rect class=\"mme-g-drug--sedative\" id=\"NblhdOufs6346\" x=\"648\" y=\"72.2222\" width=\"1\" height=\"17.7778\"></rect><rect class=\"mme-g-drug--sedative\" id=\"QfbhgNgnq1916\" x=\"649\" y=\"72.2222\" width=\"1\" height=\"17.7778\"></rect><rect class=\"mme-g-drug--sedative\" id=\"IasmuRqtu3858\" x=\"650\" y=\"72.2222\" width=\"1\" height=\"17.7778\"></rect><rect class=\"mme-g-drug--sedative\" id=\"FmjgfKolb6870\" x=\"651\" y=\"72.2222\" width=\"1\" height=\"17.7778\"></rect><rect class=\"mme-g-drug--sedative\" id=\"SsickSoox9885\" x=\"652\" y=\"72.2222\" width=\"1\" height=\"17.7778\"></rect><rect class=\"mme-g-drug--sedative\" id=\"CdzqvKdpq1673\" x=\"653\" y=\"72.2222\" width=\"1\" height=\"17.7778\"></rect><rect class=\"mme-g-drug--sedative\" id=\"TyivfQbge6618\" x=\"654\" y=\"72.2222\" width=\"1\" " "height=\"17.7778\"></rect><rect class=\"mme-g-drug--sedative\" id=\"GetlcVqcz2331\" x=\"655\" y=\"72.2222\" width=\"1\" height=\"17.7778\"></rect><rect class=\"mme-g-drug--sedative\" id=\"EvaboOduv8227\" x=\"656\" y=\"72.2222\" width=\"1\" height=\"17.7778\"></rect><rect class=\"mme-g-drug--sedative\" id=\"GqhkiEyep7627\" x=\"657\" y=\"72.2222\" width=\"1\" height=\"17.7778\"></rect><rect class=\"mme-g-drug--sedative\" id=\"CuqykAces2568\" x=\"658\" y=\"72.2222\" width=\"1\" height=\"17.7778\"></rect><rect class=\"mme-g-drug--sedative\" id=\"BvodmGvpu5840\" x=\"659\" y=\"72.2222\" width=\"1\" height=\"17.7778\"></rect><rect class=\"mme-g-drug--sedative\" id=\"ZqkteRolt3179\" x=\"660\" y=\"72.2222\" width=\"1\" height=\"17.7778\"></rect><rect class=\"mme-g-drug--sedative\" id=\"WivclNlwy2211\" x=\"661\" y=\"72.2222\" width=\"1\" height=\"17.7778\"></rect><rect class=\"mme-g-drug--sedative\" id=\"YxqjdYjel4097\" x=\"662\" y=\"72.2222\" width=\"1\" height=\"17.7778\"></rect><rect class=\"mme-g-drug--sedative\" id=\"DbcjzSmnc6120\" x=\"663\" y=\"72.2222\" width=\"1\" height=\"17.7778\"></rect><rect class=\"mme-g-drug--sedative\" id=\"LruhbXwqq3552\" x=\"664\" y=\"72.2222\" width=\"1\" height=\"17.7778\"></rect><rect class=\"mme-g-drug--sedative\" id=\"KuvcsYwrg0810\" x=\"665\" y=\"72.2222\" width=\"1\" height=\"17.7778\"></rect><rect class=\"mme-g-drug--sedative\" id=\"TkkqdBopv7928\" x=\"666\" y=\"72.2222\" width=\"1\" height=\"17.7778\"></rect><rect class=\"mme-g-drug--sedative\" id=\"VmpsjJjpp7875\" x=\"667\" y=\"72.2222\" width=\"1\" height=\"17.7778\"></rect><rect class=\"mme-g-drug--sedative\" id=\"HhgmvLtxz8476\" x=\"668\" y=\"72.2222\" width=\"1\" height=\"17.7778\"></rect><rect class=\"mme-g-drug--sedative\" id=\"UzvdvBnxu6555\" x=\"669\" y=\"72.2222\" width=\"1\" height=\"17.7778\"></rect><rect class=\"mme-g-drug--sedative\" id=\"KbbjiVxhj3199\" x=\"670\" y=\"72.2222\" width=\"1\" height=\"17.7778\"></rect><rect class=\"mme-g-drug--sedative\" id=\"WcqejGgvu0036\" x=\"671\" y=\"72.2222\" width=\"1\" height=\"17.7778\"></rect><rect class=\"mme-g-drug--sedative\" id=\"JkgwaKzcr6346\" x=\"672\" y=\"72.2222\" width=\"1\" " "height=\"17.7778\"></rect><rect class=\"mme-g-drug--sedative\" id=\"KfvnjPsww4844\" x=\"673\" y=\"72.2222\" width=\"1\" height=\"17.7778\"></rect><rect class=\"mme-g-drug--sedative\" id=\"EmuglStdu7719\" x=\"674\" y=\"72.2222\" width=\"1\" height=\"17.7778\"></rect><rect class=\"mme-g-drug--sedative\" id=\"RewjpQoik6684\" x=\"675\" y=\"72.2222\" width=\"1\" height=\"17.7778\"></rect><rect class=\"mme-g-drug--sedative\" id=\"IvagkQtxl5604\" x=\"676\" y=\"72.2222\" width=\"1\" height=\"17.7778\"></rect><rect class=\"mme-g-drug--sedative\" id=\"UllsmPsrj7955\" x=\"677\" y=\"72.2222\" width=\"1\" height=\"17.7778\"></rect><rect class=\"mme-g-drug--sedative\" id=\"ZhzdgYxqb2582\" x=\"678\" y=\"72.2222\" width=\"1\" height=\"17.7778\"></rect><rect class=\"mme-g-drug--sedative\" id=\"AmlgtCuhh3302\" x=\"679\" y=\"72.2222\" width=\"1\" height=\"17.7778\"></rect><rect class=\"mme-g-drug--sedative\" id=\"ZynutGdap1766\" x=\"680\" y=\"72.2222\" width=\"1\" height=\"17.7778\"></rect><rect class=\"mme-g-drug--sedative\" id=\"JodioIdgr8166\" x=\"681\" y=\"72.2222\" width=\"1\" height=\"17.7778\"></rect><rect class=\"mme-g-drug--sedative\" id=\"WpzutVgur9266\" x=\"682\" y=\"72.2222\" width=\"1\" height=\"17.7778\"></rect><rect class=\"mme-g-drug--sedative\" id=\"DcynjUqpi2246\" x=\"683\" y=\"72.2222\" width=\"1\" height=\"17.7778\"></rect><rect class=\"mme-g-drug--sedative\" id=\"PnvcyQfhw9482\" x=\"684\" y=\"72.2222\" width=\"1\" height=\"17.7778\"></rect><rect class=\"mme-g-drug--sedative\" id=\"XucspPgug0140\" x=\"685\" y=\"72.2222\" width=\"1\" height=\"17.7778\"></rect><rect class=\"mme-g-drug--sedative\" id=\"SnhdsRvds1348\" x=\"686\" y=\"72.2222\" width=\"1\" height=\"17.7778\"></rect><rect class=\"mme-g-drug--sedative\" id=\"PqguiLwal5036\" x=\"687\" y=\"72.2222\" width=\"1\" height=\"17.7778\"></rect><rect class=\"mme-g-drug--sedative\" id=\"YnzryMgov7780\" x=\"688\" y=\"72.2222\" width=\"1\" height=\"17.7778\"></rect><rect class=\"mme-g-drug--sedative\" id=\"EsaqsDxue8296\" x=\"689\" y=\"72.2222\" width=\"1\" height=\"17.7778\"></rect><rect class=\"mme-g-drug--sedative\" id=\"AcgxbMlsr2168\" x=\"690\" y=\"72.2222\" width=\"1\" " "height=\"17.7778\"></rect><rect class=\"mme-g-drug--sedative\" id=\"ZinjeCmkm1647\" x=\"692\" y=\"72.2222\" width=\"1\" height=\"17.7778\"></rect><rect class=\"mme-g-drug--sedative\" id=\"ZajrxRkqm6647\" x=\"693\" y=\"72.2222\" width=\"1\" height=\"17.7778\"></rect><rect class=\"mme-g-drug--sedative\" id=\"GkwrhNwpr8784\" x=\"694\" y=\"72.2222\" width=\"1\" height=\"17.7778\"></rect><rect class=\"mme-g-drug--sedative\" id=\"BlavuMigi6630\" x=\"695\" y=\"72.2222\" width=\"1\" height=\"17.7778\"></rect><rect class=\"mme-g-drug--sedative\" id=\"NbftlSlwx4651\" x=\"696\" y=\"72.2222\" width=\"1\" height=\"17.7778\"></rect><rect class=\"mme-g-drug--sedative\" id=\"BquisRieu8825\" x=\"697\" y=\"72.2222\" width=\"1\" height=\"17.7778\"></rect><rect class=\"mme-g-drug--sedative\" id=\"RepjeAdmp3934\" x=\"698\" y=\"72.2222\" width=\"1\" height=\"17.7778\"></rect><rect class=\"mme-g-drug--sedative\" id=\"JtmrrStir6082\" x=\"699\" y=\"72.2222\" width=\"1\" height=\"17.7778\"></rect><rect class=\"mme-g-drug--sedative\" id=\"QzetuIatg9162\" x=\"700\" y=\"72.2222\" width=\"1\" height=\"17.7778\"></rect><rect class=\"mme-g-drug--sedative\" id=\"YjvdtWclc1465\" x=\"701\" y=\"72.2222\" width=\"1\" height=\"17.7778\"></rect><rect class=\"mme-g-drug--sedative\" id=\"SiwfcGclj9890\" x=\"702\" y=\"72.2222\" width=\"1\" height=\"17.7778\"></rect><rect class=\"mme-g-drug--sedative\" id=\"MqyxxRwqm5127\" x=\"703\" y=\"72.2222\" width=\"1\" height=\"17.7778\"></rect><rect class=\"mme-g-drug--sedative\" id=\"QwpnnOzgy9539\" x=\"704\" y=\"72.2222\" width=\"1\" height=\"17.7778\"></rect><rect class=\"mme-g-drug--sedative\" id=\"EganhLfoy9740\" x=\"705\" y=\"72.2222\" width=\"1\" height=\"17.7778\"></rect><rect class=\"mme-g-drug--sedative\" id=\"GikgcDbsb7780\" x=\"706\" y=\"72.2222\" width=\"1\" height=\"17.7778\"></rect><rect class=\"mme-g-drug--sedative\" id=\"JwjutZlwd2581\" x=\"707\" y=\"72.2222\" width=\"1\" height=\"17.7778\"></rect><rect class=\"mme-g-drug--sedative\" id=\"OswqiNney8357\" x=\"708\" y=\"72.2222\" width=\"1\" height=\"17.7778\"></rect><rect class=\"mme-g-drug--sedative\" id=\"ByruzFouy7097\" x=\"709\" y=\"72.2222\" width=\"1\" " "height=\"17.7778\"></rect><rect class=\"mme-g-drug--sedative\" id=\"IsgwlNblk4162\" x=\"710\" y=\"72.2222\" width=\"1\" height=\"17.7778\"></rect><rect class=\"mme-g-drug--sedative\" id=\"SycqqGvsd2936\" x=\"711\" y=\"72.2222\" width=\"1\" height=\"17.7778\"></rect><rect class=\"mme-g-drug--sedative\" id=\"ZdiltTltj4387\" x=\"712\" y=\"72.2222\" width=\"1\" height=\"17.7778\"></rect><rect class=\"mme-g-drug--sedative\" id=\"GtzfhHihs7576\" x=\"713\" y=\"72.2222\" width=\"1\" height=\"17.7778\"></rect><rect class=\"mme-g-drug--sedative\" id=\"IwglwRidf7985\" x=\"714\" y=\"72.2222\" width=\"1\" height=\"17.7778\"></rect><rect class=\"mme-g-drug--sedative\" id=\"OyrnyYedt5129\" x=\"715\" y=\"72.2222\" width=\"1\" height=\"17.7778\"></rect><rect class=\"mme-g-drug--sedative\" id=\"RngbaLfoh3277\" x=\"716\" y=\"72.2222\" width=\"1\" height=\"17.7778\"></rect><rect class=\"mme-g-drug--sedative\" id=\"HhccnZkzo0944\" x=\"717\" y=\"72.2222\" width=\"1\" height=\"17.7778\"></rect><rect class=\"mme-g-drug--sedative\" id=\"XktuwDlzf0210\" x=\"718\" y=\"72.2222\" width=\"1\" height=\"17.7778\"></rect><rect class=\"mme-g-drug--sedative\" id=\"OymtlAntj7201\" x=\"719\" y=\"72.2222\" width=\"1\" height=\"17.7778\"></rect><rect class=\"mme-g-drug--sedative\" id=\"ElxgiGlgy2161\" x=\"720\" y=\"54.4444\" width=\"1\" height=\"35.5556\"></rect><rect class=\"mme-g-drug--sedative\" id=\"BacfeDtor0192\" x=\"721\" y=\"54.4444\" width=\"1\" height=\"35.5556\"></rect><rect class=\"mme-g-drug--sedative\" id=\"PtqpbYntd2966\" x=\"722\" y=\"72.2222\" width=\"1\" height=\"17.7778\"></rect><rect class=\"mme-g-drug--sedative\" id=\"SdvemPqed3621\" x=\"723\" y=\"72.2222\" width=\"1\" height=\"17.7778\"></rect><rect class=\"mme-g-drug--sedative\" id=\"OzlonOfty3290\" x=\"724\" y=\"72.2222\" width=\"1\" height=\"17.7778\"></rect><rect class=\"mme-g-drug--sedative\" id=\"KhjmjCbiy7276\" x=\"725\" y=\"72.2222\" width=\"1\" height=\"17.7778\"></rect><rect class=\"mme-g-drug--sedative\" id=\"JjhxwMzuv4864\" x=\"726\" y=\"72.2222\" width=\"1\" height=\"17.7778\"></rect><rect class=\"mme-g-drug--sedative\" id=\"EuwguSesw2661\" x=\"727\" y=\"72.2222\" width=\"1\" " "height=\"17.7778\"></rect><rect class=\"mme-g-drug--sedative\" id=\"VmzouPzmz8586\" x=\"728\" y=\"72.2222\" width=\"1\" height=\"17.7778\"></rect><rect class=\"mme-g-drug--sedative\" id=\"UqvdlXtou6167\" x=\"729\" y=\"72.2222\" width=\"1\" height=\"17.7778\"></rect><rect class=\"mme-g-drug--sedative\" id=\"VltgdIpwp6620\" x=\"730\" y=\"72.2222\" width=\"1\" height=\"17.7778\"></rect><rect class=\"mme-g-drug--sedative\" id=\"XzzgwOsxy9828\" x=\"731\" y=\"72.2222\" width=\"1\" height=\"17.7778\"></rect><rect class=\"mme-g-drug--sedative\" id=\"ZahvcCodm8801\" x=\"732\" y=\"72.2222\" width=\"1\" height=\"17.7778\"></rect><rect class=\"mme-g-drug--sedative\" id=\"MwrorCldv5913\" x=\"733\" y=\"72.2222\" width=\"1\" height=\"17.7778\"></rect><rect class=\"mme-g-drug--sedative\" id=\"JqugiIedn4887\" x=\"734\" y=\"72.2222\" width=\"1\" height=\"17.7778\"></rect><rect class=\"mme-g-drug--sedative\" id=\"YrjukTowv1822\" x=\"735\" y=\"72.2222\" width=\"1\" height=\"17.7778\"></rect><rect class=\"mme-g-drug--sedative\" id=\"NrdkeNgxf9357\" x=\"736\" y=\"72.2222\" width=\"1\" height=\"17.7778\"></rect><rect class=\"mme-g-drug--sedative\" id=\"ApqgfDcnh1989\" x=\"737\" y=\"72.2222\" width=\"1\" height=\"17.7778\"></rect><rect class=\"mme-g-drug--sedative\" id=\"KxfrkYlmp8474\" x=\"738\" y=\"72.2222\" width=\"1\" height=\"17.7778\"></rect><rect class=\"mme-g-drug--sedative\" id=\"GnaymXjtn5725\" x=\"739\" y=\"72.2222\" width=\"1\" height=\"17.7778\"></rect><rect class=\"mme-g-drug--sedative\" id=\"LxdiiEwpg6747\" x=\"740\" y=\"72.2222\" width=\"1\" height=\"17.7778\"></rect><rect class=\"mme-g-drug--sedative\" id=\"QgrhaWcvi2864\" x=\"741\" y=\"72.2222\" width=\"1\" height=\"17.7778\"></rect><rect class=\"mme-g-drug--sedative\" id=\"AxrzlJebr9415\" x=\"742\" y=\"72.2222\" width=\"1\" height=\"17.7778\"></rect><rect class=\"mme-g-drug--sedative\" id=\"VdrfhCuxi0792\" x=\"743\" y=\"72.2222\" width=\"1\" height=\"17.7778\"></rect><rect class=\"mme-g-drug--sedative\" id=\"FuqdyEwtn3425\" x=\"744\" y=\"72.2222\" width=\"1\" height=\"17.7778\"></rect><rect class=\"mme-g-drug--sedative\" id=\"ZzvjsLbxt5304\" x=\"745\" y=\"72.2222\" width=\"1\" " "height=\"17.7778\"></rect><rect class=\"mme-g-drug--sedative\" id=\"DvlowAyiv7606\" x=\"746\" y=\"72.2222\" width=\"1\" height=\"17.7778\"></rect><rect class=\"mme-g-drug--sedative\" id=\"HvwdwTnjm1392\" x=\"747\" y=\"72.2222\" width=\"1\" height=\"17.7778\"></rect><rect class=\"mme-g-drug--sedative\" id=\"DmhalJszp6879\" x=\"748\" y=\"72.2222\" width=\"1\" height=\"17.7778\"></rect><rect class=\"mme-g-drug--sedative\" id=\"JjojhYcxw8556\" x=\"749\" y=\"72.2222\" width=\"1\" height=\"17.7778\"></rect><rect class=\"mme-g-drug--sedative\" id=\"JmxunDhls5883\" x=\"750\" y=\"72.2222\" width=\"1\" height=\"17.7778\"></rect><rect class=\"mme-g-drug--sedative\" id=\"ZtkjpWgsa5363\" x=\"751\" y=\"72.2222\" width=\"1\" height=\"17.7778\"></rect><rect class=\"mme-g-drug--sedative\" id=\"ErtiyLzvb3774\" x=\"752\" y=\"72.2222\" width=\"1\" height=\"17.7778\"></rect><rect class=\"mme-g-drug--sedative\" id=\"MezzdCqqm3608\" x=\"753\" y=\"72.2222\" width=\"1\" height=\"17.7778\"></rect><rect class=\"mme-g-drug--sedative\" id=\"KaksoKyel0900\" x=\"754\" y=\"72.2222\" width=\"1\" height=\"17.7778\"></rect><rect class=\"mme-g-drug--sedative\" id=\"DzuwnFqgr0252\" x=\"755\" y=\"72.2222\" width=\"1\" height=\"17.7778\"></rect><rect class=\"mme-g-drug--sedative\" id=\"RkaufIctn9105\" x=\"756\" y=\"72.2222\" width=\"1\" height=\"17.7778\"></rect><rect class=\"mme-g-drug--sedative\" id=\"DlkvzNqos1723\" x=\"757\" y=\"72.2222\" width=\"1\" height=\"17.7778\"></rect><rect class=\"mme-g-drug--sedative\" id=\"RoiipPzkf8789\" x=\"758\" y=\"72.2222\" width=\"1\" height=\"17.7778\"></rect><rect class=\"mme-g-drug--sedative\" id=\"HaoqiZcsi5880\" x=\"759\" y=\"72.2222\" width=\"1\" height=\"17.7778\"></rect><rect class=\"mme-g-drug--sedative\" id=\"XjasiMuww4984\" x=\"760\" y=\"72.2222\" width=\"1\" height=\"17.7778\"></rect><rect class=\"mme-g-drug--sedative\" id=\"FfsieKqjp0139\" x=\"761\" y=\"72.2222\" width=\"1\" height=\"17.7778\"></rect><rect class=\"mme-g-drug--sedative\" id=\"IcbhwCcye1460\" x=\"762\" y=\"72.2222\" width=\"1\" height=\"17.7778\"></rect><rect class=\"mme-g-drug--sedative\" id=\"EpfkkCaeq5812\" x=\"763\" y=\"72.2222\" width=\"1\" " "height=\"17.7778\"></rect><rect class=\"mme-g-drug--sedative\" id=\"BiidxDgsl1273\" x=\"764\" y=\"72.2222\" width=\"1\" height=\"17.7778\"></rect><rect class=\"mme-g-drug--sedative\" id=\"ZmtoiQdzc8422\" x=\"765\" y=\"72.2222\" width=\"1\" height=\"17.7778\"></rect><rect class=\"mme-g-drug--sedative\" id=\"YodhjIrsb6164\" x=\"766\" y=\"72.2222\" width=\"1\" height=\"17.7778\"></rect><rect class=\"mme-g-drug--sedative\" id=\"RxfkbFyhs3165\" x=\"767\" y=\"72.2222\" width=\"1\" height=\"17.7778\"></rect><rect class=\"mme-g-drug--sedative\" id=\"BcjshAlut9525\" x=\"768\" y=\"72.2222\" width=\"1\" height=\"17.7778\"></rect><rect class=\"mme-g-drug--sedative\" id=\"KdjvsIxpk2950\" x=\"769\" y=\"72.2222\" width=\"1\" height=\"17.7778\"></rect><rect class=\"mme-g-drug--sedative\" id=\"HpaxvIfuo8708\" x=\"770\" y=\"72.2222\" width=\"1\" height=\"17.7778\"></rect><rect class=\"mme-g-drug--sedative\" id=\"BubikIaub3434\" x=\"771\" y=\"72.2222\" width=\"1\" height=\"17.7778\"></rect><rect class=\"mme-g-drug--sedative\" id=\"EfixmLrqy9575\" x=\"772\" y=\"72.2222\" width=\"1\" height=\"17.7778\"></rect><rect class=\"mme-g-drug--sedative\" id=\"ZlkcbYnwo2208\" x=\"773\" y=\"72.2222\" width=\"1\" height=\"17.7778\"></rect><rect class=\"mme-g-drug--sedative\" id=\"WxzpmZfcb4042\" x=\"774\" y=\"72.2222\" width=\"1\" height=\"17.7778\"></rect><rect class=\"mme-g-drug--sedative\" id=\"CalzdNhfg6552\" x=\"775\" y=\"72.2222\" width=\"1\" height=\"17.7778\"></rect><rect class=\"mme-g-drug--sedative\" id=\"JsccsJmte4185\" x=\"776\" y=\"72.2222\" width=\"1\" height=\"17.7778\"></rect><rect class=\"mme-g-drug--sedative\" id=\"LqrdqHdfz0559\" x=\"777\" y=\"72.2222\" width=\"1\" height=\"17.7778\"></rect><rect class=\"mme-g-drug--sedative\" id=\"HpzipPvjh8179\" x=\"778\" y=\"72.2222\" width=\"1\" height=\"17.7778\"></rect><rect class=\"mme-g-drug--sedative\" id=\"TkiqrNtib2720\" x=\"779\" y=\"72.2222\" width=\"1\" height=\"17.7778\"></rect><rect class=\"mme-g-drug--sedative\" id=\"BtzbvQoqh3928\" x=\"782\" y=\"72.2222\" width=\"1\" height=\"17.7778\"></rect><rect class=\"mme-g-drug--sedative\" id=\"VrtbwQjly1868\" x=\"783\" y=\"72.2222\" width=\"1\" " "height=\"17.7778\"></rect><rect class=\"mme-g-drug--sedative\" id=\"UeqixMyyd1154\" x=\"784\" y=\"72.2222\" width=\"1\" height=\"17.7778\"></rect><rect class=\"mme-g-drug--sedative\" id=\"HvuzvGbhx1575\" x=\"785\" y=\"72.2222\" width=\"1\" height=\"17.7778\"></rect><rect class=\"mme-g-drug--sedative\" id=\"FiszwHujj3857\" x=\"786\" y=\"72.2222\" width=\"1\" height=\"17.7778\"></rect><rect class=\"mme-g-drug--sedative\" id=\"YwocpHmes2351\" x=\"787\" y=\"72.2222\" width=\"1\" height=\"17.7778\"></rect><rect class=\"mme-g-drug--sedative\" id=\"MqpsuWxnt7690\" x=\"788\" y=\"72.2222\" width=\"1\" height=\"17.7778\"></rect><rect class=\"mme-g-drug--sedative\" id=\"NgritJfdk8048\" x=\"789\" y=\"72.2222\" width=\"1\" height=\"17.7778\"></rect><rect class=\"mme-g-drug--sedative\" id=\"ColixOxzr1451\" x=\"790\" y=\"72.2222\" width=\"1\" height=\"17.7778\"></rect><rect class=\"mme-g-drug--sedative\" id=\"ZedfoHfwc5419\" x=\"791\" y=\"72.2222\" width=\"1\" height=\"17.7778\"></rect><rect class=\"mme-g-drug--sedative\" id=\"DxjusLwpm2337\" x=\"792\" y=\"72.2222\" width=\"1\" height=\"17.7778\"></rect><rect class=\"mme-g-drug--sedative\" id=\"WdidqYbtk4702\" x=\"793\" y=\"72.2222\" width=\"1\" height=\"17.7778\"></rect><rect class=\"mme-g-drug--sedative\" id=\"EhapmNael4490\" x=\"794\" y=\"72.2222\" width=\"1\" height=\"17.7778\"></rect><rect class=\"mme-g-drug--sedative\" id=\"OqbspQwzs0418\" x=\"795\" y=\"72.2222\" width=\"1\" height=\"17.7778\"></rect><rect class=\"mme-g-drug--sedative\" id=\"AdeemSgqg8783\" x=\"796\" y=\"72.2222\" width=\"1\" height=\"17.7778\"></rect><rect class=\"mme-g-drug--sedative\" id=\"MmojyNcaa7059\" x=\"797\" y=\"72.2222\" width=\"1\" height=\"17.7778\"></rect><rect class=\"mme-g-drug--sedative\" id=\"YaudqWbyf2698\" x=\"798\" y=\"72.2222\" width=\"1\" height=\"17.7778\"></rect><rect class=\"mme-g-drug--sedative\" id=\"XukkkFazb0217\" x=\"799\" y=\"72.2222\" width=\"1\" height=\"17.7778\"></rect><rect class=\"mme-g-drug--sedative\" id=\"FwmugZhfy0472\" x=\"800\" y=\"72.2222\" width=\"1\" height=\"17.7778\"></rect><rect class=\"mme-g-drug--sedative\" id=\"VaighCgzy0124\" x=\"801\" y=\"72.2222\" width=\"1\" " "height=\"17.7778\"></rect><rect class=\"mme-g-drug--sedative\" id=\"ZaaxnXmhu2923\" x=\"802\" y=\"72.2222\" width=\"1\" height=\"17.7778\"></rect><rect class=\"mme-g-drug--sedative\" id=\"GzoevBjun8501\" x=\"803\" y=\"72.2222\" width=\"1\" height=\"17.7778\"></rect><rect class=\"mme-g-drug--sedative\" id=\"GksqvJlhy9690\" x=\"804\" y=\"72.2222\" width=\"1\" height=\"17.7778\"></rect><rect class=\"mme-g-drug--sedative\" id=\"SioohPadi8401\" x=\"805\" y=\"72.2222\" width=\"1\" height=\"17.7778\"></rect><rect class=\"mme-g-drug--sedative\" id=\"VexciUifj4718\" x=\"806\" y=\"72.2222\" width=\"1\" height=\"17.7778\"></rect><rect class=\"mme-g-drug--sedative\" id=\"IpmbjDxtx0984\" x=\"807\" y=\"72.2222\" width=\"1\" height=\"17.7778\"></rect><rect class=\"mme-g-drug--sedative\" id=\"LvgpxEzav9976\" x=\"808\" y=\"72.2222\" width=\"1\" height=\"17.7778\"></rect><rect class=\"mme-g-drug--sedative\" id=\"WsehzEmox2637\" x=\"809\" y=\"72.2222\" width=\"1\" height=\"17.7778\"></rect><rect class=\"mme-g-drug--sedative\" id=\"BbttcVgwa6347\" x=\"810\" y=\"72.2222\" width=\"1\" height=\"17.7778\"></rect><rect class=\"mme-g-drug--sedative\" id=\"BquyjRzci8123\" x=\"811\" y=\"72.2222\" width=\"1\" height=\"17.7778\"></rect><rect class=\"mme-g-drug--sedative\" id=\"FfelnHgaf9214\" x=\"813\" y=\"72.2222\" width=\"1\" height=\"17.7778\"></rect><rect class=\"mme-g-drug--sedative\" id=\"NwbsgKoks2842\" x=\"814\" y=\"72.2222\" width=\"1\" height=\"17.7778\"></rect><rect class=\"mme-g-drug--sedative\" id=\"PcdiuVhjr6432\" x=\"815\" y=\"72.2222\" width=\"1\" height=\"17.7778\"></rect><rect class=\"mme-g-drug--sedative\" id=\"XubybFgnk9557\" x=\"816\" y=\"72.2222\" width=\"1\" height=\"17.7778\"></rect><rect class=\"mme-g-drug--sedative\" id=\"NqgfoHfyd2095\" x=\"817\" y=\"72.2222\" width=\"1\" height=\"17.7778\"></rect><rect class=\"mme-g-drug--sedative\" id=\"FmxfoMwrw3535\" x=\"818\" y=\"72.2222\" width=\"1\" height=\"17.7778\"></rect><rect class=\"mme-g-drug--sedative\" id=\"LnydeHawy3849\" x=\"819\" y=\"72.2222\" width=\"1\" height=\"17.7778\"></rect><rect class=\"mme-g-drug--sedative\" id=\"KduvlWcyz7655\" x=\"820\" y=\"72.2222\" width=\"1\" " "height=\"17.7778\"></rect><rect class=\"mme-g-drug--sedative\" id=\"PhthkNows6032\" x=\"821\" y=\"72.2222\" width=\"1\" height=\"17.7778\"></rect><rect class=\"mme-g-drug--sedative\" id=\"TvizpPumu2210\" x=\"822\" y=\"72.2222\" width=\"1\" height=\"17.7778\"></rect><rect class=\"mme-g-drug--sedative\" id=\"YbwszPnvb7902\" x=\"823\" y=\"72.2222\" width=\"1\" height=\"17.7778\"></rect><rect class=\"mme-g-drug--sedative\" id=\"RwwkdZjyv4076\" x=\"824\" y=\"72.2222\" width=\"1\" height=\"17.7778\"></rect><rect class=\"mme-g-drug--sedative\" id=\"OlyzgNped2039\" x=\"825\" y=\"72.2222\" width=\"1\" height=\"17.7778\"></rect><rect class=\"mme-g-drug--sedative\" id=\"BhyprTjqh8485\" x=\"826\" y=\"72.2222\" width=\"1\" height=\"17.7778\"></rect><rect class=\"mme-g-drug--sedative\" id=\"TxtheUynh4808\" x=\"827\" y=\"72.2222\" width=\"1\" height=\"17.7778\"></rect><rect class=\"mme-g-drug--sedative\" id=\"CwjqtIuzm4350\" x=\"828\" y=\"72.2222\" width=\"1\" height=\"17.7778\"></rect><rect class=\"mme-g-drug--sedative\" id=\"SyoljEgub5236\" x=\"829\" y=\"72.2222\" width=\"1\" height=\"17.7778\"></rect><rect class=\"mme-g-drug--sedative\" id=\"CtejgZozt2695\" x=\"830\" y=\"72.2222\" width=\"1\" height=\"17.7778\"></rect><rect class=\"mme-g-drug--sedative\" id=\"DhftnWdbp0093\" x=\"831\" y=\"72.2222\" width=\"1\" height=\"17.7778\"></rect><rect class=\"mme-g-drug--sedative\" id=\"IedrtNkrn0830\" x=\"832\" y=\"72.2222\" width=\"1\" height=\"17.7778\"></rect><rect class=\"mme-g-drug--sedative\" id=\"NrzlqTwnu1863\" x=\"833\" y=\"72.2222\" width=\"1\" height=\"17.7778\"></rect><rect class=\"mme-g-drug--sedative\" id=\"UzcgfNuak2269\" x=\"834\" y=\"72.2222\" width=\"1\" height=\"17.7778\"></rect><rect class=\"mme-g-drug--sedative\" id=\"SniicSrqs0182\" x=\"835\" y=\"72.2222\" width=\"1\" height=\"17.7778\"></rect><rect class=\"mme-g-drug--sedative\" id=\"QbfwnIgxl2637\" x=\"836\" y=\"72.2222\" width=\"1\" height=\"17.7778\"></rect><rect class=\"mme-g-drug--sedative\" id=\"WmrlsBsjh0363\" x=\"837\" y=\"72.2222\" width=\"1\" height=\"17.7778\"></rect><rect class=\"mme-g-drug--sedative\" id=\"FaxdnWjuf6442\" x=\"838\" y=\"72.2222\" width=\"1\" " "height=\"17.7778\"></rect><rect class=\"mme-g-drug--sedative\" id=\"UmwfcUbms4970\" x=\"839\" y=\"72.2222\" width=\"1\" height=\"17.7778\"></rect><rect class=\"mme-g-drug--sedative\" id=\"CfnoyJams2019\" x=\"840\" y=\"72.2222\" width=\"1\" height=\"17.7778\"></rect><rect class=\"mme-g-drug--sedative\" id=\"OwwutYmgp9636\" x=\"841\" y=\"72.2222\" width=\"1\" height=\"17.7778\"></rect><rect class=\"mme-g-drug--sedative\" id=\"PeejjUexi7675\" x=\"842\" y=\"72.2222\" width=\"1\" height=\"17.7778\"></rect><rect class=\"mme-g-drug--sedative\" id=\"BopjeWqng0198\" x=\"843\" y=\"72.2222\" width=\"1\" height=\"17.7778\"></rect><rect class=\"mme-g-drug--sedative\" id=\"RfhrdNdul1377\" x=\"844\" y=\"72.2222\" width=\"1\" height=\"17.7778\"></rect><rect class=\"mme-g-drug--sedative\" id=\"NwykuAxqu1204\" x=\"845\" y=\"72.2222\" width=\"1\" height=\"17.7778\"></rect><rect class=\"mme-g-drug--sedative\" id=\"XooxaUhkd2242\" x=\"846\" y=\"72.2222\" width=\"1\" height=\"17.7778\"></rect><rect class=\"mme-g-drug--sedative\" id=\"FraexJetj6575\" x=\"847\" y=\"72.2222\" width=\"1\" height=\"17.7778\"></rect><rect class=\"mme-g-drug--sedative\" id=\"JlzqiTcaa7285\" x=\"848\" y=\"72.2222\" width=\"1\" height=\"17.7778\"></rect><rect class=\"mme-g-drug--sedative\" id=\"TpfyeXwgc1168\" x=\"849\" y=\"72.2222\" width=\"1\" height=\"17.7778\"></rect><rect class=\"mme-g-drug--sedative\" id=\"JwqnvZuhl0995\" x=\"850\" y=\"72.2222\" width=\"1\" height=\"17.7778\"></rect><rect class=\"mme-g-drug--sedative\" id=\"QqgveTlxy2408\" x=\"851\" y=\"72.2222\" width=\"1\" height=\"17.7778\"></rect><rect class=\"mme-g-drug--sedative\" id=\"InrzyJqss1840\" x=\"852\" y=\"72.2222\" width=\"1\" height=\"17.7778\"></rect><rect class=\"mme-g-drug--sedative\" id=\"KkfnsLysi5256\" x=\"853\" y=\"72.2222\" width=\"1\" height=\"17.7778\"></rect><rect class=\"mme-g-drug--sedative\" id=\"BkjafQuil3825\" x=\"854\" y=\"72.2222\" width=\"1\" height=\"17.7778\"></rect><rect class=\"mme-g-drug--sedative\" id=\"DmthtEwtb5811\" x=\"855\" y=\"72.2222\" width=\"1\" height=\"17.7778\"></rect><rect class=\"mme-g-drug--sedative\" id=\"KyaikPism2870\" x=\"856\" y=\"72.2222\" width=\"1\" " "height=\"17.7778\"></rect><rect class=\"mme-g-drug--sedative\" id=\"OsdeaFstd2225\" x=\"857\" y=\"72.2222\" width=\"1\" height=\"17.7778\"></rect><rect class=\"mme-g-drug--sedative\" id=\"YqmntQvmf7184\" x=\"858\" y=\"72.2222\" width=\"1\" height=\"17.7778\"></rect><rect class=\"mme-g-drug--sedative\" id=\"LapimZjwh3818\" x=\"859\" y=\"72.2222\" width=\"1\" height=\"17.7778\"></rect><rect class=\"mme-g-drug--sedative\" id=\"ZgfafUziz8839\" x=\"860\" y=\"72.2222\" width=\"1\" height=\"17.7778\"></rect><rect class=\"mme-g-drug--sedative\" id=\"WormqDvbz3568\" x=\"861\" y=\"72.2222\" width=\"1\" height=\"17.7778\"></rect><rect class=\"mme-g-drug--sedative\" id=\"DuxsmPwsc3626\" x=\"862\" y=\"72.2222\" width=\"1\" height=\"17.7778\"></rect><rect class=\"mme-g-drug--sedative\" id=\"JvlrfKivl4848\" x=\"863\" y=\"72.2222\" width=\"1\" height=\"17.7778\"></rect><rect class=\"mme-g-drug--sedative\" id=\"NkywaZtmk2741\" x=\"864\" y=\"72.2222\" width=\"1\" height=\"17.7778\"></rect><rect class=\"mme-g-drug--sedative\" id=\"WblujBong4416\" x=\"865\" y=\"72.2222\" width=\"1\" height=\"17.7778\"></rect><rect class=\"mme-g-drug--sedative\" id=\"ClstoKpuw8598\" x=\"866\" y=\"72.2222\" width=\"1\" height=\"17.7778\"></rect><rect class=\"mme-g-drug--sedative\" id=\"DygszYnyd8999\" x=\"867\" y=\"72.2222\" width=\"1\" height=\"17.7778\"></rect><rect class=\"mme-g-drug--sedative\" id=\"FmicjSfka4567\" x=\"868\" y=\"72.2222\" width=\"1\" height=\"17.7778\"></rect><rect class=\"mme-g-drug--sedative\" id=\"WcxblCaai3028\" x=\"869\" y=\"72.2222\" width=\"1\" height=\"17.7778\"></rect><rect class=\"mme-g-drug--sedative\" id=\"GsyouRgxf7937\" x=\"870\" y=\"72.2222\" width=\"1\" height=\"17.7778\"></rect><rect class=\"mme-g-drug--sedative\" id=\"FlcxlUzjs9074\" x=\"871\" y=\"72.2222\" width=\"1\" height=\"17.7778\"></rect><rect class=\"mme-g-drug--sedative\" id=\"XpsfiKzys8484\" x=\"872\" y=\"54.4444\" width=\"1\" height=\"35.5556\"></rect><rect class=\"mme-g-drug--sedative\" id=\"TflqoVtap1774\" x=\"873\" y=\"72.2222\" width=\"1\" height=\"17.7778\"></rect><rect class=\"mme-g-drug--sedative\" id=\"AkwzxJgac3207\" x=\"874\" y=\"72.2222\" width=\"1\" " "height=\"17.7778\"></rect><rect class=\"mme-g-drug--sedative\" id=\"BxkuoJlom5756\" x=\"875\" y=\"72.2222\" width=\"1\" height=\"17.7778\"></rect><rect class=\"mme-g-drug--sedative\" id=\"HpdeqCkof3848\" x=\"876\" y=\"72.2222\" width=\"1\" height=\"17.7778\"></rect><rect class=\"mme-g-drug--sedative\" id=\"TxlvuOnah4602\" x=\"877\" y=\"72.2222\" width=\"1\" height=\"17.7778\"></rect><rect class=\"mme-g-drug--sedative\" id=\"GnabpOpdp1101\" x=\"878\" y=\"72.2222\" width=\"1\" height=\"17.7778\"></rect><rect class=\"mme-g-drug--sedative\" id=\"TvtmjJrba7205\" x=\"879\" y=\"72.2222\" width=\"1\" height=\"17.7778\"></rect><rect class=\"mme-g-drug--sedative\" id=\"RcxeuNmyy6319\" x=\"880\" y=\"72.2222\" width=\"1\" height=\"17.7778\"></rect><rect class=\"mme-g-drug--sedative\" id=\"UlpclJair8962\" x=\"881\" y=\"72.2222\" width=\"1\" height=\"17.7778\"></rect><rect class=\"mme-g-drug--sedative\" id=\"QzcvvGnaf0399\" x=\"882\" y=\"72.2222\" width=\"1\" height=\"17.7778\"></rect><rect class=\"mme-g-drug--sedative\" id=\"MfjlhJsqa2382\" x=\"883\" y=\"72.2222\" width=\"1\" height=\"17.7778\"></rect><rect class=\"mme-g-drug--sedative\" id=\"XzhauGadv5167\" x=\"884\" y=\"72.2222\" width=\"1\" height=\"17.7778\"></rect><rect class=\"mme-g-drug--sedative\" id=\"IucmlFzdx5800\" x=\"885\" y=\"72.2222\" width=\"1\" height=\"17.7778\"></rect><rect class=\"mme-g-drug--sedative\" id=\"IdncyFymo7146\" x=\"886\" y=\"72.2222\" width=\"1\" height=\"17.7778\"></rect><rect class=\"mme-g-drug--sedative\" id=\"VimsoJrxz0190\" x=\"887\" y=\"72.2222\" width=\"1\" height=\"17.7778\"></rect><rect class=\"mme-g-drug--sedative\" id=\"ZbimjOvhy4167\" x=\"888\" y=\"72.2222\" width=\"1\" height=\"17.7778\"></rect><rect class=\"mme-g-drug--sedative\" id=\"YtgptOkad9388\" x=\"889\" y=\"72.2222\" width=\"1\" height=\"17.7778\"></rect><rect class=\"mme-g-drug--sedative\" id=\"VbwssIktz3224\" x=\"890\" y=\"72.2222\" width=\"1\" height=\"17.7778\"></rect><rect class=\"mme-g-drug--sedative\" id=\"GbuzfDhbs8058\" x=\"891\" y=\"72.2222\" width=\"1\" height=\"17.7778\"></rect><rect class=\"mme-g-drug--sedative\" id=\"KnoldAhwl4278\" x=\"892\" y=\"72.2222\" width=\"1\" " "height=\"17.7778\"></rect><rect class=\"mme-g-drug--sedative\" id=\"RncddCxoy4798\" x=\"893\" y=\"72.2222\" width=\"1\" height=\"17.7778\"></rect><rect class=\"mme-g-drug--sedative\" id=\"CdylkAqlz7339\" x=\"894\" y=\"72.2222\" width=\"1\" height=\"17.7778\"></rect><rect class=\"mme-g-drug--sedative\" id=\"FcxuwTsyn8427\" x=\"895\" y=\"72.2222\" width=\"1\" height=\"17.7778\"></rect><rect class=\"mme-g-drug--sedative\" id=\"GytjpLggz7648\" x=\"896\" y=\"72.2222\" width=\"1\" height=\"17.7778\"></rect><rect class=\"mme-g-drug--sedative\" id=\"TjlvtZofr1692\" x=\"897\" y=\"63.3333\" width=\"1\" height=\"26.6667\"></rect><rect class=\"mme-g-drug--sedative\" id=\"WlcdjCpia5989\" x=\"898\" y=\"63.3333\" width=\"1\" height=\"26.6667\"></rect><rect class=\"mme-g-drug--sedative\" id=\"WkpevCmha6706\" x=\"899\" y=\"63.3333\" width=\"1\" height=\"26.6667\"></rect><rect class=\"mme-g-drug--sedative\" id=\"YsphzNkpo3833\" x=\"900\" y=\"63.3333\" width=\"1\" height=\"26.6667\"></rect><rect class=\"mme-g-drug--sedative\" id=\"OynyiDevj4934\" x=\"901\" y=\"63.3333\" width=\"1\" height=\"26.6667\"></rect><rect class=\"mme-g-drug--sedative\" id=\"ChpgcNnre7845\" x=\"902\" y=\"81.1111\" width=\"1\" height=\"8.39232\"></rect><rect class=\"mme-g-drug--sedative\" id=\"YswwhUiyr5658\" x=\"903\" y=\"81.1111\" width=\"1\" height=\"8.22576\"></rect><rect class=\"mme-g-drug--sedative\" id=\"EopxxSfsp0788\" x=\"904\" y=\"81.1111\" width=\"1\" height=\"8.59037\"></rect><rect class=\"mme-g-drug--sedative\" id=\"XnbdsHjar7953\" x=\"905\" y=\"81.1111\" width=\"1\" height=\"8.24120\"></rect><rect class=\"mme-g-drug--sedative\" id=\"JjphuLhdq4748\" x=\"906\" y=\"81.1111\" width=\"1\" height=\"8.17572\"></rect><rect class=\"mme-g-drug--sedative\" id=\"ZkfexDede1434\" x=\"907\" y=\"81.1111\" width=\"1\" height=\"8.99184\"></rect><rect class=\"mme-g-drug--sedative\" id=\"XhfjnYoxu5431\" x=\"908\" y=\"81.1111\" width=\"1\" height=\"8.03235\"></rect><rect class=\"mme-g-drug--sedative\" id=\"CznqeRdgp6163\" x=\"909\" y=\"81.1111\" width=\"1\" height=\"8.69431\"></rect><rect class=\"mme-g-drug--sedative\" id=\"KqicqLjze5907\" x=\"910\" y=\"81.1111\" width=\"1\" " "height=\"8.84966\"></rect><rect class=\"mme-g-drug--sedative\" id=\"GbmhpLuun1700\" x=\"911\" y=\"81.1111\" width=\"1\" height=\"8.68524\"></rect><rect class=\"mme-g-drug--sedative\" id=\"ZhthmYids7355\" x=\"912\" y=\"81.1111\" width=\"1\" height=\"8.74138\"></rect><rect class=\"mme-g-drug--sedative\" id=\"IlvovIhhm5686\" x=\"913\" y=\"81.1111\" width=\"1\" height=\"8.14724\"></rect><rect class=\"mme-g-drug--sedative\" id=\"CugqbAflh2524\" x=\"914\" y=\"81.1111\" width=\"1\" height=\"8.48430\"></rect><rect class=\"mme-g-drug--sedative\" id=\"KspmkMrrc9889\" x=\"915\" y=\"81.1111\" width=\"1\" height=\"8.84469\"></rect><rect class=\"mme-g-drug--sedative\" id=\"XsgnzJqlt9972\" x=\"916\" y=\"81.1111\" width=\"1\" height=\"8.13649\"></rect><rect class=\"mme-g-drug--sedative\" id=\"YjvdnQgiz6179\" x=\"917\" y=\"81.1111\" width=\"1\" height=\"8.86260\"></rect><rect class=\"mme-g-drug--sedative\" id=\"AzymjYcph0419\" x=\"918\" y=\"81.1111\" width=\"1\" height=\"8.48872\"></rect><rect class=\"mme-g-drug--sedative\" id=\"IbczqXjdt7565\" x=\"919\" y=\"81.1111\" width=\"1\" height=\"8.84150\"></rect><rect class=\"mme-g-drug--sedative\" id=\"HeliaVfdh3203\" x=\"920\" y=\"81.1111\" width=\"1\" height=\"8.91116\"></rect><rect class=\"mme-g-drug--sedative\" id=\"UcwazJnbk2613\" x=\"921\" y=\"81.1111\" width=\"1\" height=\"8.52709\"></rect><rect class=\"mme-g-drug--sedative\" id=\"YqxzoZgcq7680\" x=\"922\" y=\"81.1111\" width=\"1\" height=\"8.35020\"></rect><rect class=\"mme-g-drug--sedative\" id=\"YpnahWxes0891\" x=\"923\" y=\"81.1111\" width=\"1\" height=\"8.56557\"></rect><rect class=\"mme-g-drug--sedative\" id=\"VkiurBhnk9662\" x=\"924\" y=\"81.1111\" width=\"1\" height=\"8.30835\"></rect><rect class=\"mme-g-drug--sedative\" id=\"UfdowFvic4563\" x=\"925\" y=\"81.1111\" width=\"1\" height=\"8.01849\"></rect><rect class=\"mme-g-drug--sedative\" id=\"NmsakWmon8274\" x=\"926\" y=\"81.1111\" width=\"1\" height=\"8.38634\"></rect><rect class=\"mme-g-drug--sedative\" id=\"DnsxiSeoy0399\" x=\"927\" y=\"72.2222\" width=\"1\" height=\"17.7778\"></rect><rect class=\"mme-g-drug--sedative\" id=\"AqvmtAofw8303\" x=\"928\" y=\"72.2222\" width=\"1\" " "height=\"17.7778\"></rect><rect class=\"mme-g-drug--sedative\" id=\"DheajTubp1090\" x=\"929\" y=\"72.2222\" width=\"1\" height=\"17.7778\"></rect><rect class=\"mme-g-drug--sedative\" id=\"PysklOcjh6549\" x=\"930\" y=\"72.2222\" width=\"1\" height=\"17.7778\"></rect><rect class=\"mme-g-drug--sedative\" id=\"AirhhIjaq4793\" x=\"931\" y=\"72.2222\" width=\"1\" height=\"17.7778\"></rect><rect class=\"mme-g-drug--sedative\" id=\"CrhdjCiet6344\" x=\"932\" y=\"72.2222\" width=\"1\" height=\"17.7778\"></rect><rect class=\"mme-g-drug--sedative\" id=\"VifdyOazh3066\" x=\"933\" y=\"72.2222\" width=\"1\" height=\"17.7778\"></rect><rect class=\"mme-g-drug--sedative\" id=\"FwnjuZbcg5302\" x=\"934\" y=\"72.2222\" width=\"1\" height=\"17.7778\"></rect><rect class=\"mme-g-drug--sedative\" id=\"DjpcqLvjq5674\" x=\"935\" y=\"72.2222\" width=\"1\" height=\"17.7778\"></rect><rect class=\"mme-g-drug--sedative\" id=\"VaycbVznr6870\" x=\"936\" y=\"72.2222\" width=\"1\" height=\"17.7778\"></rect><rect class=\"mme-g-drug--sedative\" id=\"BjyodYgdt3281\" x=\"937\" y=\"72.2222\" width=\"1\" height=\"17.7778\"></rect><rect class=\"mme-g-drug--sedative\" id=\"CieukWdcp6024\" x=\"938\" y=\"72.2222\" width=\"1\" height=\"17.7778\"></rect><rect class=\"mme-g-drug--sedative\" id=\"XtbafGpii9077\" x=\"939\" y=\"72.2222\" width=\"1\" height=\"17.7778\"></rect><rect class=\"mme-g-drug--sedative\" id=\"HkyyjMjgh1610\" x=\"940\" y=\"72.2222\" width=\"1\" height=\"17.7778\"></rect><rect class=\"mme-g-drug--sedative\" id=\"AldhyWedb2270\" x=\"941\" y=\"72.2222\" width=\"1\" height=\"17.7778\"></rect><rect class=\"mme-g-drug--sedative\" id=\"PdhkmMggu3845\" x=\"942\" y=\"72.2222\" width=\"1\" height=\"17.7778\"></rect><rect class=\"mme-g-drug--sedative\" id=\"HghlrGqjx4846\" x=\"943\" y=\"72.2222\" width=\"1\" height=\"17.7778\"></rect><rect class=\"mme-g-drug--sedative\" id=\"NtlbsGoiu0978\" x=\"944\" y=\"72.2222\" width=\"1\" height=\"17.7778\"></rect><rect class=\"mme-g-drug--sedative\" id=\"NgkbsCuyj4504\" x=\"945\" y=\"72.2222\" width=\"1\" height=\"17.7778\"></rect><rect class=\"mme-g-drug--sedative\" id=\"VxqqhZswu5296\" x=\"946\" y=\"72.2222\" width=\"1\" " "height=\"17.7778\"></rect><rect class=\"mme-g-drug--sedative\" id=\"OnzpaQxvf7520\" x=\"947\" y=\"72.2222\" width=\"1\" height=\"17.7778\"></rect><rect class=\"mme-g-drug--sedative\" id=\"CpolyYaun0209\" x=\"948\" y=\"72.2222\" width=\"1\" height=\"17.7778\"></rect><rect class=\"mme-g-drug--sedative\" id=\"DpbshHgsh3784\" x=\"949\" y=\"72.2222\" width=\"1\" height=\"17.7778\"></rect><rect class=\"mme-g-drug--sedative\" id=\"XltrhCiyv3077\" x=\"950\" y=\"72.2222\" width=\"1\" height=\"17.7778\"></rect><rect class=\"mme-g-drug--sedative\" id=\"NsfaiAnkl4257\" x=\"951\" y=\"72.2222\" width=\"1\" height=\"17.7778\"></rect><rect class=\"mme-g-drug--sedative\" id=\"JovzzCtnc7334\" x=\"952\" y=\"72.2222\" width=\"1\" height=\"17.7778\"></rect><rect class=\"mme-g-drug--sedative\" id=\"OraekKxqo6552\" x=\"953\" y=\"72.2222\" width=\"1\" height=\"17.7778\"></rect><rect class=\"mme-g-drug--sedative\" id=\"AxbfiFfca5954\" x=\"954\" y=\"72.2222\" width=\"1\" height=\"17.7778\"></rect><rect class=\"mme-g-drug--sedative\" id=\"LindhXqmq7543\" x=\"955\" y=\"72.2222\" width=\"1\" height=\"17.7778\"></rect><rect class=\"mme-g-drug--sedative\" id=\"BegkkPums9293\" x=\"956\" y=\"72.2222\" width=\"1\" height=\"17.7778\"></rect></svg></DIV></DIV></DIV></DIV><p class=\"nc-panel\"><p class=\"nc-panel__footer nc-panel__footer--borderless\">        *Per CDC guidance, the MME conversion factors prescribed or provided as part of the        medication-assisted treatment for opioid use disorder should not be used to        benchmark against dosage thresholds meant for opioids prescribed for pain.        Buprenorphine products have no agreed upon morphine equivalency, and as partial        opioid agonists, are not expected to be associated with overdose risk in the same        dose-dependent manner as doses for full agonist opioids. MME = morphine milligram        equivalents. LME = Lorazepam milligram equivalents. MG = dose in milligrams.       </DIV></DIV><p class=\"nc-section\"><p class=\"nc-section__header\"><H2 data-toggle=\"#data-analysis\">Data Analysis<SPAN " "class=\"nc-expandable\"></SPAN></H2>         </DIV><p id=\"data-analysis\" style=\"display: none;\"><TABLE class=\"wv-qdqp-krrlhtpf-table\"><THEAD><TR><TH></TH><TH></TH><TH></TH><TH></TH><TH></TH></TR></THEAD><TBODY><TR><TD></TD><TD><SPAN class=\"az-zdnlpogj-odbt\"></SPAN><SPAN class=\"wa-sqbixygq-ytop ip-opilnmdy-qmzq--right\"></SPAN></TD><TD><SPAN class=\"kx-siahltqo-ttju\"></SPAN><SPAN class=\"qt-gkjkkogo-rvgj su-pjfjbmba-uzvt--right\"></SPAN></TD><TD><SPAN class=\"mp-mwbwmafu-xbxe\"></SPAN><SPAN class=\"mo-pzrvxqsg-kbxl ar-cumhdhci-gvwv--right\"></SPAN></TD><TD><SPAN class=\"pt-blhhbxaz-ajqb\"></SPAN><SPAN class=\"ka-rkyfqbsh-hefd ds-zlaernpr-dvev--right\"></SPAN></TD></TR><TR><TD></TD><TD><SPAN class=\"le-utbyzsew-rnjx\"></SPAN><SPAN class=\"bb-rcpkmemd-dyje xl-qjulpbog-kjoh--right\"></SPAN></TD><TD><SPAN class=\"rc-qdhxtbxf-fekt\"></SPAN><SPAN class=\"ht-fypiwskn-zpuz hp-drndkfid-gatx--right\"></SPAN></TD><TD><SPAN class=\"ds-tazhnhkb-nlkn\"></SPAN><SPAN class=\"lp-qtcnxljy-mgmo vm-gclnqfzf-bnov--right\"></SPAN></TD><TD><SPAN class=\"gn-iizbcowo-luyq\"></SPAN><SPAN class=\"cj-cnytidea-jvyk ji-rufutjsz-mjzl--right\"></SPAN></TD></TR><TR><TD></TD><TD><SPAN class=\"ea-bswrachk-roxe\"></SPAN><SPAN class=\"gb-yqmfzxqw-jtov tk-kliqwxwn-usnb--right\"></SPAN></TD><TD><SPAN class=\"qc-xdlakheo-zajt\"></SPAN><SPAN class=\"ot-zvrnwhom-vpwb gg-oohtvmmb-djoq--right\"></SPAN></TD><TD><SPAN class=\"hu-evoxwidb-mkbl\"></SPAN><SPAN class=\"nj-ptmxgjwk-cvgx xe-tugyrbur-vcqn--right\"></SPAN></TD><TD><SPAN class=\"de-ytdozxxx-gqfu\"></SPAN><SPAN class=\"uc-wdcwgkvu-piyn tw-szijokjf-vnoo--right\"></SPAN></TD></TR><TR><TD><SUP></SUP></TD><TD><SPAN class=\"tl-tzbapnld-wjev\"></SPAN><SPAN class=\"ic-mprjpihy-rupr lp-lejjfkqc-soqj--right\"></SPAN></TD><TD><SPAN class=\"ac-folsasns-hmth\"></SPAN><SPAN class=\"ls-hzymgnbg-lodg lu-nvcqwktz-zuvi--right\"></SPAN></TD><TD><SPAN class=\"ph-dloiucci-xjqb\"></SPAN><SPAN class=\"gj-ecgucuya-qxrc cg-bgmpbhsj-zngx--right\"></SPAN></TD><TD><SPAN " "class=\"sw-avgxbtxv-qubi\"></SPAN><SPAN class=\"bu-jpgowano-rjrm qz-pjknfnca-zypi--right\"></SPAN></TD></TR></TBODY></TABLE><TABLE class=\"tt-nefr-oydtonwq-table\"><THEAD><TR><TH></TH><TH></TH><TH></TH><TH></TH><TH></TH></TR></THEAD><TBODY><TR><TD></TD><TD><SPAN class=\"cg-nqeuuufs-sojk\"></SPAN><SPAN class=\"gm-lqgyzoul-qanf gm-hlcvkilr-ikuv--right\"></SPAN></TD><TD><SPAN class=\"ly-nfakzbjr-hkyi\"></SPAN><SPAN class=\"fq-mbupozvb-aofa tt-xszmkwgr-yody--right\"></SPAN></TD><TD><SPAN class=\"hf-rgmrpjlt-cuar\"></SPAN><SPAN class=\"jy-oafmfhnx-kotv sb-xozskwdn-ibgq--right\"></SPAN></TD><TD><SPAN class=\"yo-uwpfkqay-bthn\"></SPAN><SPAN class=\"iv-jxtdksgv-uvah pd-uaeonbdr-dxzz--right\"></SPAN></TD></TR><TR><TD></TD><TD><SPAN class=\"xx-rgkwqvix-oocv\"></SPAN><SPAN class=\"jn-xesifjno-ujqn gc-mvcmiydm-rgig--right\"></SPAN></TD><TD><SPAN class=\"sk-pxscllaq-vdkv\"></SPAN><SPAN class=\"aq-sjuribxk-wnsg nu-oehzkvrb-dasm--right\"></SPAN></TD><TD><SPAN class=\"db-wdaswbsf-nqwy\"></SPAN><SPAN class=\"sz-rconzxmo-ysrj py-kspbtrkk-nsiy--right\"></SPAN></TD><TD><SPAN class=\"lw-luymuqch-lycx\"></SPAN><SPAN class=\"fk-igncutvm-feko fg-luhqvonx-pvmh--right\"></SPAN></TD></TR><TR><TD></TD><TD><SPAN class=\"xv-ojteouuz-hxhf\"></SPAN><SPAN class=\"gg-dvdpcpar-iczl ck-uxgktvsk-hpyj--right\"></SPAN></TD><TD><SPAN class=\"kb-muwroggf-doux\"></SPAN><SPAN class=\"ug-cqqdmgfi-cpgy at-mwgbxxln-hllp--right\"></SPAN></TD><TD><SPAN class=\"ex-cftgswtf-ywis\"></SPAN><SPAN class=\"em-nmognydx-fwxs dm-scboybyx-vloi--right\"></SPAN></TD><TD><SPAN class=\"eb-rdyaqwhi-mnlk\"></SPAN><SPAN class=\"wq-lpbbgeok-fcys yq-uzfhzgjt-ukwe--right\"></SPAN></TD></TR><TR><TD><SUP></SUP></TD><TD><SPAN class=\"xz-tfctrogj-jpyc\"></SPAN><SPAN class=\"vv-vcueykri-qsnf vf-emxqadep-lvur--right\"></SPAN></TD><TD><SPAN class=\"ay-yntbjmer-tbfb\"></SPAN><SPAN class=\"os-lcrulhbs-vgpv kr-bjcgobvs-ekik--right\"></SPAN></TD><TD><SPAN class=\"bc-guekbngy-tybi\"></SPAN><SPAN class=\"zw-xxyecrev-qucr " "st-wriqrgls-utrv--right\"></SPAN></TD><TD><SPAN class=\"te-wkltstuq-cpmf\"></SPAN><SPAN class=\"zx-sczzcpiy-ffti kl-atfsfyul-rvay--right\"></SPAN></TD></TR></TBODY></TABLE><TABLE class=\"tc-xyqp-hxcpeeoq-table xm-savt-pczlfrfz-table--small-spacing\"><THEAD><TR><TH></TH><TH></TH><TH></TH><TH></TH><TH></TH></TR></THEAD><TBODY><TR><TD></TD><TD><SPAN class=\"wo-acxgxziy-ocpf\"></SPAN><SPAN class=\"gc-opyynxko-hmzv mk-scuxsyji-vcxy--right\"></SPAN></TD><TD><SPAN class=\"le-bgoznoqo-aeyx\"></SPAN><SPAN class=\"qi-igjhrpbm-gqmh gw-ampzqgha-nywu--right\"></SPAN></TD><TD><SPAN class=\"jl-aikjyazt-ququ\"></SPAN><SPAN class=\"lb-iuypwvha-pmfp yq-zxbarpsg-svrr--right\"></SPAN></TD><TD><SPAN class=\"or-gmzyabth-qjnw\"></SPAN><SPAN class=\"wx-ypvnkgrw-dxpw ll-mwvtkrdo-ozwi--right\"></SPAN></TD></TR><TR><TD></TD><TD><SPAN class=\"ne-ypwwraiq-wtht\"></SPAN><SPAN class=\"ky-bfdsgqiw-jqtz lc-xnrowcdb-vlos--right\"></SPAN></TD><TD><SPAN class=\"dv-xgsidudy-cawi\"></SPAN><SPAN class=\"ui-jijxuanw-vpiz il-mvljfltn-nlut--right\"></SPAN></TD><TD><SPAN class=\"nn-eacbgfie-lmha\"></SPAN><SPAN class=\"ud-fxeocpeu-qtam lm-ojugbskl-naqy--right\"></SPAN></TD><TD><SPAN class=\"ol-gvvoukwr-wxjn\"></SPAN><SPAN class=\"zm-xzmturdm-urgp sg-okdtwspa-ggzz--right\"></SPAN></TD></TR><TR><TD></TD><TD><SPAN class=\"vu-ltsndhol-xlqv\"></SPAN><SPAN class=\"ja-xgypbxto-zxja au-nhohbfuj-blag--right\"></SPAN></TD><TD><SPAN class=\"vz-jrymxoxj-kujq\"></SPAN><SPAN class=\"fp-ojzulvkq-tedw ec-ubdajsym-yqcw--right\"></SPAN></TD><TD><SPAN class=\"xl-mfoowzds-msuf\"></SPAN><SPAN class=\"ta-ihfwzsqh-cmkc kv-akhjitia-ffhs--right\"></SPAN></TD><TD><SPAN class=\"py-kshsopub-xjtq\"></SPAN><SPAN class=\"ti-ikgfdecn-hbmz hp-mejpzeyq-urtg--right\"></SPAN></TD></TR><TR><TD><SUP></SUP></TD><TD><SPAN class=\"pg-khufogpv-kqnk\"></SPAN><SPAN class=\"to-qwnxytni-qlfp qw-trwdjhcx-ouqp--right\"></SPAN></TD><TD><SPAN class=\"ix-pazrttzk-famd\"></SPAN><SPAN class=\"gp-xezajepq-wxpk ra-vnfgglis-mbth--right\"></SPAN></TD><TD><SPAN " "class=\"dh-zuzqzeuw-hfju\"></SPAN><SPAN class=\"su-tmfwkdca-fwzq ss-wbeipwqq-ycyy--right\"></SPAN></TD><TD><SPAN class=\"tk-cxgpdjac-mgwd\"></SPAN><SPAN class=\"od-uzfybpay-tffx gn-wkiasykq-swty--right\"></SPAN></TD></TR></TBODY></TABLE><p class=\"nc-panel__footer\"><SUP></SUP></DIV></DIV></DIV><p class=\"nc-panel\"><p class=\"nc-panel__header nc-panel__header--columnar\"><p class=\"nc-panel__header-col\"><H4>Summary</H4><p class=\"nc-legend\"><p class=\"nc-legend-item\"><SPAN title=\"The total number of prescriptions listed below\" data-toggle=\"tooltip\">            Total Prescriptions:           </SPAN>           <SPAN class=\"nc-legend-item__spacer\"></SPAN>           <SPAN title=\"The total number of prescriptions listed below\" data-toggle=\"tooltip\">            25           </SPAN>         </DIV><p class=\"nc-legend-item\"><SPAN title=\"The total number of prescribers listed below\" data-toggle=\"tooltip\">            Total Prescribers:           </SPAN>           <SPAN class=\"nc-legend-item__spacer\"></SPAN>           <SPAN title=\"The total number of prescribers listed below\" data-toggle=\"tooltip\">            3           </SPAN>         </DIV><p class=\"nc-legend-item\"><SPAN title=\"The total number of pharmacies listed below\" data-toggle=\"tooltip\">            Total Pharmacies:           </SPAN>           <SPAN class=\"nc-legend-item__spacer\"></SPAN>           <SPAN title=\"The total number of pharmacies listed below\" data-toggle=\"tooltip\">            1           </SPAN>         </DIV></DIV></DIV><p class=\"nc-panel__header-col\"><H4>Narcotics<SUP>*</SUP> <SMALL>(excluding Buprenorphine)</SMALL></H4><p class=\"nc-legend\"><p class=\"nc-legend-item\"><SPAN title=\"\" data-toggle=\"tooltip\" data-html=\"true\" data-tooltip=\"det-yszvabeh-nlbzgvjwz-qty\" data-original-title=\"Estimated remaining quantity of medication<br>\">            Current Qty:           </SPAN>           <SPAN class=\"nc-legend-item__spacer\"></SPAN>           <SPAN title=\"Estimated remaining " "quantity of medication<br>\" id=\"wrw-durkijxp-dvstrpamd-qty\" data-toggle=\"tooltip\" data-html=\"true\" data-tooltip=\"ieo-byemwxgg-xjxvhlhmx-qty\">0</SPAN>         </DIV><p class=\"nc-legend-item\"><SPAN>            Current MME/day:           </SPAN>           <SPAN class=\"nc-legend-item__spacer\"></SPAN>           <SPAN id=\"lpp-ctwvwugz-xixhz\">0.00</SPAN>         </DIV><p class=\"nc-legend-item\"><SPAN title=\"The daily average of all morphine milligram equivalents for the last 30 days\" data-toggle=\"tooltip\">            30 Day Avg MME/day:           </SPAN>           <SPAN class=\"nc-legend-item__spacer\"></SPAN>           <SPAN title=\"The daily average of all morphine milligram equivalents for the last 30 days\" id=\"mme-narcotic-thirty-day\" data-toggle=\"tooltip\">0.00</SPAN>         </DIV></DIV></DIV><p class=\"nc-panel__header-col\"><H4>Sedatives<SUP>*</SUP></H4><p class=\"nc-legend\"><p class=\"nc-legend-item\"><SPAN title=\"Estimated remaining quantity of medication<br>\" data-toggle=\"tooltip\" data-html=\"true\" data-tooltip=\"lkl-ryefnjiy-pdmxgmfsq-qty\">              Current Qty:             </SPAN>             <SPAN class=\"nc-legend-item__spacer\"></SPAN>             <SPAN title=\"Estimated remaining quantity of medication<br>\" id=\"fyb-nghcymrt-motlsljfo-qty\" data-toggle=\"tooltip\" data-html=\"true\" data-tooltip=\"xcr-gbmkgauf-eefwtzmqs-qty\">0</SPAN>           </DIV><p class=\"nc-legend-item\"><SPAN>              Current LME/day:             </SPAN>             <SPAN class=\"nc-legend-item__spacer\"></SPAN>             <SPAN id=\"ikb-ouavzhbw-msdex\">4.00</SPAN>           </DIV><p class=\"nc-legend-item\"><SPAN>              30 Day Avg LME/day:             </SPAN>             <SPAN class=\"nc-legend-item__spacer\"></SPAN>             <SPAN id=\"mme-sedative-thirty-day\">3.87</SPAN>           </DIV></DIV></DIV><p class=\"nc-panel__header-col\"><H4>Buprenorphine<SUP>*</SUP></H4><p class=\"nc-legend\"><p class=\"nc-legend-item\"><SPAN title=\"Estimated remaining " "quantity of medication<br>\" data-toggle=\"tooltip\" data-html=\"true\" data-tooltip=\"mme-buprenorphine-remaining-qty\">            Current Qty:           </SPAN>           <SPAN class=\"nc-legend-item__spacer\"></SPAN>           <SPAN title=\"Estimated remaining quantity of medication<br>\" id=\"mme-buprenorphine-remaining-qty\" data-toggle=\"tooltip\" data-html=\"true\" data-tooltip=\"mme-buprenorphine-remaining-qty\">0</SPAN>         </DIV><p class=\"nc-legend-item\"><SPAN>            Current mg/day:           </SPAN>           <SPAN class=\"nc-legend-item__spacer\"></SPAN>           <SPAN id=\"mme-buprenorphine-today\">0.00</SPAN>         </DIV><p class=\"nc-legend-item\"><SPAN>            30 Day Avg mg/day:           </SPAN>           <SPAN class=\"nc-legend-item__spacer\"></SPAN>           <SPAN id=\"mme-buprenorphine-thirty-day\">0.00</SPAN>         </DIV></DIV></DIV></DIV></DIV><p class=\"nc-panel\"><p class=\"nc-panel__header\"><p class=\"nc-panel__notice yy-ilnvjuasecfw-xmnbaleve\">      *Highlighted drugs could not be included in score calculations     </DIV><H4>Prescriptions</H4><p class=\"nc-legend\"><p><SPAN title=\"The total number of prescriptions listed below\" data-toggle=\"tooltip\">          Total Prescriptions: 25         </SPAN>       </DIV><p><SPAN title=\"Total Private pay prescriptions\" data-toggle=\"tooltip\">            Total Private Pay: 4           </SPAN>         </DIV></DIV></DIV><TABLE class=\"nc-table\" basi-rbbnbcsc-exvdt=\"\"><THEAD><TR><TH class=\"nc-col--sortable nc-col--date\">Fill Date</TH><TH class=\"nc-col--sortable nc-col--id\"><SPAN title=\"The demographic ID for this prescription.<br/>Links the prescription to the demographic table ID.\" data-toggle=\"tooltip\" data-html=\"true\">            ID           </SPAN>         </TH><TH class=\"nc-col--sortable nc-col--date\">Written</TH><TH class=\"nc-col--sortable\">Drug</TH><TH class=\"nc-col--sortable nc-col--qty\">Qty</TH><TH class=\"nc-col--sortable nc-col--days\">Days</TH><TH " "class=\"nc-col--sortable nc-col--prescriber\">Prescriber</TH><TH class=\"nc-col--sortable nc-col--rx\">Rx #</TH><TH class=\"nc-col--sortable nc-col--pharmacy\">Pharmacy</TH><TH class=\"nc-col--sortable nc-col--refill\"><SPAN title=\"Number of prescription refills. 0 indicates original dispensing, 01-99 is the refill number\" data-toggle=\"tooltip\">            Refill           </SPAN>         </TH><TH class=\"nc-col--sortable nc-col--dose\">Daily Dose<SUP>*</SUP></TH><TH class=\"nc-col--sortable nc-col--payment\">[facility] Type</TH><TH class=\"nc-col--sortable nc-col--\"><SPAN title=\"The State that provided this prescription record\" data-toggle=\"tooltip\">                       </SPAN>         </TH></TR></THEAD><TBODY><TR><TD data-sort=\"[id number]\">            07/06/2020           </TD><TD><SPAN class=\"nc-gapless\"><SPAN>1</SPAN>             </SPAN>           </TD><TD data-sort=\"[id number]\">            05/04/2020           </TD><TD><p class=\"nc-drug\"><p class=\"nc-drug__name\">                Clonazepam 1 MG Tablet               </DIV><p class=\"nc-drug__icons\"></DIV></DIV></TD><TD>60.00</TD><TD>30</TD><TD data-sort=\"Jocelyn L Ramos\"><SPAN title=\"Jocelyn L Ramos<br/>[address]<br/>[place] [address]\" data-toggle=\"tooltip\" data-html=\"true\">              Me War             </SPAN>           </TD><TD><SPAN title=\"[id number]\" class=\"nc-truncate\" data-toggle=\"tooltip\">              [id number]             </SPAN>           </TD><TD><SPAN title=\"[facility] [place]/>[address]<br/>[place] [address]\" data-toggle=\"tooltip\" data-html=\"true\">              Familia (1292)             </SPAN>           </TD><TD>              2           </TD><TD data-sort=\"4.0\">            4.00 LME           </TD><TD><SPAN title=\"Medicare Insurance coverage\" class=\"nc-truncate\" data-toggle=\"tooltip\">                Medicare               </SPAN>             </TD><TD[place]</TD></TR><TR><TD data-sort=\"20200602\">            06/02/2020           </TD><TD><SPAN " "class=\"nc-gapless\"><SPAN>1</SPAN>             </SPAN>           </TD><TD data-sort=\"20200504\">            05/04/2020           </TD><TD><p class=\"nc-drug\"><p class=\"nc-drug__name\">                Clonazepam 1 MG Tablet               </DIV><p class=\"nc-drug__icons\"></DIV></DIV></TD><TD>60.00</TD><TD>30</TD><TD data-sort=\"Jocelyn L Ramos\"><SPAN title=\"Jocelyn Ramos<br/>[address]<br/>[place] [address]\" data-toggle=\"tooltip\" data-html=\"true\">              Me War             </SPAN>           </TD><TD><SPAN title=\"[id number]\" class=\"nc-truncate\" data-toggle=\"tooltip\">              [id number]             </SPAN>           </TD><TD><SPAN title=\"[facility] [place] [facility]/>[address]<br/>[place] [address]\" data-toggle=\"tooltip\" data-html=\"true\">              Familia (1292)             </SPAN>           </TD><TD>              1           </TD><TD data-sort=\"4.0\">            4.00 LME           </TD><TD><SPAN title=\"Medicare Insurance coverage\" class=\"nc-truncate\" data-toggle=\"tooltip\">                Medicare               </SPAN>             </TD><TD>[place]</TD></TR><TR><TD data-sort=\"[id number]\">            05/04/2020           </TD><TD><SPAN class=\"nc-gapless\"><SPAN>1</SPAN>             </SPAN>           </TD><TD data-sort=\"20200504\">            05/04/2020           </TD><TD><p class=\"nc-drug\"><p class=\"nc-drug__name\">                Clonazepam 1 MG Tablet               </DIV><p class=\"nc-drug__icons\"></DIV></DIV></TD><TD>60.00</TD><TD>30</TD><TD data-sort=\"Jocelyn Ramos\"><SPAN title=\"Jocelyn Ramos<br/>[address]<br/>[place] [address]\" data-toggle=\"tooltip\" data-html=\"true\">              Me War             </SPAN>           </TD><TD><SPAN title=\"[id number]\" class=\"nc-truncate\" data-toggle=\"tooltip\">              [id number]             </SPAN>           </TD><TD><SPAN title=\"[facility] [place]br/>[address]<br/>[place] [address]\" data-toggle=\"tooltip\" data-html=\"true\">              Familia (9933)    " "         </SPAN>           </TD><TD>              0           </TD><TD data-sort=\"4.0\">            4.00 LME           </TD><TD><SPAN title=\"Medicare Insurance coverage\" class=\"nc-truncate\" data-toggle=\"tooltip\">                Medicare               </SPAN>             </TD><TD>[place]</TD></TR><TR><TD data-sort=\"[id number]\">            03/31/2020           </TD><TD><SPAN class=\"nc-gapless\"><SPAN>1</SPAN>             </SPAN>           </TD><TD data-sort=\"20200127\">            01/27/2020           </TD><TD><p class=\"nc-drug\"><p class=\"nc-drug__name\">                Clonazepam 1 MG Tablet               </DIV><p class=\"nc-drug__icons\"></DIV></DIV></TD><TD>60.00</TD><TD>30</TD><TD data-sort=\"Jocelyn L Ramos\"><SPAN title=\"Jocelyn Ramos<br/>[address]<br/>[place] [address]\" data-toggle=\"tooltip\" data-html=\"true\">              Me War             </SPAN>           </TD><TD><SPAN title=\"[id number]\" class=\"nc-truncate\" data-toggle=\"tooltip\">              [id number]             </SPAN>           </TD><TD><SPAN title=\"[facility] [place]br/>[address]<br/>[place] [address]\" data-toggle=\"tooltip\" data-html=\"true\">              Familia (1292)             </SPAN>           </TD><TD>              2           </TD><TD data-sort=\"4.0\">            4.00 LME           </TD><TD><SPAN title=\"Medicare Insurance coverage\" class=\"nc-truncate\" data-toggle=\"tooltip\">                Medicare               </SPAN>             </TD><TD>[place]</TD></TR><TR><TD data-sort=\"20200301\">            03/01/2020           </TD><TD><SPAN class=\"nc-gapless\"><SPAN>1</SPAN>             </SPAN>           </TD><TD data-sort=\"20200127\">            01/27/2020           </TD><TD><p class=\"nc-drug\"><p class=\"nc-drug__name\">                Clonazepam 1 MG Tablet               </DIV><p class=\"nc-drug__icons\"></DIV></DIV></TD><TD>60.00</TD><TD>30</TD><TD data-sort=\"Jocelyn Ramos\"><SPAN title=\"Jocelyn Ramos<br/>[address]<br/>[place] [address]\" " "data-toggle=\"tooltip\" data-html=\"true\">              Me War             </SPAN>           </TD><TD><SPAN title=\"[id number]\" class=\"nc-truncate\" data-toggle=\"tooltip\">              [id number]             </SPAN>           </TD><TD><SPAN title=\"[facility] [place]br/>[address]<br/>[place] [address]\" data-toggle=\"tooltip\" data-html=\"true\">              Familia (1292)             </SPAN>           </TD><TD>              1           </TD><TD data-sort=\"4.0\">            4.00 LME           </TD><TD><SPAN title=\"Medicare Insurance coverage\" class=\"nc-truncate\" data-toggle=\"tooltip\">                Medicare               </SPAN>             </TD><TD>[place]</TD></TR><TR><TD data-sort=\"20200127\">            01/27/2020           </TD><TD><SPAN class=\"nc-gapless\"><SPAN>1</SPAN>             </SPAN>           </TD><TD data-sort=\"20200127\">            01/27/2020           </TD><TD><p class=\"nc-drug\"><p class=\"nc-drug__name\">                Clonazepam 1 MG Tablet               </DIV><p class=\"nc-drug__icons\"></DIV></DIV></TD><TD>60.00</TD><TD>30</TD><TD data-sort=\"Jocelyn L Ramos\"><SPAN title=\"Jocelyn L Ramos<br/>[address]<br/>[place] [address]\" data-toggle=\"tooltip\" data-html=\"true\">              Me War             </SPAN>           </TD><TD><SPAN title=\"[id number]\" class=\"nc-truncate\" data-toggle=\"tooltip\">              [id number]             </SPAN>           </TD><TD><SPAN title=\"[facility] [place]br/>[address]<br/>[place] [address]\" data-toggle=\"tooltip\" data-html=\"true\">              Familia (1292)             </SPAN>           </TD><TD>              0           </TD><TD data-sort=\"4.0\">            4.00 LME           </TD><TD><SPAN title=\"Medicare Insurance coverage\" class=\"nc-truncate\" data-toggle=\"tooltip\">                Medicare               </SPAN>             </TD><TD>[place]</TD></TR><TR><TD data-sort=\"[id number]\">            12/29/2019           </TD><TD><SPAN class=\"nc-gapless\"><SPAN>1</SPAN>     " "        </SPAN>           </TD><TD data-sort=\"20191011\">            10/11/2019           </TD><TD><p class=\"nc-drug\"><p class=\"nc-drug__name\">                Clonazepam 1 MG Tablet               </DIV><p class=\"nc-drug__icons\"></DIV></DIV></TD><TD>60.00</TD><TD>30</TD><TD data-sort=\"Jocelyn L Ramos\"><SPAN title=\"Jocelyn L Ramos<br/>[address]<br/>[place] [address]\" data-toggle=\"tooltip\" data-html=\"true\">              Me War             </SPAN>           </TD><TD><SPAN title=\"[id number]\" class=\"nc-truncate\" data-toggle=\"tooltip\">              [id number]             </SPAN>           </TD><TD><SPAN title=\"[facility] [place]/>[address]<br/>[place] [address]\" data-toggle=\"tooltip\" data-html=\"true\">              Familia (1292)             </SPAN>           </TD><TD>              2           </TD><TD data-sort=\"4.0\">            4.00 LME           </TD><TD><SPAN title=\"Medicare Insurance coverage\" class=\"nc-truncate\" data-toggle=\"tooltip\">                Medicare               </SPAN>             </TD><TD[place]</TD></TR><TR><TD data-sort=\"20191130\">            11/30/2019           </TD><TD><SPAN class=\"nc-gapless\"><SPAN>1</SPAN>             </SPAN>           </TD><TD data-sort=\"20191011\">            10/11/2019           </TD><TD><p class=\"nc-drug\"><p class=\"nc-drug__name\">                Clonazepam 1 MG Tablet               </DIV><p class=\"nc-drug__icons\"></DIV></DIV></TD><TD>60.00</TD><TD>30</TD><TD data-sort=\"Jocelyn Ramos\"><SPAN title=\"Jocelyn Ramos<br/>[address]<br/>[place] [address]\" data-toggle=\"tooltip\" data-html=\"true\">              Me War             </SPAN>           </TD><TD><SPAN title=\"[id number]\" class=\"nc-truncate\" data-toggle=\"tooltip\">              [id number]             </SPAN>           </TD><TD><SPAN title=\"[facility] [place]/>[address]<br/>[place] [address]\" data-toggle=\"tooltip\" data-html=\"true\">              Familia (1492)             </SPAN>           </TD><TD>  " "            1           </TD><TD data-sort=\"4.0\">            4.00 LME           </TD><TD><SPAN title=\"Medicare Insurance coverage\" class=\"nc-truncate\" data-toggle=\"tooltip\">                Medicare               </SPAN>             </TD><TD>[place]</TD></TR><TR><TD data-sort=\"[id number]\">            10/28/2019           </TD><TD><SPAN class=\"nc-gapless\"><SPAN>1</SPAN>             </SPAN>           </TD><TD data-sort=\"[id number]\">            10/11/2019           </TD><TD><p class=\"nc-drug\"><p class=\"nc-drug__name\">                Clonazepam 1 MG Tablet               </DIV><p class=\"nc-drug__icons\"></DIV></DIV></TD><TD>60.00</TD><TD>30</TD><TD data-sort=\"Jocelyn L Ramos\"><SPAN title=\"Jocelyn L Ramos<br/>[address]<br/>[place] [address]\" data-toggle=\"tooltip\" data-html=\"true\">              Me War             </SPAN>           </TD><TD><SPAN title=\"[id number]\" class=\"nc-truncate\" data-toggle=\"tooltip\">              [id number]             </SPAN>           </TD><TD><SPAN title=\"[facility] [place]/>[address]<br/>[place] [address]\" data-toggle=\"tooltip\" data-html=\"true\">              Familia (1292)             </SPAN>           </TD><TD>              0           </TD><TD data-sort=\"4.0\">            4.00 LME           </TD><TD><SPAN title=\"Medicare Insurance coverage\" class=\"nc-truncate\" data-toggle=\"tooltip\">                Medicare               </SPAN>             </TD><TD>[place]</TD></TR><TR><TD data-sort=\"[id number]\">            09/30/2019           </TD><TD><SPAN class=\"nc-gapless\"><SPAN>1</SPAN>             </SPAN>           </TD><TD data-sort=\"20190405\">            04/05/2019           </TD><TD><p class=\"nc-drug\"><p class=\"nc-drug__name\">                Clonazepam 1 MG Tablet               </DIV><p class=\"nc-drug__icons\"></DIV></DIV></TD><TD>60.00</TD><TD>30</TD><TD data-sort=\"Jocelyn Ramos\"><SPAN title=\"Jocelyn Ramos<br/>[address]<br/>[place] [address]\" data-toggle=\"tooltip\" data-html=\"true\">              " "Me War             </SPAN>           </TD><TD><SPAN title=\"[id number]\" class=\"nc-truncate\" data-toggle=\"tooltip\">              [id number]             </SPAN>           </TD><TD><SPAN title=\"[facility] [place] [facility]br/>[address]<br/>[place] [address]\" data-toggle=\"tooltip\" data-html=\"true\">              Familia (1292)             </SPAN>           </TD><TD>              2           </TD><TD data-sort=\"4.0\">            4.00 LME           </TD><TD><SPAN title=\"Medicare Insurance coverage\" class=\"nc-truncate\" data-toggle=\"tooltip\">                Medicare               </SPAN>             </TD><TD>[place]</TD></TR><TR><TD data-sort=\"[id number]\">            08/31/2019           </TD><TD><SPAN class=\"nc-gapless\"><SPAN>1</SPAN>             </SPAN>           </TD><TD data-sort=\"20190405\">            04/05/2019           </TD><TD><p class=\"nc-drug\"><p class=\"nc-drug__name\">                Clonazepam 1 MG Tablet               </DIV><p class=\"nc-drug__icons\"></DIV></DIV></TD><TD>60.00</TD><TD>30</TD><TD data-sort=\"Jocelyn L Ramos\"><SPAN title=\"Jocelyn L Ramos<br/>[address]<br/>[place] [address]\" data-toggle=\"tooltip\" data-html=\"true\">              Me War             </SPAN>           </TD><TD><SPAN title=\"[id number]\" class=\"nc-truncate\" data-toggle=\"tooltip\">              [id number]             </SPAN>           </TD><TD><SPAN title=\"[facility] [place]/>[address]<br/>[place] [address]\" data-toggle=\"tooltip\" data-html=\"true\">              Familia (1292)             </SPAN>           </TD><TD>              1           </TD><TD data-sort=\"4.0\">            4.00 LME           </TD><TD><SPAN title=\"Medicare Insurance coverage\" class=\"nc-truncate\" data-toggle=\"tooltip\">                Medicare               </SPAN>             </TD><TD>[place]</TD></TR><TR><TD data-sort=\"[id number]\">            07/31/2019           </TD><TD><SPAN class=\"nc-gapless\"><SPAN>1</SPAN>             </SPAN>           </TD><TD " "data-sort=\"20190405\">            04/05/2019           </TD><TD><p class=\"nc-drug\"><p class=\"nc-drug__name\">                Clonazepam 1 MG Tablet               </DIV><p class=\"nc-drug__icons\"></DIV></DIV></TD><TD>60.00</TD><TD>30</TD><TD data-sort=\"Jocelyn L Ramos\"><SPAN title=\"Jocelyn L Ramos<br/>[address]<br/>[place] [address]\" data-toggle=\"tooltip\" data-html=\"true\">              Me War             </SPAN>           </TD><TD><SPAN title=\"[id number]\" class=\"nc-truncate\" data-toggle=\"tooltip\">              [id number]             </SPAN>           </TD><TD><SPAN title=\"[facility] [place]/>[address]<br/>[place] [address]\" data-toggle=\"tooltip\" data-html=\"true\">              Familia (1292)             </SPAN>           </TD><TD>              0           </TD><TD data-sort=\"4.0\">            4.00 LME           </TD><TD><SPAN title=\"Medicare Insurance coverage\" class=\"nc-truncate\" data-toggle=\"tooltip\">                Medicare               </SPAN>             </TD><TD>[place]</TD></TR><TR><TD data-sort=\"20190701\">            07/01/2019           </TD><TD><SPAN class=\"nc-gapless\"><SPAN>1</SPAN>             </SPAN>           </TD><TD data-sort=\"[id number]\">            02/04/2019           </TD><TD><p class=\"nc-drug\"><p class=\"nc-drug__name\">                Clonazepam 1 MG Tablet               </DIV><p class=\"nc-drug__icons\"></DIV></DIV></TD><TD>60.00</TD><TD>30</TD><TD data-sort=\"Jocelyn Ramos\"><SPAN title=\"Jocelyn Ramos<br/>[address]<br/>[place] [address]\" data-toggle=\"tooltip\" data-html=\"true\">              Me War             </SPAN>           </TD><TD><SPAN title=\"[id number]\" class=\"nc-truncate\" data-toggle=\"tooltip\">              [id number]             </SPAN>           </TD><TD><SPAN title=\"[facility] [place] [facility]/>[address]<br/>[place] [address]\" data-toggle=\"tooltip\" data-html=\"true\">              Familia (7342)             </SPAN>           </TD><TD>              2           </TD><TD " "data-sort=\"4.0\">            4.00 LME           </TD><TD><SPAN title=\"Medicare Insurance coverage\" class=\"nc-truncate\" data-toggle=\"tooltip\">                Medicare               </SPAN>             </TD><TD>[place]</TD></TR><TR><TD data-sort=\"20190531\">            05/31/2019           </TD><TD><SPAN class=\"nc-gapless\"><SPAN>1</SPAN>             </SPAN>           </TD><TD data-sort=\"20190204\">            02/04/2019           </TD><TD><p class=\"nc-drug\"><p class=\"nc-drug__name\">                Clonazepam 1 MG Tablet               </DIV><p class=\"nc-drug__icons\"></DIV></DIV></TD><TD>60.00</TD><TD>30</TD><TD data-sort=\"Jocelyn L Ramos\"><SPAN title=\"Jocelyn L Ramos<br/>[address]<br/>[place] [address]\" data-toggle=\"tooltip\" data-html=\"true\">              Me War             </SPAN>           </TD><TD><SPAN title=\"[id number]\" class=\"nc-truncate\" data-toggle=\"tooltip\">              [id number]             </SPAN>           </TD><TD><SPAN title=\"[facility] [place]/>[address]<br/>[place] [address]\" data-toggle=\"tooltip\" data-html=\"true\">              Familia (1292)             </SPAN>           </TD><TD>              1           </TD><TD data-sort=\"4.0\">            4.00 LME           </TD><TD><SPAN title=\"Medicare Insurance coverage\" class=\"nc-truncate\" data-toggle=\"tooltip\">                Medicare               </SPAN>             </TD><TD>[place]</TD></TR><TR><TD data-sort=\"20190501\">            05/01/2019           </TD><TD><SPAN class=\"nc-gapless\"><SPAN>1</SPAN>             </SPAN>           </TD><TD data-sort=\"20190204\">            02/04/2019           </TD><TD><p class=\"nc-drug\"><p class=\"nc-drug__name\">                Clonazepam 1 MG Tablet               </DIV><p class=\"nc-drug__icons\"></DIV></DIV></TD><TD>60.00</TD><TD>30</TD><TD data-sort=\"Jocelyn L Ramos\"><SPAN title=\"Jocelyn L Ramos<br/>[address]<br/>[place] [address]\" data-toggle=\"tooltip\" data-html=\"true\">              Me War             </SPAN>         " "  </TD><TD><SPAN title=\"[id number]\" class=\"nc-truncate\" data-toggle=\"tooltip\">              [id number]             </SPAN>           </TD><TD><SPAN title=\"[facility] [place]br/>[address]<br/>[place] [address]\" data-toggle=\"tooltip\" data-html=\"true\">              Familia (1292)             </SPAN>           </TD><TD>              0           </TD><TD data-sort=\"4.0\">            4.00 LME           </TD><TD><SPAN title=\"Medicare Insurance coverage\" class=\"nc-truncate\" data-toggle=\"tooltip\">                Medicare               </SPAN>             </TD><TD>[place]</TD></TR><TR><TD data-sort=\"[id number]\">            03/31/2019           </TD><TD><SPAN class=\"nc-gapless\"><SPAN>1</SPAN>             </SPAN>           </TD><TD data-sort=\"[id number]\">            02/01/2019           </TD><TD><p class=\"nc-drug\"><p class=\"nc-drug__name\">                Clonazepam 1 MG Tablet               </DIV><p class=\"nc-drug__icons\"></DIV></DIV></TD><TD>60.00</TD><TD>30</TD><TD data-sort=\"Jocelyn L Ramos\"><SPAN title=\"Jocelyn L Ramos<br/>[address]<br/>[place] [address]\" data-toggle=\"tooltip\" data-html=\"true\">              Me War             </SPAN>           </TD><TD><SPAN title=\"[id number]\" class=\"nc-truncate\" data-toggle=\"tooltip\">              [id number]             </SPAN>           </TD><TD><SPAN title=\"[facility] [place]br/>[address]<br/>[place] [address]\" data-toggle=\"tooltip\" data-html=\"true\">              Familia (1292)             </SPAN>           </TD><TD>              2           </TD><TD data-sort=\"4.0\">            4.00 LME           </TD><TD><SPAN title=\"Patient paid for drug via cash, check, debit or credit card\" class=\"nc-truncate\" data-toggle=\"tooltip\">                Private Pay               </SPAN>             </TD><TD>[place]</TD></TR><TR><TD data-sort=\"[id number]\">            03/03/2019           </TD><TD><SPAN class=\"nc-gapless\"><SPAN>1</SPAN>             </SPAN>           </TD><TD " "data-sort=\"20190201\">            02/01/2019           </TD><TD><p class=\"nc-drug\"><p class=\"nc-drug__name\">                Clonazepam 1 MG Tablet               </DIV><p class=\"nc-drug__icons\"></DIV></DIV></TD><TD>60.00</TD><TD>30</TD><TD data-sort=\"Jocelyn L Ramos\"><SPAN title=\"Jocelyn L Ramos<br/>[address]<br/>[place] [address]\" data-toggle=\"tooltip\" data-html=\"true\">              Me War             </SPAN>           </TD><TD><SPAN title=\"[id number]\" class=\"nc-truncate\" data-toggle=\"tooltip\">              [id number]             </SPAN>           </TD><TD><SPAN title=\"[facility] [place]br/>[address]<br/>[place] [address]\" data-toggle=\"tooltip\" data-html=\"true\">              Familia (1292)             </SPAN>           </TD><TD>              1           </TD><TD data-sort=\"4.0\">            4.00 LME           </TD><TD><SPAN title=\"Patient paid for drug via cash, check, debit or credit card\" class=\"nc-truncate\" data-toggle=\"tooltip\">                Private Pay               </SPAN>             </TD><TD>[place]</TD></TR><TR><TD data-sort=\"20190201\">            02/01/2019           </TD><TD><SPAN class=\"nc-gapless\"><SPAN>1</SPAN>             </SPAN>           </TD><TD data-sort=\"20190201\">            02/01/2019           </TD><TD><p class=\"nc-drug\"><p class=\"nc-drug__name\">                Clonazepam 1 MG Tablet               </DIV><p class=\"nc-drug__icons\"></DIV></DIV></TD><TD>60.00</TD><TD>30</TD><TD data-sort=\"Jocelyn WEAVER Ramos\"><SPAN title=\"Jocelyn WEAVER Ramos<br/>[address]<br/>[place] [address]\" data-toggle=\"tooltip\" data-html=\"true\">              Me War             </SPAN>           </TD><TD><SPAN title=\"[id number]\" class=\"nc-truncate\" data-toggle=\"tooltip\">              [id number]             </SPAN>           </TD><TD><SPAN title=\"[facility] [place]/>[address]<br/>[place] [address]\" data-toggle=\"tooltip\" data-html=\"true\">              Familia (7112)             </SPAN>           </TD><TD>  " "            0           </TD><TD data-sort=\"4.0\">            4.00 LME           </TD><TD><SPAN title=\"Patient paid for drug via cash, check, debit or credit card\" class=\"nc-truncate\" data-toggle=\"tooltip\">                Private Pay               </SPAN>             </TD><TD>[place]</TD></TR><TR><TD data-sort=\"20181231\">            12/31/2018           </TD><TD><SPAN class=\"nc-gapless\"><SPAN>1</SPAN>             </SPAN>           </TD><TD data-sort=\"20181231\">            12/31/2018           </TD><TD><p class=\"nc-drug\"><p class=\"nc-drug__name\">                Clonazepam 1 MG Tablet               </DIV><p class=\"nc-drug__icons\"></DIV></DIV></TD><TD>60.00</TD><TD>30</TD><TD data-sort=\"Jocelyn L Ramos\"><SPAN title=\"Jocelyn L Ramos<br/>[address]<br/>[place] [address]\" data-toggle=\"tooltip\" data-html=\"true\">              Me War             </SPAN>           </TD><TD><SPAN title=\"[id number]\" class=\"nc-truncate\" data-toggle=\"tooltip\">              [id number]             </SPAN>           </TD><TD><SPAN title=\"[facility] [place]/>[address]<br/>[place] [address]\" data-toggle=\"tooltip\" data-html=\"true\">              Familia (1292)             </SPAN>           </TD><TD>              0           </TD><TD data-sort=\"4.0\">            4.00 LME           </TD><TD><SPAN title=\"Medicare Insurance coverage\" class=\"nc-truncate\" data-toggle=\"tooltip\">                Medicare               </SPAN>             </TD><TD>[place]</TD></TR><TR><TD data-sort=\"20181211\">            12/11/2018           </TD><TD><SPAN class=\"nc-gapless\"><SPAN>1</SPAN>             </SPAN>           </TD><TD data-sort=\"[id number]\">            12/11/2018           </TD><TD><p class=\"nc-drug\"><p class=\"nc-drug__name\">                Hydrocodone-Acetamin 5-325 MG               </DIV><p class=\"nc-drug__icons\"></DIV></DIV></TD><TD>8.00</TD><TD>2</TD><TD data-sort=\"Sho [facility]ajwa\"><SPAN title=\"Sho Bajwa<br/>[address]<br/>[place] [address]\" " "data-toggle=\"tooltip\" data-html=\"true\">              Am Van             </SPAN>           </TD><TD><SPAN title=\"[id number]\" class=\"nc-truncate\" data-toggle=\"tooltip\">              [id number]             </SPAN>           </TD><TD><SPAN title=\"[facility] [place]br/>[address]<br/>[place] [address]\" data-toggle=\"tooltip\" data-html=\"true\">              Familia (1292)             </SPAN>           </TD><TD>              0           </TD><TD data-sort=\"20.0\">            20.00 MME           </TD><TD><SPAN title=\"Patient paid for drug via cash, check, debit or credit card\" class=\"nc-truncate\" data-toggle=\"tooltip\">                Private Pay               </SPAN>             </TD><TD>[place]</TD></TR><TR><TD data-sort=\"[id number]\">            11/30/2018           </TD><TD><SPAN class=\"nc-gapless\"><SPAN>1</SPAN>             </SPAN>           </TD><TD data-sort=\"[id number]\">            09/28/2018           </TD><TD><p class=\"nc-drug\"><p class=\"nc-drug__name\">                Clonazepam 1 MG Tablet               </DIV><p class=\"nc-drug__icons\"></DIV></DIV></TD><TD>60.00</TD><TD>30</TD><TD data-sort=\"Jocelyn L Ramos\"><SPAN title=\"Jocelyn L Ramos<br/>[address]<br/>[place] [address]\" data-toggle=\"tooltip\" data-html=\"true\">              Me War             </SPAN>           </TD><TD><SPAN title=\"[id number]\" class=\"nc-truncate\" data-toggle=\"tooltip\">              [id number]             </SPAN>           </TD><TD><SPAN title=\"[facility] [place]/>[address]<br/>[place] [address]\" data-toggle=\"tooltip\" data-html=\"true\">              Familia (1292)             </SPAN>           </TD><TD>              2           </TD><TD data-sort=\"4.0\">            4.00 LME           </TD><TD><SPAN title=\"Medicare Insurance coverage\" class=\"nc-truncate\" data-toggle=\"tooltip\">                Medicare               </SPAN>             </TD><TD>[place]</TD></TR><TR><TD data-sort=\"[id number]\">            10/31/2018           </TD><TD><SPAN " "class=\"nc-gapless\"><SPAN>1</SPAN>             </SPAN>           </TD><TD data-sort=\"[id number]\">            09/28/2018           </TD><TD><p class=\"nc-drug\"><p class=\"nc-drug__name\">                Clonazepam 1 MG Tablet               </DIV><p class=\"nc-drug__icons\"></DIV></DIV></TD><TD>60.00</TD><TD>30</TD><TD data-sort=\"Jocelyn L Ramos\"><SPAN title=\"Jocelyn Ramos<br/>[address]<br/>[place] [address]\" data-toggle=\"tooltip\" data-html=\"true\">              Me War             </SPAN>           </TD><TD><SPAN title=\"[id number]\" class=\"nc-truncate\" data-toggle=\"tooltip\">              [id number]             </SPAN>           </TD><TD><SPAN title=\"[facility] [place] [facility]br/>[address]<br/>[place] [address]\" data-toggle=\"tooltip\" data-html=\"true\">              Familia (1292)             </SPAN>           </TD><TD>              1           </TD><TD data-sort=\"4.0\">            4.00 LME           </TD><TD><SPAN title=\"Medicare Insurance coverage\" class=\"nc-truncate\" data-toggle=\"tooltip\">                Medicare               </SPAN>             </TD><TD>[place]</TD></TR><TR><TD data-sort=\"[id number]\">            10/02/2018           </TD><TD><SPAN class=\"nc-gapless\"><SPAN>1</SPAN>             </SPAN>           </TD><TD data-sort=\"[id number]\">            09/28/2018           </TD><TD><p class=\"nc-drug\"><p class=\"nc-drug__name\">                Clonazepam 1 MG Tablet               </DIV><p class=\"nc-drug__icons\"></DIV></DIV></TD><TD>60.00</TD><TD>30</TD><TD data-sort=\"Jocelyn Ramos\"><SPAN title=\"Jocelyn Ramos<br/>[address]<br/>[place] [address]\" data-toggle=\"tooltip\" data-html=\"true\">              Me War             </SPAN>           </TD><TD><SPAN title=\"[id number]\" class=\"nc-truncate\" data-toggle=\"tooltip\">              [id number]             </SPAN>           </TD><TD><SPAN title=\"[facility] [place]br/>[address]<br/>[place] [address]\" data-toggle=\"tooltip\" data-html=\"true\">              Familia (2810)    " "         </SPAN>           </TD><TD>              0           </TD><TD data-sort=\"4.0\">            4.00 LME           </TD><TD><SPAN title=\"Medicare Insurance coverage\" class=\"nc-truncate\" data-toggle=\"tooltip\">                Medicare               </SPAN>             </TD><TD>[place]</TD></TR><TR><TD data-sort=\"[id number]\">            09/07/2018           </TD><TD><SPAN class=\"nc-gapless\"><SPAN>1</SPAN>             </SPAN>           </TD><TD data-sort=\"[id number]\">            09/07/2018           </TD><TD><p class=\"nc-drug\"><p class=\"nc-drug__name\">                Clonazepam 1 MG Tablet               </DIV><p class=\"nc-drug__icons\"></DIV></DIV></TD><TD>30.00</TD><TD>30</TD><TD data-sort=\"Jocelyn L Ramos\"><SPAN title=\"Jocelyn Ramos<br/>[address]<br/>[place] [address]\" data-toggle=\"tooltip\" data-html=\"true\">              Me War             </SPAN>           </TD><TD><SPAN title=\"[id number]\" class=\"nc-truncate\" data-toggle=\"tooltip\">              [id number]             </SPAN>           </TD><TD><SPAN title=\"[facility] [place]br/>[address]<br/>[place] [address]\" data-toggle=\"tooltip\" data-html=\"true\">              Familia (1292)             </SPAN>           </TD><TD>              0           </TD><TD data-sort=\"2.0\">            2.00 LME           </TD><TD><SPAN title=\"Medicare Insurance coverage\" class=\"nc-truncate\" data-toggle=\"tooltip\">                Medicare               </SPAN>             </TD><TD>[place]</TD></TR><TR><TD data-sort=\"[id number]\">            08/08/2018           </TD><TD><SPAN class=\"nc-gapless\"><SPAN>1</SPAN>             </SPAN>           </TD><TD data-sort=\"[id number]\">            06/13/2018           </TD><TD><p class=\"nc-drug\"><p class=\"nc-drug__name\">                Clonazepam 1 MG Tablet               </DIV><p class=\"nc-drug__icons\"></DIV></DIV></TD><TD>60.00</TD><TD>30</TD><TD data-sort=\"Jocelyn Ramos\"><SPAN title=\"Jocelyn Ramos<br/>[facility] [place] [address] " "[address]<br/>[place] [address]\" data-toggle=\"tooltip\" data-html=\"true\">              Me War             </SPAN>           </TD><TD><SPAN title=\"[id number]\" class=\"nc-truncate\" data-toggle=\"tooltip\">              [id number]             </SPAN>           </TD><TD><SPAN title=\"[facility] [place]br/>[address]<br/>[place] [address]\" data-toggle=\"tooltip\" data-html=\"true\">              Familia (1292)             </SPAN>           </TD><TD>              2           </TD><TD data-sort=\"4.0\">            4.00 LME           </TD><TD><SPAN title=\"Medicare Insurance coverage\" class=\"nc-truncate\" data-toggle=\"tooltip\">                Medicare               </SPAN>             </TD><TD>[place]</TD></TR></TBODY></TABLE><p class=\"nc-panel__footer\">    *Per CDC guidance, the MME conversion factors prescribed or provided as part of    the medication-assisted treatment for opioid use disorder should not be used to    benchmark against dosage thresholds meant for opioids prescribed for pain.    Buprenorphine products have no agreed upon morphine equivalency, and as partial    opioid agonists, are not expected to be associated with overdose risk in the same    dose-dependent manner as doses for full agonist opioids. MME = morphine milligram    equivalents. LME = Lorazepam milligram equivalents. MG = dose in milligrams.   </DIV></DIV><p class=\"nc-panel\"><p class=\"nc-panel__header\"><H4>Providers</H4><p class=\"nc-legend\">      Total Providers: 3     </DIV></DIV><TABLE class=\"nc-table\" lplo-yyvujnxb-nolhi=\"\"><THEAD><TR><TH class=\"nc-col--sortable\">Name</TH><TH class=\"nc-col--sortable nc-col--address\">Address</TH><TH class=\"nc-col--sortable nc-col--city\">City</TH><TH class=\"nc-col--sortable nc-col--state\">State</TH><TH class=\"nc-col--sortable nc-col--zipcode\">Zipcode</TH><TH class=\"nc-col--sortable nc-col--phone\">Phone</TH></TR></THEAD><TBODY><TR><TD>Jocelyn Ramos</TD><TD><p class=\"nc-truncate\"><SPAN title=\"[facility] [place] " "[address]\" data-toggle=\"tooltip\">                [facility] [place] [address]               </SPAN>             </DIV>           </TD><TD>[place]</TD><TD>[place]</TD><TD>[address]</TD><TD></TD></TR><TR><TD>[facility] [place]</TD><TD><p class=\"nc-truncate\"><SPAN title=\"[address]\" data-toggle=\"tooltip\">                [address]               </SPAN>             </DIV>           </TD><TD>[place]</TD><TD>[place]TD><TD>[address]</TD><TD></TD></TR><TR><TD>[facility]</TD><TD><p class=\"nc-truncate\"><SPAN title=\"[address]\" data-toggle=\"tooltip\">                [address]               </SPAN>             </DIV>           </TD><TD>[place]</TD><TD>[place]</TD><TD>[address]</TD><TD></TD></TR></TBODY></TABLE></DIV><p class=\"nc-panel\"><p class=\"nc-panel__header\"><H4>Pharmacies</H4><p class=\"nc-legend\">      Total Pharmacies: 1     </DIV></DIV><TABLE class=\"nc-table\" rnem-cykayhtc-cfdll=\"\"><THEAD><TR><TH class=\"nc-col--sortable\">Name</TH><TH class=\"nc-col--sortable nc-col--address\">Address</TH><TH class=\"nc-col--sortable nc-col--city\">City</TH><TH class=\"nc-col--sortable nc-col--state\">State</TH><TH class=\"nc-col--sortable nc-col--zipcode\">Zipcode</TH><TH class=\"nc-col--sortable nc-col--phone\">Phone</TH></TR></THEAD><TBODY><TR><TD>[facility] [place] ([facility])</TD><TD><SPAN title=\"[address]\" class=\"nc-truncate\" data-toggle=\"tooltip\">              [address]             </SPAN>           </TD><TD>[place]</TD><TD>[place]</TD><TD>[address]</TD><[phone]</TD></TR></TBODY></TABLE></DIV><p class=\"nc-popover\" id=\"graph-help-popover\" style=\"display: none;\"><H6 class=\"nc-popover__header\"></H6><P class=\"nc-popover__paragraph\"><IMG src=\"[email or web address]\"></P></DIV><p class=\"nc-overlay\" id=\"demographics-overlay\" style=\"display: none;\"><p class=\"nc-overlay__header\" " "data-drag=\"#demographics-overlay\"><BUTTON class=\"nc-overlay__close\" type=\"button\" data-overlay-close=\"#demographics-overlay\"><svg xmlns=\"http://www.[employer]/2000/svg\" viewBox=\"0 0 14 14\" width=\"13\" height=\"13\" version=\"1.1\"><path fill=\"none\" stroke=\"currentColor\" stroke-width=\"1.1\" d=\"M 1 1 l 12 12 M 13 1 L 1 13\"></path></svg></BUTTON><H2></H2></DIV><p><TABLE class=\"nc-overlay__table\"><THEAD><TR><TH></TH><TH></TH><TH></TH><TH></TH><TH></TH><TH></TH><TH></TH><TH></TH></TR></THEAD><TBODY><TR><TD></TD><TD></TD><TD></TD><TD></TD><TD></TD><TD></TD><TD></TD><TD></TD></TR></TBODY></TABLE></DIV></DIV><p class=\"nc-overlay\" id=\"hrhpn-zcxalmo-hzljwsz\" style=\"display: none;\"><p class=\"nc-overlay__header\" data-drag=\"#xvjvv-mojjrnl-qszwlwb\"><BUTTON class=\"nc-overlay__close\" type=\"button\" data-overlay-close=\"#sjpet-jxtgbvo-ymvbbdi\"><svg xmlns=\"http://www.[employer]ferizeorg/2000/svg\" viewBox=\"0 0 14 14\" width=\"13\" height=\"13\" version=\"1.1\"><path fill=\"none\" stroke=\"currentColor\" stroke-width=\"1.1\" d=\"M 1 1 l 12 12 M 13 1 L 1 13\"></path></svg></BUTTON><H2></H2></DIV><p><TABLE class=\"nc-overlay__table\"><THEAD><TR><TH></TH><TH></TH><TH></TH><TH></TH><TH></TH></TR></THEAD><TBODY></TBODY></TABLE></DIV></DIV><p class=\"nc-overlay\" id=\"prescriptions-graph-overlay\" style=\"display: none;\"><p class=\"nc-overlay__header\" data-drag=\"#prescriptions-graph-overlay\"><BUTTON class=\"nc-overlay__close\" type=\"button\" data-overlay-close=\"#prescriptions-graph-overlay\"><svg xmlns=\"http://[email or web address].org/2000/svg\" viewBox=\"0 0 14 14\" width=\"13\" height=\"13\" version=\"1.1\"><path fill=\"none\" stroke=\"currentColor\" stroke-width=\"1.1\" d=\"M 1 1 l 12 12 M 13 1 L 1 13\"></path></svg></BUTTON><H2></H2></DIV><p><TABLE class=\"nc-overlay__table\"><THEAD><TR><TH></TH><TH></TH><TH></TH><TH></TH><TH></TH><TH></TH><TH></TH><TH></TH><TH></TH></TR></THEAD><TBODY></TBODY></TABLE></DIV></DIV><p class=\"nc-overlay\" id=\"cmtmjaoxmigjb-aynvzsrvq-nxybulw\" style=\"display: none;\"><p " "class=\"nc-overlay__header\" data-drag=\"#lsnakihtefhxx-xjznlhoym-hqrvfap\"><BUTTON class=\"nc-overlay__close\" type=\"button\" data-overlay-close=\"#wytptkfapibup-ojbkyhruy-avthihf\"><svg xmlns=\"http://www.[employer].MainOne/2000/svg\" viewBox=\"0 0 14 14\" width=\"13\" height=\"13\" version=\"1.1\"><path fill=\"none\" stroke=\"currentColor\" stroke-width=\"1.1\" d=\"M 1 1 l 12 12 M 13 1 L 1 13\"></path></svg></BUTTON><H2></H2></DIV><p><TABLE class=\"nc-overlay__table\"><THEAD><TR><TH></TH><TH></TH><TH></TH><TH></TH><TH></TH></TR></THEAD><TBODY></TBODY></TABLE></DIV></DIV></DIV><svg xmlns=\"http://www.[employer].org/2000/svg\" id=\"VxaopIsv3883\" style=\"left: -100%; top: -100%; overflow: hidden; position: absolute; opacity: 0;\" focusable=\"false\" width=\"2\" height=\"0\" version=\"1.1\" xmlns=\"http://www.[employer].org/2000/svg\"><defs id=\"XgvewLgkz9627\"></defs><polyline id=\"QxhdhCahqhowp8593\" points=\"0,0\"></polyline><path id=\"UpgtuFrhx2363\" d=\"M 0 0\"></path></svg>   "

## 2020-08-18 ENCOUNTER — NON-PROVIDER VISIT (OUTPATIENT)
Dept: MEDICAL GROUP | Facility: PHYSICIAN GROUP | Age: 70
End: 2020-08-18
Payer: MEDICARE

## 2020-08-18 VITALS — BODY MASS INDEX: 45.04 KG/M2 | WEIGHT: 305 LBS

## 2020-08-18 DIAGNOSIS — E66.01 MORBID OBESITY WITH BMI OF 40.0-44.9, ADULT (HCC): ICD-10-CM

## 2020-08-18 DIAGNOSIS — E11.9 DIET-CONTROLLED DIABETES MELLITUS (HCC): ICD-10-CM

## 2020-08-18 PROCEDURE — G0447 BEHAVIOR COUNSEL OBESITY 15M: HCPCS | Mod: 95 | Performed by: FAMILY MEDICINE

## 2020-08-18 ASSESSMENT — FIBROSIS 4 INDEX: FIB4 SCORE: 1.33

## 2020-08-18 NOTE — PROGRESS NOTES
8/18/2020     Visit #6       No ref. provider found      70 y.o.           Time in/Out:  11-11:16am    This encounter was conducted via Zoom .   Verbal consent was obtained. Patient's identity was verified.      ASSESS:    Vitals:    08/18/20 1125   Weight: (!) 138.3 kg (305 lb)            Wt Readings from Last 2 Encounters:   08/18/20 (!) 138.3 kg (305 lb)   08/04/20 (!) 139.3 kg (307 lb)            Body mass index is 45.04 kg/m².        Starting Weight: 308 lbs (7/7/20)   Difference: -2 lbs  Total Weight Change: -3 lbs       Current Dietary/Exercise Habits:  Gerald is tracking his intake and sent his log to me via email. He did not have any desserts/sweets and he kept to his goal of no more than 8 glasses of wine per week. His meals are still lacking in balance and NS vegetables, and his protein portions appear to be large. Gerald is not getting any physical activity at this time d/t neuropathy.    Estimated Stage of Change:    Action as evidenced by reduced etoh and dessert intake.      ADVISE:      Physical Activity:  Not addressed at this visit     Dietary Guidelines:  Add NS vegetables    The patient has been advised of how weight management and physical activity impacts their health and will help to reduce complications and health risk factors.        AGREE:  Gerald is agreeable to trying to add more NS vegetables, eating less chips, and further limiting the number of drinks that he has per week      ASSIST:  I applauded Gerald for losing weight at this visit, though he didn't think 2 lbs was commendable. I reminded him that weight loss is difficult and it takes time, practice, and reinforcement. He says that he bought frozen vegetables since he's not using the fresh ones quickly enough and they're going back. I let him know that this is fine and encouraged him to keep the Plate Planner in his kitchen to remind him to build vegetables into his meals. I again suggested that he swap his chips for NS vegetables at lunch  and encouraged he do raw vegetables and bagged salads since he doesn't want to cook vegetables at lunch time. I commended Gerald for reducing his wine intake and suggested that he continue to reduce, advising him of the negative consequences of alcohol for overall health and weight management. Gerald asked about artificial sweeteners and I informed him of the potential negative impacts on satiety and gut health and recommended that he limit artificial sweeteners. Pt verbalized understanding and all of his/her nutrition-related questions were answered      ARRANGE:     Return for follow-up in 2 weeks     The patient was assisted in making follow-up appointment per orders.

## 2020-10-06 DIAGNOSIS — I10 ESSENTIAL HYPERTENSION: ICD-10-CM

## 2020-10-06 RX ORDER — LOSARTAN POTASSIUM 100 MG/1
100 TABLET ORAL
Qty: 90 TAB | Refills: 3 | Status: SHIPPED | OUTPATIENT
Start: 2020-10-06 | End: 2021-10-01 | Stop reason: SDUPTHER

## 2020-10-26 ENCOUNTER — TELEPHONE (OUTPATIENT)
Dept: MEDICAL GROUP | Facility: PHYSICIAN GROUP | Age: 70
End: 2020-10-26

## 2020-10-26 ENCOUNTER — APPOINTMENT (OUTPATIENT)
Dept: MEDICAL GROUP | Facility: PHYSICIAN GROUP | Age: 70
End: 2020-10-26
Payer: MEDICARE

## 2020-10-26 DIAGNOSIS — E11.9 DIET-CONTROLLED DIABETES MELLITUS (HCC): ICD-10-CM

## 2020-10-26 DIAGNOSIS — I10 ESSENTIAL HYPERTENSION: ICD-10-CM

## 2020-10-26 RX ORDER — CLONAZEPAM 1 MG/1
1 TABLET ORAL 2 TIMES DAILY
COMMUNITY
Start: 2020-10-04 | End: 2020-10-26

## 2020-10-26 RX ORDER — CLONAZEPAM 1 MG/1
1 TABLET ORAL 2 TIMES DAILY
Qty: 60 TAB | Refills: 2 | Status: CANCELLED | OUTPATIENT
Start: 2020-11-03 | End: 2020-12-03

## 2020-10-26 NOTE — TELEPHONE ENCOUNTER
I spoke with [facility] and the location on [facility] is temporarily closed.  Patient will go to [facility] - lab order faxed to [phone].

## 2020-10-26 NOTE — TELEPHONE ENCOUNTER
Patient is up to date on most of his labs.  I did order a non-fasting metabolic panel and A1c for him.  Please fax over his lab slip.  -Jocelyn Ramos M.D.

## 2020-11-02 ENCOUNTER — TELEPHONE (OUTPATIENT)
Dept: SLEEP MEDICINE | Facility: MEDICAL CENTER | Age: 70
End: 2020-11-02

## 2020-11-05 LAB
ALBUMIN SERPL-MCNC: 4.4 G/DL (ref 3.8–4.8)
ALBUMIN/GLOB SERPL: 2.8 {RATIO} (ref 1.2–2.2)
ALP SERPL-CCNC: 73 IU/L (ref 39–117)
ALT SERPL-CCNC: 21 IU/L (ref 0–44)
AST SERPL-CCNC: 19 IU/L (ref 0–40)
BILIRUB SERPL-MCNC: 0.6 MG/DL (ref 0–1.2)
BUN SERPL-MCNC: 11 MG/DL (ref 8–27)
BUN/CREAT SERPL: 10 (ref 10–24)
CALCIUM SERPL-MCNC: 9.6 MG/DL (ref 8.6–10.2)
CHLORIDE SERPL-SCNC: 99 MMOL/L (ref 96–106)
CO2 SERPL-SCNC: 29 MMOL/L (ref 20–29)
CREAT SERPL-MCNC: 1.11 MG/DL (ref 0.76–1.27)
GLOBULIN SER CALC-MCNC: 1.6 G/DL (ref 1.5–4.5)
GLUCOSE SERPL-MCNC: 114 MG/DL (ref 65–99)
HBA1C MFR BLD: 6.4 % (ref 4.8–5.6)
POTASSIUM SERPL-SCNC: 5 MMOL/L (ref 3.5–5.2)
PROT SERPL-MCNC: 6 G/DL (ref 6–8.5)
SODIUM SERPL-SCNC: 139 MMOL/L (ref 134–144)

## 2020-11-09 ENCOUNTER — TELEMEDICINE (OUTPATIENT)
Dept: MEDICAL GROUP | Facility: PHYSICIAN GROUP | Age: 70
End: 2020-11-09
Payer: MEDICARE

## 2020-11-09 VITALS — HEIGHT: 70 IN | BODY MASS INDEX: 42.95 KG/M2 | WEIGHT: 300 LBS

## 2020-11-09 DIAGNOSIS — C18.6 MALIGNANT NEOPLASM OF DESCENDING COLON (HCC): ICD-10-CM

## 2020-11-09 DIAGNOSIS — F41.8 ANXIETY ABOUT HEALTH: ICD-10-CM

## 2020-11-09 DIAGNOSIS — G63 NEUROPATHY ASSOCIATED WITH CANCER (HCC): ICD-10-CM

## 2020-11-09 DIAGNOSIS — E11.29 MICROALBUMINURIA DUE TO TYPE 2 DIABETES MELLITUS (HCC): ICD-10-CM

## 2020-11-09 DIAGNOSIS — E66.01 MORBID OBESITY WITH BMI OF 40.0-44.9, ADULT (HCC): ICD-10-CM

## 2020-11-09 DIAGNOSIS — G47.33 OBSTRUCTIVE SLEEP APNEA: ICD-10-CM

## 2020-11-09 DIAGNOSIS — I10 ESSENTIAL HYPERTENSION: ICD-10-CM

## 2020-11-09 DIAGNOSIS — C80.1 NEUROPATHY ASSOCIATED WITH CANCER (HCC): ICD-10-CM

## 2020-11-09 DIAGNOSIS — E78.00 PURE HYPERCHOLESTEROLEMIA: ICD-10-CM

## 2020-11-09 DIAGNOSIS — E11.9 DIET-CONTROLLED DIABETES MELLITUS (HCC): ICD-10-CM

## 2020-11-09 DIAGNOSIS — C61 MALIGNANT NEOPLASM OF PROSTATE (HCC): ICD-10-CM

## 2020-11-09 DIAGNOSIS — F51.04 CHRONIC INSOMNIA: ICD-10-CM

## 2020-11-09 DIAGNOSIS — R80.9 MICROALBUMINURIA DUE TO TYPE 2 DIABETES MELLITUS (HCC): ICD-10-CM

## 2020-11-09 DIAGNOSIS — J96.11 CHRONIC RESPIRATORY FAILURE WITH HYPOXIA (HCC): ICD-10-CM

## 2020-11-09 PROCEDURE — 99214 OFFICE O/P EST MOD 30 MIN: CPT | Mod: 95,CR | Performed by: FAMILY MEDICINE

## 2020-11-09 RX ORDER — GABAPENTIN 800 MG/1
800 TABLET ORAL 3 TIMES DAILY
Qty: 270 TAB | Refills: 3 | Status: SHIPPED | OUTPATIENT
Start: 2020-11-09 | End: 2021-10-01 | Stop reason: SDUPTHER

## 2020-11-09 ASSESSMENT — FIBROSIS 4 INDEX: FIB4 SCORE: 1.26

## 2020-11-09 NOTE — PROGRESS NOTES
"Virtual Visit: Established Patient   This visit was conducted via Zoom using secure and encrypted videoconferencing technology. The patient was in a private location in the state of [place].    The patient's identity was confirmed and verbal consent was obtained for this virtual visit.    Subjective:   CC:   Chief Complaint   Patient presents with   • Follow-Up     lab real Oshea is a 70 y.o. male presenting for evaluation and management of:    -Not losing as much weight as he would like to be  -He has lost ~5 pounds  -He was seeing a nutritionist, but is going to try things on his own  -Not exercising much due to neuropathy  -A1c is up to 6.4%, he has been having sandwiches with white bread  -Recently saw the oncologist, normal CEA and WBC, no changes and on surveillance   -Has a follow-up appointment with his urologist to follow his prostate cancer  -Tells me that for the last two weeks he has had a wet cough with some yellow sputum  -He does wear a CPAP at night, but thinks he needs a new machine  -No shortness of breath  -Wears oxygen 24/7, no issues with his home readings    ROS   Denies any recent fevers or chills. No nausea or vomiting. No chest pains or shortness of breath.     Allergies   Allergen Reactions   • Ketamine Unspecified     \"I hallucinate\".     • Lidocaine Rash     [id number]  Pt states \"Rash all over my body\".     • Duloxetine      Unable to urinate    • Morphine      \"Can not have too much, my oxygen goes down\"       Current medicines (including changes today)  Current Outpatient Medications   Medication Sig Dispense Refill   • gabapentin (NEURONTIN) 800 MG tablet Take 1 Tab by mouth 3 times a day. 270 Tab 3   • clonazePAM (KLONOPIN) 1 MG Tab TAKE 1 TABLET ORALLY TWICE A DAY FOR 30 DAYS (F51.04) 60 Tab 5   • metronidazole (METROCREAM) 0.75 % cream Apply 1 Application to affected area(s) 2 Times a Day.     • losartan (COZAAR) 100 MG Tab Take 1 Tab by mouth every day. 90 " Tab 3   • triamterene-hctz (MAXZIDE-25/DYAZIDE) 37.5-25 MG Tab Take 1 Tab by mouth every day. 90 Tab 3   • methocarbamol (ROBAXIN) 750 MG Tab Take 750 mg by mouth 4 times a day.     • rosuvastatin (CRESTOR) 40 MG tablet TAKE 1 TABLET BY MOUTH EVERYDAY AT BEDTIME 90 Tab 3   • Diclofenac Sodium 1 % Gel      • hydrocortisone 2.5 % Cream topical cream Apply to affected areas twice daily 1 Tube 0     No current facility-administered medications for this visit.        Patient Active Problem List    Diagnosis Date Noted   • Colon cancer ([facility]) 09/28/2016     Priority: Medium   • Essential hypertension 09/28/2016     Priority: Low   • Microalbuminuria due to type 2 diabetes mellitus ([facility]) 06/22/2020   • History of lung surgery 04/05/2019   • Former smoker 02/20/2019   • Chronic insomnia 09/07/2018   • Diet-controlled diabetes mellitus ([facility]) 04/11/2018   • Chronic respiratory failure with hypoxia ([facility]) 04/11/2018   • Skin cancer 12/06/2017   • Morbid obesity with BMI of 40.0-44.9, adult ([facility]) 09/20/2017   • Benign non-nodular prostatic hyperplasia with lower urinary tract symptoms 07/03/2017   • Obstructive sleep apnea 05/09/2017   • Neuropathy associated with cancer ([facility]) 03/28/2017   • History of gout 09/28/2016   • Anxiety about health 09/28/2016   • Pure hypercholesterolemia 09/28/2016   • Prostate cancer ([facility]) 09/28/2016       Family History   Problem Relation Age of Onset   • Cancer Mother         Bladder   • Heart Disease Father    • Heart Disease Brother         CABG x2       He  has a past medical history of Anemia, Anxiety, Atrophy of right kidney, Breath shortness, Cancer ([facility]) (2016), Colon cancer ([facility]), Former tobacco use, Gout, High cholesterol (12/12/2017), Hyperlipidemia, Hypertension (12/12/2017), Neuropathy (12/12/2017), Pain (12/12/2017), Pneumonia (1991), Psychiatric problem, Sleep apnea (12/12/2017), and Snoring.  He  has a past surgical history that includes cath placement (Left, 10/7/2016); closed  "reduction (10/25/2012); colon resection (2016); and thoracoscopy (2/5/2018).       Objective:   Ht 1.778 m (5' 10\")   Wt (!) 136.1 kg (300 lb)   BMI 43.05 kg/m²     Physical Exam:  Constitutional: Alert, no distress, well-groomed.  Skin: No rashes in visible areas.  Eye: Round. Conjunctiva clear, lids normal. No icterus.   ENMT: Lips pink without lesions, good dentition, moist mucous membranes. Phonation normal.  Neck: No masses, no thyromegaly. Moves freely without pain.  Respiratory: Unlabored respiratory effort, no cough or audible wheeze. Wearing supplemental oxygen.  Psych: Alert and oriented x3, normal affect and mood.     Assessment and Plan:   The following treatment plan was discussed:     1. Malignant neoplasm of descending colon (HCC)  2. Neuropathy associated with cancer (HCC)  Chronic and stable.  On close surveillance with oncology.  Recheck CBC prior to next visit and refilled gabapentin.  - CBC WITH DIFFERENTIAL; Future  - gabapentin (NEURONTIN) 800 MG tablet; Take 1 Tab by mouth 3 times a day.  Dispense: 270 Tab; Refill: 3    3. Prostate cancer (HCC)  Chronic and stable.  Monitoring with serial PSAs through his urology office.  - CBC WITH DIFFERENTIAL; Future    4. Diet-controlled diabetes mellitus (HCC)  5. Morbid obesity with BMI of 40.0-44.9, adult (HCC)  6. Microalbuminuria due to type 2 diabetes mellitus (HCC)  Chronic and stable.  Patient was encouraged that he lost weight, but is no longer following with a nutritionist.  He tells me that he would like to manage this on his own.  We will follow with serial labs.  - Comp Metabolic Panel; Future  - HEMOGLOBIN A1C; Future    7. Essential hypertension  Well-controlled on current regimen.  Labs as indicated.  Continue antihypertensive medications.  Discussed decreasing salt intake.  Emphasized benefits of exercise and diet. Continue to monitor.  - Comp Metabolic Panel; Future    8. Chronic respiratory failure with hypoxia (HCC)  9. Obstructive " sleep apnea  Encourage patient to call sleep medicine office to discuss his CPAP.  I did advise him that he may be in need of an appointment.  Continue with supplemental oxygen.    10. Chronic insomnia  11. Anxiety about health  This is a chronic and stable issue for the patient.  His insomnia and anxiety have been treated for many many years with clonazepam.  We have tried to taper him down in the past unsuccessfully.  I do not any concerns for misuse.  He does have refills at the pharmacy.    12. Pure hypercholesterolemia  Well controlled.  Labs as indicated.  Continue statin medication and lifestyle modifications.  Continue to monitor.  - Lipid Profile; Future    Follow-up: Return in about 6 months (around 5/9/2021) for Labs, Short.

## 2020-11-10 NOTE — TELEPHONE ENCOUNTER
Pt scheduled virtually tomorrow with [employer]  Since machine is not working and it may not be possible to obtain a compliance - Medicare will allow us to document what pt verbally advises he uses the machine prior to machine breaking.     We will need documentation of benefit and compliance to order a new machine     Pt will need a follow up compliance OV after obtaining the new machine

## 2020-11-11 ENCOUNTER — TELEMEDICINE (OUTPATIENT)
Dept: SLEEP MEDICINE | Facility: MEDICAL CENTER | Age: 70
End: 2020-11-11
Payer: MEDICARE

## 2020-11-11 VITALS — HEIGHT: 70 IN | BODY MASS INDEX: 42.23 KG/M2 | WEIGHT: 295 LBS

## 2020-11-11 DIAGNOSIS — C80.1 NEUROPATHY ASSOCIATED WITH CANCER (HCC): ICD-10-CM

## 2020-11-11 DIAGNOSIS — G47.33 OBSTRUCTIVE SLEEP APNEA: ICD-10-CM

## 2020-11-11 DIAGNOSIS — G63 NEUROPATHY ASSOCIATED WITH CANCER (HCC): ICD-10-CM

## 2020-11-11 DIAGNOSIS — J96.11 CHRONIC RESPIRATORY FAILURE WITH HYPOXIA (HCC): ICD-10-CM

## 2020-11-11 DIAGNOSIS — I10 ESSENTIAL HYPERTENSION: ICD-10-CM

## 2020-11-11 PROCEDURE — 99214 OFFICE O/P EST MOD 30 MIN: CPT | Mod: 95,CR | Performed by: NURSE PRACTITIONER

## 2020-11-11 ASSESSMENT — FIBROSIS 4 INDEX: FIB4 SCORE: 1.26

## 2020-11-11 NOTE — LETTER
CHICHO Anna  [facility]monary [facility]   [address], [address] [address]  Phone: [phone] - Fax:             Encounter Date: 11/11/2020  Provider: CHICHO Anna  Location of Care: [place] [facility]  [address] [facility] [address]      Patient:   Gerald Oshea   MR Number: [id number]   YOB: 1950     PROGRESS NOTE:  Virtual Visit: Established Patient   This visit was conducted via Zoom using secure and encrypted videoconferencing technology. The patient was in a private location in the [facility].    The patient's identity was confirmed and verbal consent was obtained for this virtual visit.  Given the importance of social distancing and other strategies recommended to reduce the risk of COVID-19 transmission, I am providing medical care to this patient via audio/video visit in place of an in person visit at the request of the patient.  Subjective:   CC:   Chief Complaint   Patient presents with   • Follow-Up     SU-Last seen 01/07/2020       Gerald Oshea is a 70 y.o. male presenting for evaluation and management of SU. PMH includes colon cancer, lung cancer, former smoker, chronic respiratory failure with hypoxia, anxiety, prostate cancer, neuropathy associated with cancer, DM II, chronic insomnia, hypertension.    Recent compliance data is not available for download.  Patient reports that his BiPAP machine started to function improperly in September of this year.  He states has been very noisy, there is a significant leak around the humidifier chamber and it also continuously will push pressure into his mask rather than fluctuate the pressure.  This has been very disruptive to his sleep and he has been waking up more frequently throughout the night.  He is also starting to feel more tired during the day.  Compliance report that is available is from 12/8/19-1/6/20 was  "downloaded and reviewed with the patient which showed BiPAP IPAP/EPAP 20-16 cmH2O, biflex 2, 100% compliance, 8 hrs 25 min use, AHI of 33.5. He is tolerating the pressure and mask well. He goes to bed between 11:30 pm-12 am and wakes up between 7-8 am. He denies morning headache or snoring.  He has limited with activity due to neuropathy secondary to chemo for the treatment of: Cancer, prostate and lung cancer.  He is on 2 liters of oxygen 24/7.  He is taking gabapentin 800 mg 3 times daily but feels it is not effectively managing his symptoms.  He follows up with his PCP for neuropathy as well as his blood pressure which has been stable and he takes his blood pressure medication as directed.      ROS   Denies any recent fevers or chills. No nausea or vomiting. No chest pains or shortness of breath.     Allergies   Allergen Reactions   • Ketamine Unspecified     \"I hallucinate\".     • Lidocaine Rash     [id number]  Pt states \"Rash all over my body\".     • Duloxetine      Unable to urinate    • Morphine      \"Can not have too much, my oxygen goes down\"       Current medicines (including changes today)  Current Outpatient Medications   Medication Sig Dispense Refill   • gabapentin (NEURONTIN) 800 MG tablet Take 1 Tab by mouth 3 times a day. 270 Tab 3   • clonazePAM (KLONOPIN) 1 MG Tab TAKE 1 TABLET ORALLY TWICE A DAY FOR 30 DAYS (F51.04) 60 Tab 5   • metronidazole (METROCREAM) 0.75 % cream Apply 1 Application to affected area(s) 2 Times a Day.     • losartan (COZAAR) 100 MG Tab Take 1 Tab by mouth every day. 90 Tab 3   • triamterene-hctz (MAXZIDE-25/DYAZIDE) 37.5-25 MG Tab Take 1 Tab by mouth every day. 90 Tab 3   • methocarbamol (ROBAXIN) 750 MG Tab Take 750 mg by mouth 4 times a day.     • rosuvastatin (CRESTOR) 40 MG tablet TAKE 1 TABLET BY MOUTH EVERYDAY AT BEDTIME 90 Tab 3   • Diclofenac Sodium 1 % Gel      • hydrocortisone 2.5 % Cream topical cream Apply to affected areas twice daily 1 Tube 0     No current " "facility-administered medications for this visit.        Patient Active Problem List    Diagnosis Date Noted   • Colon cancer (HCC) 09/28/2016     Priority: Medium   • Essential hypertension 09/28/2016     Priority: Low   • Microalbuminuria due to type 2 diabetes mellitus ([facility]) 06/22/2020   • History of lung surgery 04/05/2019   • Former smoker 02/20/2019   • Chronic insomnia 09/07/2018   • Diet-controlled diabetes mellitus ([facility]) 04/11/2018   • Chronic respiratory failure with hypoxia ([facility]) 04/11/2018   • Skin cancer 12/06/2017   • Morbid obesity with BMI of 40.0-44.9, adult ([facility]) 09/20/2017   • Benign non-nodular prostatic hyperplasia with lower urinary tract symptoms 07/03/2017   • Obstructive sleep apnea 05/09/2017   • Neuropathy associated with cancer ([facility]) 03/28/2017   • History of gout 09/28/2016   • Anxiety about health 09/28/2016   • Pure hypercholesterolemia 09/28/2016   • Prostate cancer ([facility]) 09/28/2016       Family History   Problem Relation Age of Onset   • Cancer Mother         Bladder   • Heart Disease Father    • Heart Disease Brother         CABG x2       He  has a past medical history of Anemia, Anxiety, Atrophy of right kidney, Breath shortness, Cancer ([facility]) (2016), Colon cancer (HCC), Former tobacco use, Gout, High cholesterol (12/12/2017), Hyperlipidemia, Hypertension (12/12/2017), Neuropathy (12/12/2017), Pain (12/12/2017), Pneumonia (1991), Psychiatric problem, Sleep apnea (12/12/2017), and Snoring.  He  has a past surgical history that includes cath placement (Left, 10/7/2016); closed reduction (10/25/2012); colon resection (2016); and thoracoscopy (2/5/2018).       Objective:   Ht 1.778 m (5' 10\")   Wt (!) 133.8 kg (295 lb)   BMI 42.33 kg/m²     Physical Exam:  Constitutional: Alert, no distress, well-groomed.  Skin: No rashes in visible areas.  Eye: Round. Conjunctiva clear, lids normal. No icterus.   ENMT: Lips pink without lesions, good dentition, moist mucous membranes. Phonation " normal.  Neck: Moves freely without pain.  Respiratory: Unlabored respiratory effort, no cough or audible wheeze  Psych: Alert and oriented x3, normal affect and mood.       Assessment and Plan:   The following treatment plan was discussed:     1. Obstructive sleep apnea  - DME BiPAP    2. Essential hypertension    3. BMI 40.0-44.9, adult ([facility])    4. Chronic respiratory failure with hypoxia ([facility])    5. Neuropathy associated with cancer ([facility])      Discussed the cardiovascular and neuropsychiatric risks of untreated SU; including but not limited to: HTN, DM, MI, ASCVD, CVA, CHF, traffic accidents.     1. Recent compliance data is not available for download due to malfunctioning BiPAP. Compliance report that is available is from 12/8/19-1/6/20 was downloaded and reviewed with the patient which showed BiPAP IPAP/EPAP 20-16 cmH2O, biflex 2, 100% compliance, 8 hrs 25 min use, AHI of 33.5. Continue autoBiPAP and 2L O2. Patient is compliant and benefiting from autoBiPAP and 2L O2 bleed in therapy for management of SU.  Will switch to an auto BiPAP setting due to elevated AHI on BiPAP setting.  2. DME order ([employer]) for new autoBiPAP IPAP/EPAP 25/16 cmH2O, PS 4 cmH2O, with 2L O2 mask (MOC) and supplies was provided today. Continue to clean mask and supplies weekly, and change supplies per insurance guidelines.   3. Continue to stay active as able.  Patient has limitations due to lateral lower extremity neuropathy secondary to chemo therapy, history of colon, prostate and lung cancer.  Patient is on 2 L O2 24/7.  4. Follow up with the appropriate healthcare practitioners for all other medical problems and issues.  5. Sleep hygiene discussed.  6.  Continue to follow-up with PCP for blood pressure management, take blood pressure medication as directed and check blood pressure at home.  Advised patient to inquire about long-acting gabapentin for management of neuropathy as current gabapentin 800 mg 3 times daily is not  effective.      Follow-up: Return in about 3 months (around 2/11/2021).    CHICHO Anna    This dictation was created using voice recognition software. The accuracy of the dictation is limited to the abilities of the software. I expect there may be some errors of grammar and possibly content.                  Electronically signed by CHICHO Anna  on 11/11/20    Jocelyn Ramos M.D.  [id number]   [place] [id number]  Via In Basket

## 2020-11-11 NOTE — PROGRESS NOTES
Virtual Visit: Established Patient   This visit was conducted via Zoom using secure and encrypted videoconferencing technology. The patient was in a private location in the state of [place].    The patient's identity was confirmed and verbal consent was obtained for this virtual visit.  Given the importance of social distancing and other strategies recommended to reduce the risk of COVID-19 transmission, I am providing medical care to this patient via audio/video visit in place of an in person visit at the request of the patient.  Subjective:   CC:   Chief Complaint   Patient presents with   • Follow-Up     SU-Last seen 01/07/2020       Gerald Oshea is a 70 y.o. male presenting for evaluation and management of SU. PMH includes colon cancer, lung cancer, former smoker, chronic respiratory failure with hypoxia, anxiety, prostate cancer, neuropathy associated with cancer, DM II, chronic insomnia, hypertension.    Recent compliance data is not available for download.  Patient reports that his BiPAP machine started to function improperly in September of this year.  He states has been very noisy, there is a significant leak around the humidifier chamber and it also continuously will push pressure into his mask rather than fluctuate the pressure.  This has been very disruptive to his sleep and he has been waking up more frequently throughout the night.  He is also starting to feel more tired during the day.  Compliance report that is available is from 12/8/19-1/6/20 was downloaded and reviewed with the patient which showed BiPAP IPAP/EPAP 20-16 cmH2O, biflex 2, 100% compliance, 8 hrs 25 min use, AHI of 33.5. He is tolerating the pressure and mask well. He goes to bed between 11:30 pm-12 am and wakes up between 7-8 am. He denies morning headache or snoring.  He has limited with activity due to neuropathy secondary to chemo for the treatment of: Cancer, prostate and lung cancer.  He is on 2 liters of oxygen 24/7.   "He is taking gabapentin 800 mg 3 times daily but feels it is not effectively managing his symptoms.  He follows up with his PCP for neuropathy as well as his blood pressure which has been stable and he takes his blood pressure medication as directed.      ROS   Denies any recent fevers or chills. No nausea or vomiting. No chest pains or shortness of breath.     Allergies   Allergen Reactions   • Ketamine Unspecified     \"I hallucinate\".     • Lidocaine Rash     [id number]  Pt states \"Rash all over my body\".     • Duloxetine      Unable to urinate    • Morphine      \"Can not have too much, my oxygen goes down\"       Current medicines (including changes today)  Current Outpatient Medications   Medication Sig Dispense Refill   • gabapentin (NEURONTIN) 800 MG tablet Take 1 Tab by mouth 3 times a day. 270 Tab 3   • clonazePAM (KLONOPIN) 1 MG Tab TAKE 1 TABLET ORALLY TWICE A DAY FOR 30 DAYS (F51.04) 60 Tab 5   • metronidazole (METROCREAM) 0.75 % cream Apply 1 Application to affected area(s) 2 Times a Day.     • losartan (COZAAR) 100 MG Tab Take 1 Tab by mouth every day. 90 Tab 3   • triamterene-hctz (MAXZIDE-25/DYAZIDE) 37.5-25 MG Tab Take 1 Tab by mouth every day. 90 Tab 3   • methocarbamol (ROBAXIN) 750 MG Tab Take 750 mg by mouth 4 times a day.     • rosuvastatin (CRESTOR) 40 MG tablet TAKE 1 TABLET BY MOUTH EVERYDAY AT BEDTIME 90 Tab 3   • Diclofenac Sodium 1 % Gel      • hydrocortisone 2.5 % Cream topical cream Apply to affected areas twice daily 1 Tube 0     No current facility-administered medications for this visit.        Patient Active Problem List    Diagnosis Date Noted   • Colon cancer (HCC) 09/28/2016     Priority: Medium   • Essential hypertension 09/28/2016     Priority: Low   • Microalbuminuria due to type 2 diabetes mellitus (HCC) 06/22/2020   • History of lung surgery 04/05/2019   • Former smoker 02/20/2019   • Chronic insomnia 09/07/2018   • Diet-controlled diabetes mellitus (HCC) 04/11/2018   • Chronic " "respiratory failure with hypoxia (HCC) 04/11/2018   • Skin cancer 12/06/2017   • Morbid obesity with BMI of 40.0-44.9, adult (HCC) 09/20/2017   • Benign non-nodular prostatic hyperplasia with lower urinary tract symptoms 07/03/2017   • Obstructive sleep apnea 05/09/2017   • Neuropathy associated with cancer (HCC) 03/28/2017   • History of gout 09/28/2016   • Anxiety about health 09/28/2016   • Pure hypercholesterolemia 09/28/2016   • Prostate cancer (HCC) 09/28/2016       Family History   Problem Relation Age of Onset   • Cancer Mother         Bladder   • Heart Disease Father    • Heart Disease Brother         CABG x2       He  has a past medical history of Anemia, Anxiety, Atrophy of right kidney, Breath shortness, Cancer (HCC) (2016), Colon cancer (HCC), Former tobacco use, Gout, High cholesterol (12/12/2017), Hyperlipidemia, Hypertension (12/12/2017), Neuropathy (12/12/2017), Pain (12/12/2017), Pneumonia (1991), Psychiatric problem, Sleep apnea (12/12/2017), and Snoring.  He  has a past surgical history that includes cath placement (Left, 10/7/2016); closed reduction (10/25/2012); colon resection (2016); and thoracoscopy (2/5/2018).       Objective:   Ht 1.778 m (5' 10\")   Wt (!) 133.8 kg (295 lb)   BMI 42.33 kg/m²     Physical Exam:  Constitutional: Alert, no distress, well-groomed.  Skin: No rashes in visible areas.  Eye: Round. Conjunctiva clear, lids normal. No icterus.   ENMT: Lips pink without lesions, good dentition, moist mucous membranes. Phonation normal.  Neck: Moves freely without pain.  Respiratory: Unlabored respiratory effort, no cough or audible wheeze  Psych: Alert and oriented x3, normal affect and mood.       Assessment and Plan:   The following treatment plan was discussed:     1. Obstructive sleep apnea  - DME BiPAP    2. Essential hypertension    3. BMI 40.0-44.9, adult ([facility])    4. Chronic respiratory failure with hypoxia (HCC)    5. Neuropathy associated with cancer (HCC)      Discussed " the cardiovascular and neuropsychiatric risks of untreated SU; including but not limited to: HTN, DM, MI, ASCVD, CVA, CHF, traffic accidents.     1. Recent compliance data is not available for download due to malfunctioning BiPAP. Compliance report that is available is from 12/8/19-1/6/20 was downloaded and reviewed with the patient which showed BiPAP IPAP/EPAP 20-16 cmH2O, biflex 2, 100% compliance, 8 hrs 25 min use, AHI of 33.5. Continue autoBiPAP and 2L O2. Patient is compliant and benefiting from autoBiPAP and 2L O2 bleed in therapy for management of SU.  Will switch to an auto BiPAP setting due to elevated AHI on BiPAP setting.  2. DME order ([email or web address]) for new autoBiPAP IPAP/EPAP 25/16 cmH2O, PS 4 cmH2O, with 2L O2 mask (MOC) and supplies was provided today. Continue to clean mask and supplies weekly, and change supplies per insurance guidelines.   3. Continue to stay active as able.  Patient has limitations due to lateral lower extremity neuropathy secondary to chemo therapy, history of colon, prostate and lung cancer.  Patient is on 2 L O2 24/7.  4. Follow up with the appropriate healthcare practitioners for all other medical problems and issues.  5. Sleep hygiene discussed.  6.  Continue to follow-up with PCP for blood pressure management, take blood pressure medication as directed and check blood pressure at home.  Advised patient to inquire about long-acting gabapentin for management of neuropathy as current gabapentin 800 mg 3 times daily is not effective.      Follow-up: Return in about 3 months (around 2/11/2021).    BELEN Anna.    This dictation was created using voice recognition software. The accuracy of the dictation is limited to the abilities of the software. I expect there may be some errors of grammar and possibly content.

## 2021-01-11 ENCOUNTER — HOSPITAL ENCOUNTER (OUTPATIENT)
Dept: LAB | Facility: MEDICAL CENTER | Age: 71
End: 2021-01-11
Attending: INTERNAL MEDICINE
Payer: MEDICARE

## 2021-01-11 LAB
ALBUMIN SERPL BCP-MCNC: 4.3 G/DL (ref 3.2–4.9)
ALBUMIN/GLOB SERPL: 1.9 G/DL
ALP SERPL-CCNC: 74 U/L (ref 30–99)
ALT SERPL-CCNC: 15 U/L (ref 2–50)
ANION GAP SERPL CALC-SCNC: 7 MMOL/L (ref 7–16)
AST SERPL-CCNC: 15 U/L (ref 12–45)
BASOPHILS # BLD AUTO: 0.5 % (ref 0–1.8)
BASOPHILS # BLD: 0.04 K/UL (ref 0–0.12)
BILIRUB SERPL-MCNC: 0.4 MG/DL (ref 0.1–1.5)
BUN SERPL-MCNC: 19 MG/DL (ref 8–22)
CALCIUM SERPL-MCNC: 9.6 MG/DL (ref 8.4–10.2)
CEA SERPL-MCNC: 6.1 NG/ML (ref 0–3)
CHLORIDE SERPL-SCNC: 101 MMOL/L (ref 96–112)
CO2 SERPL-SCNC: 31 MMOL/L (ref 20–33)
CREAT SERPL-MCNC: 1.18 MG/DL (ref 0.5–1.4)
EOSINOPHIL # BLD AUTO: 0.23 K/UL (ref 0–0.51)
EOSINOPHIL NFR BLD: 3.1 % (ref 0–6.9)
ERYTHROCYTE [DISTWIDTH] IN BLOOD BY AUTOMATED COUNT: 48.6 FL (ref 35.9–50)
FASTING STATUS PATIENT QL REPORTED: NORMAL
GLOBULIN SER CALC-MCNC: 2.3 G/DL (ref 1.9–3.5)
GLUCOSE SERPL-MCNC: 100 MG/DL (ref 65–99)
HCT VFR BLD AUTO: 43.1 % (ref 42–52)
HGB BLD-MCNC: 14.2 G/DL (ref 14–18)
IMM GRANULOCYTES # BLD AUTO: 0.02 K/UL (ref 0–0.11)
IMM GRANULOCYTES NFR BLD AUTO: 0.3 % (ref 0–0.9)
LYMPHOCYTES # BLD AUTO: 1.36 K/UL (ref 1–4.8)
LYMPHOCYTES NFR BLD: 18.5 % (ref 22–41)
MCH RBC QN AUTO: 31.5 PG (ref 27–33)
MCHC RBC AUTO-ENTMCNC: 32.9 G/DL (ref 33.7–35.3)
MCV RBC AUTO: 95.6 FL (ref 81.4–97.8)
MONOCYTES # BLD AUTO: 0.72 K/UL (ref 0–0.85)
MONOCYTES NFR BLD AUTO: 9.8 % (ref 0–13.4)
NEUTROPHILS # BLD AUTO: 4.97 K/UL (ref 1.82–7.42)
NEUTROPHILS NFR BLD: 67.8 % (ref 44–72)
NRBC # BLD AUTO: 0 K/UL
NRBC BLD-RTO: 0 /100 WBC
PLATELET # BLD AUTO: 191 K/UL (ref 164–446)
PMV BLD AUTO: 9.2 FL (ref 9–12.9)
POTASSIUM SERPL-SCNC: 4.3 MMOL/L (ref 3.6–5.5)
PROT SERPL-MCNC: 6.6 G/DL (ref 6–8.2)
RBC # BLD AUTO: 4.51 M/UL (ref 4.7–6.1)
SODIUM SERPL-SCNC: 139 MMOL/L (ref 135–145)
WBC # BLD AUTO: 7.3 K/UL (ref 4.8–10.8)

## 2021-01-11 PROCEDURE — 80053 COMPREHEN METABOLIC PANEL: CPT

## 2021-01-11 PROCEDURE — 36415 COLL VENOUS BLD VENIPUNCTURE: CPT

## 2021-01-11 PROCEDURE — 85025 COMPLETE CBC W/AUTO DIFF WBC: CPT

## 2021-01-11 PROCEDURE — 82378 CARCINOEMBRYONIC ANTIGEN: CPT

## 2021-01-28 ENCOUNTER — HOSPITAL ENCOUNTER (OUTPATIENT)
Dept: RADIOLOGY | Facility: MEDICAL CENTER | Age: 71
End: 2021-01-28
Attending: INTERNAL MEDICINE
Payer: MEDICARE

## 2021-01-28 DIAGNOSIS — C18.2 MALIGNANT NEOPLASM OF ASCENDING COLON (HCC): ICD-10-CM

## 2021-01-28 PROCEDURE — 71250 CT THORAX DX C-: CPT

## 2021-03-03 DIAGNOSIS — Z23 NEED FOR VACCINATION: ICD-10-CM

## 2021-03-19 ENCOUNTER — APPOINTMENT (OUTPATIENT)
Dept: LAB | Facility: MEDICAL CENTER | Age: 71
End: 2021-03-19
Attending: FAMILY MEDICINE
Payer: MEDICARE

## 2021-03-25 ENCOUNTER — HOSPITAL ENCOUNTER (OUTPATIENT)
Dept: RADIOLOGY | Facility: MEDICAL CENTER | Age: 71
End: 2021-03-25
Attending: INTERNAL MEDICINE
Payer: MEDICARE

## 2021-03-25 DIAGNOSIS — C18.2 MALIGNANT NEOPLASM OF ASCENDING COLON (HCC): ICD-10-CM

## 2021-03-25 PROCEDURE — 71250 CT THORAX DX C-: CPT | Mod: MH

## 2021-04-05 DIAGNOSIS — E78.00 PURE HYPERCHOLESTEROLEMIA: ICD-10-CM

## 2021-04-05 RX ORDER — ROSUVASTATIN CALCIUM 40 MG/1
TABLET, COATED ORAL
Qty: 90 TABLET | Refills: 0 | Status: SHIPPED | OUTPATIENT
Start: 2021-04-05 | End: 2021-10-01 | Stop reason: SDUPTHER

## 2021-04-06 PROBLEM — Z87.891 FORMER SMOKER: Status: RESOLVED | Noted: 2019-02-20 | Resolved: 2021-04-06

## 2021-04-06 ASSESSMENT — ENCOUNTER SYMPTOMS
FEVER: 0
SHORTNESS OF BREATH: 0
CHILLS: 0
CONSTIPATION: 0
DIARRHEA: 0
DEPRESSION: 0
NAUSEA: 0
VOMITING: 0
PALPITATIONS: 0
BLURRED VISION: 0
WEAKNESS: 0

## 2021-04-06 NOTE — PROGRESS NOTES
This evaluation was conducted via Zoom using secure and encrypted videoconferencing technology. The patient was in a private location in the [facility].    The patient's identity was confirmed and verbal consent was obtained for this virtual visit.      History of Present Illness  70 year old male presents via video visit to establish care.  He does have neuropathy in his hands and feet bilaterally.  This is definitely worse in his feet though.  This is a side effect from the chemotherapy treatment he has used in the past.  He does take gabapentin 3 times daily which he finds helpful.    He has hypertension for which he is taking losartan, hydrochlorothiazide, and triamterene. He does not get check blood pressures regularly at home. He is using Rosuvastatin for hyperlipidemia. He denies any side effects, no myalgias.    He has sleep apnea, for which he uses a CPAP. He is able to get an average of 7 hours of sleep per night. He does feel rested upon wakening, but does fall asleep in the afternoon still.    He has chronic hypoxic and needs to use supplemental oxygen. He uses 2L with rest and 3L with activity. He did have part of his lung removed due to metastasis of his colon cancer. He denies any cough or shortness of breath.    He also has type 2 diabetes, which is currently being controlled with diet and lifestyle.  He does not check his blood glucose on a regular basis.  He has associated microalbuminuria.  He denies any signs or symptoms of hypo or hyperglycemia.    He also has generalized anxiety and has Xanax that he can use up to 2 times daily as needed.  He states he usually uses this once per day for panic attacks.  Mood is stable.  He denies any thoughts of self-harm, suicide, or harm to others.    He does have prostate cancer and is following with urology and oncology for this.  No active treatment is being needed for it.  He denies any troubles with urinary stream or frequency.    He denies any other  "questions or concerns at this time.    ROS  Review of Systems   Constitutional: Negative for chills and fever.   HENT: Negative for hearing loss.    Eyes: Negative for blurred vision.   Respiratory: Negative for shortness of breath.    Cardiovascular: Negative for chest pain and palpitations.   Gastrointestinal: Negative for constipation, diarrhea, nausea and vomiting.   Genitourinary: Negative for dysuria and hematuria.   Skin: Negative for rash.   Neurological: Negative for weakness.   Psychiatric/Behavioral: Negative for depression.     Medications  Current Outpatient Medications   Medication Sig Dispense Refill   • clonazePAM (KLONOPIN) 1 MG Tab Take 1 tablet by mouth 1 time a day as needed.     • rosuvastatin (CRESTOR) 40 MG tablet TAKE 1 TABLET BY MOUTH EVERYDAY AT BEDTIME 90 tablet 0   • gabapentin (NEURONTIN) 800 MG tablet Take 1 Tab by mouth 3 times a day. 270 Tab 3   • metronidazole (METROCREAM) 0.75 % cream Apply 1 Application to affected area(s) 2 Times a Day.     • losartan (COZAAR) 100 MG Tab Take 1 Tab by mouth every day. 90 Tab 3   • triamterene-hctz (MAXZIDE-25/DYAZIDE) 37.5-25 MG Tab Take 1 Tab by mouth every day. 90 Tab 3   • methocarbamol (ROBAXIN) 750 MG Tab Take 750 mg by mouth 4 times a day.     • Diclofenac Sodium 1 % Gel      • hydrocortisone 2.5 % Cream topical cream Apply to affected areas twice daily 1 Tube 0     No current facility-administered medications for this visit.     Allergies  Allergies   Allergen Reactions   • Ketamine Unspecified     \"I hallucinate\".     • Lidocaine Rash     CLZ=2525  Pt states \"Rash all over my body\".     • Duloxetine      Unable to urinate    • Morphine      \"Can not have too much, my oxygen goes down\"     Problem List  Patient Active Problem List   Diagnosis   • History of colon cancer   • History of gout   • Essential hypertension   • Pure hypercholesterolemia   • Prostate cancer (HCC)   • Neuropathy associated with cancer (HCC)   • Obstructive sleep " "apnea   • Morbid obesity with BMI of 40.0-44.9, adult (HCC)   • History of skin cancer   • Diet-controlled diabetes mellitus (HCC)   • Chronic respiratory failure with hypoxia (HCC)   • Microalbuminuria due to type 2 diabetes mellitus (HCC)     Past Medical History  Past Medical History:   Diagnosis Date   • Anemia    • Anxiety    • Atrophy of right kidney     one functioning kidney   • Breath shortness    • Cancer (HCC) 2016    colon   rx surgery and chemo   • Colon cancer (HCC)    • Former tobacco use    • Gout    • High cholesterol 12/12/2017   • Hyperlipidemia    • Hypertension 12/12/2017   • Neuropathy 12/12/2017   • Pain 12/12/2017    \"Lower Back & Left Leg\"   • Pneumonia 1991    Left upper lobe   • Psychiatric problem     anxiety   • Sleep apnea 12/12/2017    CPAP USE   • Snoring     DX SU     Past Surgical History  Past Surgical History:   Procedure Laterality Date   • THORACOSCOPY  2/5/2018    Procedure: THORACOSCOPY- WEDGE RESECTION LT UPPER LOBE METASTASIS;  Surgeon: John H Ganser, M.D.;  Location: SURGERY [facility];  Service: General   • CATH PLACEMENT Left 10/7/2016    Procedure: CATH PLACEMENT - SUBCLAVIAN PORT;  Surgeon: Sho Bajwa M.D.;  Location: SURGERY SAME DAY [facility] ORS;  Service:    • COLON RESECTION  2016    [facility]   • CLOSED REDUCTION  10/25/2012    Performed by Chavez Duran M.D. at SURGERY [facility]     Past Family History  Family History   Problem Relation Age of Onset   • Cancer Mother         Bladder   • Heart Disease Father    • Heart Disease Brother         CABG x2   • Hyperlipidemia Brother    • No Known Problems Son    • No Known Problems Maternal Grandmother    • No Known Problems Paternal Grandmother    • Heart Disease Paternal Grandfather    • Hypertension Paternal Grandfather    • Hyperlipidemia Paternal Grandfather      Social History  He reports eating a diet that consists of take out and home made meals. He does not eat vegetables. He does not " "get any regular exercise. 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 He drinks an average of 5 alcoholic beverages per day. He denies any tobacco product or illicit drug use. He is not currently sexually active.     Physical Exam  Ht 1.778 m (5' 10\")   Wt (!) 138 kg (305 lb)   BMI 43.76 kg/m²   Physical Exam   Constitutional: He is oriented to person, place, and time and well-developed, well-nourished, and in no distress. No distress.   Eyes: Right eye exhibits no discharge. Left eye exhibits no discharge. No scleral icterus.   Pulmonary/Chest: Effort normal. No respiratory distress.   Neurological: He is alert and oriented to person, place, and time.   Skin: He is not diaphoretic.   Psychiatric: Affect and judgment normal.     Assessment & Plan  1. Neuropathy associated with cancer (HCC)  Chronic and stable.  Continue gabapentin    2. Essential hypertension  Chronic and stable.  Continue losartan and Maxide    3. Pure hypercholesterolemia  Chronic and stable.  Continue rosuvastatin    4. Obstructive sleep apnea  Chronic and stable.  Continue CPAP machine    5. Chronic respiratory failure with hypoxia (HCC)  Chronic and stable.  Continue with supplemental oxygen.    6. History of colon cancer  Chronic and stable.     7. Diet-controlled diabetes mellitus (HCC)  8. Microalbuminuria due to type 2 diabetes mellitus (HCC)  Chronic and stable.  Continue to monitor    9. Prostate cancer (HCC)  Chronic and stable.     10. Morbid obesity with BMI of 40.0-44.9, adult (HCC)  We did discuss at length the recommended amount of exercise and a healthy balanced diet.  Unfortunately due to his neuropathy exercise is very limited to him, so he will mostly rely on a calorie deficit.  Our goal is to get him out of the morbidly obese category and into an obese BMI.    Return in about 3 months (around 7/8/2021) for anxiety management.    Jaylin Carroll M.D.   "

## 2021-04-08 ENCOUNTER — TELEMEDICINE (OUTPATIENT)
Dept: MEDICAL GROUP | Facility: MEDICAL CENTER | Age: 71
End: 2021-04-08
Payer: MEDICARE

## 2021-04-08 VITALS — BODY MASS INDEX: 43.67 KG/M2 | WEIGHT: 305 LBS | HEIGHT: 70 IN

## 2021-04-08 DIAGNOSIS — E66.01 MORBID OBESITY WITH BMI OF 40.0-44.9, ADULT (HCC): ICD-10-CM

## 2021-04-08 DIAGNOSIS — C80.1 NEUROPATHY ASSOCIATED WITH CANCER (HCC): ICD-10-CM

## 2021-04-08 DIAGNOSIS — G47.33 OBSTRUCTIVE SLEEP APNEA: ICD-10-CM

## 2021-04-08 DIAGNOSIS — I10 ESSENTIAL HYPERTENSION: ICD-10-CM

## 2021-04-08 DIAGNOSIS — G63 NEUROPATHY ASSOCIATED WITH CANCER (HCC): ICD-10-CM

## 2021-04-08 DIAGNOSIS — E11.9 DIET-CONTROLLED DIABETES MELLITUS (HCC): ICD-10-CM

## 2021-04-08 DIAGNOSIS — R80.9 MICROALBUMINURIA DUE TO TYPE 2 DIABETES MELLITUS (HCC): ICD-10-CM

## 2021-04-08 DIAGNOSIS — E78.00 PURE HYPERCHOLESTEROLEMIA: ICD-10-CM

## 2021-04-08 DIAGNOSIS — C61 MALIGNANT NEOPLASM OF PROSTATE (HCC): ICD-10-CM

## 2021-04-08 DIAGNOSIS — Z85.038 HISTORY OF COLON CANCER: ICD-10-CM

## 2021-04-08 DIAGNOSIS — J96.11 CHRONIC RESPIRATORY FAILURE WITH HYPOXIA (HCC): ICD-10-CM

## 2021-04-08 DIAGNOSIS — E11.29 MICROALBUMINURIA DUE TO TYPE 2 DIABETES MELLITUS (HCC): ICD-10-CM

## 2021-04-08 PROBLEM — N40.1 BENIGN NON-NODULAR PROSTATIC HYPERPLASIA WITH LOWER URINARY TRACT SYMPTOMS: Status: RESOLVED | Noted: 2017-07-03 | Resolved: 2021-04-08

## 2021-04-08 PROBLEM — Z98.890 HISTORY OF LUNG SURGERY: Status: RESOLVED | Noted: 2019-04-05 | Resolved: 2021-04-08

## 2021-04-08 PROBLEM — Z85.828 HISTORY OF SKIN CANCER: Status: ACTIVE | Noted: 2017-12-06

## 2021-04-08 PROBLEM — F51.04 CHRONIC INSOMNIA: Status: RESOLVED | Noted: 2018-09-07 | Resolved: 2021-04-08

## 2021-04-08 PROCEDURE — 99214 OFFICE O/P EST MOD 30 MIN: CPT | Mod: 95,CR | Performed by: FAMILY MEDICINE

## 2021-04-08 RX ORDER — CLONAZEPAM 1 MG/1
1 TABLET ORAL
COMMUNITY
Start: 2021-04-05 | End: 2021-04-30 | Stop reason: SDUPTHER

## 2021-04-08 ASSESSMENT — FIBROSIS 4 INDEX: FIB4 SCORE: 1.4

## 2021-04-08 ASSESSMENT — PATIENT HEALTH QUESTIONNAIRE - PHQ9: CLINICAL INTERPRETATION OF PHQ2 SCORE: 0

## 2021-04-28 NOTE — PROGRESS NOTES
"History of Present Illness  70 year old male presents to clinic for anxiety management.  He does use clonazepam as needed for this.  He typically takes this 2 times per day.  Mood, motivation, and concentration are all doing well. He denies any thoughts of self-harm, suicide, or harm to others.    He denies any other questions or concerns at this time.    Current problem list, current medication, and past medical/surgical history were reviewed.     ROS  See HPI    Physical Exam  /68 (BP Location: Left arm, Patient Position: Sitting, BP Cuff Size: Adult)   Pulse 90   Temp 37.1 °C (98.8 °F) (Temporal)   Resp 20   Ht 1.778 m (5' 10\")   Wt (!) 141 kg (311 lb 8.2 oz)   SpO2 94%   BMI 44.70 kg/m²   Physical Exam   Constitutional: He is well-developed, well-nourished, and in no distress. No distress.   Cardiovascular: Normal rate, regular rhythm and normal heart sounds.   Pulmonary/Chest: Effort normal and breath sounds normal. No respiratory distress.   Abdominal: Soft. Bowel sounds are normal.   Neurological: He is alert.   Skin: Skin is warm and dry. He is not diaphoretic.   Psychiatric: Affect and judgment normal.     Assessment & Plan  1. Generalized anxiety disorder  I have sent 3 months worth of clonazepam prescriptions in.  Controlled substance agreement signed today and scanned into his chart.  Obtained and reviewed patient utilization report from [facility] pharmacy database on 4/30/2021 11:35 AM  prior to writing prescription for controlled substance II, III or IV per [place] bill . Based on assessment of the report, my physical exam, and the patient's health problem, the prescription is medically necessary.   - clonazePAM (KLONOPIN) 1 MG Tab; Take 1 tablet by mouth 2 times a day as needed for up to 30 days.  Dispense: 60 tablet; Refill: 0  - clonazePAM (KLONOPIN) 1 MG Tab; Take 1 tablet by mouth 2 times a day as needed for up to 30 days.  Dispense: 60 tablet; Refill: 0  - clonazePAM " (KLONOPIN) 1 MG Tab; Take 1 tablet by mouth 2 times a day as needed for up to 30 days.  Dispense: 60 tablet; Refill: 0  - Controlled Substance Treatment Agreement    Return in about 3 months (around 7/30/2021) for controlled substance management.    Jaylin Carroll M.D.

## 2021-04-30 ENCOUNTER — OFFICE VISIT (OUTPATIENT)
Dept: MEDICAL GROUP | Facility: MEDICAL CENTER | Age: 71
End: 2021-04-30
Payer: MEDICARE

## 2021-04-30 VITALS
SYSTOLIC BLOOD PRESSURE: 116 MMHG | HEART RATE: 90 BPM | TEMPERATURE: 98.8 F | HEIGHT: 70 IN | OXYGEN SATURATION: 94 % | BODY MASS INDEX: 44.6 KG/M2 | WEIGHT: 311.51 LBS | RESPIRATION RATE: 20 BRPM | DIASTOLIC BLOOD PRESSURE: 68 MMHG

## 2021-04-30 DIAGNOSIS — F41.1 GENERALIZED ANXIETY DISORDER: ICD-10-CM

## 2021-04-30 PROCEDURE — 99214 OFFICE O/P EST MOD 30 MIN: CPT | Performed by: FAMILY MEDICINE

## 2021-04-30 RX ORDER — CLONAZEPAM 1 MG/1
1 TABLET ORAL 2 TIMES DAILY PRN
Qty: 60 TABLET | Refills: 0 | Status: SHIPPED | OUTPATIENT
Start: 2021-07-05 | End: 2021-08-02 | Stop reason: SDUPTHER

## 2021-04-30 RX ORDER — CLONAZEPAM 1 MG/1
1 TABLET ORAL 2 TIMES DAILY PRN
Qty: 60 TABLET | Refills: 0 | Status: SHIPPED | OUTPATIENT
Start: 2021-05-05 | End: 2021-06-04

## 2021-04-30 RX ORDER — CLONAZEPAM 1 MG/1
1 TABLET ORAL 2 TIMES DAILY PRN
Qty: 60 TABLET | Refills: 0 | Status: SHIPPED | OUTPATIENT
Start: 2021-06-05 | End: 2021-07-05

## 2021-04-30 ASSESSMENT — FIBROSIS 4 INDEX: FIB4 SCORE: 1.4

## 2021-05-13 NOTE — LETTER
[facility]  Jocelyn Ramos M.D.  [address]  [place] [address]  Fax: [phone]   Authorization for Release/Disclosure of   Protected Health Information   Name: GERALD ASIF : 1950 SSN: [id number]   Address: [address] [place] [address]  [place] [address] Phone:    [phone] (home)    I authorize the entity listed below to release/disclose the PHI below to:   [facility]/Jocelyn Ramos M.D. and Jocelyn Ramos M.D.   Provider or Entity Name:  [facility]   Address   [facility], [facility], [facility]   Phone:  [phone]    Fax:     Reason for request: continuity of care   Information to be released:    [  ] LAST COLONOSCOPY,  including any PATH REPORT and follow-up  [  ] LAST FIT/COLOGUARD RESULT [  ] LAST DEXA  [  ] LAST MAMMOGRAM  [  ] LAST PAP  [  ] LAST LABS [ x ] RETINA EXAM REPORT  [  ] IMMUNIZATION RECORDS  [  ] Release all info      [  ] Check here and initial the line next to each item to release ALL health information INCLUDING  _____ Care and treatment for drug and / or alcohol abuse  _____ HIV testing, infection status, or AIDS  _____ Genetic Testing    DATES OF SERVICE OR TIME PERIOD TO BE DISCLOSED: _____________  I understand and acknowledge that:  * This Authorization may be revoked at any time by you in writing, except if your health information has already been used or disclosed.  * Your health information that will be used or disclosed as a result of you signing this authorization could be re-disclosed by the recipient. If this occurs, your re-disclosed health information may no longer be protected by State or Federal laws.  * You may refuse to sign this Authorization. Your refusal will not affect your ability to obtain treatment.  * This Authorization becomes effective upon signing and will  on (date) __________.      If no date is indicated, this Authorization will  one (1) year from the signature date.    Name: Gerald Asif    Signature:   Date:     2020       PLEASE FAX REQUESTED RECORDS BACK TO: [phone]   100.7

## 2021-07-01 ENCOUNTER — TELEMEDICINE (OUTPATIENT)
Dept: SLEEP MEDICINE | Facility: MEDICAL CENTER | Age: 71
End: 2021-07-01
Payer: MEDICARE

## 2021-07-01 ENCOUNTER — TELEPHONE (OUTPATIENT)
Dept: SLEEP MEDICINE | Facility: MEDICAL CENTER | Age: 71
End: 2021-07-01

## 2021-07-01 VITALS — WEIGHT: 300 LBS | HEIGHT: 70 IN | BODY MASS INDEX: 42.95 KG/M2

## 2021-07-01 DIAGNOSIS — J96.11 CHRONIC RESPIRATORY FAILURE WITH HYPOXIA (HCC): ICD-10-CM

## 2021-07-01 DIAGNOSIS — I10 ESSENTIAL HYPERTENSION: ICD-10-CM

## 2021-07-01 DIAGNOSIS — G47.33 OBSTRUCTIVE SLEEP APNEA: ICD-10-CM

## 2021-07-01 PROCEDURE — 99214 OFFICE O/P EST MOD 30 MIN: CPT | Mod: 95 | Performed by: NURSE PRACTITIONER

## 2021-07-01 ASSESSMENT — FIBROSIS 4 INDEX: FIB4 SCORE: 1.4

## 2021-07-01 NOTE — PROGRESS NOTES
"Virtual Visit: Established Patient   This visit was conducted via Zoom using secure and encrypted videoconferencing technology. The patient was in a private location in the state of [place].    The patient's identity was confirmed and verbal consent was obtained for this virtual visit.  Given the importance of social distancing and other strategies recommended to reduce the risk of COVID-19 transmission, I am providing medical care to this patient via audio/video visit in place of an in person visit at the request of the patient.  Subjective:   CC:   Chief Complaint   Patient presents with   • Follow-Up     SU-Last seen 11/11/2020       Gerald Oshea is a 70 y.o. male presenting for evaluation and management of SU. PMH includes colon cancer, lung cancer, former smoker, chronic respiratory failure with hypoxia, anxiety, prostate cancer, neuropathy associated with cancer, DM II, chronic insomnia, hypertension, hyperlipidemia, right kidney atrophy, gout.    Patient was set up with a [employer] BiPAP machine in July 2018.  Compliance report is again not available for download and [facility] reports only data available is up to February of 2021.  Patient reported issues with his prior machine at the last office visit in November 2020 and an order was placed for a new BiPAP machine that was sent to [facility].  He reports that [facility] gave him \"half of a machine\" but he does not recall the date which is also now malfunctioning.  He states that it is not turning on at all and he is not able to use it.  After review with [facility] it appears that they honored his warranty and provided the patient with the part that was malfunctioning on 2/20/2021.  Patient is being treated with  BiPAP IPAP/EPAP 20-16 cmH2O, biflex 2.  He is tolerating the pressure and mask well.  He goes to bed between 11:30 pm-12 am and wakes up between 7-8 am. He denies morning headache or snoring.  He has limited with activity due to " "neuropathy secondary to chemo for the treatment of colon, prostate and lung cancer.  He is on 2 liters of oxygen 24/7.   He reports that he gets his oxygen through accidents and the BiPAP through [employer]. He follows up with his PCP for neuropathy as well as his blood pressure which has been stable and he takes his blood pressure medication as directed.    Sleep Study History:  His original PSG showed an AHI of 97.    ROS   Denies any recent fevers or chills. No nausea or vomiting. No chest pains or shortness of breath.     Allergies   Allergen Reactions   • Ketamine Unspecified     \"I hallucinate\".     • Lidocaine Rash     AFS=2722  Pt states \"Rash all over my body\".     • Duloxetine      Unable to urinate    • Morphine      \"Can not have too much, my oxygen goes down\"       Current medicines (including changes today)  Current Outpatient Medications   Medication Sig Dispense Refill   • clonazePAM (KLONOPIN) 1 MG Tab Take 1 tablet by mouth 2 times a day as needed for up to 30 days. 60 tablet 0   • rosuvastatin (CRESTOR) 40 MG tablet TAKE 1 TABLET BY MOUTH EVERYDAY AT BEDTIME 90 tablet 0   • gabapentin (NEURONTIN) 800 MG tablet Take 1 Tab by mouth 3 times a day. 270 Tab 3   • metronidazole (METROCREAM) 0.75 % cream Apply 1 Application to affected area(s) 2 Times a Day.     • losartan (COZAAR) 100 MG Tab Take 1 Tab by mouth every day. 90 Tab 3   • triamterene-hctz (MAXZIDE-25/DYAZIDE) 37.5-25 MG Tab Take 1 Tab by mouth every day. 90 Tab 3   • hydrocortisone 2.5 % Cream topical cream Apply to affected areas twice daily 1 Tube 0   • [START ON 7/5/2021] clonazePAM (KLONOPIN) 1 MG Tab Take 1 tablet by mouth 2 times a day as needed for up to 30 days. 60 tablet 0   • methocarbamol (ROBAXIN) 750 MG Tab Take 750 mg by mouth 4 times a day.     • Diclofenac Sodium 1 % Gel        No current facility-administered medications for this visit.       Patient Active Problem List    Diagnosis Date Noted   • Generalized anxiety " "disorder 04/30/2021   • Microalbuminuria due to type 2 diabetes mellitus (HCC) 06/22/2020   • Diet-controlled diabetes mellitus (HCC) 04/11/2018   • Chronic respiratory failure with hypoxia (HCC) 04/11/2018   • History of skin cancer 12/06/2017   • Morbid obesity with BMI of 40.0-44.9, adult (HCC) 09/20/2017   • Obstructive sleep apnea 05/09/2017   • Neuropathy associated with cancer (HCC) 03/28/2017   • History of colon cancer 09/28/2016   • History of gout 09/28/2016   • Essential hypertension 09/28/2016   • Pure hypercholesterolemia 09/28/2016   • Prostate cancer (HCC) 09/28/2016       Family History   Problem Relation Age of Onset   • Cancer Mother         Bladder   • Heart Disease Father    • Heart Disease Brother         CABG x2   • Hyperlipidemia Brother    • No Known Problems Son    • No Known Problems Maternal Grandmother    • No Known Problems Paternal Grandmother    • Heart Disease Paternal Grandfather    • Hypertension Paternal Grandfather    • Hyperlipidemia Paternal Grandfather        He  has a past medical history of Anemia, Anxiety, Atrophy of right kidney, Breath shortness, Cancer (HCC) (2016), Colon cancer (HCC), Former tobacco use, Gout, High cholesterol (12/12/2017), Hyperlipidemia, Hypertension (12/12/2017), Neuropathy (12/12/2017), Pain (12/12/2017), Pneumonia (1991), Psychiatric problem, Sleep apnea (12/12/2017), and Snoring.  He  has a past surgical history that includes cath placement (Left, 10/7/2016); closed reduction (10/25/2012); colon resection (2016); and thoracoscopy (2/5/2018).       Objective:   Ht 1.778 m (5' 10\")   Wt (!) 136 kg (300 lb)   BMI 43.05 kg/m²     Physical Exam:  Constitutional: Alert, no distress, well-groomed.  Skin: No rashes in visible areas.  Eye: Round. Conjunctiva clear, lids normal. No icterus.   ENMT: Lips pink without lesions, good dentition, moist mucous membranes. Phonation normal.  Neck: Moves freely without pain.  Respiratory: Unlabored respiratory " effort, no cough or audible wheeze  Psych: Alert and oriented x3, normal affect and mood.       Assessment and Plan:   The following treatment plan was discussed:     1. Obstructive sleep apnea    2. Chronic respiratory failure with hypoxia (HCC)    3. Essential hypertension    4. BMI 40.0-44.9, adult ([facility])      Discussed the cardiovascular and neuropsychiatric risks of untreated SU; including but not limited to: HTN, DM, MI, ASCVD, CVA, CHF, traffic accidents.     1. Compliance report is again not available for download.  A compliance will be requested from [facility].  Patient is being treated with  BiPAP IPAP/EPAP 20-16 cmH2O, biflex 2.     *DME order ([facility]) for mask (MOC) and supplies was not needed today. Continue to clean mask and supplies weekly, and change supplies per insurance guidelines.     Recall of [employer] BiPAP machines was reviewed with the patient today.   We were just recently told about these recalls. What I have been recommending to the patients is to call their supplying companies to figure out exact model of their machine. You can self  register your machine on [employer] website as mentioned in the letter that will be sent out by [facility], or ask your DME to assist you in the process.  We do not know if [employer] will be giving out replacement machines or just repairing it. This is all new for us to let you know how this is going to pan out. The recent recall from [employer] is due to disintegration of the polyurethane foam. Currently I recommend to continue using your machine until or unless you experience black debris/particles within the air path or experiencing headaches, upper airway irritation, cough, chest pressure, sinus infection, irritation to skin/eyes/respiratory tract, inflammatory response, asthma, nausea/vomiting to stop using the PAP therapy immediately and contact the office. It is ultimately your decision on whether you choose to continue using the  BiPAP machine. Patient was also recommended to reach out to their DME to let them know that they are using [employer] machine, therefore when and if needed those machines can be replaced by their DME's. We will keep you posted as we get more information from the  for the DME.     [place] your device on the recall website at [email or web address]/src-update     Patient will be eligible for a new BiPAP machine in July 2023.  In the meantime the patient is advised to take the BiPAP machine to [facility] for an in-service to see if he can be repaired and to also register his device on the recall website.    *Patient is advised to continue using oxygen at nighttime and to elevate the head of the bed and to avoid the supine position while he waits to get the BiPAP issue resolved.    2.  Currently using 2 L O2 24/7.  Obtains supplemental oxygen supplies and concentrator through [employer].  3.  Continue to follow-up with PCP for blood pressure management.  4. Continue to stay active as able.  Patient has limitations due to lateral lower extremity neuropathy secondary to chemo therapy, history of colon, prostate and lung cancer.  Patient is on 2 L O2 24/7.  5. Sleep hygiene was reviewed. Recommend keeping a set sleep/wake schedule. Logging enough hours of sleep. Limiting/Avoiding naps. No caffeine after noon and no heavy meals in the evening.   6. Follow up with the appropriate healthcare practitioners for all other medical problems and issues.      Follow-up: Return in about 3 months (around 10/1/2021) for SU. Patient should f/u in person and bring BiPAP machine with SD card to office visit.     BELEN Anna.    This dictation was created using voice recognition software. The accuracy of the dictation is limited to the abilities of the software. I expect there may be some errors of grammar and possibly content.

## 2021-07-21 ENCOUNTER — HOSPITAL ENCOUNTER (OUTPATIENT)
Dept: RADIOLOGY | Facility: MEDICAL CENTER | Age: 71
End: 2021-07-21
Attending: INTERNAL MEDICINE
Payer: MEDICARE

## 2021-07-21 ENCOUNTER — TELEPHONE (OUTPATIENT)
Dept: SLEEP MEDICINE | Facility: MEDICAL CENTER | Age: 71
End: 2021-07-21

## 2021-07-21 ENCOUNTER — HOSPITAL ENCOUNTER (OUTPATIENT)
Dept: LAB | Facility: MEDICAL CENTER | Age: 71
End: 2021-07-21
Attending: INTERNAL MEDICINE
Payer: MEDICARE

## 2021-07-21 DIAGNOSIS — G47.33 OBSTRUCTIVE SLEEP APNEA: ICD-10-CM

## 2021-07-21 DIAGNOSIS — C18.2 MALIGNANT NEOPLASM OF ASCENDING COLON (HCC): ICD-10-CM

## 2021-07-21 LAB
ALBUMIN SERPL BCP-MCNC: 4.5 G/DL (ref 3.2–4.9)
ALBUMIN/GLOB SERPL: 1.6 G/DL
ALP SERPL-CCNC: 75 U/L (ref 30–99)
ALT SERPL-CCNC: 20 U/L (ref 2–50)
ANION GAP SERPL CALC-SCNC: 9 MMOL/L (ref 7–16)
AST SERPL-CCNC: 18 U/L (ref 12–45)
BASOPHILS # BLD AUTO: 0.6 % (ref 0–1.8)
BASOPHILS # BLD: 0.05 K/UL (ref 0–0.12)
BILIRUB SERPL-MCNC: 0.6 MG/DL (ref 0.1–1.5)
BUN SERPL-MCNC: 23 MG/DL (ref 8–22)
CALCIUM SERPL-MCNC: 10.3 MG/DL (ref 8.4–10.2)
CHLORIDE SERPL-SCNC: 92 MMOL/L (ref 96–112)
CO2 SERPL-SCNC: 30 MMOL/L (ref 20–33)
CREAT SERPL-MCNC: 1.57 MG/DL (ref 0.5–1.4)
EOSINOPHIL # BLD AUTO: 0.23 K/UL (ref 0–0.51)
EOSINOPHIL NFR BLD: 2.8 % (ref 0–6.9)
ERYTHROCYTE [DISTWIDTH] IN BLOOD BY AUTOMATED COUNT: 43.7 FL (ref 35.9–50)
GLOBULIN SER CALC-MCNC: 2.8 G/DL (ref 1.9–3.5)
GLUCOSE SERPL-MCNC: 122 MG/DL (ref 65–99)
HCT VFR BLD AUTO: 44.6 % (ref 42–52)
HGB BLD-MCNC: 14.8 G/DL (ref 14–18)
IMM GRANULOCYTES # BLD AUTO: 0.02 K/UL (ref 0–0.11)
IMM GRANULOCYTES NFR BLD AUTO: 0.2 % (ref 0–0.9)
LYMPHOCYTES # BLD AUTO: 1.09 K/UL (ref 1–4.8)
LYMPHOCYTES NFR BLD: 13.3 % (ref 22–41)
MCH RBC QN AUTO: 30.9 PG (ref 27–33)
MCHC RBC AUTO-ENTMCNC: 33.2 G/DL (ref 33.7–35.3)
MCV RBC AUTO: 93.1 FL (ref 81.4–97.8)
MONOCYTES # BLD AUTO: 0.73 K/UL (ref 0–0.85)
MONOCYTES NFR BLD AUTO: 8.9 % (ref 0–13.4)
NEUTROPHILS # BLD AUTO: 6.08 K/UL (ref 1.82–7.42)
NEUTROPHILS NFR BLD: 74.2 % (ref 44–72)
NRBC # BLD AUTO: 0 K/UL
NRBC BLD-RTO: 0 /100 WBC
PLATELET # BLD AUTO: 234 K/UL (ref 164–446)
PMV BLD AUTO: 9.6 FL (ref 9–12.9)
POTASSIUM SERPL-SCNC: 3.9 MMOL/L (ref 3.6–5.5)
PROT SERPL-MCNC: 7.3 G/DL (ref 6–8.2)
RBC # BLD AUTO: 4.79 M/UL (ref 4.7–6.1)
SODIUM SERPL-SCNC: 131 MMOL/L (ref 135–145)
WBC # BLD AUTO: 8.2 K/UL (ref 4.8–10.8)

## 2021-07-21 PROCEDURE — 82378 CARCINOEMBRYONIC ANTIGEN: CPT

## 2021-07-21 PROCEDURE — 85025 COMPLETE CBC W/AUTO DIFF WBC: CPT

## 2021-07-21 PROCEDURE — 36415 COLL VENOUS BLD VENIPUNCTURE: CPT

## 2021-07-21 PROCEDURE — 80053 COMPREHEN METABOLIC PANEL: CPT

## 2021-07-21 PROCEDURE — 71250 CT THORAX DX C-: CPT | Mod: MH

## 2021-07-21 NOTE — TELEPHONE ENCOUNTER
Patient called and left voice message that he wants to buy out of pocket a Resmed Machine with [employer] and wants the order faxed to them at [phone]. Attention Destini    Message routed to provider.

## 2021-07-22 LAB — CEA SERPL-MCNC: 4.5 NG/ML (ref 0–3)

## 2021-07-22 NOTE — TELEPHONE ENCOUNTER
Order is placed for [employer]Med BiPAP IPAP/EPAP 20-16 cmH2O. Please Fax order to [employer] [phone]. Attention Destini.     Thank you  BELEN Anna.

## 2021-07-27 ENCOUNTER — HOSPITAL ENCOUNTER (OUTPATIENT)
Dept: LAB | Facility: MEDICAL CENTER | Age: 71
End: 2021-07-27
Attending: INTERNAL MEDICINE
Payer: MEDICARE

## 2021-07-27 LAB
ANION GAP SERPL CALC-SCNC: 12 MMOL/L (ref 7–16)
BUN SERPL-MCNC: 22 MG/DL (ref 8–22)
CALCIUM SERPL-MCNC: 9.6 MG/DL (ref 8.4–10.2)
CHLORIDE SERPL-SCNC: 93 MMOL/L (ref 96–112)
CO2 SERPL-SCNC: 28 MMOL/L (ref 20–33)
CREAT SERPL-MCNC: 1.32 MG/DL (ref 0.5–1.4)
GLUCOSE SERPL-MCNC: 95 MG/DL (ref 65–99)
POTASSIUM SERPL-SCNC: 4.3 MMOL/L (ref 3.6–5.5)
SODIUM SERPL-SCNC: 133 MMOL/L (ref 135–145)

## 2021-07-27 PROCEDURE — 36415 COLL VENOUS BLD VENIPUNCTURE: CPT

## 2021-07-27 PROCEDURE — 80048 BASIC METABOLIC PNL TOTAL CA: CPT

## 2021-07-28 ENCOUNTER — APPOINTMENT (RX ONLY)
Dept: URBAN - METROPOLITAN AREA CLINIC 35 | Facility: CLINIC | Age: 71
Setting detail: DERMATOLOGY
End: 2021-07-28

## 2021-07-28 DIAGNOSIS — D22 MELANOCYTIC NEVI: ICD-10-CM

## 2021-07-28 DIAGNOSIS — L82.1 OTHER SEBORRHEIC KERATOSIS: ICD-10-CM

## 2021-07-28 DIAGNOSIS — Z87.2 PERSONAL HISTORY OF DISEASES OF THE SKIN AND SUBCUTANEOUS TISSUE: ICD-10-CM

## 2021-07-28 DIAGNOSIS — Z85.828 PERSONAL HISTORY OF OTHER MALIGNANT NEOPLASM OF SKIN: ICD-10-CM

## 2021-07-28 DIAGNOSIS — L81.4 OTHER MELANIN HYPERPIGMENTATION: ICD-10-CM

## 2021-07-28 DIAGNOSIS — L11.1 TRANSIENT ACANTHOLYTIC DERMATOSIS [GROVER]: ICD-10-CM

## 2021-07-28 DIAGNOSIS — L40.0 PSORIASIS VULGARIS: ICD-10-CM

## 2021-07-28 DIAGNOSIS — Z71.89 OTHER SPECIFIED COUNSELING: ICD-10-CM

## 2021-07-28 PROBLEM — D22.61 MELANOCYTIC NEVI OF RIGHT UPPER LIMB, INCLUDING SHOULDER: Status: ACTIVE | Noted: 2021-07-28

## 2021-07-28 PROBLEM — D22.5 MELANOCYTIC NEVI OF TRUNK: Status: ACTIVE | Noted: 2021-07-28

## 2021-07-28 PROBLEM — D22.62 MELANOCYTIC NEVI OF LEFT UPPER LIMB, INCLUDING SHOULDER: Status: ACTIVE | Noted: 2021-07-28

## 2021-07-28 PROCEDURE — ? PRESCRIPTION

## 2021-07-28 PROCEDURE — ? COUNSELING

## 2021-07-28 PROCEDURE — 99213 OFFICE O/P EST LOW 20 MIN: CPT

## 2021-07-28 RX ORDER — TRIAMCINOLONE ACETONIDE 1 MG/G
CREAM TOPICAL BID
Qty: 1 | Refills: 1 | Status: ERX | COMMUNITY
Start: 2021-07-28

## 2021-07-28 RX ADMIN — TRIAMCINOLONE ACETONIDE THIN LAYER: 1 CREAM TOPICAL at 00:00

## 2021-07-28 ASSESSMENT — LOCATION DETAILED DESCRIPTION DERM
LOCATION DETAILED: LEFT SUPERIOR LATERAL UPPER BACK
LOCATION DETAILED: LEFT ANTERIOR DISTAL UPPER ARM
LOCATION DETAILED: RIGHT MEDIAL UPPER BACK
LOCATION DETAILED: RIGHT ANTERIOR DISTAL UPPER ARM
LOCATION DETAILED: RIGHT DISTAL DORSAL FOREARM
LOCATION DETAILED: LEFT LATERAL SUPERIOR CHEST
LOCATION DETAILED: SUPERIOR THORACIC SPINE
LOCATION DETAILED: LEFT PROXIMAL DORSAL FOREARM
LOCATION DETAILED: LEFT VENTRAL PROXIMAL FOREARM
LOCATION DETAILED: LEFT SUPERIOR MEDIAL MIDBACK
LOCATION DETAILED: RIGHT SUPERIOR MEDIAL UPPER BACK
LOCATION DETAILED: RIGHT VENTRAL PROXIMAL FOREARM
LOCATION DETAILED: RIGHT CENTRAL LATERAL NECK
LOCATION DETAILED: INFERIOR THORACIC SPINE
LOCATION DETAILED: LEFT DISTAL POSTERIOR UPPER ARM
LOCATION DETAILED: SUPERIOR LUMBAR SPINE
LOCATION DETAILED: RIGHT PROXIMAL POSTERIOR UPPER ARM

## 2021-07-28 ASSESSMENT — LOCATION SIMPLE DESCRIPTION DERM
LOCATION SIMPLE: NECK
LOCATION SIMPLE: RIGHT FOREARM
LOCATION SIMPLE: RIGHT UPPER BACK
LOCATION SIMPLE: LOWER BACK
LOCATION SIMPLE: LEFT FOREARM
LOCATION SIMPLE: LEFT UPPER BACK
LOCATION SIMPLE: LEFT LOWER BACK
LOCATION SIMPLE: LEFT UPPER ARM
LOCATION SIMPLE: UPPER BACK
LOCATION SIMPLE: CHEST
LOCATION SIMPLE: RIGHT UPPER ARM

## 2021-07-28 ASSESSMENT — LOCATION ZONE DERM
LOCATION ZONE: TRUNK
LOCATION ZONE: ARM
LOCATION ZONE: NECK

## 2021-08-02 ENCOUNTER — OFFICE VISIT (OUTPATIENT)
Dept: MEDICAL GROUP | Facility: MEDICAL CENTER | Age: 71
End: 2021-08-02
Payer: MEDICARE

## 2021-08-02 VITALS
DIASTOLIC BLOOD PRESSURE: 66 MMHG | SYSTOLIC BLOOD PRESSURE: 124 MMHG | TEMPERATURE: 99 F | HEART RATE: 86 BPM | OXYGEN SATURATION: 94 % | BODY MASS INDEX: 44 KG/M2 | HEIGHT: 70 IN | WEIGHT: 307.32 LBS

## 2021-08-02 DIAGNOSIS — C61 MALIGNANT NEOPLASM OF PROSTATE (HCC): ICD-10-CM

## 2021-08-02 DIAGNOSIS — G63 NEUROPATHY ASSOCIATED WITH CANCER (HCC): ICD-10-CM

## 2021-08-02 DIAGNOSIS — J96.11 CHRONIC RESPIRATORY FAILURE WITH HYPOXIA (HCC): ICD-10-CM

## 2021-08-02 DIAGNOSIS — E11.9 DIET-CONTROLLED DIABETES MELLITUS (HCC): ICD-10-CM

## 2021-08-02 DIAGNOSIS — F41.1 GENERALIZED ANXIETY DISORDER: ICD-10-CM

## 2021-08-02 DIAGNOSIS — C80.1 NEUROPATHY ASSOCIATED WITH CANCER (HCC): ICD-10-CM

## 2021-08-02 DIAGNOSIS — E66.01 MORBID OBESITY WITH BMI OF 40.0-44.9, ADULT (HCC): ICD-10-CM

## 2021-08-02 DIAGNOSIS — R80.9 MICROALBUMINURIA DUE TO TYPE 2 DIABETES MELLITUS (HCC): ICD-10-CM

## 2021-08-02 DIAGNOSIS — E11.29 MICROALBUMINURIA DUE TO TYPE 2 DIABETES MELLITUS (HCC): ICD-10-CM

## 2021-08-02 DIAGNOSIS — I10 ESSENTIAL HYPERTENSION: ICD-10-CM

## 2021-08-02 PROCEDURE — 99215 OFFICE O/P EST HI 40 MIN: CPT | Performed by: FAMILY MEDICINE

## 2021-08-02 RX ORDER — HYDROXYCHLOROQUINE SULFATE 200 MG/1
1 TABLET, FILM COATED ORAL
COMMUNITY
Start: 2021-05-26 | End: 2021-08-03 | Stop reason: SDUPTHER

## 2021-08-02 RX ORDER — CLONAZEPAM 1 MG/1
1 TABLET ORAL 2 TIMES DAILY PRN
Qty: 60 TABLET | Refills: 0 | Status: SHIPPED | OUTPATIENT
Start: 2021-09-06 | End: 2021-10-01

## 2021-08-02 RX ORDER — CLONAZEPAM 1 MG/1
1 TABLET ORAL 2 TIMES DAILY PRN
Qty: 60 TABLET | Refills: 0 | Status: SHIPPED | OUTPATIENT
Start: 2021-08-06 | End: 2021-09-05

## 2021-08-02 RX ORDER — CLONAZEPAM 1 MG/1
1 TABLET ORAL 2 TIMES DAILY PRN
Qty: 60 TABLET | Refills: 0 | Status: SHIPPED | OUTPATIENT
Start: 2021-10-06 | End: 2021-10-01 | Stop reason: SDUPTHER

## 2021-08-02 RX ORDER — TRIAMTERENE AND HYDROCHLOROTHIAZIDE 37.5; 25 MG/1; MG/1
1 TABLET ORAL
Qty: 90 TABLET | Refills: 3 | Status: SHIPPED | OUTPATIENT
Start: 2021-08-02 | End: 2021-10-01 | Stop reason: SDUPTHER

## 2021-08-02 ASSESSMENT — FIBROSIS 4 INDEX: FIB4 SCORE: 1.19

## 2021-08-02 NOTE — PROGRESS NOTES
"History of Present Illness  70 year old male presents to clinic for anxiety management.  He was last seen for this 4/30/2021, at which time we continued his clonazepam 2 times daily as needed. He typically takes one in the morning when he wakes up and one more again before bed to help with sleep. He is not having any panic attacks during the day. Mood, motivation, and concentration are all good. He denies any thoughts of self-harm, suicide, or harm to others.    He also has an active diagnosis of prostate cancer which is being managed through urology and oncology.  No current, active treatment. He denies any troubles with urinary stream or frequency.    He does have neuropathy in his hands and feet bilaterally subsequent from previous chemotherapy treatment for his colon cancer.  He does take gabapentin 3 times daily which he finds helpful.     He has chronic hypoxia with respiratory failure secondary to lobectomy for metastases of his colon cancer to his lung.  He uses 2L of supplemental oxygen at rest and 3 with activity. He denies any cough or shortness of breath.     He also has type 2 diabetes, which is currently being controlled with diet and lifestyle.  He does not check his blood glucose on a regular basis.  He has associated microalbuminuria.  He denies any signs or symptoms of hypo or hyperglycemia.     He denies any other questions or concerns at this time.    Current problem list, current medication, and past medical/surgical history were reviewed.     ROS  See HPI    Physical Exam  /66 (BP Location: Left arm, Patient Position: Sitting, BP Cuff Size: Adult)   Pulse 86   Temp 37.2 °C (99 °F) (Temporal)   Ht 1.778 m (5' 10\")   Wt (!) 139 kg (307 lb 5.1 oz)   SpO2 94%   BMI 44.10 kg/m²   Physical Exam  Constitutional:       General: He is not in acute distress.     Appearance: He is not diaphoretic.   Cardiovascular:      Rate and Rhythm: Normal rate and regular rhythm.      Heart sounds: Normal " heart sounds.   Pulmonary:      Effort: Pulmonary effort is normal. No respiratory distress.      Breath sounds: Normal breath sounds.   Abdominal:      General: Bowel sounds are normal.      Palpations: Abdomen is soft.   Skin:     General: Skin is warm and dry.   Neurological:      Mental Status: He is alert.   Psychiatric:         Mood and Affect: Affect normal.         Judgment: Judgment normal.     Monofilament testing with a 10 gram force: sensation: decreased bilaterally  Visual Inspection: Feet without maceration, ulcers, or fissures.  Pedal pulses: intact bilaterally    Assessment & Plan  1. Generalized anxiety disorder  Chronic and stable.  Continue clonazepam 1 mg 2 times daily as needed.  Controlled substance agreement signed. Obtained and reviewed patient utilization report from [facility] pharmacy database on 8/2/2021 1:55 PM  prior to writing prescription for controlled substance II, III or IV per [place] bill . Based on assessment of the report, my physical exam, and the patient's health problem, the prescription is medically necessary.   - clonazePAM (KLONOPIN) 1 MG Tab; Take 1 tablet by mouth 2 times a day as needed for up to 30 days.  Dispense: 60 tablet; Refill: 0  - clonazePAM (KLONOPIN) 1 MG Tab; Take 1 tablet by mouth 2 times a day as needed (anxiety) for up to 30 days.  Dispense: 60 tablet; Refill: 0  - clonazePAM (KLONOPIN) 1 MG Tab; Take 1 tablet by mouth 2 times a day as needed (Anxiety) for up to 30 days.  Dispense: 60 tablet; Refill: 0    2. Prostate cancer (HCC)  Chronic and stable.  Continue to monitor.    3. Neuropathy associated with cancer (HCC)  Chronic and stable.  Continue to monitor.    4. Chronic respiratory failure with hypoxia (HCC)  Chronic and stable.  Continue supplemental oxygen.    5. Diet-controlled diabetes mellitus (HCC)  6. Microalbuminuria due to type 2 diabetes mellitus (HCC)  Chronic and stable.  Continue to monitor.  Diabetic foot exam updated today.  -  Diabetic Monofilament Lower Extremity Exam  - Microalbumin Creat Ratio Urine - Lab Collect; Future  - REFERRAL TO OPHTHALMOLOGY    7. Morbid obesity with BMI of 40.0-44.9, adult (HCC)  Chronic condition.  Again we discussed the recommended amount of weekly exercise as well as what consists of a healthy and balanced diet.    Return in about 2 months (around 10/2/2021) for establish care with Dr. Tavera.    Jaylin Carroll M.D.

## 2021-08-03 RX ORDER — HYDROXYCHLOROQUINE SULFATE 200 MG/1
200 TABLET, FILM COATED ORAL
Qty: 8 TABLET | Refills: 0 | Status: SHIPPED | OUTPATIENT
Start: 2021-08-03 | End: 2022-01-07

## 2021-08-16 ENCOUNTER — OFFICE VISIT (OUTPATIENT)
Dept: URGENT CARE | Facility: CLINIC | Age: 71
End: 2021-08-16
Payer: OTHER MISCELLANEOUS

## 2021-08-16 VITALS
SYSTOLIC BLOOD PRESSURE: 144 MMHG | TEMPERATURE: 98.1 F | HEIGHT: 70 IN | DIASTOLIC BLOOD PRESSURE: 92 MMHG | OXYGEN SATURATION: 95 % | HEART RATE: 73 BPM | BODY MASS INDEX: 43.95 KG/M2 | WEIGHT: 307 LBS | RESPIRATION RATE: 16 BRPM

## 2021-08-16 DIAGNOSIS — T14.8XXA NONHEALING NONSURGICAL WOUND: ICD-10-CM

## 2021-08-16 DIAGNOSIS — E11.9 TYPE 2 DIABETES MELLITUS TREATED WITHOUT INSULIN (HCC): ICD-10-CM

## 2021-08-16 DIAGNOSIS — L03.115 CELLULITIS OF RIGHT KNEE: ICD-10-CM

## 2021-08-16 LAB — GLUCOSE BLD-MCNC: 112 MG/DL (ref 70–100)

## 2021-08-16 PROCEDURE — 99214 OFFICE O/P EST MOD 30 MIN: CPT | Mod: 25 | Performed by: PHYSICIAN ASSISTANT

## 2021-08-16 PROCEDURE — 82962 GLUCOSE BLOOD TEST: CPT | Performed by: PHYSICIAN ASSISTANT

## 2021-08-16 PROCEDURE — 90714 TD VACC NO PRESV 7 YRS+ IM: CPT | Performed by: PHYSICIAN ASSISTANT

## 2021-08-16 PROCEDURE — 90471 IMMUNIZATION ADMIN: CPT | Performed by: PHYSICIAN ASSISTANT

## 2021-08-16 RX ORDER — SULFAMETHOXAZOLE AND TRIMETHOPRIM 800; 160 MG/1; MG/1
1 TABLET ORAL 2 TIMES DAILY
Qty: 10 TABLET | Refills: 0 | Status: SHIPPED | OUTPATIENT
Start: 2021-08-16 | End: 2021-08-21

## 2021-08-16 RX ORDER — AMOXICILLIN 500 MG/1
500 CAPSULE ORAL 3 TIMES DAILY
Qty: 15 CAPSULE | Refills: 0 | Status: SHIPPED | OUTPATIENT
Start: 2021-08-16 | End: 2021-08-21

## 2021-08-16 ASSESSMENT — FIBROSIS 4 INDEX: FIB4 SCORE: 1.2

## 2021-08-16 NOTE — PROGRESS NOTES
"Subjective:   Gerald Oshea  is a 71 y.o. male who presents for Knee Pain (x1 week, fell on right knee, concerns wound might be infected )    Patient identity confirmed using two patient identifiers of last name and date of birth.        Knee Pain    Patient presents to urgent care with concern for infection of right knee after fall injury that he sustained 1 week ago.  Patient reports that he was walking when he tripped over a parking curb causing him to fall forward and landed directly on the right knee.  He sustained a large abrasion.  He reports this is very slow to heal and he is concerned about the possibility of infection.  Patient is a type II diabetic treated with diet.  Most recent hemoglobin A1c on  November 4, 2021 was 6.4.  Review of Systems   Skin:        Abrasion with nonhealing wound right knee   All other systems reviewed and are negative.    Allergies   Allergen Reactions   • Ketamine Unspecified     \"I hallucinate\".     • Lidocaine Rash     JNG=2000  Pt states \"Rash all over my body\".     • Duloxetine      Unable to urinate    • Morphine      \"Can not have too much, my oxygen goes down\"     Reviewed past medical, surgical , social and family history.  Reviewed prescription and over-the-counter medications with patient and electronic health record today.     Objective:   /92   Pulse 73   Temp 36.7 °C (98.1 °F) (Temporal)   Resp 16   Ht 1.778 m (5' 10\")   Wt (!) 139 kg (307 lb)   SpO2 95% Comment: nasal oxygen 3L per min  BMI 44.05 kg/m²   Physical Exam  Vitals and nursing note reviewed.   Constitutional:       General: He is not in acute distress.     Appearance: He is well-developed. He is not ill-appearing or toxic-appearing.   HENT:      Head: Normocephalic and atraumatic.      Jaw: There is normal jaw occlusion.      Right Ear: External ear normal.      Left Ear: External ear normal.   Eyes:      General: Lids are normal.      Extraocular Movements: Extraocular movements " intact.      Conjunctiva/sclera: Conjunctivae normal.      Pupils: Pupils are equal, round, and reactive to light.   Cardiovascular:      Rate and Rhythm: Normal rate and regular rhythm.      Pulses:           Posterior tibial pulses are 2+ on the right side.   Pulmonary:      Effort: Pulmonary effort is normal. No respiratory distress.   Musculoskeletal:         General: Normal range of motion.      Cervical back: Normal range of motion and neck supple.      Right knee: No swelling, deformity, effusion, ecchymosis or bony tenderness. Normal range of motion.   Skin:     General: Skin is warm and dry.      Findings: Signs of injury present.             Comments: Right knee anterior aspect with 6 cm abrasion with honey crusted scabbing with mild surrounding erythema, warmth, induration, tenderness to palpation.   Neurological:      Mental Status: He is alert and oriented to person, place, and time.      Cranial Nerves: No cranial nerve deficit.      Sensory: No sensory deficit.      Motor: Motor function is intact.      Coordination: Coordination is intact.   Psychiatric:         Attention and Perception: Attention normal.         Mood and Affect: Mood and affect normal.         Speech: Speech normal.         Behavior: Behavior normal.         Thought Content: Thought content normal.         Cognition and Memory: Cognition normal.         Judgment: Judgment normal.           Assessment/Plan:   1. Cellulitis of right knee  - amoxicillin (AMOXIL) 500 MG Cap; Take 1 Capsule by mouth 3 times a day for 5 days.  Dispense: 15 Capsule; Refill: 0  - sulfamethoxazole-trimethoprim (BACTRIM DS) 800-160 MG tablet; Take 1 Tablet by mouth 2 times a day for 5 days.  Dispense: 10 Tablet; Refill: 0    2. Nonhealing nonsurgical wound  - mupirocin (BACTROBAN) 2 % Ointment; Apply 1 Application topically 3 times a day for 7 days.  Dispense: 22 g; Refill: 0  - amoxicillin (AMOXIL) 500 MG Cap; Take 1 Capsule by mouth 3 times a day for 5  days.  Dispense: 15 Capsule; Refill: 0  - sulfamethoxazole-trimethoprim (BACTRIM DS) 800-160 MG tablet; Take 1 Tablet by mouth 2 times a day for 5 days.  Dispense: 10 Tablet; Refill: 0    3. Type 2 diabetes mellitus treated without insulin (HCC)  - POCT Glucose       Results for orders placed or performed in visit on 08/16/21   POCT Glucose   Result Value Ref Range    Glucose - Accu-Ck 112 (A) 70 - 100 mg/dL     As part of today's visit patient's most recent hemoglobin A1c result is reviewed by myself with finding of 6.4.    Treated with combination of Bactrim and amoxicillin as well as mupirocin as above.  Recommend close observation.  Counseled patient on the importance of good glucose control for healing.    Tetanus updated today.    Differential diagnosis, natural history, supportive care, and indications for immediate follow-up discussed.     Red flag warning symptoms and strict ER/follow-up precautions given.  The patient demonstrated a good understanding and agreed with the treatment plan.    Upon entering exam room I ensured patient was wearing a mask.  This provider wore appropriate PPE throughout entire visit.  Patient wore mask entire visit except for a brief period while examining oropharynx.      Please note that this note was created using voice recognition speech to text software. Every effort has been made to correct obvious errors.  However, I expect there are errors of grammar and possibly context that were not discovered prior to finalizing the note  ILENE Vu PA-C

## 2021-08-27 LAB
ALBUMIN/CREAT UR: 96 MG/G CREAT (ref 0–29)
CREAT UR-MCNC: 45.9 MG/DL
MICROALBUMIN UR-MCNC: 43.9 UG/ML

## 2021-09-23 DIAGNOSIS — I10 ESSENTIAL HYPERTENSION: ICD-10-CM

## 2021-09-23 RX ORDER — LOSARTAN POTASSIUM 100 MG/1
100 TABLET ORAL
Qty: 90 TABLET | Refills: 0 | OUTPATIENT
Start: 2021-09-23

## 2021-10-01 ENCOUNTER — HOSPITAL ENCOUNTER (OUTPATIENT)
Facility: MEDICAL CENTER | Age: 71
End: 2021-10-01
Attending: INTERNAL MEDICINE
Payer: MEDICARE

## 2021-10-01 ENCOUNTER — OFFICE VISIT (OUTPATIENT)
Dept: MEDICAL GROUP | Facility: MEDICAL CENTER | Age: 71
End: 2021-10-01
Payer: MEDICARE

## 2021-10-01 VITALS
TEMPERATURE: 98.6 F | OXYGEN SATURATION: 95 % | HEART RATE: 74 BPM | DIASTOLIC BLOOD PRESSURE: 66 MMHG | HEIGHT: 68 IN | WEIGHT: 297.62 LBS | SYSTOLIC BLOOD PRESSURE: 122 MMHG | RESPIRATION RATE: 14 BRPM | BODY MASS INDEX: 45.11 KG/M2

## 2021-10-01 DIAGNOSIS — C61 MALIGNANT NEOPLASM OF PROSTATE (HCC): ICD-10-CM

## 2021-10-01 DIAGNOSIS — Z85.038 HISTORY OF COLON CANCER: ICD-10-CM

## 2021-10-01 DIAGNOSIS — C80.1 NEUROPATHY ASSOCIATED WITH CANCER (HCC): ICD-10-CM

## 2021-10-01 DIAGNOSIS — E78.5 DYSLIPIDEMIA: ICD-10-CM

## 2021-10-01 DIAGNOSIS — G63 NEUROPATHY ASSOCIATED WITH CANCER (HCC): ICD-10-CM

## 2021-10-01 DIAGNOSIS — E11.29 MICROALBUMINURIA DUE TO TYPE 2 DIABETES MELLITUS (HCC): ICD-10-CM

## 2021-10-01 DIAGNOSIS — E66.01 MORBID OBESITY WITH BMI OF 40.0-44.9, ADULT (HCC): ICD-10-CM

## 2021-10-01 DIAGNOSIS — G47.33 OBSTRUCTIVE SLEEP APNEA: ICD-10-CM

## 2021-10-01 DIAGNOSIS — N18.30 TYPE 2 DIABETES MELLITUS WITH STAGE 3 CHRONIC KIDNEY DISEASE, WITHOUT LONG-TERM CURRENT USE OF INSULIN, UNSPECIFIED WHETHER STAGE 3A OR 3B CKD (HCC): ICD-10-CM

## 2021-10-01 DIAGNOSIS — E11.22 TYPE 2 DIABETES MELLITUS WITH STAGE 3 CHRONIC KIDNEY DISEASE, WITHOUT LONG-TERM CURRENT USE OF INSULIN, UNSPECIFIED WHETHER STAGE 3A OR 3B CKD (HCC): ICD-10-CM

## 2021-10-01 DIAGNOSIS — Z85.828 HISTORY OF SKIN CANCER: ICD-10-CM

## 2021-10-01 DIAGNOSIS — E78.00 PURE HYPERCHOLESTEROLEMIA: ICD-10-CM

## 2021-10-01 DIAGNOSIS — J96.11 CHRONIC RESPIRATORY FAILURE WITH HYPOXIA (HCC): ICD-10-CM

## 2021-10-01 DIAGNOSIS — I10 ESSENTIAL HYPERTENSION: ICD-10-CM

## 2021-10-01 DIAGNOSIS — R80.9 MICROALBUMINURIA DUE TO TYPE 2 DIABETES MELLITUS (HCC): ICD-10-CM

## 2021-10-01 DIAGNOSIS — F41.1 GENERALIZED ANXIETY DISORDER: ICD-10-CM

## 2021-10-01 DIAGNOSIS — Z79.899 CHRONIC USE OF BENZODIAZEPINE FOR THERAPEUTIC PURPOSE: ICD-10-CM

## 2021-10-01 PROBLEM — E11.9 DIET-CONTROLLED DIABETES MELLITUS (HCC): Status: RESOLVED | Noted: 2018-04-11 | Resolved: 2021-10-01

## 2021-10-01 PROCEDURE — 80307 DRUG TEST PRSMV CHEM ANLYZR: CPT

## 2021-10-01 PROCEDURE — 99214 OFFICE O/P EST MOD 30 MIN: CPT | Performed by: INTERNAL MEDICINE

## 2021-10-01 RX ORDER — TRIAMTERENE AND HYDROCHLOROTHIAZIDE 37.5; 25 MG/1; MG/1
1 TABLET ORAL
Qty: 90 TABLET | Refills: 3 | Status: SHIPPED | OUTPATIENT
Start: 2021-10-01 | End: 2022-06-09 | Stop reason: SDUPTHER

## 2021-10-01 RX ORDER — CLONAZEPAM 1 MG/1
1 TABLET ORAL 2 TIMES DAILY PRN
Qty: 60 TABLET | Refills: 0 | Status: SHIPPED | OUTPATIENT
Start: 2021-11-30 | End: 2021-12-30

## 2021-10-01 RX ORDER — GABAPENTIN 800 MG/1
800 TABLET ORAL 3 TIMES DAILY
Qty: 270 TABLET | Refills: 3 | Status: SHIPPED | OUTPATIENT
Start: 2021-10-01 | End: 2022-06-09 | Stop reason: SDUPTHER

## 2021-10-01 RX ORDER — ROSUVASTATIN CALCIUM 40 MG/1
TABLET, COATED ORAL
Qty: 90 TABLET | Refills: 3 | Status: SHIPPED | OUTPATIENT
Start: 2021-10-01 | End: 2022-06-09 | Stop reason: SDUPTHER

## 2021-10-01 RX ORDER — CLONAZEPAM 1 MG/1
1 TABLET ORAL 2 TIMES DAILY PRN
Qty: 60 TABLET | Refills: 0 | Status: SHIPPED | OUTPATIENT
Start: 2021-12-30 | End: 2021-10-01 | Stop reason: SDUPTHER

## 2021-10-01 RX ORDER — CLONAZEPAM 1 MG/1
1 TABLET ORAL 2 TIMES DAILY PRN
Qty: 60 TABLET | Refills: 0 | Status: SHIPPED | OUTPATIENT
Start: 2021-12-30 | End: 2022-01-07 | Stop reason: SDUPTHER

## 2021-10-01 RX ORDER — LOSARTAN POTASSIUM 100 MG/1
100 TABLET ORAL
Qty: 90 TABLET | Refills: 3 | Status: SHIPPED | OUTPATIENT
Start: 2021-10-01 | End: 2022-06-09 | Stop reason: SDUPTHER

## 2021-10-01 RX ORDER — CLONAZEPAM 1 MG/1
1 TABLET ORAL 2 TIMES DAILY PRN
Qty: 60 TABLET | Refills: 0 | Status: SHIPPED | OUTPATIENT
Start: 2021-10-31 | End: 2021-11-30

## 2021-10-01 ASSESSMENT — FIBROSIS 4 INDEX: FIB4 SCORE: 1.2

## 2021-10-02 DIAGNOSIS — Z79.899 CHRONIC USE OF BENZODIAZEPINE FOR THERAPEUTIC PURPOSE: ICD-10-CM

## 2021-10-02 NOTE — PROGRESS NOTES
New Patient    Gerald Oshea is a 71 y.o. male who presents today with the following:    CC:   Chief Complaint   Patient presents with   • Establish Care     Dr. Carroll   • Lab Results   • Diabetes   • Medication Refill       HPI:     Generalized anxiety disorder  He has been taking clonazepam 1 mg bid prn for anxiety attack and AVIVA for many years.      Pure hypercholesterolemia  On statin, tolerating well      Prostate cancer (HCC)  He has not follow up with his urologist for a while.  Referral to urology       Chronic respiratory failure with hypoxia (HCC)  Using 2-3L NC 24/7  O2 saturation will go down into 85%          Neuropathy associated with cancer (HCC)  Well controlled  He oftentimes forgot the night time dose.  On gabapentin 800 mg TID       Morbid obesity with BMI of 40.0-44.9, adult (HCC)  Body mass index is 45.25 kg/m².  Healthful lifestyle measures        History of colon cancer  Surgically removed in 2016 and treated with chemotherapy. No radiation treatments needed. Follows with Oncology Cancer care specialist      Essential hypertension  Controlled on triamterene-HCTZ, losartan      History of skin cancer  history of skin cancer following with dermatology        Current Outpatient Medications   Medication Sig Dispense Refill   • losartan (COZAAR) 100 MG Tab Take 1 Tablet by mouth every day. 90 Tablet 3   • rosuvastatin (CRESTOR) 40 MG tablet TAKE 1 TABLET BY MOUTH EVERYDAY AT BEDTIME 90 Tablet 3   • triamterene-hctz (MAXZIDE-25/DYAZIDE) 37.5-25 MG Tab Take 1 Tablet by mouth every day. 90 Tablet 3   • gabapentin (NEURONTIN) 800 MG tablet Take 1 Tablet by mouth 3 times a day. 270 Tablet 3   • [START ON 10/31/2021] clonazePAM (KLONOPIN) 1 MG Tab Take 1 Tablet by mouth 2 times a day as needed (Anxiety) for up to 30 days. 60 Tablet 0   • [START ON 11/30/2021] clonazePAM (KLONOPIN) 1 MG Tab Take 1 Tablet by mouth 2 times a day as needed (Anxiety) for up to 30 days. 60 Tablet 0   • [START ON  "12/30/2021] clonazePAM (KLONOPIN) 1 MG Tab Take 1 Tablet by mouth 2 times a day as needed (Anxiety) for up to 30 days. 60 Tablet 0   • hydroxychloroquine (PLAQUENIL) 200 MG Tab Take 1 tablet by mouth every 7 days. 8 tablet 0   • metronidazole (METROCREAM) 0.75 % cream Apply 1 Application to affected area(s) 2 Times a Day.     • methocarbamol (ROBAXIN) 750 MG Tab Take 750 mg by mouth 4 times a day.     • Diclofenac Sodium 1 % Gel      • hydrocortisone 2.5 % Cream topical cream Apply to affected areas twice daily 1 Tube 0     No current facility-administered medications for this visit.       Allergies, past medical history, past surgical history, medications, family history, social history reviewed and updated.    ROS   Constitutional: Denies fevers or chills  Eyes: Denies changes in vision  Ears/Nose/Throat/Mouth: Denies nasal congestion or sore throat   Cardiovascular: Denies chest pain or palpitations   Respiratory: Denies shortness of breath , Denies cough  Gastrointestinal/Hepatic: Denies abd pain, nausea, vomiting   Genitourinary: Denies dysuria or frequency  Musculoskeletal/Rheum: Denies joint pain and swelling   Neurological: Denies headache  Psychiatric: Mood stable  Endocrine:  diabetes denies thyroid dysfunction  Heme/Oncology/Lymph Nodes: Denies weight changes or enlarged LNs.    Physical Exam  Vitals: /66 (BP Location: Left arm, Patient Position: Sitting, BP Cuff Size: Adult)   Pulse 74   Temp 37 °C (98.6 °F) (Temporal)   Resp 14   Ht 1.727 m (5' 8\")   Wt (!) 135 kg (297 lb 9.9 oz)   SpO2 95%   BMI 45.25 kg/m²   General: Alert, pleasant, NAD  HEENT: Normocephalic.  EOMI, no icterus or pallor.  Conjunctivae and lids normal. External ears normal. Wearing a mask. Oropharynx non-erythematous, mucous membranes moist.  Neck supple.  No thyromegaly or masses palpated.   Lymph: No cervical or supraclavicular lymphadenopathy.  Cardiovascular: Regular rate and rhythm.   Respiratory: Normal respiratory " effort.  Decreased breath sounds bilaterally.  Abdomen: Non-distended, soft, non-tender  Skin: Warm, dry  Musculoskeletal: Gait is normal.  Moves all extremities well.  Extremities: No leg edema.  Radial pulses 2+ symmetric.   Psych:  Affect/mood is normal, judgement is good, memory is intact, grooming is appropriate.        Assessment and Plan    1. Essential hypertension  - losartan (COZAAR) 100 MG Tab; Take 1 Tablet by mouth every day.  Dispense: 90 Tablet; Refill: 3  - triamterene-hctz (MAXZIDE-25/DYAZIDE) 37.5-25 MG Tab; Take 1 Tablet by mouth every day.  Dispense: 90 Tablet; Refill: 3    2. Type 2 diabetes mellitus with stage 3 chronic kidney disease, without long-term current use of insulin, unspecified whether stage 3a or 3b CKD ([facility])  - Comp Metabolic Panel; Future  - HEMOGLOBIN A1C; Future  Diet controlled    3. Pure hypercholesterolemia  - rosuvastatin (CRESTOR) 40 MG tablet; TAKE 1 TABLET BY MOUTH EVERYDAY AT BEDTIME  Dispense: 90 Tablet; Refill: 3    4. Neuropathy associated with cancer ([facility])  - gabapentin (NEURONTIN) 800 MG tablet; Take 1 Tablet by mouth 3 times a day.  Dispense: 270 Tablet; Refill: 3    5. Generalized anxiety disorder  - clonazePAM (KLONOPIN) 1 MG Tab; Take 1 Tablet by mouth 2 times a day as needed (Anxiety) for up to 30 days.  Dispense: 60 Tablet; Refill: 0  Start: 10/31/2021 End: 11/30/2021  - clonazePAM (KLONOPIN) 1 MG Tab; Take 1 Tablet by mouth 2 times a day as needed (Anxiety) for up to 30 days.  Dispense: 60 Tablet; Refill: 0  Start: 11/30/2021 End: 12/30/2021  - clonazePAM (KLONOPIN) 1 MG Tab; Take 1 Tablet by mouth 2 times a day as needed (Anxiety) for up to 30 days.  Dispense: 60 Tablet; Refill: 0  Start: 11/30/2021 End: 12/30/2021  - Controlled Substance Treatment Agreement  A Controlled Substance Agreement has been signed within the last year. A urine drug screen has been done in the past year.        The patient reports that anxiety is well controlled with the current  treatment plan  They were counseled on other means to help with anxiety  This patient is continuing to use a controlled substance (CS) on a long term basis.  The patient is thoroughly aware of the goals of treatment with the CS  The patient is aware that yearly and random urine drug screens are required.  The patient has been instructed to take the CS only as prescribed.  The patient is prohibited from sharing the CS with any other person.  The patient is instructed to inform the provider if any other CS is taken, of any alcohol or cannabis or other recreational drug use, any treatment for side effects of the CS or complications, if they have CS active rx in other states  The patient has evidence for a reason for the CS  The treatment plan has been discussed with the patient  The  report has been reviewed      6. Chronic use of benzodiazepine for therapeutic purpose  - URINE DRUG SCREEN W/CONF (AR); Future  - Controlled Substance Treatment Agreement    7. Dyslipidemia  - Lipid Profile; Future    8. History of colon cancer  Follow with oncology    9. Prostate cancer (HCC)  - REFERRAL TO UROLOGY    10. Obstructive sleep apnea  On BiPAP following with sleep medicine    11. Chronic respiratory failure with hypoxia (HCC)  On 2 to 3 L nasal cannula  Oxygen dropped to below 85% when he takes off oxygen.    12. Microalbuminuria due to type 2 diabetes mellitus (HCC)  On losartan    13. Morbid obesity with BMI of 40.0-44.9, adult (HCC)  Healthful lifestyle measures    14. History of skin cancer  Follow with dermatology      Follow-up:Return in about 3 months (around 1/1/2022), or if symptoms worsen or fail to improve.    This note was created using voice recognition software. There may be unintended errors in spelling, grammar or content.

## 2021-10-04 LAB
AMPHET CTO UR CFM-MCNC: NEGATIVE NG/ML
BARBITURATES CTO UR CFM-MCNC: NEGATIVE NG/ML
BENZODIAZ CTO UR CFM-MCNC: NEGATIVE NG/ML
CANNABINOIDS CTO UR CFM-MCNC: NEGATIVE NG/ML
COCAINE CTO UR CFM-MCNC: NEGATIVE NG/ML
DRUG COMMENT 753798: NORMAL
METHADONE CTO UR CFM-MCNC: NEGATIVE NG/ML
OPIATES CTO UR CFM-MCNC: NEGATIVE NG/ML
PCP CTO UR CFM-MCNC: NEGATIVE NG/ML
PROPOXYPH CTO UR CFM-MCNC: NEGATIVE NG/ML

## 2022-01-05 LAB
ALBUMIN SERPL-MCNC: 4.7 G/DL (ref 3.8–4.8)
ALBUMIN/GLOB SERPL: 2.8 {RATIO} (ref 1.2–2.2)
ALP SERPL-CCNC: 71 IU/L (ref 44–121)
ALT SERPL-CCNC: 16 IU/L (ref 0–44)
AST SERPL-CCNC: 17 IU/L (ref 0–40)
BILIRUB SERPL-MCNC: 0.8 MG/DL (ref 0–1.2)
BUN SERPL-MCNC: 17 MG/DL (ref 8–27)
BUN/CREAT SERPL: 15 (ref 10–24)
CALCIUM SERPL-MCNC: 9.8 MG/DL (ref 8.6–10.2)
CHLORIDE SERPL-SCNC: 88 MMOL/L (ref 96–106)
CHOLEST SERPL-MCNC: 104 MG/DL (ref 100–199)
CO2 SERPL-SCNC: 27 MMOL/L (ref 20–29)
CREAT SERPL-MCNC: 1.11 MG/DL (ref 0.76–1.27)
GLOBULIN SER CALC-MCNC: 1.7 G/DL (ref 1.5–4.5)
GLUCOSE SERPL-MCNC: 110 MG/DL (ref 65–99)
HBA1C MFR BLD: 6 % (ref 4.8–5.6)
HDLC SERPL-MCNC: 56 MG/DL
LABORATORY COMMENT REPORT: NORMAL
LDLC SERPL CALC-MCNC: 27 MG/DL (ref 0–99)
POTASSIUM SERPL-SCNC: 4.2 MMOL/L (ref 3.5–5.2)
PROT SERPL-MCNC: 6.4 G/DL (ref 6–8.5)
SODIUM SERPL-SCNC: 131 MMOL/L (ref 134–144)
TRIGL SERPL-MCNC: 117 MG/DL (ref 0–149)
VLDLC SERPL CALC-MCNC: 21 MG/DL (ref 5–40)

## 2022-01-07 ENCOUNTER — OFFICE VISIT (OUTPATIENT)
Dept: MEDICAL GROUP | Facility: MEDICAL CENTER | Age: 72
End: 2022-01-07
Payer: MEDICARE

## 2022-01-07 VITALS
RESPIRATION RATE: 16 BRPM | BODY MASS INDEX: 44.44 KG/M2 | HEIGHT: 68 IN | OXYGEN SATURATION: 95 % | DIASTOLIC BLOOD PRESSURE: 52 MMHG | TEMPERATURE: 98.1 F | SYSTOLIC BLOOD PRESSURE: 112 MMHG | HEART RATE: 74 BPM | WEIGHT: 293.21 LBS

## 2022-01-07 DIAGNOSIS — C61 MALIGNANT NEOPLASM OF PROSTATE (HCC): ICD-10-CM

## 2022-01-07 DIAGNOSIS — C80.1 NEUROPATHY ASSOCIATED WITH CANCER (HCC): ICD-10-CM

## 2022-01-07 DIAGNOSIS — Z85.038 HISTORY OF COLON CANCER: ICD-10-CM

## 2022-01-07 DIAGNOSIS — R80.9 MICROALBUMINURIA DUE TO TYPE 2 DIABETES MELLITUS (HCC): ICD-10-CM

## 2022-01-07 DIAGNOSIS — E11.29 MICROALBUMINURIA DUE TO TYPE 2 DIABETES MELLITUS (HCC): ICD-10-CM

## 2022-01-07 DIAGNOSIS — G63 NEUROPATHY ASSOCIATED WITH CANCER (HCC): ICD-10-CM

## 2022-01-07 DIAGNOSIS — N18.30 TYPE 2 DIABETES MELLITUS WITH STAGE 3 CHRONIC KIDNEY DISEASE, WITHOUT LONG-TERM CURRENT USE OF INSULIN, UNSPECIFIED WHETHER STAGE 3A OR 3B CKD (HCC): ICD-10-CM

## 2022-01-07 DIAGNOSIS — G47.33 OBSTRUCTIVE SLEEP APNEA: ICD-10-CM

## 2022-01-07 DIAGNOSIS — E11.22 TYPE 2 DIABETES MELLITUS WITH STAGE 3 CHRONIC KIDNEY DISEASE, WITHOUT LONG-TERM CURRENT USE OF INSULIN, UNSPECIFIED WHETHER STAGE 3A OR 3B CKD (HCC): ICD-10-CM

## 2022-01-07 DIAGNOSIS — J96.11 CHRONIC RESPIRATORY FAILURE WITH HYPOXIA (HCC): ICD-10-CM

## 2022-01-07 DIAGNOSIS — F41.1 GENERALIZED ANXIETY DISORDER: ICD-10-CM

## 2022-01-07 DIAGNOSIS — E78.00 PURE HYPERCHOLESTEROLEMIA: ICD-10-CM

## 2022-01-07 PROCEDURE — 99214 OFFICE O/P EST MOD 30 MIN: CPT | Performed by: INTERNAL MEDICINE

## 2022-01-07 RX ORDER — CLONAZEPAM 1 MG/1
1 TABLET ORAL 2 TIMES DAILY PRN
Qty: 60 TABLET | Refills: 0 | Status: SHIPPED | OUTPATIENT
Start: 2022-02-02 | End: 2022-03-04

## 2022-01-07 RX ORDER — CLONAZEPAM 1 MG/1
1 TABLET ORAL 2 TIMES DAILY PRN
Qty: 60 TABLET | Refills: 0 | Status: SHIPPED | OUTPATIENT
Start: 2022-03-04 | End: 2022-04-03

## 2022-01-07 RX ORDER — CLONAZEPAM 1 MG/1
1 TABLET ORAL 2 TIMES DAILY PRN
Qty: 60 TABLET | Refills: 0 | Status: SHIPPED | OUTPATIENT
Start: 2022-04-03 | End: 2022-05-03

## 2022-01-07 ASSESSMENT — PATIENT HEALTH QUESTIONNAIRE - PHQ9: CLINICAL INTERPRETATION OF PHQ2 SCORE: 0

## 2022-01-07 ASSESSMENT — FIBROSIS 4 INDEX: FIB4 SCORE: 1.27

## 2022-01-07 NOTE — ASSESSMENT & PLAN NOTE
Diabetic diet control  On statin  On losartan for microalbuminuria  Due for eye exam   Ref. Range 1/4/2022 06:34   Glycohemoglobin Latest Ref Range: 4.8 - 5.6 % 6.0 (H)        Ref. Range 8/26/2021 09:02   Creatinine, Random Urine Latest Ref Range: Not Estab. mg/dL 45.9   Microalbumin, Urine Random Latest Ref Range: Not Estab. ug/mL 43.9   Microalbumin-Creatinine Latest Ref Range: 0 - 29 mg/g creat 96 (H)

## 2022-01-07 NOTE — PROGRESS NOTES
Established Patient    Gerald Oshea is a 71 y.o. male who presents today with the following:    CC:   Chief Complaint   Patient presents with   • Follow-Up   • Lab Results   • Other     questions on diabetes       HPI:     Problem   Type 2 Diabetes Mellitus With Stage 3 Chronic Kidney Disease, Without Long-Term Current Use of Insulin (Hcc)    Diabetic diet control  On statin  On losartan for microalbuminuria  Due for eye exam   Ref. Range 1/4/2022 06:34   Glycohemoglobin Latest Ref Range: 4.8 - 5.6 % 6.0 (H)        Ref. Range 8/26/2021 09:02   Creatinine, Random Urine Latest Ref Range: Not Estab. mg/dL 45.9   Microalbumin, Urine Random Latest Ref Range: Not Estab. ug/mL 43.9   Microalbumin-Creatinine Latest Ref Range: 0 - 29 mg/g creat 96 (H)        Generalized Anxiety Disorder    He has been taking clonazepam 1 mg bid prn for anxiety attack and AVIVA for many years.     Microalbuminuria Due to Type 2 Diabetes Mellitus (Hcc)    On losartan     Chronic Respiratory Failure With Hypoxia (Hcc)    Using 3L NC 24/7, follow with pulmonology  O2 saturation will go down into 81 % without oxygen    History of partial left pneumonectomy due metastatic colon cancer      SU     Obstructive Sleep Apnea    BiPAP  Follow with pulmonary medicine        Neuropathy associated with cancer (HCC)    Well controlled  He oftentimes forgot the night time dose.  On gabapentin 800 mg TID      History of Colon Cancer    Surgically removed in 2016 and treated with chemotherapy. No radiation treatments needed. Follows with Oncology Cancer care specialist     Prostate cancer (HCC)    Follow with urology [place]  Pending MR prostate          Current Outpatient Medications   Medication Sig Dispense Refill   • mupirocin (BACTROBAN) 2 % Ointment mupirocin 2 % topical ointment     • [START ON 2/2/2022] clonazePAM (KLONOPIN) 1 MG Tab Take 1 Tablet by mouth 2 times a day as needed (Anxiety) for up to 30 days. 2/2/22-3/4/22 60 Tablet 0   • [START ON  3/4/2022] clonazePAM (KLONOPIN) 1 MG Tab Take 1 Tablet by mouth 2 times a day as needed (Anxiety) for up to 30 days. 3/4/22-4/3/22 60 Tablet 0   • [START ON 4/3/2022] clonazePAM (KLONOPIN) 1 MG Tab Take 1 Tablet by mouth 2 times a day as needed (Anxiety) for up to 30 days. 4/3/22-5/3/22 60 Tablet 0   • losartan (COZAAR) 100 MG Tab Take 1 Tablet by mouth every day. 90 Tablet 3   • rosuvastatin (CRESTOR) 40 MG tablet TAKE 1 TABLET BY MOUTH EVERYDAY AT BEDTIME 90 Tablet 3   • triamterene-hctz (MAXZIDE-25/DYAZIDE) 37.5-25 MG Tab Take 1 Tablet by mouth every day. 90 Tablet 3   • gabapentin (NEURONTIN) 800 MG tablet Take 1 Tablet by mouth 3 times a day. 270 Tablet 3   • metronidazole (METROCREAM) 0.75 % cream Apply 1 Application to affected area(s) 2 Times a Day.     • methocarbamol (ROBAXIN) 750 MG Tab Take 750 mg by mouth 4 times a day.     • Diclofenac Sodium 1 % Gel      • hydrocortisone 2.5 % Cream topical cream Apply to affected areas twice daily 1 Tube 0     No current facility-administered medications for this visit.       Allergies, past medical history, past surgical history, medications, family history, social history reviewed and updated.    ROS   Constitutional: Denies fevers or chills  Eyes: Denies changes in vision  Ears/Nose/Throat/Mouth: Denies nasal congestion or sore throat   Cardiovascular: Denies chest pain or palpitations   Respiratory: Denies shortness of breath , Denies cough  Gastrointestinal/Hepatic: Denies abd pain, nausea, vomiting   Genitourinary: Denies dysuria or frequency  Musculoskeletal/Rheum: Denies joint pain and swelling   Neurological: Denies headache  Psychiatric: anxiety, denies depression, SI/HI  Endocrine:diabetes Denies hx of  thyroid dysfunction  Heme/Oncology/Lymph Nodes: Denies weight changes or enlarged LNs.    Physical Exam  Vitals: /52 (BP Location: Left arm, Patient Position: Sitting, BP Cuff Size: Adult)   Pulse 74   Temp 36.7 °C (98.1 °F) (Temporal)   Resp 16    "Ht 1.727 m (5' 8\")   Wt (!) 133 kg (293 lb 3.4 oz)   SpO2 95%   BMI 44.58 kg/m²   General: Alert, pleasant, NAD  HEENT: Normocephalic.  EOMI, no icterus or pallor.  Conjunctivae and lids normal. External ears normal. Wearing a mask. Oropharynx non-erythematous, mucous membranes moist.  Neck supple.  No thyromegaly or masses palpated.   Lymph: No cervical or supraclavicular lymphadenopathy.  Cardiovascular: Regular rate and rhythm.    Respiratory: Normal respiratory effort.  Clear to auscultation bilaterally.  Abdomen: Non-distended, soft, non-tender  Skin: Warm, dry  Musculoskeletal: Gait is normal.  Moves all extremities well.  Extremities: No leg edema.    Psych:  Affect/mood is normal, judgement is good, memory is forgetful, grooming is appropriate.      Labs (1/4/22) were reviewed and discussed with patients.  All questions were answered.      Assessment and Plan    1. Generalized anxiety disorder  - clonazePAM (KLONOPIN) 1 MG Tab; Take 1 Tablet by mouth 2 times a day as needed (Anxiety) for up to 30 days. 2/2/22-3/4/22  Dispense: 60 Tablet; Refill: 0  - clonazePAM (KLONOPIN) 1 MG Tab; Take 1 Tablet by mouth 2 times a day as needed (Anxiety) for up to 30 days. 3/4/22-4/3/22  Dispense: 60 Tablet; Refill: 0  - clonazePAM (KLONOPIN) 1 MG Tab; Take 1 Tablet by mouth 2 times a day as needed (Anxiety) for up to 30 days. 4/3/22-5/3/22  Dispense: 60 Tablet; Refill: 0    A Controlled Substance Agreement has been signed within the last year. A urine drug screen has been done in the past year.        The patient reports that anxiety is well controlled with the current treatment plan  They were counseled on other means to help with anxiety   This patient is continuing to use a controlled substance (CS) on a long term basis.  The patient is thoroughly aware of the goals of treatment with the CS  The patient is aware that yearly and random urine drug screens are required.  The patient has been instructed to take the CS " only as prescribed.  The patient is prohibited from sharing the CS with any other person.  The patient is instructed to inform the provider if any other CS is taken, of any alcohol or cannabis or other recreational drug use, any treatment for side effects of the CS or complications, if they have CS active rx in other states  The patient has evidence for a reason for the CS  The treatment plan has been discussed with the patient  The  report has been reviewed    2. Type 2 diabetes mellitus with stage 3 chronic kidney disease, without long-term current use of insulin, unspecified whether stage 3a or 3b CKD (HCC)  3. Microalbuminuria due to type 2 diabetes mellitus (HCC)  Diabetic diet control, A1c 6  On statin  On losartan for microalbuminuria  Recommend diabetic eye exam    4. Neuropathy associated with cancer (HCC)  Stable on gabapentin    5. History of colon cancer  Follow with oncology    6. Chronic respiratory failure with hypoxia (HCC)  Using 3L NC 24/7, follow with pulmonology  O2 saturation will go down into 81 % without oxygen    History of partial left pneumonectomy due metastatic colon cancer      SU    7. Pure hypercholesterolemia  - TSH WITH REFLEX TO FT4; Future  - Lipid Profile; Future  Statin    8. Prostate cancer (HCC)  Follow with urology    9. Obstructive sleep apnea  BiPAP  Follow with pulmonology        Follow-up:Return in about 3 months (around 4/7/2022), or if symptoms worsen or fail to improve.    This note was created using voice recognition software. There may be unintended errors in spelling, grammar or content.

## 2022-01-07 NOTE — ASSESSMENT & PLAN NOTE
Surgically removed in 2016 and treated with chemotherapy. No radiation treatments needed. Follows with Oncology Cancer care specialist

## 2022-01-07 NOTE — ASSESSMENT & PLAN NOTE
Using 3L NC 24/7, follow with pulmonology  O2 saturation will go down into 81 % without oxygen    History of partial left pneumonectomy due metastatic colon cancer      SU

## 2022-01-11 LAB
CHOLEST SERPL-MCNC: 114 MG/DL (ref 100–199)
HDLC SERPL-MCNC: 57 MG/DL
LABORATORY COMMENT REPORT: NORMAL
LDLC SERPL CALC-MCNC: 36 MG/DL (ref 0–99)
TRIGL SERPL-MCNC: 117 MG/DL (ref 0–149)
TSH SERPL DL<=0.005 MIU/L-ACNC: 2.73 UIU/ML (ref 0.45–4.5)
VLDLC SERPL CALC-MCNC: 21 MG/DL (ref 5–40)

## 2022-01-13 ENCOUNTER — TELEMEDICINE (OUTPATIENT)
Dept: SLEEP MEDICINE | Facility: MEDICAL CENTER | Age: 72
End: 2022-01-13
Payer: MEDICARE

## 2022-01-13 VITALS — WEIGHT: 290 LBS | HEIGHT: 69 IN | BODY MASS INDEX: 42.95 KG/M2

## 2022-01-13 DIAGNOSIS — G47.33 OBSTRUCTIVE SLEEP APNEA: ICD-10-CM

## 2022-01-13 PROCEDURE — 99214 OFFICE O/P EST MOD 30 MIN: CPT | Mod: 95 | Performed by: INTERNAL MEDICINE

## 2022-01-13 ASSESSMENT — FIBROSIS 4 INDEX: FIB4 SCORE: 1.27

## 2022-01-13 NOTE — PROGRESS NOTES
"Telemedicine Visit: Established Patient     This encounter was conducted via Zoom using secure and encrypted video conferencing technology.  The patient was in a private location  (POS  10) in the state of [place]  The patient's identity was confirmed and verbal consent was obtained for this virtual visit.         Subjective:   CC: SU on BiPAP 20/16 cm water with O2 3 l/min.    HPI:  Gerald Oshea is a 71 y.o. male last seen via telemedicine on 7/1/2021 by CHRISTINA Palacios.  The patient owns his own machine and therefore we are unable to do wireless download.  He will bring his machine in for an interrogation if he so desires.  He purchased a ResMed machine from [employer]. His Respironics machine broke. Has never gotten a replacement.    The patient reports improved symptoms using positive airway pressure.  Has experienced no significant problems with mask fit, mask leak, pressure tolerance, excessive airway dryness, nasal congestion, aerophagia, or chest pain.      Significant comorbidities and modifying factors include hypertension, history of colon cancer, prostate cancer, history of lung metastases status post wedge resection, status post chemotherapy, history of tobacco abuse, peripheral neuropathy secondary to chemotherapy, diabetes mellitus, chronic respiratory failure on supplemental O2, anxiety, hypercholesterolemia, and gout.  His oncologist is. Dr. Martine Lewis MD.       ROS   Denies any recent fevers or chills. No nausea or vomiting. No chest pains or shortness of breath.     Allergies   Allergen Reactions   • Ketamine Unspecified     \"I hallucinate\".     • Lidocaine Rash     [id number]  Pt states \"Rash all over my body\".     • Lidocaine-Tetracaine-Epineph Hives and Rash   • Duloxetine      Unable to urinate    • Morphine      \"Can not have too much, my oxygen goes down\"       Current medicines (including changes today)  Current Outpatient Medications   Medication Sig Dispense Refill   • " mupirocin (BACTROBAN) 2 % Ointment mupirocin 2 % topical ointment     • [START ON 4/3/2022] clonazePAM (KLONOPIN) 1 MG Tab Take 1 Tablet by mouth 2 times a day as needed (Anxiety) for up to 30 days. 4/3/22-5/3/22 60 Tablet 0   • losartan (COZAAR) 100 MG Tab Take 1 Tablet by mouth every day. 90 Tablet 3   • rosuvastatin (CRESTOR) 40 MG tablet TAKE 1 TABLET BY MOUTH EVERYDAY AT BEDTIME 90 Tablet 3   • triamterene-hctz (MAXZIDE-25/DYAZIDE) 37.5-25 MG Tab Take 1 Tablet by mouth every day. 90 Tablet 3   • gabapentin (NEURONTIN) 800 MG tablet Take 1 Tablet by mouth 3 times a day. 270 Tablet 3   • metronidazole (METROCREAM) 0.75 % cream Apply 1 Application to affected area(s) 2 Times a Day.     • methocarbamol (ROBAXIN) 750 MG Tab Take 750 mg by mouth 4 times a day.     • Diclofenac Sodium 1 % Gel      • hydrocortisone 2.5 % Cream topical cream Apply to affected areas twice daily 1 Tube 0   • [START ON 2/2/2022] clonazePAM (KLONOPIN) 1 MG Tab Take 1 Tablet by mouth 2 times a day as needed (Anxiety) for up to 30 days. 2/2/22-3/4/22 (Patient not taking: Reported on 1/13/2022) 60 Tablet 0   • [START ON 3/4/2022] clonazePAM (KLONOPIN) 1 MG Tab Take 1 Tablet by mouth 2 times a day as needed (Anxiety) for up to 30 days. 3/4/22-4/3/22 (Patient not taking: Reported on 1/13/2022) 60 Tablet 0     No current facility-administered medications for this visit.       Patient Active Problem List    Diagnosis Date Noted   • Type 2 diabetes mellitus with stage 3 chronic kidney disease, without long-term current use of insulin ([facility]) 10/01/2021   • Generalized anxiety disorder 04/30/2021   • Microalbuminuria due to type 2 diabetes mellitus ([facility]) 06/22/2020   • Chronic respiratory failure with hypoxia ([facility]) 04/11/2018   • History of skin cancer 12/06/2017   • Morbid obesity with BMI of 40.0-44.9, adult ([facility]) 09/20/2017   • Obstructive sleep apnea 05/09/2017   • Neuropathy associated with cancer (HCC) 03/28/2017   • History of colon cancer  09/28/2016   • History of gout 09/28/2016   • Essential hypertension 09/28/2016   • Pure hypercholesterolemia 09/28/2016   • Prostate cancer (HCC) 09/28/2016       Family History   Problem Relation Age of Onset   • Cancer Mother         Bladder   • Heart Disease Father    • Heart Disease Brother         CABG x2   • Hyperlipidemia Brother    • No Known Problems Son    • No Known Problems Maternal Grandmother    • No Known Problems Paternal Grandmother    • Heart Disease Paternal Grandfather    • Hypertension Paternal Grandfather    • Hyperlipidemia Paternal Grandfather        He  has a past medical history of Anemia, Anxiety, Atrophy of right kidney, Breath shortness, Cancer (HCC) (2016), Colon cancer (HCC), Former tobacco use, Gout, High cholesterol (12/12/2017), Hyperlipidemia, Hypertension (12/12/2017), Neuropathy (12/12/2017), Pain (12/12/2017), Pneumonia (1991), Psychiatric problem, Sleep apnea (12/12/2017), and Snoring.  He  has a past surgical history that includes cath placement (Left, 10/7/2016); closed reduction (10/25/2012); colon resection (2016); and thoracoscopy (2/5/2018).       Objective:   Weight 132 kg  BMI 42.83 kg/m²  Blood pressure 112/52  Heart rate 74      Physical Exam: Obese; O2 cannula on  Constitutional: Alert, no distress, well-groomed.  Skin: No rashes in visible areas.  Eye: Round. Conjunctiva clear, lids normal. No icterus.   ENMT: Lips pink without lesions, good dentition, moist mucous membranes. Phonation normal.  Neck: No masses, no thyromegaly. Moves freely without pain.  CV: Pulse as reported by patient  Respiratory: Unlabored respiratory effort, no cough or audible wheeze  Psych: Alert and oriented x3, normal affect and mood.       Medical Decision Making       The medical record was reviewed in its entirety including the referral notes, records from primary care, consultants notes, hospital records, labs, imaging, microbiology, immunology, and immunizations.  Care gaps identified  and reviewed with the patient.    Diagnostic and titration nocturnal polysomnogram's, home sleep apnea tests, continuous nocturnal oximetry results, multiple sleep latency tests, and compliance reports reviewed.    1. Obstructive sleep apnea  - DME Mask and Supplies      Has been advised to continue the current BiPAP with O2 bleed in, clean equipment frequently, and get new mask and supplies as allowed by insurance and DME. Advised about potential problems including nasal obstruction/stuffiness, mask leak or discomfort , frequent awakenings, chill or dryness of the upper airway, noise, inconvenience, and lack of benefit. Recommend an earlier appointment, if significant treatment barriers develop.      The risks of untreated sleep apnea were discussed with the patient at length. Patients with SU are at increased risk of cardiovascular disease including coronary artery disease, systemic arterial hypertension, pulmonary arterial hypertension, cardiac arrythmias, and stroke. SU patients have an increased risk of motor vehicle accidents, type 2 diabetes, chronic kidney disease, and non-alcoholic liver disease. The patient was advised to avoid driving a motor vehicle when drowsy.    Positive airway pressure will favorably impact many of the adverse conditions and effects provoked by SU.    Have advised the patient to follow up with the appropriate healthcare practitioners for all other medical problems and issues.    Mask wearing, handwashing, and social distancing protocols reviewed and encouraged.    Time spent 25 minutes. More than 1/2 the time spent coordinating care, counseling the patient, and reviewing the pathophysiology and treatment options for sleep apnea and the patient specific modifying factors and significant co-morbidities.      Follow-up:  Return in about 1 year (around 1/13/2023).

## 2022-01-14 ENCOUNTER — HOSPITAL ENCOUNTER (OUTPATIENT)
Dept: RADIOLOGY | Facility: MEDICAL CENTER | Age: 72
End: 2022-01-14
Attending: INTERNAL MEDICINE
Payer: MEDICARE

## 2022-01-14 DIAGNOSIS — C18.2 MALIGNANT NEOPLASM OF ASCENDING COLON (HCC): ICD-10-CM

## 2022-01-14 PROCEDURE — 71250 CT THORAX DX C-: CPT | Mod: MH

## 2022-04-07 ENCOUNTER — HOSPITAL ENCOUNTER (OUTPATIENT)
Dept: RADIOLOGY | Facility: MEDICAL CENTER | Age: 72
End: 2022-04-07
Attending: INTERNAL MEDICINE
Payer: MEDICARE

## 2022-04-07 DIAGNOSIS — C18.2 MALIGNANT NEOPLASM OF ASCENDING COLON (HCC): ICD-10-CM

## 2022-04-07 PROCEDURE — 71250 CT THORAX DX C-: CPT | Mod: MH

## 2022-04-14 ENCOUNTER — OFFICE VISIT (OUTPATIENT)
Dept: MEDICAL GROUP | Facility: MEDICAL CENTER | Age: 72
End: 2022-04-14
Payer: MEDICARE

## 2022-04-14 VITALS
OXYGEN SATURATION: 93 % | SYSTOLIC BLOOD PRESSURE: 110 MMHG | BODY MASS INDEX: 40.58 KG/M2 | DIASTOLIC BLOOD PRESSURE: 62 MMHG | HEART RATE: 76 BPM | HEIGHT: 69 IN | TEMPERATURE: 98 F | WEIGHT: 274 LBS

## 2022-04-14 DIAGNOSIS — C80.1 NEUROPATHY ASSOCIATED WITH CANCER (HCC): ICD-10-CM

## 2022-04-14 DIAGNOSIS — F41.1 GENERALIZED ANXIETY DISORDER: ICD-10-CM

## 2022-04-14 DIAGNOSIS — N18.30 TYPE 2 DIABETES MELLITUS WITH STAGE 3 CHRONIC KIDNEY DISEASE, WITHOUT LONG-TERM CURRENT USE OF INSULIN, UNSPECIFIED WHETHER STAGE 3A OR 3B CKD (HCC): ICD-10-CM

## 2022-04-14 DIAGNOSIS — E11.22 TYPE 2 DIABETES MELLITUS WITH STAGE 3 CHRONIC KIDNEY DISEASE, WITHOUT LONG-TERM CURRENT USE OF INSULIN, UNSPECIFIED WHETHER STAGE 3A OR 3B CKD (HCC): ICD-10-CM

## 2022-04-14 DIAGNOSIS — E78.00 PURE HYPERCHOLESTEROLEMIA: ICD-10-CM

## 2022-04-14 DIAGNOSIS — G63 NEUROPATHY ASSOCIATED WITH CANCER (HCC): ICD-10-CM

## 2022-04-14 DIAGNOSIS — I10 ESSENTIAL HYPERTENSION: ICD-10-CM

## 2022-04-14 PROCEDURE — 99214 OFFICE O/P EST MOD 30 MIN: CPT | Performed by: STUDENT IN AN ORGANIZED HEALTH CARE EDUCATION/TRAINING PROGRAM

## 2022-04-14 ASSESSMENT — ENCOUNTER SYMPTOMS
CHILLS: 0
FEVER: 0
SHORTNESS OF BREATH: 1
COUGH: 0

## 2022-04-14 ASSESSMENT — FIBROSIS 4 INDEX: FIB4 SCORE: 1.27

## 2022-04-14 NOTE — PROGRESS NOTES
Subjective:     CC: Establishing with new provider    HPI:   Gerald presents today for the following;    Problem   Type 2 Diabetes Mellitus With Stage 3 Chronic Kidney Disease, Without Long-Term Current Use of Insulin (Hcc)    Diabetic diet control  On statin  On losartan for microalbuminuria  Due for eye exam   Ref. Range 1/4/2022 06:34   Glycohemoglobin Latest Ref Range: 4.8 - 5.6 % 6.0 (H)        Ref. Range 8/26/2021 09:02   Creatinine, Random Urine Latest Ref Range: Not Estab. mg/dL 45.9   Microalbumin, Urine Random Latest Ref Range: Not Estab. ug/mL 43.9   Microalbumin-Creatinine Latest Ref Range: 0 - 29 mg/g creat 96 (H)        Generalized Anxiety Disorder    He has been taking clonazepam 1 mg bid prn for anxiety attack and AVIVA for roughly 20 years.      Neuropathy associated with cancer (HCC)    Well controlled. Originally secondary to lung   He oftentimes forgot the night time dose.  On gabapentin 800 mg TID      Essential Hypertension    Controlled on triamterene-HCTZ, losartan     Pure Hypercholesterolemia    Lab Results   Component Value Date/Time    CHOLSTRLTOT 114 01/10/2022 08:48 AM    CHOLSTRLTOT 137 11/24/2014 09:42 AM    LDL 48 06/17/2020 09:34 AM    LDL 63 11/24/2014 09:42 AM    HDL 57 01/10/2022 08:48 AM    HDL 40 11/24/2014 09:42 AM    TRIGLYCERIDE 117 01/10/2022 08:48 AM    TRIGLYCERIDE 169 (H) 11/24/2014 09:42 AM       -currently on rosuvastatin 40mg         Current Outpatient Medications Ordered in Epic   Medication Sig Dispense Refill   • mupirocin (BACTROBAN) 2 % Ointment mupirocin 2 % topical ointment     • clonazePAM (KLONOPIN) 1 MG Tab Take 1 Tablet by mouth 2 times a day as needed (Anxiety) for up to 30 days. 4/3/22-5/3/22 60 Tablet 0   • losartan (COZAAR) 100 MG Tab Take 1 Tablet by mouth every day. 90 Tablet 3   • rosuvastatin (CRESTOR) 40 MG tablet TAKE 1 TABLET BY MOUTH EVERYDAY AT BEDTIME 90 Tablet 3   • triamterene-hctz (MAXZIDE-25/DYAZIDE) 37.5-25 MG Tab Take 1 Tablet by mouth every  "day. 90 Tablet 3   • gabapentin (NEURONTIN) 800 MG tablet Take 1 Tablet by mouth 3 times a day. 270 Tablet 3   • metronidazole (METROCREAM) 0.75 % cream Apply 1 Application to affected area(s) 2 Times a Day.     • methocarbamol (ROBAXIN) 750 MG Tab Take 750 mg by mouth 4 times a day.     • Diclofenac Sodium 1 % Gel      • hydrocortisone 2.5 % Cream topical cream Apply to affected areas twice daily 1 Tube 0     No current Epic-ordered facility-administered medications on file.           ROS:  Review of Systems   Constitutional: Negative for chills and fever.   Respiratory: Positive for shortness of breath (chronic. o2). Negative for cough.        Objective:     Exam:  /62 (BP Location: Left arm, Patient Position: Sitting, BP Cuff Size: Adult)   Pulse 76   Temp 36.7 °C (98 °F) (Temporal)   Ht 1.753 m (5' 9\")   Wt 124 kg (274 lb)   SpO2 93% Comment: on 3 liters O2  BMI 40.46 kg/m²  Body mass index is 40.46 kg/m².    Physical Exam  Constitutional:       Appearance: He is obese.   Pulmonary:      Effort: Pulmonary effort is normal.   Musculoskeletal:      Cervical back: Normal range of motion.   Neurological:      Mental Status: He is alert.   Psychiatric:         Mood and Affect: Mood normal.         Behavior: Behavior normal.         Thought Content: Thought content normal.         Judgment: Judgment normal.               Assessment & Plan:     Problem List Items Addressed This Visit     Essential hypertension     Chronic-stable  -continue losartan and maxzide         Generalized anxiety disorder     Chronic  -continue clonazepam for now, discussed with patient other options  -pdmp checked and favorable  -CS ordered and signed          Relevant Orders    Controlled Substance Treatment Agreement    Neuropathy associated with cancer (HCC)     Chronic  -continue gabapentin         Pure hypercholesterolemia     Chronic-stable  -continue rosuvastatin         Type 2 diabetes mellitus with stage 3 chronic kidney " disease, without long-term current use of insulin (HCC)     Chronic-stable  -not on medications last A1c is 6 in the prediabetes range  -refer for retinal eye exam         Relevant Orders    Referral for Retinal Screening Exam                No follow-ups on file.    Please note that this dictation was created using voice recognition software. I have made every reasonable attempt to correct obvious errors, but I expect that there are errors of grammar and possibly content that I did not discover before finalizing the note.

## 2022-04-14 NOTE — ASSESSMENT & PLAN NOTE
Chronic  -continue clonazepam for now, discussed with patient other options  -pdmp checked and favorable  -CS ordered and signed

## 2022-04-14 NOTE — ASSESSMENT & PLAN NOTE
Chronic-stable  -not on medications last A1c is 6 in the prediabetes range  -refer for retinal eye exam

## 2022-05-02 DIAGNOSIS — F41.1 GENERALIZED ANXIETY DISORDER: ICD-10-CM

## 2022-05-03 RX ORDER — CLONAZEPAM 1 MG/1
1 TABLET ORAL 2 TIMES DAILY PRN
Qty: 60 TABLET | Refills: 0 | Status: SHIPPED | OUTPATIENT
Start: 2022-05-03 | End: 2022-06-02

## 2022-05-23 ENCOUNTER — HOSPITAL ENCOUNTER (OUTPATIENT)
Dept: RADIOLOGY | Facility: MEDICAL CENTER | Age: 72
End: 2022-05-23
Attending: UROLOGY
Payer: MEDICARE

## 2022-05-23 DIAGNOSIS — C61 MALIGNANT NEOPLASM OF PROSTATE (HCC): ICD-10-CM

## 2022-05-23 PROCEDURE — 72197 MRI PELVIS W/O & W/DYE: CPT | Mod: MH

## 2022-05-23 PROCEDURE — 700117 HCHG RX CONTRAST REV CODE 255: Performed by: UROLOGY

## 2022-05-23 PROCEDURE — A9576 INJ PROHANCE MULTIPACK: HCPCS | Performed by: UROLOGY

## 2022-05-23 PROCEDURE — 700111 HCHG RX REV CODE 636 W/ 250 OVERRIDE (IP): Mod: JG | Performed by: RADIOLOGY

## 2022-05-23 RX ADMIN — GLUCAGON 1 MG: 1 INJECTION, POWDER, LYOPHILIZED, FOR SOLUTION INTRAMUSCULAR; INTRAVENOUS at 15:00

## 2022-05-23 RX ADMIN — GADOTERIDOL 20 ML: 279.3 INJECTION, SOLUTION INTRAVENOUS at 16:15

## 2022-06-07 ENCOUNTER — PATIENT MESSAGE (OUTPATIENT)
Dept: MEDICAL GROUP | Facility: MEDICAL CENTER | Age: 72
End: 2022-06-07
Payer: MEDICARE

## 2022-06-07 DIAGNOSIS — F41.1 GENERALIZED ANXIETY DISORDER: ICD-10-CM

## 2022-06-07 RX ORDER — CLONAZEPAM 1 MG/1
TABLET ORAL 2 TIMES DAILY
Status: CANCELLED | OUTPATIENT
Start: 2022-06-07

## 2022-06-07 RX ORDER — CLONAZEPAM 1 MG/1
TABLET ORAL 2 TIMES DAILY
COMMUNITY
End: 2022-06-08 | Stop reason: SDUPTHER

## 2022-06-08 RX ORDER — CLONAZEPAM 1 MG/1
1 TABLET ORAL 2 TIMES DAILY
Qty: 20 TABLET | Refills: 0 | Status: SHIPPED | OUTPATIENT
Start: 2022-06-08 | End: 2022-06-09 | Stop reason: SDUPTHER

## 2022-06-08 NOTE — PROGRESS NOTES
Patient is requesting refill on clonazepam. PDMP was checked and favorable. Patient was given 10 day supply and told to make an appointment. No more clonazepam will be prescribed without an appointment.

## 2022-06-09 ENCOUNTER — OFFICE VISIT (OUTPATIENT)
Dept: MEDICAL GROUP | Facility: MEDICAL CENTER | Age: 72
End: 2022-06-09
Payer: MEDICARE

## 2022-06-09 VITALS
BODY MASS INDEX: 40.43 KG/M2 | DIASTOLIC BLOOD PRESSURE: 84 MMHG | SYSTOLIC BLOOD PRESSURE: 132 MMHG | TEMPERATURE: 97 F | OXYGEN SATURATION: 96 % | HEIGHT: 69 IN | HEART RATE: 76 BPM | WEIGHT: 273 LBS

## 2022-06-09 DIAGNOSIS — Z79.899 LONG-TERM CURRENT USE OF BENZODIAZEPINE: ICD-10-CM

## 2022-06-09 DIAGNOSIS — F41.1 GENERALIZED ANXIETY DISORDER: ICD-10-CM

## 2022-06-09 DIAGNOSIS — I10 ESSENTIAL HYPERTENSION: ICD-10-CM

## 2022-06-09 DIAGNOSIS — G63 NEUROPATHY ASSOCIATED WITH CANCER (HCC): ICD-10-CM

## 2022-06-09 DIAGNOSIS — E78.00 PURE HYPERCHOLESTEROLEMIA: ICD-10-CM

## 2022-06-09 DIAGNOSIS — C80.1 NEUROPATHY ASSOCIATED WITH CANCER (HCC): ICD-10-CM

## 2022-06-09 PROCEDURE — 99213 OFFICE O/P EST LOW 20 MIN: CPT | Performed by: STUDENT IN AN ORGANIZED HEALTH CARE EDUCATION/TRAINING PROGRAM

## 2022-06-09 RX ORDER — LOSARTAN POTASSIUM 100 MG/1
100 TABLET ORAL
Qty: 90 TABLET | Refills: 3 | Status: SHIPPED | OUTPATIENT
Start: 2022-06-09 | End: 2023-04-04

## 2022-06-09 RX ORDER — TRIAMTERENE AND HYDROCHLOROTHIAZIDE 37.5; 25 MG/1; MG/1
1 TABLET ORAL
Qty: 90 TABLET | Refills: 3 | Status: ON HOLD | OUTPATIENT
Start: 2022-06-09 | End: 2022-11-28

## 2022-06-09 RX ORDER — FLUOXETINE HYDROCHLORIDE 20 MG/1
20 CAPSULE ORAL DAILY
Qty: 90 CAPSULE | Refills: 0 | Status: SHIPPED | OUTPATIENT
Start: 2022-06-09 | End: 2022-09-09

## 2022-06-09 RX ORDER — ROSUVASTATIN CALCIUM 40 MG/1
TABLET, COATED ORAL
Qty: 90 TABLET | Refills: 3 | Status: SHIPPED | OUTPATIENT
Start: 2022-06-09 | End: 2022-11-26

## 2022-06-09 RX ORDER — CLONAZEPAM 1 MG/1
1 TABLET ORAL 2 TIMES DAILY
Qty: 60 TABLET | Refills: 2 | Status: SHIPPED | OUTPATIENT
Start: 2022-06-09 | End: 2022-07-09

## 2022-06-09 RX ORDER — GABAPENTIN 800 MG/1
800 TABLET ORAL 3 TIMES DAILY
Qty: 270 TABLET | Refills: 3 | Status: SHIPPED | OUTPATIENT
Start: 2022-06-09 | End: 2022-11-24

## 2022-06-09 ASSESSMENT — FIBROSIS 4 INDEX: FIB4 SCORE: 1.27

## 2022-06-09 ASSESSMENT — ENCOUNTER SYMPTOMS
CHILLS: 0
FEVER: 0
SHORTNESS OF BREATH: 0

## 2022-06-09 NOTE — ASSESSMENT & PLAN NOTE
Chronic  - As far as right now patient will receive a total of 3-month supply of clonazepam  - PDMP checked and favorable, controlled substance agreement already signed in April 2022  - Patient is agreeable to a slow taper of his clonazepam after his prostate cancer treatment is finished

## 2022-06-09 NOTE — PROGRESS NOTES
"Subjective:     CC: Medication Discussion    HPI:   Gerald presents today for the following;    Problem   Long-Term Current Use of Benzodiazepine    Patient has been on clonzaepam          Current Outpatient Medications Ordered in Epic   Medication Sig Dispense Refill   • FLUoxetine (PROZAC) 20 MG Cap Take 1 Capsule by mouth every day. 90 Capsule 0   • losartan (COZAAR) 100 MG Tab Take 1 Tablet by mouth every day. 90 Tablet 3   • triamterene-hctz (MAXZIDE-25/DYAZIDE) 37.5-25 MG Tab Take 1 Tablet by mouth every day. 90 Tablet 3   • gabapentin (NEURONTIN) 800 MG tablet Take 1 Tablet by mouth 3 times a day. 270 Tablet 3   • rosuvastatin (CRESTOR) 40 MG tablet TAKE 1 TABLET BY MOUTH EVERYDAY AT BEDTIME 90 Tablet 3   • clonazePAM (KLONOPIN) 1 MG Tab Take 1 Tablet by mouth 2 times a day for 30 days. 60 Tablet 2   • mupirocin (BACTROBAN) 2 % Ointment mupirocin 2 % topical ointment     • metronidazole (METROCREAM) 0.75 % cream Apply 1 Application to affected area(s) 2 Times a Day.     • methocarbamol (ROBAXIN) 750 MG Tab Take 750 mg by mouth 4 times a day.     • Diclofenac Sodium 1 % Gel      • hydrocortisone 2.5 % Cream topical cream Apply to affected areas twice daily 1 Tube 0     No current Epic-ordered facility-administered medications on file.           ROS:  Review of Systems   Constitutional: Negative for chills and fever.   Respiratory: Negative for shortness of breath.    Cardiovascular: Negative for chest pain.       Objective:     Exam:  /84 (BP Location: Left arm, Patient Position: Sitting, BP Cuff Size: Adult)   Pulse 76   Temp 36.1 °C (97 °F) (Temporal)   Ht 1.753 m (5' 9\")   Wt 124 kg (273 lb)   SpO2 96% Comment: 3 liters on 02  BMI 40.32 kg/m²  Body mass index is 40.32 kg/m².    Physical Exam  Constitutional:       General: He is not in acute distress.     Appearance: He is not ill-appearing.   Pulmonary:      Effort: Pulmonary effort is normal.   Neurological:      Mental Status: He is alert. "   Psychiatric:         Mood and Affect: Mood normal.         Behavior: Behavior normal.         Thought Content: Thought content normal.         Judgment: Judgment normal.               Assessment & Plan:     Problem List Items Addressed This Visit     Essential hypertension    Relevant Medications    losartan (COZAAR) 100 MG Tab    triamterene-hctz (MAXZIDE-25/DYAZIDE) 37.5-25 MG Tab    rosuvastatin (CRESTOR) 40 MG tablet    Generalized anxiety disorder    Relevant Medications    FLUoxetine (PROZAC) 20 MG Cap    clonazePAM (KLONOPIN) 1 MG Tab    Long-term current use of benzodiazepine    Neuropathy associated with cancer (HCC)    Relevant Medications    FLUoxetine (PROZAC) 20 MG Cap    gabapentin (NEURONTIN) 800 MG tablet    clonazePAM (KLONOPIN) 1 MG Tab    Pure hypercholesterolemia    Relevant Medications    losartan (COZAAR) 100 MG Tab    triamterene-hctz (MAXZIDE-25/DYAZIDE) 37.5-25 MG Tab    rosuvastatin (CRESTOR) 40 MG tablet            Return in about 3 months (around 9/9/2022) for Start taper of clonazepam .    Please note that this dictation was created using voice recognition software. I have made every reasonable attempt to correct obvious errors, but I expect that there are errors of grammar and possibly content that I did not discover before finalizing the note.

## 2022-07-08 NOTE — HPI: SKIN LESION
Patient called back and said he has one scheduled for Monday at another facility.    Debra Bowen PSC on 7/8/2022 at 4:42 PM    
Is This A New Presentation, Or A Follow-Up?: Skin Lesion
How Severe Is Your Skin Lesion?: mild

## 2022-07-14 ENCOUNTER — OFFICE VISIT (OUTPATIENT)
Dept: MEDICAL GROUP | Facility: MEDICAL CENTER | Age: 72
End: 2022-07-14
Payer: MEDICARE

## 2022-07-14 VITALS
HEIGHT: 69 IN | DIASTOLIC BLOOD PRESSURE: 62 MMHG | WEIGHT: 269 LBS | SYSTOLIC BLOOD PRESSURE: 126 MMHG | TEMPERATURE: 97.5 F | OXYGEN SATURATION: 95 % | BODY MASS INDEX: 39.84 KG/M2 | HEART RATE: 80 BPM

## 2022-07-14 DIAGNOSIS — N18.30 TYPE 2 DIABETES MELLITUS WITH STAGE 3 CHRONIC KIDNEY DISEASE, WITHOUT LONG-TERM CURRENT USE OF INSULIN, UNSPECIFIED WHETHER STAGE 3A OR 3B CKD (HCC): ICD-10-CM

## 2022-07-14 DIAGNOSIS — E11.22 TYPE 2 DIABETES MELLITUS WITH STAGE 3 CHRONIC KIDNEY DISEASE, WITHOUT LONG-TERM CURRENT USE OF INSULIN, UNSPECIFIED WHETHER STAGE 3A OR 3B CKD (HCC): ICD-10-CM

## 2022-07-14 DIAGNOSIS — Z00.00 MEDICARE ANNUAL WELLNESS VISIT, SUBSEQUENT: ICD-10-CM

## 2022-07-14 DIAGNOSIS — E78.00 PURE HYPERCHOLESTEROLEMIA: ICD-10-CM

## 2022-07-14 PROCEDURE — G0439 PPPS, SUBSEQ VISIT: HCPCS | Performed by: STUDENT IN AN ORGANIZED HEALTH CARE EDUCATION/TRAINING PROGRAM

## 2022-07-14 ASSESSMENT — PATIENT HEALTH QUESTIONNAIRE - PHQ9: CLINICAL INTERPRETATION OF PHQ2 SCORE: 0

## 2022-07-14 ASSESSMENT — ENCOUNTER SYMPTOMS
FEVER: 0
PALPITATIONS: 0
SHORTNESS OF BREATH: 1
CHILLS: 0
SPUTUM PRODUCTION: 0
GENERAL WELL-BEING: FAIR
VOMITING: 0
COUGH: 0
BLOOD IN STOOL: 0
ABDOMINAL PAIN: 0

## 2022-07-14 ASSESSMENT — ACTIVITIES OF DAILY LIVING (ADL): BATHING_REQUIRES_ASSISTANCE: 0

## 2022-07-14 ASSESSMENT — FIBROSIS 4 INDEX: FIB4 SCORE: 1.27

## 2022-07-14 NOTE — PROGRESS NOTES
Chief Complaint   Patient presents with   • Annual Exam       HPI:  Gerald Oshea is a 71 y.o. here for Medicare Annual Wellness Visit     Patient Active Problem List    Diagnosis Date Noted   • Medicare annual wellness visit, subsequent 07/14/2022   • Long-term current use of benzodiazepine 06/09/2022   • Type 2 diabetes mellitus with stage 3 chronic kidney disease, without long-term current use of insulin ([facility]) 10/01/2021   • Generalized anxiety disorder 04/30/2021   • Microalbuminuria due to type 2 diabetes mellitus ([facility]) 06/22/2020   • Chronic respiratory failure with hypoxia ([facility]) 04/11/2018   • History of skin cancer 12/06/2017   • Morbid obesity with BMI of 40.0-44.9, adult ([facility]) 09/20/2017   • Obstructive sleep apnea 05/09/2017   • Neuropathy associated with cancer ([facility]) 03/28/2017   • History of colon cancer 09/28/2016   • History of gout 09/28/2016   • Essential hypertension 09/28/2016   • Pure hypercholesterolemia 09/28/2016   • Prostate cancer ([facility]) 09/28/2016       Current Outpatient Medications   Medication Sig Dispense Refill   • FLUoxetine (PROZAC) 20 MG Cap Take 1 Capsule by mouth every day. 90 Capsule 0   • losartan (COZAAR) 100 MG Tab Take 1 Tablet by mouth every day. 90 Tablet 3   • triamterene-hctz (MAXZIDE-25/DYAZIDE) 37.5-25 MG Tab Take 1 Tablet by mouth every day. 90 Tablet 3   • gabapentin (NEURONTIN) 800 MG tablet Take 1 Tablet by mouth 3 times a day. 270 Tablet 3   • rosuvastatin (CRESTOR) 40 MG tablet TAKE 1 TABLET BY MOUTH EVERYDAY AT BEDTIME 90 Tablet 3   • mupirocin (BACTROBAN) 2 % Ointment mupirocin 2 % topical ointment     • metronidazole (METROCREAM) 0.75 % cream Apply 1 Application to affected area(s) 2 Times a Day.     • methocarbamol (ROBAXIN) 750 MG Tab Take 750 mg by mouth 4 times a day.     • Diclofenac Sodium 1 % Gel      • hydrocortisone 2.5 % Cream topical cream Apply to affected areas twice daily 1 Tube 0     No current facility-administered medications for this  visit.          Current supplements as per medication list.     Allergies: Ketamine, Lidocaine, Lidocaine-tetracaine-epineph, Duloxetine, and Morphine    Current social contact/activities:    He  reports that he quit smoking about 24 years ago. His smoking use included cigarettes. He has a 20.00 pack-year smoking history. He has never used smokeless tobacco. He reports current alcohol use of about 1.8 oz of alcohol per week. He reports that he does not use drugs.  Counseling given: Not Answered      DPA/Advanced Directive:  Patient has Living Will, but it is not on file. Instructed to bring in a copy to scan into their chart.    ROS:    Gait: Uses no assistive device  Ostomy: No  Other tubes: No  Amputations: No  Chronic oxygen use: Yes  Last eye exam: pt due   Wears hearing aids: No   : Denies any urinary leakage during the last 6 months  Review of Systems   Constitutional: Negative for chills and fever.   Respiratory: Positive for shortness of breath (chronic on o2). Negative for cough and sputum production.    Cardiovascular: Negative for chest pain and palpitations.   Gastrointestinal: Negative for abdominal pain, blood in stool and vomiting.   Musculoskeletal: Positive for joint pain.       Screening:    Depression Screening  Little interest or pleasure in doing things?  0 - not at all  Feeling down, depressed , or hopeless? 0 - not at all  Patient Health Questionnaire Score: 0     If depressive symptoms identified deferred to follow up visit unless specifically addressed in assessment and plan.    Interpretation of PHQ-9 Total Score   Score Severity   1-4 No Depression   5-9 Mild Depression   10-14 Moderate Depression   15-19 Moderately Severe Depression   20-27 Severe Depression    Screening for Cognitive Impairment  Three Minute Recall (daughter, heaven, mountain) 2/3    Roberto Carlos clock face with all 12 numbers and set the hands to show 10 past 11.  No Patient wrote down time   Cognitive concerns identified  deferred for follow up unless specifically addressed in assessment and plan.    Fall Risk Assessment  Has the patient had two or more falls in the last year or any fall with injury in the last year?  No    Safety Assessment  Throw rugs on floor.  No  Handrails on all stairs.  No  Good lighting in all hallways.  Yes  Difficulty hearing.  Yes  Patient counseled about all safety risks that were identified.    Functional Assessment ADLs  Are there any barriers preventing you from cooking for yourself or meeting nutritional needs?  No.    Are there any barriers preventing you from driving safely or obtaining transportation?  No.    Are there any barriers preventing you from using a telephone or calling for help?  No.    Are there any barriers preventing you from shopping?  No.    Are there any barriers preventing you from taking care of your own finances?  No.    Are there any barriers preventing you from managing your medications?  No.    Are there any barriers preventing you from showering, bathing or dressing yourself?  No.    Are you currently engaging in any exercise or physical activity?  Yes.  Walking   What is your perception of your health?  Fair.      Health Maintenance Summary          Overdue - Annual Wellness Visit (Every 366 Days) Overdue - never done    No completion history exists for this topic.          Overdue - COVID-19 Vaccine (1) Overdue - never done    No completion history exists for this topic.          Overdue - IMM ZOSTER VACCINES (1 of 2) Overdue - never done    No completion history exists for this topic.          Ordered - RETINAL SCREENING (Yearly) Ordered on 4/14/2022 01/01/2020  Done          Overdue - IMM DTaP/Tdap/Td Vaccine (1 - Tdap) Overdue since 8/17/2021 08/16/2021  Imm Admin: TD Vaccine          Ordered - A1C SCREENING (Every 6 Months) Ordered on 7/14/2022 01/04/2022  HEMOGLOBIN A1C    11/04/2020  HEMOGLOBIN A1C    06/17/2020  HEMOGLOBIN A1C    09/30/2019  HEMOGLOBIN A1C     04/01/2019  HEMOGLOBIN A1C    Only the first 5 history entries have been loaded, but more history exists.          DIABETES MONOFILAMENT / LE EXAM (Yearly) Due soon on 8/2/2022 08/02/2021  Diabetic Monofilament Lower Extremity Exam    08/02/2021  [employer]: WORKFLOW - DIABETES - DIABETIC FOOT EXAM PERFORMED    01/27/2020  [employer]: WORKFLOW - DIABETES - DIABETIC FOOT EXAM PERFORMED    01/27/2020  Diabetic Monofilament LE Exam          URINE ACR / MICROALBUMIN (Yearly) Next due on 8/26/2022 08/26/2021  MICROALB/CREAT RATIO RAND. UR    06/17/2020  MICROALB/CREAT RATIO RAND. UR    11/24/2014  MICROALBUMIN CREAT RATIO URINE          IMM INFLUENZA (1) Next due on 9/1/2022    10/17/2019  Imm Admin: Influenza Vaccine Adult HD    12/07/2018  Imm Admin: Influenza Vaccine Adult HD    11/20/2015  Imm Admin: Influenza Vaccine Quad Inj (Pf)          Ordered - SERUM CREATININE (Yearly) Ordered on 7/14/2022 01/04/2022  COMP METABOLIC PANEL    07/27/2021  Basic Metabolic Panel    07/21/2021  Comp Metabolic Panel    01/11/2021  Comp Metabolic Panel    11/04/2020  Comp Metabolic Panel    Only the first 5 history entries have been loaded, but more history exists.          Ordered - FASTING LIPID PROFILE (Yearly) Ordered on 7/14/2022    01/10/2022  LIPID PANEL    01/04/2022  LIPID PANEL    06/17/2020  LIPID PANEL    04/01/2019  LIPID PANEL    04/09/2018  LIPID PANEL    Only the first 5 history entries have been loaded, but more history exists.          COLORECTAL CANCER SCREENING (COLONOSCOPY - Every 10 Years) Next due on 6/29/2026 06/29/2016  REFERRAL TO GI FOR COLONOSCOPY          ABDOMINAL AORTIC ANEURYSM (AAA) SCREEN  Completed    07/06/2018  CT-CHEST,ABDOMEN,PELVIS WITH    06/13/2017  CT-CHEST,ABDOMEN,PELVIS WITH    05/28/2012  CT-RENAL COLIC EVALUATION(A/P W/O) (R/O kidney stones, ruptured AAA, retroperitoneal hemorrhage)          IMM PNEUMOCOCCAL VACCINE: 65+ Years (Series Information) Completed    09/07/2018   Imm Admin: Pneumococcal Conjugate Vaccine (Prevnar/PCV-13)    2016  Imm Admin: Pneumococcal polysaccharide vaccine (PPSV-23)    2016  Imm Admin: Pneumococcal polysaccharide vaccine (PPSV-23)          IMM MENINGOCOCCAL VACCINE (MCV4) (Series Information) Aged Out    No completion history exists for this topic.          Discontinued - IMM HEP B VACCINE  Discontinued    No completion history exists for this topic.          Discontinued - HEPATITIS C SCREENING  Discontinued    No completion history exists for this topic.                Patient Care Team:  Luis Toth D.O. as PCP - General (Internal Medicine)  Lex Abraham M.D. as Consulting Physician (Gastroenterology)  [facility] (Inactive) as Consulting Physician  Ann Keller M.D. as Consulting Physician (Pulmonary Medicine)  Saúl Kamara M.D. (Phys Med and Rehab)  Amy R. Schoening, P.A. (Family Medicine)  Manuel Boston O.D. (Optometry)  Lucas Villegas M.D. as Consulting Physician (Urology)  [employer] for o2/[employer] for PAP supplies  (DME Supplier)  [employer] (DME Supplier)        Social History     Tobacco Use   • Smoking status: Former Smoker     Packs/day: 1.00     Years: 20.00     Pack years: 20.00     Types: Cigarettes     Quit date: 1998     Years since quittin.5   • Smokeless tobacco: Never Used   Vaping Use   • Vaping Use: Never used   Substance Use Topics   • Alcohol use: Yes     Alcohol/week: 1.8 oz     Types: 3 Glasses of wine per week     Comment: 2-3 per day   • Drug use: No     Family History   Problem Relation Age of Onset   • Cancer Mother         Bladder   • Heart Disease Father    • Heart Disease Brother         CABG x2   • Hyperlipidemia Brother    • No Known Problems Son    • No Known Problems Maternal Grandmother    • No Known Problems Paternal Grandmother    • Heart Disease Paternal Grandfather    • Hypertension Paternal Grandfather    • Hyperlipidemia Paternal Grandfather      He  has a past  "medical history of Anemia, Anxiety, Atrophy of right kidney, Breath shortness, Cancer (HCC) (2016), Colon cancer (HCC), Former tobacco use, Gout, High cholesterol (12/12/2017), Hyperlipidemia, Hypertension (12/12/2017), Neuropathy (12/12/2017), Pain (12/12/2017), Pneumonia (1991), Psychiatric problem, Sleep apnea (12/12/2017), and Snoring.   Past Surgical History:   Procedure Laterality Date   • THORACOSCOPY  2/5/2018    Procedure: THORACOSCOPY- WEDGE RESECTION LT UPPER LOBE METASTASIS;  Surgeon: John H Ganser, M.D.;  Location: SURGERY [facility];  Service: General   • CATH PLACEMENT Left 10/7/2016    Procedure: CATH PLACEMENT - SUBCLAVIAN PORT;  Surgeon: Sho Bajwa M.D.;  Location: SURGERY SAME DAY [facility];  Service:    • COLON RESECTION  2016    [place]   • CLOSED REDUCTION  10/25/2012    Performed by Chavez Duran M.D. at SURGERY [facility]       Exam:   /62 (BP Location: Left arm, Patient Position: Sitting, BP Cuff Size: Adult)   Pulse 80   Temp 36.4 °C (97.5 °F) (Temporal)   Ht 1.753 m (5' 9\")   Wt 122 kg (269 lb)   SpO2 95%  Body mass index is 39.72 kg/m².    Hearing fair.    Dentition good  Alert, oriented in no acute distress.  Eye contact is good, speech goal directed, affect calm  Physical Exam  Constitutional:       Appearance: Normal appearance.   HENT:      Head: Normocephalic and atraumatic.      Right Ear: Tympanic membrane and ear canal normal.      Left Ear: Tympanic membrane and ear canal normal.      Mouth/Throat:      Mouth: Mucous membranes are moist.      Pharynx: Oropharynx is clear.   Eyes:      Extraocular Movements: Extraocular movements intact.      Pupils: Pupils are equal, round, and reactive to light.   Neck:      Thyroid: No thyromegaly.   Cardiovascular:      Rate and Rhythm: Normal rate and regular rhythm.      Heart sounds: Normal heart sounds.   Pulmonary:      Effort: Pulmonary effort is normal.      Breath sounds: Normal breath sounds.     "  Comments: NC noted with O2  Abdominal:      General: Abdomen is flat. Bowel sounds are normal.      Palpations: Abdomen is soft.   Musculoskeletal:      Cervical back: Normal range of motion and neck supple.      Right lower leg: No edema.      Left lower leg: No edema.   Lymphadenopathy:      Cervical: No cervical adenopathy.   Skin:     General: Skin is warm and dry.   Neurological:      General: No focal deficit present.      Mental Status: He is alert.         Assessment and Plan. The following treatment and monitoring plan is recommended:    1. Medicare annual wellness visit, subsequent    2. Pure hypercholesterolemia  - CBC WITH DIFFERENTIAL; Future  - TSH WITH REFLEX TO FT4; Future  - Lipid Profile; Future    3. Type 2 diabetes mellitus with stage 3 chronic kidney disease, without long-term current use of insulin, unspecified whether stage 3a or 3b CKD (HCC)  - HEMOGLOBIN A1C; Future  - Comp Metabolic Panel; Future      Services suggested: No services needed at this time  Health Care Screening: Age-appropriate preventive services recommended by USPTF and ACIP covered by Medicare were discussed today. Services ordered if indicated and agreed upon by the patient.  Referrals offered: Community-based lifestyle interventions to reduce health risks and promote self-management and wellness, fall prevention, nutrition, physical activity, tobacco-use cessation, weight loss, and mental health services as per orders if indicated.    Discussion today about general wellness and lifestyle habits:    · Prevent falls and reduce trip hazards; Cautioned about securing or removing rugs.  · Have a working fire alarm and carbon monoxide detector;   · Engage in regular physical activity and social activities     Follow-up: Return in about 6 months (around 1/14/2023) for F/U Labs.

## 2022-09-09 ENCOUNTER — APPOINTMENT (OUTPATIENT)
Dept: MEDICAL GROUP | Facility: MEDICAL CENTER | Age: 72
End: 2022-09-09
Payer: MEDICARE

## 2022-09-09 DIAGNOSIS — F41.1 GENERALIZED ANXIETY DISORDER: ICD-10-CM

## 2022-09-09 RX ORDER — FLUOXETINE HYDROCHLORIDE 20 MG/1
20 CAPSULE ORAL DAILY
Qty: 90 CAPSULE | Refills: 0 | Status: SHIPPED | OUTPATIENT
Start: 2022-09-09 | End: 2022-10-04

## 2022-10-03 DIAGNOSIS — F41.1 GENERALIZED ANXIETY DISORDER: ICD-10-CM

## 2022-10-04 RX ORDER — FLUOXETINE HYDROCHLORIDE 20 MG/1
20 CAPSULE ORAL DAILY
Qty: 90 CAPSULE | Refills: 0 | Status: SHIPPED | OUTPATIENT
Start: 2022-10-04 | End: 2022-12-15

## 2022-10-06 LAB
ALBUMIN SERPL-MCNC: 4.7 G/DL (ref 3.7–4.7)
ALBUMIN/GLOB SERPL: 2.6 {RATIO} (ref 1.2–2.2)
ALP SERPL-CCNC: 71 IU/L (ref 44–121)
ALT SERPL-CCNC: 15 IU/L (ref 0–44)
AST SERPL-CCNC: 17 IU/L (ref 0–40)
BASOPHILS # BLD AUTO: 0 X10E3/UL (ref 0–0.2)
BASOPHILS NFR BLD AUTO: 1 %
BILIRUB SERPL-MCNC: 0.6 MG/DL (ref 0–1.2)
BUN SERPL-MCNC: 33 MG/DL (ref 8–27)
BUN/CREAT SERPL: 21 (ref 10–24)
CALCIUM SERPL-MCNC: 10.3 MG/DL (ref 8.6–10.2)
CHLORIDE SERPL-SCNC: 98 MMOL/L (ref 96–106)
CHOLEST SERPL-MCNC: 122 MG/DL (ref 100–199)
CO2 SERPL-SCNC: 24 MMOL/L (ref 20–29)
CREAT SERPL-MCNC: 1.54 MG/DL (ref 0.76–1.27)
EGFRCR SERPLBLD CKD-EPI 2021: 48 ML/MIN/1.73
EOSINOPHIL # BLD AUTO: 0.2 X10E3/UL (ref 0–0.4)
EOSINOPHIL NFR BLD AUTO: 4 %
ERYTHROCYTE [DISTWIDTH] IN BLOOD BY AUTOMATED COUNT: 13.1 % (ref 11.6–15.4)
GLOBULIN SER CALC-MCNC: 1.8 G/DL (ref 1.5–4.5)
GLUCOSE SERPL-MCNC: 118 MG/DL (ref 70–99)
HBA1C MFR BLD: 5.6 % (ref 4.8–5.6)
HCT VFR BLD AUTO: 34.4 % (ref 37.5–51)
HDLC SERPL-MCNC: 62 MG/DL
HGB BLD-MCNC: 11.2 G/DL (ref 13–17.7)
IMM GRANULOCYTES # BLD AUTO: 0 X10E3/UL (ref 0–0.1)
IMM GRANULOCYTES NFR BLD AUTO: 0 %
IMMATURE CELLS  115398: ABNORMAL
LABORATORY COMMENT REPORT: NORMAL
LDLC SERPL CALC-MCNC: 44 MG/DL (ref 0–99)
LYMPHOCYTES # BLD AUTO: 0.6 X10E3/UL (ref 0.7–3.1)
LYMPHOCYTES NFR BLD AUTO: 13 %
MCH RBC QN AUTO: 30.6 PG (ref 26.6–33)
MCHC RBC AUTO-ENTMCNC: 32.6 G/DL (ref 31.5–35.7)
MCV RBC AUTO: 94 FL (ref 79–97)
MONOCYTES # BLD AUTO: 0.5 X10E3/UL (ref 0.1–0.9)
MONOCYTES NFR BLD AUTO: 11 %
MORPHOLOGY BLD-IMP: ABNORMAL
NEUTROPHILS # BLD AUTO: 3.6 X10E3/UL (ref 1.4–7)
NEUTROPHILS NFR BLD AUTO: 71 %
NRBC BLD AUTO-RTO: ABNORMAL %
PLATELET # BLD AUTO: 237 X10E3/UL (ref 150–450)
POTASSIUM SERPL-SCNC: 4.2 MMOL/L (ref 3.5–5.2)
PROT SERPL-MCNC: 6.5 G/DL (ref 6–8.5)
RBC # BLD AUTO: 3.66 X10E6/UL (ref 4.14–5.8)
SODIUM SERPL-SCNC: 138 MMOL/L (ref 134–144)
TRIGL SERPL-MCNC: 79 MG/DL (ref 0–149)
TSH SERPL DL<=0.005 MIU/L-ACNC: 3.92 UIU/ML (ref 0.45–4.5)
VLDLC SERPL CALC-MCNC: 16 MG/DL (ref 5–40)
WBC # BLD AUTO: 5 X10E3/UL (ref 3.4–10.8)

## 2022-10-12 ENCOUNTER — OFFICE VISIT (OUTPATIENT)
Dept: MEDICAL GROUP | Facility: MEDICAL CENTER | Age: 72
End: 2022-10-12
Payer: MEDICARE

## 2022-10-12 DIAGNOSIS — N18.30 TYPE 2 DIABETES MELLITUS WITH STAGE 3 CHRONIC KIDNEY DISEASE, WITHOUT LONG-TERM CURRENT USE OF INSULIN, UNSPECIFIED WHETHER STAGE 3A OR 3B CKD (HCC): ICD-10-CM

## 2022-10-12 DIAGNOSIS — E66.01 MORBID OBESITY WITH BMI OF 40.0-44.9, ADULT (HCC): ICD-10-CM

## 2022-10-12 DIAGNOSIS — E11.22 TYPE 2 DIABETES MELLITUS WITH STAGE 3 CHRONIC KIDNEY DISEASE, WITHOUT LONG-TERM CURRENT USE OF INSULIN, UNSPECIFIED WHETHER STAGE 3A OR 3B CKD (HCC): ICD-10-CM

## 2022-10-12 DIAGNOSIS — E66.01 MORBID (SEVERE) OBESITY DUE TO EXCESS CALORIES (HCC): ICD-10-CM

## 2022-10-12 DIAGNOSIS — F41.1 GENERALIZED ANXIETY DISORDER: ICD-10-CM

## 2022-10-12 PROCEDURE — 99214 OFFICE O/P EST MOD 30 MIN: CPT | Performed by: STUDENT IN AN ORGANIZED HEALTH CARE EDUCATION/TRAINING PROGRAM

## 2022-10-12 RX ORDER — CLONAZEPAM 0.5 MG/1
0.5 TABLET ORAL 2 TIMES DAILY
Qty: 60 TABLET | Refills: 2 | Status: SHIPPED | OUTPATIENT
Start: 2022-10-12 | End: 2022-11-11

## 2022-10-12 ASSESSMENT — ENCOUNTER SYMPTOMS
FEVER: 0
NERVOUS/ANXIOUS: 1
TREMORS: 1
CHILLS: 0
SHORTNESS OF BREATH: 0

## 2022-10-12 ASSESSMENT — FIBROSIS 4 INDEX: FIB4 SCORE: 1.31

## 2022-10-12 NOTE — PROGRESS NOTES
"Subjective:     CC: anxiety     HPI:   Gerald presents today for the following;    Problem   Generalized Anxiety Disorder    He has been taking clonazepam 1 mg bid prn for anxiety attack and AVIVA for roughly 20 years.     Interval hx 10/12/22  -patient was prescribed fluoxetine previously but has not started taking it until recently   -he ran out of clonazepam and reports feeling very \"lousy\", he is having hand tremors      Morbid obesity with BMI of 40.0-44.9, adult ([facility])    Body mass index is 45.25 kg/m².  Healthful lifestyle measures           Current Outpatient Medications Ordered in Epic   Medication Sig Dispense Refill    clonazePAM (KLONOPIN) 0.5 MG Tab Take 1 Tablet by mouth 2 times a day for 30 days. 60 Tablet 2    FLUoxetine (PROZAC) 20 MG Cap TAKE 1 CAPSULE BY MOUTH EVERY DAY 90 Capsule 0    losartan (COZAAR) 100 MG Tab Take 1 Tablet by mouth every day. 90 Tablet 3    triamterene-hctz (MAXZIDE-25/DYAZIDE) 37.5-25 MG Tab Take 1 Tablet by mouth every day. 90 Tablet 3    gabapentin (NEURONTIN) 800 MG tablet Take 1 Tablet by mouth 3 times a day. 270 Tablet 3    rosuvastatin (CRESTOR) 40 MG tablet TAKE 1 TABLET BY MOUTH EVERYDAY AT BEDTIME 90 Tablet 3    mupirocin (BACTROBAN) 2 % Ointment mupirocin 2 % topical ointment      metronidazole (METROCREAM) 0.75 % cream Apply 1 Application to affected area(s) 2 Times a Day.      methocarbamol (ROBAXIN) 750 MG Tab Take 750 mg by mouth 4 times a day.      Diclofenac Sodium 1 % Gel       hydrocortisone 2.5 % Cream topical cream Apply to affected areas twice daily 1 Tube 0     No current Epic-ordered facility-administered medications on file.           ROS:  Review of Systems   Constitutional:  Negative for chills and fever.   Respiratory:  Negative for shortness of breath.    Cardiovascular:  Negative for chest pain.   Neurological:  Positive for tremors.   Psychiatric/Behavioral:  The patient is nervous/anxious.      Objective:     Exam:  BP (P) 120/62   Pulse (P) 96   " "Temp (P) 36.5 °C (97.7 °F) (Temporal)   Resp (P) 16   Ht (P) 1.753 m (5' 9\")   Wt (P) 120 kg (265 lb 6.4 oz)   SpO2 (P) 96%   BMI (P) 39.19 kg/m²  Body mass index is 39.19 kg/m² (pended).    Physical Exam  Constitutional:       General: He is not in acute distress.     Appearance: He is not ill-appearing.   Pulmonary:      Effort: Pulmonary effort is normal.      Comments: NC with oxygen noted   Neurological:      Mental Status: He is alert.   Psychiatric:         Thought Content: Thought content normal.         Judgment: Judgment normal.      Comments: Mildly anxious             Assessment & Plan:     Problem List Items Addressed This Visit       Generalized anxiety disorder     Chronic with some withdrawal symptoms   -patient was told to return to office or go to urgent care if his prescription runs out to prevent withdrawal symptoms   -will prescribe clonezepam 0.5 mg BID prn for anxiety  -pdmp checked and favorable  -CS previously signed   -will continue to titrate clonazepam as much possible  -follow up in 3 months to continue to titrate medication             Relevant Medications    clonazePAM (KLONOPIN) 0.5 MG Tab    Morbid obesity with BMI of 40.0-44.9, adult (HCC)     Chronic, stable. Encouraged healthy diet and physical activity changes with a goal of weight loss. Follow up at least annually. The comorbid condition is unchanged based on today's assessment. Continue current treatment plan including control of risk factors, will focus more on weight once his anxiety is controlled. Follow up at least yearly.         Type 2 diabetes mellitus with stage 3 chronic kidney disease, without long-term current use of insulin (HCC)    Relevant Orders    Referral to Ophthalmology     Other Visit Diagnoses       Morbid (severe) obesity due to excess calories (HCC)        Body mass index (BMI) 39.0-39.9, adult                    Return in about 3 months (around 1/12/2023) for F/U Meds.    Please note that this " dictation was created using voice recognition software. I have made every reasonable attempt to correct obvious errors, but I expect that there are errors of grammar and possibly content that I did not discover before finalizing the note.

## 2022-10-12 NOTE — ASSESSMENT & PLAN NOTE
Chronic with some withdrawal symptoms   -patient was told to return to office or go to urgent care if his prescription runs out to prevent withdrawal symptoms   -will prescribe clonezepam 0.5 mg BID prn for anxiety  -pdmp checked and favorable  -CS previously signed   -will continue to titrate clonazepam as much possible  -follow up in 3 months to continue to titrate medication

## 2022-10-12 NOTE — ASSESSMENT & PLAN NOTE
Chronic, stable. Encouraged healthy diet and physical activity changes with a goal of weight loss. Follow up at least annually. The comorbid condition is unchanged based on today's assessment. Continue current treatment plan including control of risk factors, will focus more on weight once his anxiety is controlled. Follow up at least yearly.

## 2022-10-24 NOTE — PROGRESS NOTES
Subjective:   Gerald Oshea is a 66 y.o. male here today for follow-up elevated PSA.    Elevated PSA, less than 10 ng/ml  PSA 7.74 to 6.7 to 4.9 over the last few months. Denies frequent urination or weak urinary stream. He does admit to nocturia every once in a while.    Colon cancer (CMS-HCC)  Following closely with Dr. Silverman. He only has two more rounds of chemotherapy.    Neuropathy associated with cancer (CMS-HCC)  Patient has started having some numbness and tingling in his fingertips and toes. He tells me that for the last 2 chemotherapy sessions, Dr. Silverman will not order one of the medications that has been known to cause neuropathy. He is interested in starting the medicine, but wants to discuss it with Dr. Silverman first.    Pure hypercholesterolemia  Doing well. Reviewed labs with the patient. Taking medications as prescribed. No myalgias.    History of gout  Patient has stopped taking allopurinol. Since discontinuing, he has not had any exacerbations.     Current medicines (including changes today)  Current Outpatient Prescriptions   Medication Sig Dispense Refill   • NON SPECIFIED 1 Each by Intravenous route every 14 days. 5FU     • rosuvastatin (CRESTOR) 40 MG tablet Take 1 Tab by mouth every bedtime. 90 Tab 3   • clonazepam (KLONOPIN) 1 MG Tab Take 1 Tab by mouth 2 times a day. 60 Tab 3   • Ferrous Sulfate (IRON) 325 (65 FE) MG Tab Take 325 mg by mouth every day.       No current facility-administered medications for this visit.     He  has a past medical history of Hypertension; Hyperlipidemia; Anxiety; Colon cancer (CMS-HCC); Gout; Former tobacco use; Cancer (CMS-HCC) (2016); Anemia; Atrophy of right kidney; Pneumonia (1991); Sleep apnea; Snoring; Psychiatric problem; and DM (diabetes mellitus) (CMS-HCC).    ROS   No chest pain, no shortness of breath, no abdominal pain.     Objective:     Physical Exam:  Blood pressure 122/70, pulse 79, temperature 36.6 °C (97.8 °F), resp. rate 18,  "height 1.753 m (5' 9\"), weight 121.564 kg (268 lb), SpO2 95 %. Body mass index is 39.56 kg/(m^2).   Constitutional: Pleasant gentleman in no distress.  Skin: Warm, dry, good turgor, no rashes in visible areas.  Neck: Trachea midline, no masses, no thyromegaly. No cervical or supraclavicular lymphadenopathy.  Respiratory: Unlabored respiratory effort, lungs clear to auscultation, no wheezes, no ronchi.  Cardiovascular: +Chemoport on left chest. Slightly muffled S1, S2, no gross murmur, no leg edema.  Abdomen: +Obese. +Trochar scars healing well. Soft, non-tender, no masses, no hepatosplenomegaly.  Psych: Alert and oriented x3, normal affect and mood.    Assessment and Plan:     1. Malignant neoplasm of descending colon (CMS-HCC)  2. Neuropathy associated with cancer (CMS-HCC)  Following closely with oncology and will be finishing chemotherapy soon. Continue to monitor.    3. Elevated PSA, less than 10 ng/ml  PSA has decreased to 4.9. Plan to recheck PSA in a few months. At this point, as his PSA has been downtrending, prostate cancer is unlikely. However, on his recheck, if still elevated may consider referring to urologist.  - PROSTATE SPECIFIC AG SCREENING; Future    4. Pure hypercholesterolemia  Well controlled. Labs as indicated. Continue statin medication. Continue to monitor.  - LIPID PROFILE; Future    5. History of gout  Off allopurinol. Continue to monitor.    6. Obesity, unspecified obesity severity, unspecified obesity type  Patient's weight was discussed today, including healthy low-carb diet, 30-minutes of moderate exercise daily and avoiding sugars and high-fat foods.  - Patient identified as having weight management issue.  Appropriate orders and counseling given.    Total of 27 minutes face-to-face time spent with patient, with greater than 50% of the total time discussing patient's issues and symptoms as listed above in assessment and plan, as well as managing coordination of care for future " evaluation and treatment.    Followup: Return in about 3 months (around 6/28/2017) for f/u PSA and cholesterol -- make sure result is in chart, short.         PLEASE NOTE: This dictation was created using voice recognition software. I have made every reasonable attempt to correct obvious errors, but I expect that there are errors of grammar and possibly content that I did not discover before finalizing the note.   Improved

## 2022-11-08 ENCOUNTER — PATIENT MESSAGE (OUTPATIENT)
Dept: HEALTH INFORMATION MANAGEMENT | Facility: OTHER | Age: 72
End: 2022-11-08

## 2022-11-13 ENCOUNTER — HOSPITAL ENCOUNTER (OUTPATIENT)
Dept: RADIOLOGY | Facility: MEDICAL CENTER | Age: 72
End: 2022-11-13
Attending: INTERNAL MEDICINE
Payer: MEDICARE

## 2022-11-13 DIAGNOSIS — G62.0 DRUG-INDUCED POLYNEUROPATHY (HCC): ICD-10-CM

## 2022-11-13 DIAGNOSIS — C18.2 MALIGNANT NEOPLASM OF ASCENDING COLON (HCC): ICD-10-CM

## 2022-11-13 DIAGNOSIS — R19.7 DIARRHEA, UNSPECIFIED TYPE: ICD-10-CM

## 2022-11-13 DIAGNOSIS — Z51.11 ENCOUNTER FOR ANTINEOPLASTIC CHEMOTHERAPY: ICD-10-CM

## 2022-11-13 DIAGNOSIS — E86.0 DEHYDRATION: ICD-10-CM

## 2022-11-13 PROCEDURE — 71250 CT THORAX DX C-: CPT

## 2022-11-24 ENCOUNTER — APPOINTMENT (OUTPATIENT)
Dept: RADIOLOGY | Facility: MEDICAL CENTER | Age: 72
End: 2022-11-24
Attending: EMERGENCY MEDICINE
Payer: MEDICARE

## 2022-11-24 ENCOUNTER — HOSPITAL ENCOUNTER (EMERGENCY)
Facility: MEDICAL CENTER | Age: 72
End: 2022-11-24
Attending: EMERGENCY MEDICINE
Payer: MEDICARE

## 2022-11-24 VITALS
HEART RATE: 81 BPM | OXYGEN SATURATION: 97 % | DIASTOLIC BLOOD PRESSURE: 76 MMHG | RESPIRATION RATE: 20 BRPM | BODY MASS INDEX: 38.53 KG/M2 | WEIGHT: 260.14 LBS | SYSTOLIC BLOOD PRESSURE: 109 MMHG | HEIGHT: 69 IN | TEMPERATURE: 98 F

## 2022-11-24 DIAGNOSIS — E87.1 HYPONATREMIA: ICD-10-CM

## 2022-11-24 DIAGNOSIS — S32.010A COMPRESSION FRACTURE OF L1 VERTEBRA, INITIAL ENCOUNTER (HCC): ICD-10-CM

## 2022-11-24 DIAGNOSIS — I95.1 ORTHOSTATIC SYNCOPE: ICD-10-CM

## 2022-11-24 LAB
ALBUMIN SERPL BCP-MCNC: 4.3 G/DL (ref 3.2–4.9)
ALBUMIN/GLOB SERPL: 2 G/DL
ALP SERPL-CCNC: 75 U/L (ref 30–99)
ALT SERPL-CCNC: 21 U/L (ref 2–50)
ANION GAP SERPL CALC-SCNC: 14 MMOL/L (ref 7–16)
AST SERPL-CCNC: 20 U/L (ref 12–45)
BASOPHILS # BLD AUTO: 0.5 % (ref 0–1.8)
BASOPHILS # BLD: 0.05 K/UL (ref 0–0.12)
BILIRUB SERPL-MCNC: 1.1 MG/DL (ref 0.1–1.5)
BUN SERPL-MCNC: 21 MG/DL (ref 8–22)
CALCIUM SERPL-MCNC: 9.9 MG/DL (ref 8.4–10.2)
CHLORIDE SERPL-SCNC: 93 MMOL/L (ref 96–112)
CO2 SERPL-SCNC: 23 MMOL/L (ref 20–33)
CREAT SERPL-MCNC: 1.38 MG/DL (ref 0.5–1.4)
EKG IMPRESSION: NORMAL
EOSINOPHIL # BLD AUTO: 0.26 K/UL (ref 0–0.51)
EOSINOPHIL NFR BLD: 2.7 % (ref 0–6.9)
ERYTHROCYTE [DISTWIDTH] IN BLOOD BY AUTOMATED COUNT: 39.2 FL (ref 35.9–50)
GFR SERPLBLD CREATININE-BSD FMLA CKD-EPI: 55 ML/MIN/1.73 M 2
GLOBULIN SER CALC-MCNC: 2.2 G/DL (ref 1.9–3.5)
GLUCOSE SERPL-MCNC: 126 MG/DL (ref 65–99)
HCT VFR BLD AUTO: 33.2 % (ref 42–52)
HGB BLD-MCNC: 11.6 G/DL (ref 14–18)
IMM GRANULOCYTES # BLD AUTO: 0.03 K/UL (ref 0–0.11)
IMM GRANULOCYTES NFR BLD AUTO: 0.3 % (ref 0–0.9)
LYMPHOCYTES # BLD AUTO: 0.42 K/UL (ref 1–4.8)
LYMPHOCYTES NFR BLD: 4.3 % (ref 22–41)
MCH RBC QN AUTO: 29.7 PG (ref 27–33)
MCHC RBC AUTO-ENTMCNC: 34.9 G/DL (ref 33.7–35.3)
MCV RBC AUTO: 85.1 FL (ref 81.4–97.8)
MONOCYTES # BLD AUTO: 0.64 K/UL (ref 0–0.85)
MONOCYTES NFR BLD AUTO: 6.6 % (ref 0–13.4)
NEUTROPHILS # BLD AUTO: 8.33 K/UL (ref 1.82–7.42)
NEUTROPHILS NFR BLD: 85.6 % (ref 44–72)
NRBC # BLD AUTO: 0 K/UL
NRBC BLD-RTO: 0 /100 WBC
PLATELET # BLD AUTO: 224 K/UL (ref 164–446)
PMV BLD AUTO: 9 FL (ref 9–12.9)
POTASSIUM SERPL-SCNC: 3.8 MMOL/L (ref 3.6–5.5)
PROT SERPL-MCNC: 6.5 G/DL (ref 6–8.2)
RBC # BLD AUTO: 3.9 M/UL (ref 4.7–6.1)
SODIUM SERPL-SCNC: 130 MMOL/L (ref 135–145)
TROPONIN T SERPL-MCNC: 30 NG/L (ref 6–19)
WBC # BLD AUTO: 9.7 K/UL (ref 4.8–10.8)

## 2022-11-24 PROCEDURE — 700111 HCHG RX REV CODE 636 W/ 250 OVERRIDE (IP): Performed by: EMERGENCY MEDICINE

## 2022-11-24 PROCEDURE — 85025 COMPLETE CBC W/AUTO DIFF WBC: CPT

## 2022-11-24 PROCEDURE — 93005 ELECTROCARDIOGRAM TRACING: CPT | Performed by: EMERGENCY MEDICINE

## 2022-11-24 PROCEDURE — 700105 HCHG RX REV CODE 258: Performed by: EMERGENCY MEDICINE

## 2022-11-24 PROCEDURE — 72131 CT LUMBAR SPINE W/O DYE: CPT

## 2022-11-24 PROCEDURE — 96374 THER/PROPH/DIAG INJ IV PUSH: CPT

## 2022-11-24 PROCEDURE — 93005 ELECTROCARDIOGRAM TRACING: CPT

## 2022-11-24 PROCEDURE — 96375 TX/PRO/DX INJ NEW DRUG ADDON: CPT

## 2022-11-24 PROCEDURE — 99285 EMERGENCY DEPT VISIT HI MDM: CPT

## 2022-11-24 PROCEDURE — 84484 ASSAY OF TROPONIN QUANT: CPT

## 2022-11-24 PROCEDURE — 36415 COLL VENOUS BLD VENIPUNCTURE: CPT

## 2022-11-24 PROCEDURE — 80053 COMPREHEN METABOLIC PANEL: CPT

## 2022-11-24 PROCEDURE — 70450 CT HEAD/BRAIN W/O DYE: CPT

## 2022-11-24 RX ORDER — CLONAZEPAM 0.5 MG/1
0.5 TABLET ORAL 2 TIMES DAILY
Status: SHIPPED | COMMUNITY
End: 2023-01-17

## 2022-11-24 RX ORDER — HYDROMORPHONE HYDROCHLORIDE 1 MG/ML
0.5 INJECTION, SOLUTION INTRAMUSCULAR; INTRAVENOUS; SUBCUTANEOUS ONCE
Status: COMPLETED | OUTPATIENT
Start: 2022-11-24 | End: 2022-11-24

## 2022-11-24 RX ORDER — KETOROLAC TROMETHAMINE 30 MG/ML
15 INJECTION, SOLUTION INTRAMUSCULAR; INTRAVENOUS ONCE
Status: COMPLETED | OUTPATIENT
Start: 2022-11-24 | End: 2022-11-24

## 2022-11-24 RX ORDER — ACETAMINOPHEN 325 MG/1
650 TABLET ORAL EVERY 4 HOURS PRN
COMMUNITY

## 2022-11-24 RX ORDER — GABAPENTIN 800 MG/1
800 TABLET ORAL 2 TIMES DAILY
Status: SHIPPED | COMMUNITY
End: 2024-03-07 | Stop reason: SDUPTHER

## 2022-11-24 RX ORDER — SODIUM CHLORIDE 9 MG/ML
1000 INJECTION, SOLUTION INTRAVENOUS ONCE
Status: COMPLETED | OUTPATIENT
Start: 2022-11-24 | End: 2022-11-24

## 2022-11-24 RX ORDER — HYDROCODONE BITARTRATE AND ACETAMINOPHEN 5; 325 MG/1; MG/1
1 TABLET ORAL EVERY 6 HOURS PRN
Qty: 20 TABLET | Refills: 0 | Status: SHIPPED | OUTPATIENT
Start: 2022-11-24 | End: 2022-11-26

## 2022-11-24 RX ORDER — ONDANSETRON 2 MG/ML
4 INJECTION INTRAMUSCULAR; INTRAVENOUS ONCE
Status: COMPLETED | OUTPATIENT
Start: 2022-11-24 | End: 2022-11-24

## 2022-11-24 RX ADMIN — ONDANSETRON 4 MG: 2 INJECTION INTRAMUSCULAR; INTRAVENOUS at 11:58

## 2022-11-24 RX ADMIN — KETOROLAC TROMETHAMINE 15 MG: 30 INJECTION, SOLUTION INTRAMUSCULAR; INTRAVENOUS at 11:58

## 2022-11-24 RX ADMIN — HYDROMORPHONE HYDROCHLORIDE 0.5 MG: 1 INJECTION, SOLUTION INTRAMUSCULAR; INTRAVENOUS; SUBCUTANEOUS at 12:00

## 2022-11-24 RX ADMIN — SODIUM CHLORIDE 1000 ML: 9 INJECTION, SOLUTION INTRAVENOUS at 11:49

## 2022-11-24 ASSESSMENT — FIBROSIS 4 INDEX: FIB4 SCORE: 1.31

## 2022-11-24 NOTE — ED TRIAGE NOTES
Pt amb to triage c/o low back pain r/t syncopal fall last noc. Pt sates he got up from seatedt ot standing , walked a few steps before falling onto his back due to dizziness from syncopal event. Pt denies LOC.

## 2022-11-24 NOTE — ED NOTES
Patient verbalized understanding to plan of care and discharge information. Patient reports pain 3/10. Patient in stable condition. Patient ambulated out of ED to lobby to wait on transportation.

## 2022-11-24 NOTE — DISCHARGE INSTRUCTIONS
Please see Dr. Kamara in follow-up for pain control  Take Norco as needed for pain in the next few days until you see Dr. Kamara preferably on Monday  Use TLSO brace to limit movement

## 2022-11-24 NOTE — ED PROVIDER NOTES
"ED Provider Note    CHIEF COMPLAINT  Chief Complaint   Patient presents with    Syncope       HPI  Gerald Oshea is a 72 y.o. male who presents stating that yesterday around dinnertime he was making a meal in the kitchen and went into the living room and suddenly became dizzy and lightheaded falling backwards and he thinks he may have lost consciousness briefly but struck his head at the occiput and has mild headache but denies any neck pain.  Does have significant new low back pain.  Does have a history of sciatica followed by Dr. Kamara for pain control and he also takes benzodiazepines for anxiety.  He says that he did not take any extra medicine or mix up his medications to his knowledge.  He did not miss any meals yesterday and ate lunch prior to this event at dinnertime.  His chief concern is his lower back pain at this time after this fall which she said made it much worse than it has been    REVIEW OF SYSTEMS  See HPI for further details. All other systems are negative.     PAST MEDICAL HISTORY  Past Medical History:   Diagnosis Date    Anemia     Anxiety     Atrophy of right kidney     one functioning kidney    Breath shortness     Cancer (HCC) 2016    colon   rx surgery and chemo    Colon cancer (HCC)     Former tobacco use     Gout     High cholesterol 12/12/2017    Hyperlipidemia     Hypertension 12/12/2017    Neuropathy 12/12/2017    Pain 12/12/2017    \"Lower Back & Left Leg\"    Pneumonia 1991    Left upper lobe    Psychiatric problem     anxiety    Sleep apnea 12/12/2017    CPAP USE    Snoring     DX SU       FAMILY HISTORY  Family History   Problem Relation Age of Onset    Cancer Mother         Bladder    Heart Disease Father     Heart Disease Brother         CABG x2    Hyperlipidemia Brother     No Known Problems Son     No Known Problems Maternal Grandmother     No Known Problems Paternal Grandmother     Heart Disease Paternal Grandfather     Hypertension Paternal Grandfather     " "Hyperlipidemia Paternal Grandfather        SOCIAL HISTORY   reports that he quit smoking about 24 years ago. His smoking use included cigarettes. He has a 20.00 pack-year smoking history. He has never used smokeless tobacco. He reports current alcohol use of about 1.8 oz per week. He reports that he does not use drugs.    SURGICAL HISTORY  Past Surgical History:   Procedure Laterality Date    THORACOSCOPY  2/5/2018    Procedure: THORACOSCOPY- WEDGE RESECTION LT UPPER LOBE METASTASIS;  Surgeon: John H Ganser, M.D.;  Location: SURGERY [facility];  Service: General    CATH PLACEMENT Left 10/7/2016    Procedure: CATH PLACEMENT - SUBCLAVIAN PORT;  Surgeon: Sho Bajwa M.D.;  Location: SURGERY SAME DAY [facility]S;  Service:     COLON RESECTION  2016    [place]    CLOSED REDUCTION  10/25/2012    Performed by Chavez Duran M.D. at SURGERY [facility]       CURRENT MEDICATIONS  Home Medications    **Home medications have not yet been reviewed for this encounter**         ALLERGIES  Allergies   Allergen Reactions    Ketamine Unspecified     \"I hallucinate\".      Lidocaine Rash     [id number]  Pt states \"Rash all over my body\".      Lidocaine-Tetracaine-Epineph Hives and Rash    Duloxetine      Unable to urinate     Morphine      \"Can not have too much, my oxygen goes down\"       PHYSICAL EXAM  VITAL SIGNS: BP (!) 142/78   Pulse (!) 104   Temp 36 °C (96.8 °F) (Temporal)   Resp 20   Ht 1.753 m (5' 9\")   Wt 118 kg (260 lb 2.3 oz)   SpO2 94%   BMI 38.42 kg/m²    Constitutional: Well developed, Well nourished, No acute distress, Non-toxic appearance.   HENT: Normocephalic, Atraumatic, Bilateral external ears normal, Oropharynx is clear mucous membranes are moist. No oral exudates or nasal discharge.   Eyes: Pupils are equal round and reactive, EOMI, Conjunctiva normal, No discharge.   Neck: Normal range of motion, No tenderness, Supple, No stridor. No meningismus.  Lymphatic: No lymphadenopathy noted. "   Cardiovascular: Regular rate and rhythm without murmur rub or gallop.  Thorax & Lungs: Clear breath sounds bilaterally without wheezes, rhonchi or rales. There is no chest wall tenderness.   Abdomen: Soft, obese, non-tender non-distended. There is no rebound or guarding. No organomegaly is appreciated. Bowel sounds are normal.  Skin: Normal without rash.   Back: Lower lumbar and sacral spinal tenderness midline.   Extremities: Intact distal pulses, No edema, No tenderness, No cyanosis, No clubbing. Capillary refill is less than 2 seconds.  Musculoskeletal: Good range of motion in all major joints. No tenderness to palpation or major deformities noted.   Neurologic: Alert & oriented x 3, Normal motor function, Normal sensory function, No focal deficits noted. Reflexes are normal.  Psychiatric: Affect normal, Judgment normal, Mood normal. There is no suicidal ideation or patient reported hallucinations.     EKG  Results for orders placed or performed during the hospital encounter of 22   EKG   Result Value Ref Range    Report       [facility]    Test Date:  2022  Pt Name:    MAYRA ASIF              Department: [facility]  MRN:        [id number]                      Room:  Gender:     Male                         Technician: [id number]  :        1950                   Requested By:ER TRIAGE PROTOCOL  Order #:    [id number]                    Reading MD: IRENE HAMILTON MD    Measurements  Intervals                                Axis  Rate:       91                           P:          7  AK:         229                          QRS:        -8  QRSD:       110                          T:          51  QT:         330  QTc:        407    Interpretive Statements  Sinus rhythm  Ventricular trigeminy  Prolonged AK interval  Compared to ECG 2018 13:55:38  Ventricular premature complex(es) now present  Intraventricular conduction delay no longer present  Electronically  Signed On 11- 11:07:18 PST by IRENE HAMILTON MD           RADIOLOGY/PROCEDURES  CT-LSPINE W/O PLUS RECONS   Final Result      1.  Acute fracture at the anterior superior aspect of L1, without significant loss of height.   2.  Fracture involving the superior aspect posterior spinous process at L1.   3.  Superior endplate fracture of L2 without significant loss of height.   4.  Mild to moderate degenerative changes.   5.  No segmental malalignment.      These findings were discussed with IRENE HAMILTON on 11/24/2022 11:43 AM.      CT-HEAD W/O   Final Result      1.  Diffuse atrophy and white matter changes.   2.  No acute intracranial hemorrhage or territorial infarct.                  COURSE & MEDICAL DECISION MAKING  Pertinent Labs & Imaging studies reviewed. (See chart for details)  Patient seems to have had a syncopal event.  This may be orthostatic given his medication regimen.  His EKG was unremarkable showing no evidence of acute dysrhythmia or ischemic changes.    Laboratory evaluation did show hyponatremia at 130 down from 138 previously a month ago.  No other significant changes and he still has some anemia which is improved slightly and near normal renal function.    And a high suspicion for lumbar spine fracture and less suspicious for subdural hematoma after striking the back of his head.  CT scan of the head in fact was negative for fracture or bleed.    CT scan of the L-spine shows an acute fracture of the anterior superior aspect of L1 without loss of height as well as superior aspect of posterior spinous process of L1 and an endplate fracture of L2    I placed the patient in an off-the-shelf TLSO brace.  He was pain controlled with half dose Dilaudid and Toradol.  He has medications at home for pain and is followed by Dr. Kamara who I suggest he follows up with for additional pain management either orally or procedurally.  He understands plan of care discharged in stable condition will return if any  significant change in symptoms but does not appear to require admission at this time    FINAL IMPRESSION  1. Orthostatic syncope    2. Compression fracture of L1 vertebra, initial encounter ([facility])    3. Hyponatremia             Electronically signed by: [place] M.D., 11/24/2022 11:05 AM

## 2022-11-24 NOTE — ED NOTES
Med rec updated and complete, per pt   Allergies reviewed, per pt  Pt had a list of medications at bedside, went over list of medications and returned list of medications back to pt at bedside

## 2022-11-26 ENCOUNTER — APPOINTMENT (OUTPATIENT)
Dept: RADIOLOGY | Facility: MEDICAL CENTER | Age: 72
End: 2022-11-26
Attending: EMERGENCY MEDICINE
Payer: MEDICARE

## 2022-11-26 ENCOUNTER — HOSPITAL ENCOUNTER (EMERGENCY)
Facility: MEDICAL CENTER | Age: 72
End: 2022-11-26
Attending: EMERGENCY MEDICINE
Payer: MEDICARE

## 2022-11-26 ENCOUNTER — HOSPITAL ENCOUNTER (INPATIENT)
Facility: MEDICAL CENTER | Age: 72
LOS: 2 days | DRG: 683 | End: 2022-11-28
Attending: HOSPITALIST | Admitting: INTERNAL MEDICINE
Payer: MEDICARE

## 2022-11-26 VITALS
SYSTOLIC BLOOD PRESSURE: 186 MMHG | DIASTOLIC BLOOD PRESSURE: 97 MMHG | HEIGHT: 69 IN | TEMPERATURE: 98.2 F | BODY MASS INDEX: 40.13 KG/M2 | WEIGHT: 270.95 LBS | RESPIRATION RATE: 18 BRPM | OXYGEN SATURATION: 98 % | HEART RATE: 60 BPM

## 2022-11-26 DIAGNOSIS — C61 MALIGNANT NEOPLASM OF PROSTATE (HCC): ICD-10-CM

## 2022-11-26 PROBLEM — N17.9 AKI (ACUTE KIDNEY INJURY) (HCC): Status: ACTIVE | Noted: 2022-11-26

## 2022-11-26 LAB
ALBUMIN SERPL BCP-MCNC: 3.9 G/DL (ref 3.2–4.9)
ALBUMIN/GLOB SERPL: 1.6 G/DL
ALP SERPL-CCNC: 67 U/L (ref 30–99)
ALT SERPL-CCNC: 15 U/L (ref 2–50)
ANION GAP SERPL CALC-SCNC: 17 MMOL/L (ref 7–16)
APPEARANCE UR: CLEAR
APPEARANCE UR: CLEAR
AST SERPL-CCNC: 11 U/L (ref 12–45)
BASOPHILS # BLD AUTO: 0.8 % (ref 0–1.8)
BASOPHILS # BLD: 0.06 K/UL (ref 0–0.12)
BILIRUB SERPL-MCNC: 0.7 MG/DL (ref 0.1–1.5)
BILIRUB UR QL STRIP.AUTO: NEGATIVE
BILIRUB UR QL STRIP.AUTO: NEGATIVE
BUN SERPL-MCNC: 48 MG/DL (ref 8–22)
CALCIUM SERPL-MCNC: 9.4 MG/DL (ref 8.4–10.2)
CHLORIDE SERPL-SCNC: 94 MMOL/L (ref 96–112)
CK SERPL-CCNC: 77 U/L (ref 0–154)
CO2 SERPL-SCNC: 21 MMOL/L (ref 20–33)
COLOR UR: YELLOW
COLOR UR: YELLOW
CREAT SERPL-MCNC: 4.58 MG/DL (ref 0.5–1.4)
EOSINOPHIL # BLD AUTO: 0.42 K/UL (ref 0–0.51)
EOSINOPHIL NFR BLD: 5.5 % (ref 0–6.9)
ERYTHROCYTE [DISTWIDTH] IN BLOOD BY AUTOMATED COUNT: 41.9 FL (ref 35.9–50)
GFR SERPLBLD CREATININE-BSD FMLA CKD-EPI: 13 ML/MIN/1.73 M 2
GLOBULIN SER CALC-MCNC: 2.4 G/DL (ref 1.9–3.5)
GLUCOSE SERPL-MCNC: 124 MG/DL (ref 65–99)
GLUCOSE UR STRIP.AUTO-MCNC: NEGATIVE MG/DL
GLUCOSE UR STRIP.AUTO-MCNC: NEGATIVE MG/DL
HCT VFR BLD AUTO: 30.7 % (ref 42–52)
HGB BLD-MCNC: 10.2 G/DL (ref 14–18)
IMM GRANULOCYTES # BLD AUTO: 0.02 K/UL (ref 0–0.11)
IMM GRANULOCYTES NFR BLD AUTO: 0.3 % (ref 0–0.9)
KETONES UR STRIP.AUTO-MCNC: NEGATIVE MG/DL
KETONES UR STRIP.AUTO-MCNC: NEGATIVE MG/DL
LACTATE SERPL-SCNC: 0.6 MMOL/L (ref 0.5–2)
LACTATE SERPL-SCNC: 2.5 MMOL/L (ref 0.5–2)
LEUKOCYTE ESTERASE UR QL STRIP.AUTO: NEGATIVE
LEUKOCYTE ESTERASE UR QL STRIP.AUTO: NEGATIVE
LYMPHOCYTES # BLD AUTO: 0.48 K/UL (ref 1–4.8)
LYMPHOCYTES NFR BLD: 6.3 % (ref 22–41)
MCH RBC QN AUTO: 29.3 PG (ref 27–33)
MCHC RBC AUTO-ENTMCNC: 33.2 G/DL (ref 33.7–35.3)
MCV RBC AUTO: 88.2 FL (ref 81.4–97.8)
MICRO URNS: NORMAL
MICRO URNS: NORMAL
MONOCYTES # BLD AUTO: 0.92 K/UL (ref 0–0.85)
MONOCYTES NFR BLD AUTO: 12.1 % (ref 0–13.4)
NEUTROPHILS # BLD AUTO: 5.72 K/UL (ref 1.82–7.42)
NEUTROPHILS NFR BLD: 75 % (ref 44–72)
NITRITE UR QL STRIP.AUTO: NEGATIVE
NITRITE UR QL STRIP.AUTO: NEGATIVE
NRBC # BLD AUTO: 0 K/UL
NRBC BLD-RTO: 0 /100 WBC
PH UR STRIP.AUTO: 5.5 [PH] (ref 5–8)
PH UR STRIP.AUTO: 6 [PH] (ref 5–8)
PLATELET # BLD AUTO: 195 K/UL (ref 164–446)
PMV BLD AUTO: 9.3 FL (ref 9–12.9)
POTASSIUM SERPL-SCNC: 4.1 MMOL/L (ref 3.6–5.5)
PROCALCITONIN SERPL-MCNC: 0.15 NG/ML
PROT SERPL-MCNC: 6.3 G/DL (ref 6–8.2)
PROT UR QL STRIP: NEGATIVE MG/DL
PROT UR QL STRIP: NEGATIVE MG/DL
RBC # BLD AUTO: 3.48 M/UL (ref 4.7–6.1)
RBC UR QL AUTO: NEGATIVE
RBC UR QL AUTO: NEGATIVE
SODIUM SERPL-SCNC: 132 MMOL/L (ref 135–145)
SP GR UR STRIP.AUTO: 1.01
SP GR UR STRIP.AUTO: 1.01
WBC # BLD AUTO: 7.6 K/UL (ref 4.8–10.8)

## 2022-11-26 PROCEDURE — 85025 COMPLETE CBC W/AUTO DIFF WBC: CPT

## 2022-11-26 PROCEDURE — 99223 1ST HOSP IP/OBS HIGH 75: CPT | Mod: AI,GC | Performed by: STUDENT IN AN ORGANIZED HEALTH CARE EDUCATION/TRAINING PROGRAM

## 2022-11-26 PROCEDURE — 80053 COMPREHEN METABOLIC PANEL: CPT

## 2022-11-26 PROCEDURE — 36415 COLL VENOUS BLD VENIPUNCTURE: CPT | Mod: XU

## 2022-11-26 PROCEDURE — 303105 HCHG CATHETER EXTRA

## 2022-11-26 PROCEDURE — 74176 CT ABD & PELVIS W/O CONTRAST: CPT

## 2022-11-26 PROCEDURE — 87040 BLOOD CULTURE FOR BACTERIA: CPT

## 2022-11-26 PROCEDURE — 82550 ASSAY OF CK (CPK): CPT

## 2022-11-26 PROCEDURE — 84300 ASSAY OF URINE SODIUM: CPT

## 2022-11-26 PROCEDURE — 770020 HCHG ROOM/CARE - TELE (206)

## 2022-11-26 PROCEDURE — 99285 EMERGENCY DEPT VISIT HI MDM: CPT

## 2022-11-26 PROCEDURE — 84133 ASSAY OF URINE POTASSIUM: CPT

## 2022-11-26 PROCEDURE — 700105 HCHG RX REV CODE 258: Performed by: EMERGENCY MEDICINE

## 2022-11-26 PROCEDURE — 51702 INSERT TEMP BLADDER CATH: CPT

## 2022-11-26 PROCEDURE — 84145 PROCALCITONIN (PCT): CPT

## 2022-11-26 PROCEDURE — 83605 ASSAY OF LACTIC ACID: CPT

## 2022-11-26 PROCEDURE — 82570 ASSAY OF URINE CREATININE: CPT

## 2022-11-26 PROCEDURE — 82436 ASSAY OF URINE CHLORIDE: CPT

## 2022-11-26 PROCEDURE — 36415 COLL VENOUS BLD VENIPUNCTURE: CPT

## 2022-11-26 PROCEDURE — 81003 URINALYSIS AUTO W/O SCOPE: CPT

## 2022-11-26 RX ORDER — SODIUM CHLORIDE 9 MG/ML
1000 INJECTION, SOLUTION INTRAVENOUS ONCE
Status: COMPLETED | OUTPATIENT
Start: 2022-11-26 | End: 2022-11-26

## 2022-11-26 RX ORDER — ROSUVASTATIN CALCIUM 40 MG/1
40 TABLET, COATED ORAL
Status: SHIPPED | COMMUNITY
End: 2023-01-06 | Stop reason: SDUPTHER

## 2022-11-26 RX ORDER — ACETAMINOPHEN 325 MG/1
650 TABLET ORAL EVERY 6 HOURS PRN
Status: ACTIVE | OUTPATIENT
Start: 2022-11-26 | End: 2022-11-26

## 2022-11-26 RX ADMIN — SODIUM CHLORIDE 1000 ML: 9 INJECTION, SOLUTION INTRAVENOUS at 19:08

## 2022-11-26 RX ADMIN — SODIUM CHLORIDE 1000 ML: 9 INJECTION, SOLUTION INTRAVENOUS at 14:18

## 2022-11-26 ASSESSMENT — FIBROSIS 4 INDEX: FIB4 SCORE: 1.38

## 2022-11-26 ASSESSMENT — PAIN DESCRIPTION - PAIN TYPE: TYPE: ACUTE PAIN;CHRONIC PAIN

## 2022-11-26 NOTE — ED PROVIDER NOTES
ED Provider Note    CHIEF COMPLAINT  Chief Complaint   Patient presents with    Urinary Retention     Last void sometime yesterday  Fell backwards yesterday after getting dizzy and having a syncopal episode  Seen here  at [facility]   and Dx with an L1 fracture    Dizziness     Onset yesterday  Pt hypotensive Pale and diaph in triage  Denies fever or CP  Chronic SOB due to lung CA with resection       HPI  Gerald Oshea is a 72 y.o. male who presents with urinary retention.  Patient was seen 2 days prior after a syncopal episode.  At that time it was thought the patient likely had orthostasis as he had stood up, became dizzy and fell backwards striking his head and his back.  He had a normal head CT.  His CT of his lumbar spine revealed compression fractures of L1 and L2 as well as a spinous process fracture.  Patient reports that he was doing well after this but last night was unable to urinate, he reports he has not urinated now for approximately 12 hours.  Patient takes tamsulosin for his history of BPH and prostate cancer, he took this last night without any effect.  Patient has a history of metastatic prostate cancer and lung cancer status post partial pneumonectomy and patient continues to receive radiation oncology treatments.  Patient denies any fevers, chills, nausea or vomiting, or any other constitutional symptoms.  Patient denies any other further falls.  Patient denies any associated new weakness or numbness.  She denies any saddle anesthesias.  Of note patient has a solitary kidney.    REVIEW OF SYSTEMS  ROS    See HPI for further details. All other systems are negative.     PAST MEDICAL HISTORY   has a past medical history of Anemia, Anxiety, Atrophy of right kidney, Breath shortness, Cancer (HCC) (2016), Colon cancer (HCC), Former tobacco use, Gout, High cholesterol (12/12/2017), Hyperlipidemia, Hypertension (12/12/2017), Neuropathy (12/12/2017), Pain (12/12/2017), Pneumonia (1991), Psychiatric  "problem, Sleep apnea (2017), and Snoring.    SOCIAL HISTORY  Social History     Tobacco Use    Smoking status: Former     Packs/day: 1.00     Years: 20.00     Pack years: 20.00     Types: Cigarettes     Quit date: 1998     Years since quittin.9    Smokeless tobacco: Never   Vaping Use    Vaping Use: Never used   Substance and Sexual Activity    Alcohol use: Yes     Alcohol/week: 1.8 oz     Types: 3 Glasses of wine per week     Comment: 2-3 per day    Drug use: No    Sexual activity: Not Currently       SURGICAL HISTORY   has a past surgical history that includes cath placement (Left, 10/7/2016); closed reduction (10/25/2012); colon resection (); and thoracoscopy (2018).    CURRENT MEDICATIONS  Home Medications    **Home medications have not yet been reviewed for this encounter**         ALLERGIES  Allergies   Allergen Reactions    Ketamine Unspecified     \"I hallucinate\".      Lidocaine Rash     [id number]  Pt states \"Rash all over my body\".      Lidocaine-Tetracaine-Epineph Hives and Rash    Duloxetine      Unable to urinate     Morphine      \"Can not have too much, my oxygen goes down\"       PHYSICAL EXAM  Vitals:    22 1343   BP: (!) 68/44   Pulse: 74   Resp: 18   Temp: 36.8 °C (98.2 °F)   SpO2: 90%       Physical Exam  Constitutional:       Appearance: He is well-developed.   HENT:      Head: Normocephalic and atraumatic.   Eyes:      Conjunctiva/sclera: Conjunctivae normal.      Pupils: Pupils are equal, round, and reactive to light.   Cardiovascular:      Rate and Rhythm: Normal rate and regular rhythm.      Heart sounds: No murmur heard.    No friction rub. No gallop.   Pulmonary:      Effort: Pulmonary effort is normal. No respiratory distress.      Breath sounds: Normal breath sounds. No wheezing.   Abdominal:      General: Bowel sounds are normal. There is no distension.      Palpations: Abdomen is soft.      Tenderness: There is no abdominal tenderness. There is no rebound. "   Musculoskeletal:      Cervical back: Normal range of motion and neck supple.      Comments: Mild tenderness of the lumbar spine.  No tenderness of bilateral flanks.  Strength is 5 out of 5 in distal and proximal musculature.  EHL is 5 out of 5.  Reflexes are normal and equal bilaterally.  Sensation intact throughout.   Skin:     General: Skin is warm and dry.   Neurological:      Mental Status: He is alert and oriented to person, place, and time.   Psychiatric:         Behavior: Behavior normal.         DIAGNOSTIC STUDIES / PROCEDURES        LABS  Results for orders placed or performed during the hospital encounter of 11/26/22   CBC WITH DIFFERENTIAL   Result Value Ref Range    WBC 7.6 4.8 - 10.8 K/uL    RBC 3.48 (L) 4.70 - 6.10 M/uL    Hemoglobin 10.2 (L) 14.0 - 18.0 g/dL    Hematocrit 30.7 (L) 42.0 - 52.0 %    MCV 88.2 81.4 - 97.8 fL    MCH 29.3 27.0 - 33.0 pg    MCHC 33.2 (L) 33.7 - 35.3 g/dL    RDW 41.9 35.9 - 50.0 fL    Platelet Count 195 164 - 446 K/uL    MPV 9.3 9.0 - 12.9 fL    Neutrophils-Polys 75.00 (H) 44.00 - 72.00 %    Lymphocytes 6.30 (L) 22.00 - 41.00 %    Monocytes 12.10 0.00 - 13.40 %    Eosinophils 5.50 0.00 - 6.90 %    Basophils 0.80 0.00 - 1.80 %    Immature Granulocytes 0.30 0.00 - 0.90 %    Nucleated RBC 0.00 /100 WBC    Neutrophils (Absolute) 5.72 1.82 - 7.42 K/uL    Lymphs (Absolute) 0.48 (L) 1.00 - 4.80 K/uL    Monos (Absolute) 0.92 (H) 0.00 - 0.85 K/uL    Eos (Absolute) 0.42 0.00 - 0.51 K/uL    Baso (Absolute) 0.06 0.00 - 0.12 K/uL    Immature Granulocytes (abs) 0.02 0.00 - 0.11 K/uL    NRBC (Absolute) 0.00 K/uL   CMP   Result Value Ref Range    Sodium 132 (L) 135 - 145 mmol/L    Potassium 4.1 3.6 - 5.5 mmol/L    Chloride 94 (L) 96 - 112 mmol/L    Co2 21 20 - 33 mmol/L    Anion Gap 17.0 (H) 7.0 - 16.0    Glucose 124 (H) 65 - 99 mg/dL    Bun 48 (H) 8 - 22 mg/dL    Creatinine 4.58 (HH) 0.50 - 1.40 mg/dL    Calcium 9.4 8.4 - 10.2 mg/dL    AST(SGOT) 11 (L) 12 - 45 U/L    ALT(SGPT) 15 2 - 50  U/L    Alkaline Phosphatase 67 30 - 99 U/L    Total Bilirubin 0.7 0.1 - 1.5 mg/dL    Albumin 3.9 3.2 - 4.9 g/dL    Total Protein 6.3 6.0 - 8.2 g/dL    Globulin 2.4 1.9 - 3.5 g/dL    A-G Ratio 1.6 g/dL   LACTIC ACID   Result Value Ref Range    Lactic Acid 2.5 (H) 0.5 - 2.0 mmol/L   URINALYSIS    Specimen: Urine   Result Value Ref Range    Color Yellow     Character Clear     Specific Gravity 1.015 <1.035    Ph 5.5 5.0 - 8.0    Glucose Negative Negative mg/dL    Ketones Negative Negative mg/dL    Protein Negative Negative mg/dL    Bilirubin Negative Negative    Nitrite Negative Negative    Leukocyte Esterase Negative Negative    Occult Blood Negative Negative    Micro Urine Req see below    PROCALCITONIN   Result Value Ref Range    Procalcitonin 0.15 <0.25 ng/mL   ESTIMATED GFR   Result Value Ref Range    GFR (CKD-EPI) 13 (A) >60 mL/min/1.73 m 2   LACTIC ACID   Result Value Ref Range    Lactic Acid 0.6 0.5 - 2.0 mmol/L   URINALYSIS   Result Value Ref Range    Color Yellow     Character Clear     Specific Gravity 1.010 <1.035    Ph 6.0 5.0 - 8.0    Glucose Negative Negative mg/dL    Ketones Negative Negative mg/dL    Protein Negative Negative mg/dL    Bilirubin Negative Negative    Nitrite Negative Negative    Leukocyte Esterase Negative Negative    Occult Blood Negative Negative    Micro Urine Req see below          RADIOLOGY  CT-ABDOMEN-PELVIS W/O   Final Result      1.  No change in the mild L1 and L2 superior endplate compression fractures.   2.  No new fractures.   3.  No gross evidence of intra-abdominal or pelvic injury.   4.  Stable soft tissue density between the prostate and rectum as previously described.   5.  Postoperative changes of right hemicolectomy.   6.  Atrophic right kidney again noted.      MR-LUMBAR SPINE-W/O    (Results Pending)         40 minutes of critical care for this patient    COURSE & MEDICAL DECISION MAKING  Pertinent Labs & Imaging studies reviewed. (See chart for details)    Patient  here, hypotensive with new onset urinary retention now 24 hours of a spinal fracture.  Patient spinal fractures are clear of any evidence of significant loss of height.  He does not have any associated lower extremity weakness numbness and has normal reflexes bilaterally.  Unclear if this is simply urinary retention secondary to patient's chronically enlarged prostate and his prostate cancer versus potential cauda equina, I have a significantly lower suspicion of the former given patient's lack of any other neurologic symptoms.  Patient EHL is 5 out of 5 bilaterally.  Patient certainly also may simply be dehydrated especially given that he is hypotensive on arrival.  Will give IV fluids, and reassess.  Sending lactic acid.  Patient's pressure is significantly improved following this.  Patient basic labs reveal minimal lactic acidosis, with significant acute kidney injury.  Possibly secondary to postrenal VANNA.  Patient has put out around 500 cc upon Valenzuela placement.  We will send urinalysis and urine electrolytes.  Patient blood pressure has remained significantly normalized following a single bolus.  Patient is awaiting MRI to evaluate for possible compressive lesion.  Patient without any use of anticoagulants, I believe epidural hematoma is unlikely.  Patient without any infectious symptoms I believe abscess is unlikely.  Unfortunately patient is unable to fit in our MRI scanner, he will transfer to [facility] facility for this.  Patient will undergo CT to evaluate for possible renal contusion or hematoma, unfortunately unable to check with contrast given patient's solitary kidney.  Patient CT fails to reveal any acute causative pathology; prior fractures, have not moved significantly to suggest bony compression of the spinal cord..  I have added a CK to see if this could be causing patient's elevated creatinine.  I do believe that dehydration and prerenal VANNA is likely.  Awaiting urine electrolytes.  Patient  is awaiting transfer to original facility, he has been accepted.  Given patient without any fever, I believe that sepsis is highly unlikely, will defer empiric antibiotics at this point.        FINAL IMPRESSION  1.  Acute kidney injury, acute urinary retention, hypovolemic shock         Electronically signed by: Robert Tavarez M.D., 11/26/2022 2:10 PM

## 2022-11-26 NOTE — ED TRIAGE NOTES
Pt reports his neck is painful today also  Some vague abd pain but present prior to syncopal episode

## 2022-11-26 NOTE — ED NOTES
Pharmacy Medication Reconciliation      ~Medication reconciliation updated and complete per patient at bedside with medication list. Reviewed list with patient and returned at bedside  ~Allergies have been verified and updated   ~No oral ABX within the last 30 days  ~[facility] home pharmacy :  [facility]

## 2022-11-27 ENCOUNTER — APPOINTMENT (OUTPATIENT)
Dept: RADIOLOGY | Facility: MEDICAL CENTER | Age: 72
DRG: 683 | End: 2022-11-27
Attending: STUDENT IN AN ORGANIZED HEALTH CARE EDUCATION/TRAINING PROGRAM
Payer: MEDICARE

## 2022-11-27 PROBLEM — S32.010S COMPRESSION FRACTURE OF L1 LUMBAR VERTEBRA, SEQUELA: Status: ACTIVE | Noted: 2022-11-27

## 2022-11-27 PROBLEM — E78.5 HYPERLIPIDEMIA: Status: ACTIVE | Noted: 2022-11-27

## 2022-11-27 LAB
CHLORIDE UR-SCNC: 22 MMOL/L
CREAT UR-MCNC: 157.87 MG/DL
POTASSIUM UR-SCNC: 27 MMOL/L
SODIUM UR-SCNC: 34 MMOL/L

## 2022-11-27 PROCEDURE — 99233 SBSQ HOSP IP/OBS HIGH 50: CPT | Performed by: HOSPITALIST

## 2022-11-27 PROCEDURE — 72148 MRI LUMBAR SPINE W/O DYE: CPT

## 2022-11-27 PROCEDURE — 700111 HCHG RX REV CODE 636 W/ 250 OVERRIDE (IP): Performed by: HOSPITALIST

## 2022-11-27 PROCEDURE — A9270 NON-COVERED ITEM OR SERVICE: HCPCS | Performed by: STUDENT IN AN ORGANIZED HEALTH CARE EDUCATION/TRAINING PROGRAM

## 2022-11-27 PROCEDURE — 700102 HCHG RX REV CODE 250 W/ 637 OVERRIDE(OP): Performed by: HOSPITALIST

## 2022-11-27 PROCEDURE — 700105 HCHG RX REV CODE 258: Performed by: HOSPITALIST

## 2022-11-27 PROCEDURE — A9270 NON-COVERED ITEM OR SERVICE: HCPCS | Performed by: HOSPITALIST

## 2022-11-27 PROCEDURE — 770020 HCHG ROOM/CARE - TELE (206)

## 2022-11-27 PROCEDURE — 700102 HCHG RX REV CODE 250 W/ 637 OVERRIDE(OP): Performed by: STUDENT IN AN ORGANIZED HEALTH CARE EDUCATION/TRAINING PROGRAM

## 2022-11-27 PROCEDURE — 94760 N-INVAS EAR/PLS OXIMETRY 1: CPT

## 2022-11-27 RX ORDER — LOSARTAN POTASSIUM 50 MG/1
100 TABLET ORAL DAILY
Status: DISCONTINUED | OUTPATIENT
Start: 2022-11-27 | End: 2022-11-28 | Stop reason: HOSPADM

## 2022-11-27 RX ORDER — METHOCARBAMOL 500 MG/1
750 TABLET, FILM COATED ORAL 2 TIMES DAILY PRN
Status: DISCONTINUED | OUTPATIENT
Start: 2022-11-27 | End: 2022-11-28 | Stop reason: HOSPADM

## 2022-11-27 RX ORDER — CLONAZEPAM 0.5 MG/1
0.5 TABLET ORAL 2 TIMES DAILY
Status: DISCONTINUED | OUTPATIENT
Start: 2022-11-27 | End: 2022-11-28 | Stop reason: HOSPADM

## 2022-11-27 RX ORDER — SODIUM CHLORIDE, SODIUM LACTATE, POTASSIUM CHLORIDE, CALCIUM CHLORIDE 600; 310; 30; 20 MG/100ML; MG/100ML; MG/100ML; MG/100ML
INJECTION, SOLUTION INTRAVENOUS CONTINUOUS
Status: DISCONTINUED | OUTPATIENT
Start: 2022-11-27 | End: 2022-11-28

## 2022-11-27 RX ORDER — BISACODYL 10 MG
10 SUPPOSITORY, RECTAL RECTAL
Status: DISCONTINUED | OUTPATIENT
Start: 2022-11-27 | End: 2022-11-28 | Stop reason: HOSPADM

## 2022-11-27 RX ORDER — GABAPENTIN 300 MG/1
300 CAPSULE ORAL 2 TIMES DAILY
Status: DISCONTINUED | OUTPATIENT
Start: 2022-11-27 | End: 2022-11-28 | Stop reason: HOSPADM

## 2022-11-27 RX ORDER — FLUOXETINE HYDROCHLORIDE 20 MG/1
20 CAPSULE ORAL DAILY
Status: DISCONTINUED | OUTPATIENT
Start: 2022-11-27 | End: 2022-11-28 | Stop reason: HOSPADM

## 2022-11-27 RX ORDER — ROSUVASTATIN CALCIUM 10 MG/1
10 TABLET, COATED ORAL
Status: DISCONTINUED | OUTPATIENT
Start: 2022-11-27 | End: 2022-11-28 | Stop reason: HOSPADM

## 2022-11-27 RX ORDER — AMOXICILLIN 250 MG
2 CAPSULE ORAL 2 TIMES DAILY
Status: DISCONTINUED | OUTPATIENT
Start: 2022-11-27 | End: 2022-11-28 | Stop reason: HOSPADM

## 2022-11-27 RX ORDER — POLYETHYLENE GLYCOL 3350 17 G/17G
1 POWDER, FOR SOLUTION ORAL
Status: DISCONTINUED | OUTPATIENT
Start: 2022-11-27 | End: 2022-11-28 | Stop reason: HOSPADM

## 2022-11-27 RX ORDER — TAMSULOSIN HYDROCHLORIDE 0.4 MG/1
0.4 CAPSULE ORAL
Status: DISCONTINUED | OUTPATIENT
Start: 2022-11-27 | End: 2022-11-28 | Stop reason: HOSPADM

## 2022-11-27 RX ORDER — HEPARIN SODIUM 5000 [USP'U]/ML
5000 INJECTION, SOLUTION INTRAVENOUS; SUBCUTANEOUS EVERY 8 HOURS
Status: DISCONTINUED | OUTPATIENT
Start: 2022-11-27 | End: 2022-11-28 | Stop reason: HOSPADM

## 2022-11-27 RX ORDER — ACETAMINOPHEN 325 MG/1
650 TABLET ORAL EVERY 6 HOURS PRN
Status: DISCONTINUED | OUTPATIENT
Start: 2022-11-27 | End: 2022-11-28 | Stop reason: HOSPADM

## 2022-11-27 RX ADMIN — CLONAZEPAM 0.5 MG: 0.5 TABLET ORAL at 17:44

## 2022-11-27 RX ADMIN — GABAPENTIN 300 MG: 300 CAPSULE ORAL at 17:44

## 2022-11-27 RX ADMIN — FLUOXETINE 20 MG: 20 CAPSULE ORAL at 06:24

## 2022-11-27 RX ADMIN — TAMSULOSIN HYDROCHLORIDE 0.4 MG: 0.4 CAPSULE ORAL at 12:21

## 2022-11-27 RX ADMIN — LOSARTAN POTASSIUM 100 MG: 50 TABLET, FILM COATED ORAL at 06:24

## 2022-11-27 RX ADMIN — HEPARIN SODIUM 5000 UNITS: 5000 INJECTION, SOLUTION INTRAVENOUS; SUBCUTANEOUS at 22:00

## 2022-11-27 RX ADMIN — ROSUVASTATIN 10 MG: 10 TABLET, FILM COATED ORAL at 21:35

## 2022-11-27 RX ADMIN — HEPARIN SODIUM 5000 UNITS: 5000 INJECTION, SOLUTION INTRAVENOUS; SUBCUTANEOUS at 15:31

## 2022-11-27 RX ADMIN — GABAPENTIN 300 MG: 300 CAPSULE ORAL at 06:24

## 2022-11-27 RX ADMIN — CLONAZEPAM 0.5 MG: 0.5 TABLET ORAL at 06:24

## 2022-11-27 RX ADMIN — SODIUM CHLORIDE, POTASSIUM CHLORIDE, SODIUM LACTATE AND CALCIUM CHLORIDE: 600; 310; 30; 20 INJECTION, SOLUTION INTRAVENOUS at 12:24

## 2022-11-27 ASSESSMENT — ENCOUNTER SYMPTOMS
CHILLS: 0
PHOTOPHOBIA: 0
COUGH: 0
WEAKNESS: 0
HALLUCINATIONS: 0
BLURRED VISION: 0
TREMORS: 0
MYALGIAS: 0
VOMITING: 0
STRIDOR: 0
HEARTBURN: 0
POLYDIPSIA: 0
CONSTIPATION: 0
SPUTUM PRODUCTION: 0
NAUSEA: 0
FALLS: 1
DIARRHEA: 0
FEVER: 0
HEADACHES: 0
PALPITATIONS: 0
DOUBLE VISION: 0
BRUISES/BLEEDS EASILY: 0
WHEEZING: 0
BACK PAIN: 1
SHORTNESS OF BREATH: 0
DIAPHORESIS: 0
DIZZINESS: 0
TINGLING: 0
SINUS PAIN: 0
NECK PAIN: 0
PND: 0
BLOOD IN STOOL: 0
FLANK PAIN: 1
EYE PAIN: 0
SORE THROAT: 0
ORTHOPNEA: 0
ABDOMINAL PAIN: 0
DEPRESSION: 0
HEMOPTYSIS: 0
CLAUDICATION: 0

## 2022-11-27 ASSESSMENT — FIBROSIS 4 INDEX: FIB4 SCORE: 1.03

## 2022-11-27 ASSESSMENT — PAIN DESCRIPTION - PAIN TYPE: TYPE: ACUTE PAIN;CHRONIC PAIN

## 2022-11-27 ASSESSMENT — LIFESTYLE VARIABLES: SUBSTANCE_ABUSE: 0

## 2022-11-27 NOTE — PROGRESS NOTES
[facility] HOSPITAL[facility] TRIAGE OFFICER DIRECT ADMISSION REPORT  Transferring facility: [facility]  Transferring physician: Dr Tavarez  Transferring facility/physician contact number:   Chief complaint: Unable to urinate  Pertinent history & patient course: 72-year-old male with past medical history of BPH, prostate cancer, lung cancer status postpneumonectomy, sciatica, hypertension, sleep apnea on CPAP, anxiety, who presented with [facility] on 11/24 after syncopal and ground-level fall, when she was diagnosed with L1 L2 fracture and discharged home, returned today with inability to urinate for 12 hours, evidence of acute kidney injury.  ERP is concerned about possible spinal cord compression and wanted to order MRI, however patient could not fit MRI machine at [facility].  For that reason transfer requested to [facility] hospital where MRI machine could accommodate weight up to 500 pounds  Pertinent imaging & lab results:   Code Status: full per transferring provider, I personally verified with the transferring provider patient's code status and the transferring provider has confirmed this with the patient.  Further work up or recommendations per triage officer prior to transfer:   Consultants called prior to transfer and pertinent input from consultants:   Patient accepted for transfer: Yes  Consultants to be called upon arrival:   Admission status: Inpatient.   Floor requested: tele  If ICU transfer, name of intensivist case discussed with and pertinent input from critical care:  n/a    Please inform the triage officer upon arrival of the patient to [facility] for assignment of a hospitalist to perform admission.     For any question or concerns regarding the care of this patient, please reach out to the assigned hospitalist.

## 2022-11-27 NOTE — CARE PLAN
The patient is Stable - Low risk of patient condition declining or worsening    Shift Goals  Clinical Goals: Obtain MRI  Patient Goals: Eat  Family Goals: NA not present    Progress made toward(s) clinical / shift goals:  MRI obtained    Patient is not progressing towards the following goals:      Problem: Knowledge Deficit - Standard  Goal: Patient and family/care givers will demonstrate understanding of plan of care, disease process/condition, diagnostic tests and medications  Outcome: Progressing     Problem: Pain - Standard  Goal: Alleviation of pain or a reduction in pain to the patient’s comfort goal  Outcome: Progressing     Problem: Skin Integrity  Goal: Skin integrity is maintained or improved  Outcome: Progressing     Problem: Fall Risk  Goal: Patient will remain free from falls  Outcome: Progressing

## 2022-11-27 NOTE — PROGRESS NOTES
1952 Report taken from TONIA Galindo at [facility]. Pt to arrive via [facility] shortly to [id number].

## 2022-11-27 NOTE — PROGRESS NOTES
"Med rec completed by med rec tech at [facility] on 11/26/2022, prior to transfer:    \"~Medication reconciliation updated and complete per patient at bedside with medication list. Reviewed list with patient and returned at bedside  ~Allergies have been verified and updated   ~No oral ABX within the last 30 days  ~[facility]  "

## 2022-11-27 NOTE — ASSESSMENT & PLAN NOTE
*Likely postrenal from patient's BPH/prostate cancer/soft tissue density between prostate and rectal    -MRI lumbar spine compression fracture but no evidence of nerve impingement    IV fluid hydration   Monitor BMP and assess response  Avoid IV contrast/nephrotoxins/NSAIDs  Dose adjust meds for decreased GFR    Monitor urine output closely

## 2022-11-27 NOTE — ED NOTES
Pt resting in bed, call light within reach. No complaints at this time and updated on plan of care and transfer time. Given ice chips.

## 2022-11-27 NOTE — H&P
History & Physical Note    Date of Admission: 11/27/2022  Admission Status: Inpatient  [facility] Team: MILAGROS  Attending: Dr. Knox  Senior Resident: Dr. Castañeda  Contact Number: [phone]    Chief Complaint:   Acute Urinary Retention    History of Present Illness (HPI):     Patient is a 72 y.o. male with a PMHx of BPH, prostate cancer s/p radiation therapy with last therapy 1 month ago, colorectal cancer s/p right hemicolectomy in 2016 complicated by left lung metastasis s/p pneumonectomy in 2018, chronic sciatica, hypertension, sleep apnea on CPAP, anxiety on clonazepam 0.5 mg twice daily, who was a direct transfer from [facility] on 11/26/2022 for concerns of cauda equina syndrome due to inability to obtain MRI due to patient's body habitus.    Patient had a syncopal events on 11/23/2022, patient states he got up rapidly, got dizzy, fell backwards, hit his head and low back, waited to present to ED on 11/24, diagnosed with compression fracture of L1 vertebra, given off-the-shelf TLSO brace, pain controlled with half dose Dilaudid and Toradol.  His TLSO came off and he went instructions on how to pull back on and he had acute urinary retention for 12 hours so he presented to the ED on 11/26/2020 for further evaluation.  DDx included prerenal from dehydration, given IV fluids with no improvement, postrenal from patient's BPH/prostate cancer/soft tissue density between prostate and rectal, or cauda equina syndrome.  Could not rule out cauda equina syndrome so was sent to [facility] [place] capable to image his weight.    Patient was at baseline neurological status without any muscle weakness, change in sensation, fecal incontinence, saddle anesthesia.  He states he just has low back pain and acute urinary retention.  Denies any urinary frequency nor urgency.  Last molting 1980, denies any alcohol use review extremities, lives at home alone, and has neighbors for support.    Review of Systems:   Review of Systems    Constitutional:  Negative for chills, fever and malaise/fatigue.   Respiratory:  Negative for cough and shortness of breath.    Cardiovascular:  Negative for chest pain and palpitations.   Gastrointestinal:  Negative for abdominal pain, nausea and vomiting.   Genitourinary:         Urinary retention   Musculoskeletal:  Positive for back pain (lumbar).   Skin:  Negative for rash.   Neurological:  Negative for dizziness and headaches.   Psychiatric/Behavioral:  Negative for substance abuse.    All other systems reviewed and are negative.  All other systems reviewed and are negative  except mentioned in the HPI.    Past Medical History:   Past Medical History was reviewed with patient.   has a past medical history of Anemia, Anxiety, Atrophy of right kidney, Breath shortness, Cancer (HCC) (2016), Colon cancer (HCC), Former tobacco use, Gout, High cholesterol (12/12/2017), Hyperlipidemia, Hypertension (12/12/2017), Neuropathy (12/12/2017), Pain (12/12/2017), Pneumonia (1991), Psychiatric problem, Sleep apnea (12/12/2017), and Snoring.    Past Surgical History: Past Surgical History was reviewed with patient.   has a past surgical history that includes cath placement (Left, 10/7/2016); closed reduction (10/25/2012); colon resection (2016); and thoracoscopy (2/5/2018).    Medications: Medications have been reviewed with patient.  Prior to Admission Medications   Prescriptions Last Dose Informant Patient Reported? Taking?   Diclofenac Sodium 1 % Gel  Patient Yes No   Sig: Apply 1 Application topically 2 times a day as needed (Apply's on back for pain).   FLUoxetine (PROZAC) 20 MG Cap  Patient No No   Sig: TAKE 1 CAPSULE BY MOUTH EVERY DAY   acetaminophen (TYLENOL) 325 MG Tab  Patient Yes No   Sig: Take 650 mg by mouth every four hours as needed. Indications: Pain   clonazePAM (KLONOPIN) 0.5 MG Tab  Patient Yes No   Sig: Take 0.5 mg by mouth 2 times a day.   gabapentin (NEURONTIN) 800 MG tablet  Patient Yes No   Sig: Take  "800 mg by mouth 2 times a day.   losartan (COZAAR) 100 MG Tab  Patient No No   Sig: Take 1 Tablet by mouth every day.   methocarbamol (ROBAXIN) 750 MG Tab  Patient Yes No   Sig: Take 750 mg by mouth 2 times a day as needed. Indications: Musculoskeletal Pain   rosuvastatin (CRESTOR) 40 MG tablet  Patient Yes No   Sig: Take 40 mg by mouth at bedtime.   triamterene-hctz (MAXZIDE-25/DYAZIDE) 37.5-25 MG Tab  Patient No No   Sig: Take 1 Tablet by mouth every day.      Facility-Administered Medications: None        Allergies: Allergies have been reviewed with patient.  Allergies   Allergen Reactions    Duloxetine Unspecified     Unable to urinate     Ketamine Unspecified     \"I hallucinate\".      Lidocaine Rash     SCA=1237  Pt states \"Rash all over my body\".      Lidocaine-Tetracaine-Epineph Hives and Rash    Morphine Unspecified     \"Can not have too much, my oxygen goes down\"       Family History:   family history includes Cancer in his mother; Heart Disease in his brother, father, and paternal grandfather; Hyperlipidemia in his brother and paternal grandfather; Hypertension in his paternal grandfather; No Known Problems in his maternal grandmother, paternal grandmother, and son.     Social History:   Tobacco: Please see HPI  Alcohol: Please see HPI  Recreational drugs (illegal and prescription): Please see HPI  Employment: Please see HPI  Activity Level: Please see HPI  Living situation:  Please see HPI  Recent travel:  Please see HPI  Primary Care Provider: reviewed Luis Toth D.O.    Physical Exam:   Vitals:  Temp:  [36.8 °C (98.2 °F)-36.9 °C (98.4 °F)] 36.9 °C (98.4 °F)  Pulse:  [55-77] 58  Resp:  [18-20] 20  BP: ()/(41-97) 134/58  SpO2:  [90 %-98 %] 93 %    Physical Exam  Vitals and nursing note reviewed.   Constitutional:       Appearance: Normal appearance. He is obese.   HENT:      Head: Normocephalic and atraumatic.      Right Ear: External ear normal.      Left Ear: External ear normal.      Nose: " Nose normal.      Mouth/Throat:      Mouth: Mucous membranes are moist.      Pharynx: Oropharynx is clear.   Eyes:      Extraocular Movements: Extraocular movements intact.      Pupils: Pupils are equal, round, and reactive to light.   Cardiovascular:      Rate and Rhythm: Normal rate and regular rhythm.      Pulses: Normal pulses.      Heart sounds: Normal heart sounds.   Pulmonary:      Effort: Pulmonary effort is normal.      Breath sounds: Normal breath sounds.   Abdominal:      General: There is no distension.      Palpations: Abdomen is soft.      Tenderness: There is no abdominal tenderness. There is no guarding or rebound.   Musculoskeletal:         General: Normal range of motion.      Cervical back: Normal range of motion.   Skin:     General: Skin is warm.      Capillary Refill: Capillary refill takes less than 2 seconds.   Neurological:      General: No focal deficit present.      Mental Status: He is alert and oriented to person, place, and time.   Psychiatric:         Mood and Affect: Mood normal.         Behavior: Behavior normal.         Thought Content: Thought content normal.         Judgment: Judgment normal.       Labs:   Results for orders placed or performed during the hospital encounter of 11/26/22   CBC WITH DIFFERENTIAL   Result Value Ref Range    WBC 7.6 4.8 - 10.8 K/uL    RBC 3.48 (L) 4.70 - 6.10 M/uL    Hemoglobin 10.2 (L) 14.0 - 18.0 g/dL    Hematocrit 30.7 (L) 42.0 - 52.0 %    MCV 88.2 81.4 - 97.8 fL    MCH 29.3 27.0 - 33.0 pg    MCHC 33.2 (L) 33.7 - 35.3 g/dL    RDW 41.9 35.9 - 50.0 fL    Platelet Count 195 164 - 446 K/uL    MPV 9.3 9.0 - 12.9 fL    Neutrophils-Polys 75.00 (H) 44.00 - 72.00 %    Lymphocytes 6.30 (L) 22.00 - 41.00 %    Monocytes 12.10 0.00 - 13.40 %    Eosinophils 5.50 0.00 - 6.90 %    Basophils 0.80 0.00 - 1.80 %    Immature Granulocytes 0.30 0.00 - 0.90 %    Nucleated RBC 0.00 /100 WBC    Neutrophils (Absolute) 5.72 1.82 - 7.42 K/uL    Lymphs (Absolute) 0.48 (L) 1.00  - 4.80 K/uL    Monos (Absolute) 0.92 (H) 0.00 - 0.85 K/uL    Eos (Absolute) 0.42 0.00 - 0.51 K/uL    Baso (Absolute) 0.06 0.00 - 0.12 K/uL    Immature Granulocytes (abs) 0.02 0.00 - 0.11 K/uL    NRBC (Absolute) 0.00 K/uL   CMP   Result Value Ref Range    Sodium 132 (L) 135 - 145 mmol/L    Potassium 4.1 3.6 - 5.5 mmol/L    Chloride 94 (L) 96 - 112 mmol/L    Co2 21 20 - 33 mmol/L    Anion Gap 17.0 (H) 7.0 - 16.0    Glucose 124 (H) 65 - 99 mg/dL    Bun 48 (H) 8 - 22 mg/dL    Creatinine 4.58 (HH) 0.50 - 1.40 mg/dL    Calcium 9.4 8.4 - 10.2 mg/dL    AST(SGOT) 11 (L) 12 - 45 U/L    ALT(SGPT) 15 2 - 50 U/L    Alkaline Phosphatase 67 30 - 99 U/L    Total Bilirubin 0.7 0.1 - 1.5 mg/dL    Albumin 3.9 3.2 - 4.9 g/dL    Total Protein 6.3 6.0 - 8.2 g/dL    Globulin 2.4 1.9 - 3.5 g/dL    A-G Ratio 1.6 g/dL   LACTIC ACID   Result Value Ref Range    Lactic Acid 2.5 (H) 0.5 - 2.0 mmol/L   URINALYSIS    Specimen: Urine   Result Value Ref Range    Color Yellow     Character Clear     Specific Gravity 1.015 <1.035    Ph 5.5 5.0 - 8.0    Glucose Negative Negative mg/dL    Ketones Negative Negative mg/dL    Protein Negative Negative mg/dL    Bilirubin Negative Negative    Nitrite Negative Negative    Leukocyte Esterase Negative Negative    Occult Blood Negative Negative    Micro Urine Req see below    PROCALCITONIN   Result Value Ref Range    Procalcitonin 0.15 <0.25 ng/mL   ESTIMATED GFR   Result Value Ref Range    GFR (CKD-EPI) 13 (A) >60 mL/min/1.73 m 2   LACTIC ACID   Result Value Ref Range    Lactic Acid 0.6 0.5 - 2.0 mmol/L   URINALYSIS   Result Value Ref Range    Color Yellow     Character Clear     Specific Gravity 1.010 <1.035    Ph 6.0 5.0 - 8.0    Glucose Negative Negative mg/dL    Ketones Negative Negative mg/dL    Protein Negative Negative mg/dL    Bilirubin Negative Negative    Nitrite Negative Negative    Leukocyte Esterase Negative Negative    Occult Blood Negative Negative    Micro Urine Req see below    CREATINE KINASE    Result Value Ref Range    CPK Total 77 0 - 154 U/L        Imaging:   MR-LUMBAR SPINE-W/O    (Results Pending)        Previous Data Review: reviewed    Assessment and Plan:    Problem Representation:     * VANNA (acute kidney injury) (HCC)- (present on admission)  Assessment & Plan  *Likely postrenal from patient's BPH/prostate cancer/soft tissue density between prostate and rectal, but cannot rule out cauda equina syndrome    -MRI lumbar without ordered  -Maintain euvolemia and monitor fluid responsiveness (avoid NaCL and renal congestion)  -MAP >65  -Avoid nephrotoxins and renally dose medications  -Monitor renal function and urine output    Hyperlipidemia  Assessment & Plan    -Continue atorvastatin 40 mg nightly    Morbid obesity with BMI of 40.0-44.9, adult (HCC)- (present on admission)  Assessment & Plan    -Continue with diet and exercise outpatient    Obstructive sleep apnea- (present on admission)  Assessment & Plan    -CPAP ordered    Neuropathy associated with cancer (HCC)- (present on admission)  Assessment & Plan    -Change home dose to renal dose of 300 mg twice daily from 800 mg twice daily    Anxiety  Assessment & Plan    -Continue home fluoxetine 20 mg daily  -Continue home clonazepam 0.5 mg twice daily  -Follow up psychiatry outpatient    Hypertension  Assessment & Plan    -Continue losartan 100 mg daily       Core Measures:    Code Status: Full Code  Diet: NPO  IVF: None  Valenzuela Catheter: None  IV Lines: pIV  Antibiotics:None  GI Prophylaxis: PEG + Bisacodyl PRN  DVT Prophylaxis: SCDs   Disposition: Inpatient    Please note that this dictation was created using voice recognition software. I have made every reasonable attempt to correct obvious errors, but there may be errors of grammar and possibly content that I did not discover before finalizing the note. If the error changes the accuracy of the document, I would appreciate it being brought to my attention.

## 2022-11-27 NOTE — ASSESSMENT & PLAN NOTE
-Continue home fluoxetine 20 mg daily  -Continue home clonazepam 0.5 mg twice daily  -Follow up psychiatry outpatient

## 2022-11-27 NOTE — PROGRESS NOTES
Hospital Medicine Daily Progress Note    Date of Service  11/27/2022    Chief Complaint  Gerald Oshea is a 72 y.o. male admitted 11/26/2022 with PMHx of BPH, prostate cancer s/p radiation therapy with last therapy 1 month ago, colorectal cancer s/p right hemicolectomy in 2016 complicated by left lung metastasis s/p pneumonectomy in 2018, chronic sciatica, hypertension, sleep apnea on CPAP, anxiety on clonazepam 0.5 mg twice daily, who was a direct transfer from [facility] on 11/26/2022 for concerns of cauda equina syndrome due to inability to obtain MRI due to patient's body habitus.    Hospital Course  Patient presented to the hospital with VANNA.  Creatinine was increased to 4.58 and baseline is 1.3.  CT scan of the abdomen that did not show any evidence of hydronephrosis.  MRI of the L-spine found compression fractures but no nerve impingement.    Interval Problem Update  11/27: I have started IV fluids on him to help his kidneys recover.  I spoke with neurosurgery today who recommended no neurosurgical interventions other than a TSLO brace    I have discussed this patient's plan of care and discharge plan at IDT rounds today with Case Management, Nursing, Nursing leadership, and other members of the IDT team.    Consultants/Specialty  neurosurgery    Code Status  Full Code    Disposition  Patient is not medically cleared for discharge.   Anticipate discharge to to home with close outpatient follow-up.  I have placed the appropriate orders for post-discharge needs.    Review of Systems  Review of Systems   Constitutional:  Negative for chills, diaphoresis, fever and malaise/fatigue.   HENT:  Negative for congestion, ear discharge, ear pain, hearing loss, nosebleeds, sinus pain, sore throat and tinnitus.    Eyes:  Negative for blurred vision, double vision, photophobia and pain.   Respiratory:  Negative for cough, hemoptysis, sputum production, shortness of breath, wheezing and stridor.     Cardiovascular:  Negative for chest pain, palpitations, orthopnea, claudication, leg swelling and PND.   Gastrointestinal:  Negative for abdominal pain, blood in stool, constipation, diarrhea, heartburn, melena, nausea and vomiting.   Genitourinary:  Positive for flank pain. Negative for dysuria, frequency, hematuria and urgency.   Musculoskeletal:  Positive for back pain and falls. Negative for joint pain, myalgias and neck pain.   Skin:  Negative for itching and rash.   Neurological:  Negative for dizziness, tingling, tremors, weakness and headaches.   Endo/Heme/Allergies:  Negative for environmental allergies and polydipsia. Does not bruise/bleed easily.   Psychiatric/Behavioral:  Negative for depression, hallucinations, substance abuse and suicidal ideas.       Physical Exam  Temp:  [36.6 °C (97.9 °F)-37 °C (98.6 °F)] 37 °C (98.6 °F)  Pulse:  [51-69] 56  Resp:  [16-20] 16  BP: (125-186)/(49-97) 125/61  SpO2:  [91 %-98 %] 96 %    Physical Exam  Vitals and nursing note reviewed.   Constitutional:       General: He is not in acute distress.     Appearance: Normal appearance. He is obese. He is not ill-appearing, toxic-appearing or diaphoretic.   HENT:      Head: Normocephalic and atraumatic.      Nose: No congestion or rhinorrhea.      Mouth/Throat:      Pharynx: No posterior oropharyngeal erythema.   Eyes:      General: No scleral icterus.        Right eye: No discharge.   Cardiovascular:      Rate and Rhythm: Normal rate and regular rhythm.      Pulses: Normal pulses.      Heart sounds: Normal heart sounds. No murmur heard.    No friction rub. No gallop.   Pulmonary:      Effort: Pulmonary effort is normal. No respiratory distress.      Breath sounds: Normal breath sounds. No stridor. No wheezing, rhonchi or rales.   Abdominal:      General: There is no distension.      Tenderness: There is no abdominal tenderness.   Musculoskeletal:         General: No swelling, tenderness, deformity or signs of injury.       Cervical back: Normal range of motion.      Right lower leg: No edema.      Left lower leg: No edema.   Skin:     Capillary Refill: Capillary refill takes 2 to 3 seconds.      Coloration: Skin is not jaundiced or pale.      Findings: No bruising, erythema, lesion or rash.   Neurological:      General: No focal deficit present.      Mental Status: He is alert and oriented to person, place, and time.      Motor: No weakness.       Fluids    Intake/Output Summary (Last 24 hours) at 11/27/2022 1436  Last data filed at 11/27/2022 1435  Gross per 24 hour   Intake --   Output 2400 ml   Net -2400 ml       Laboratory  Recent Labs     11/26/22  1404   WBC 7.6   RBC 3.48*   HEMOGLOBIN 10.2*   HEMATOCRIT 30.7*   MCV 88.2   MCH 29.3   MCHC 33.2*   RDW 41.9   PLATELETCT 195   MPV 9.3     Recent Labs     11/26/22  1404   SODIUM 132*   POTASSIUM 4.1   CHLORIDE 94*   CO2 21   GLUCOSE 124*   BUN 48*   CREATININE 4.58*   CALCIUM 9.4                   Imaging  MR-LUMBAR SPINE-W/O   Final Result      1.  Acute/subacute compression fractures along the superior endplates of L1 and L2 vertebral bodies. These findings are new since the previous study. These fractures are amenable for vertebral augmentation.   2.  Degenerative disease as described above.      Evidence based practice: Radiology Journal publication-2020      In a meta-analysis of more than 2 million patients, those with osteoporotic vertebral compression fractures who underwent vertebral augmentation were 22% less likely to die at up to 10 years after treatment than those who received nonsurgical treatment.      https://pubs.rsna.org/doi/full/10.1148/radiol.8200315304#:~:text=In%20this%20meta%2Danalysis%2C%20vertebral,at%20up%20to%2010%20years.              Assessment/Plan  * VANNA (acute kidney injury) (HCC)- (present on admission)  Assessment & Plan  *Likely postrenal from patient's BPH/prostate cancer/soft tissue density between prostate and rectal    -MRI lumbar spine  compression fracture but no evidence of nerve impingement    IV fluid hydration   Monitor BMP and assess response  Avoid IV contrast/nephrotoxins/NSAIDs  Dose adjust meds for decreased GFR    Monitor urine output closely    Compression fracture of L1 lumbar vertebra, sequela  Assessment & Plan  Pain controlled   brace  Neurosurgery recommended no interventions    Hyperlipidemia  Assessment & Plan    -Continue atorvastatin 40 mg nightly    Morbid obesity with BMI of 40.0-44.9, adult (HCC)- (present on admission)  Assessment & Plan    -Continue with diet and exercise outpatient    Obstructive sleep apnea- (present on admission)  Assessment & Plan    -CPAP ordered    Neuropathy associated with cancer (HCC)- (present on admission)  Assessment & Plan    -Change home dose to renal dose of 300 mg twice daily from 800 mg twice daily    Anxiety  Assessment & Plan    -Continue home fluoxetine 20 mg daily  -Continue home clonazepam 0.5 mg twice daily  -Follow up psychiatry outpatient    Hypertension  Assessment & Plan    Hold losartan  Hold maxzide            VTE prophylaxis: SCDs/TEDs    I have performed a physical exam and reviewed and updated ROS and Plan today (11/27/2022). In review of yesterday's note (11/26/2022), there are no changes except as documented above.

## 2022-11-28 VITALS
DIASTOLIC BLOOD PRESSURE: 63 MMHG | OXYGEN SATURATION: 91 % | WEIGHT: 262.35 LBS | TEMPERATURE: 98 F | RESPIRATION RATE: 17 BRPM | SYSTOLIC BLOOD PRESSURE: 125 MMHG | BODY MASS INDEX: 38.74 KG/M2 | HEART RATE: 57 BPM

## 2022-11-28 LAB
ALBUMIN SERPL BCP-MCNC: 3.3 G/DL (ref 3.2–4.9)
ALBUMIN/GLOB SERPL: 1.6 G/DL
ALP SERPL-CCNC: 60 U/L (ref 30–99)
ALT SERPL-CCNC: 16 U/L (ref 2–50)
ANION GAP SERPL CALC-SCNC: 8 MMOL/L (ref 7–16)
AST SERPL-CCNC: 12 U/L (ref 12–45)
BASOPHILS # BLD AUTO: 0.6 % (ref 0–1.8)
BASOPHILS # BLD: 0.04 K/UL (ref 0–0.12)
BILIRUB SERPL-MCNC: 0.5 MG/DL (ref 0.1–1.5)
BUN SERPL-MCNC: 34 MG/DL (ref 8–22)
CALCIUM SERPL-MCNC: 9.3 MG/DL (ref 8.5–10.5)
CHLORIDE SERPL-SCNC: 103 MMOL/L (ref 96–112)
CO2 SERPL-SCNC: 24 MMOL/L (ref 20–33)
CREAT SERPL-MCNC: 1.85 MG/DL (ref 0.5–1.4)
EOSINOPHIL # BLD AUTO: 0.35 K/UL (ref 0–0.51)
EOSINOPHIL NFR BLD: 5.4 % (ref 0–6.9)
ERYTHROCYTE [DISTWIDTH] IN BLOOD BY AUTOMATED COUNT: 41.2 FL (ref 35.9–50)
EST. AVERAGE GLUCOSE BLD GHB EST-MCNC: 123 MG/DL
GFR SERPLBLD CREATININE-BSD FMLA CKD-EPI: 38 ML/MIN/1.73 M 2
GLOBULIN SER CALC-MCNC: 2.1 G/DL (ref 1.9–3.5)
GLUCOSE SERPL-MCNC: 97 MG/DL (ref 65–99)
HBA1C MFR BLD: 5.9 % (ref 4–5.6)
HCT VFR BLD AUTO: 28.4 % (ref 42–52)
HGB BLD-MCNC: 9.7 G/DL (ref 14–18)
IMM GRANULOCYTES # BLD AUTO: 0.02 K/UL (ref 0–0.11)
IMM GRANULOCYTES NFR BLD AUTO: 0.3 % (ref 0–0.9)
LYMPHOCYTES # BLD AUTO: 0.67 K/UL (ref 1–4.8)
LYMPHOCYTES NFR BLD: 10.4 % (ref 22–41)
MCH RBC QN AUTO: 29.9 PG (ref 27–33)
MCHC RBC AUTO-ENTMCNC: 34.2 G/DL (ref 33.7–35.3)
MCV RBC AUTO: 87.7 FL (ref 81.4–97.8)
MONOCYTES # BLD AUTO: 0.87 K/UL (ref 0–0.85)
MONOCYTES NFR BLD AUTO: 13.5 % (ref 0–13.4)
NEUTROPHILS # BLD AUTO: 4.51 K/UL (ref 1.82–7.42)
NEUTROPHILS NFR BLD: 69.8 % (ref 44–72)
NRBC # BLD AUTO: 0 K/UL
NRBC BLD-RTO: 0 /100 WBC
PLATELET # BLD AUTO: 180 K/UL (ref 164–446)
PMV BLD AUTO: 9.8 FL (ref 9–12.9)
POTASSIUM SERPL-SCNC: 4.1 MMOL/L (ref 3.6–5.5)
PROT SERPL-MCNC: 5.4 G/DL (ref 6–8.2)
RBC # BLD AUTO: 3.24 M/UL (ref 4.7–6.1)
SODIUM SERPL-SCNC: 135 MMOL/L (ref 135–145)
WBC # BLD AUTO: 6.5 K/UL (ref 4.8–10.8)

## 2022-11-28 PROCEDURE — 97166 OT EVAL MOD COMPLEX 45 MIN: CPT

## 2022-11-28 PROCEDURE — 85025 COMPLETE CBC W/AUTO DIFF WBC: CPT

## 2022-11-28 PROCEDURE — 97162 PT EVAL MOD COMPLEX 30 MIN: CPT

## 2022-11-28 PROCEDURE — 700111 HCHG RX REV CODE 636 W/ 250 OVERRIDE (IP): Performed by: HOSPITALIST

## 2022-11-28 PROCEDURE — 51798 US URINE CAPACITY MEASURE: CPT

## 2022-11-28 PROCEDURE — A9270 NON-COVERED ITEM OR SERVICE: HCPCS | Performed by: HOSPITALIST

## 2022-11-28 PROCEDURE — 83036 HEMOGLOBIN GLYCOSYLATED A1C: CPT

## 2022-11-28 PROCEDURE — 99239 HOSP IP/OBS DSCHRG MGMT >30: CPT | Performed by: HOSPITALIST

## 2022-11-28 PROCEDURE — 700105 HCHG RX REV CODE 258: Performed by: HOSPITALIST

## 2022-11-28 PROCEDURE — 700102 HCHG RX REV CODE 250 W/ 637 OVERRIDE(OP): Performed by: STUDENT IN AN ORGANIZED HEALTH CARE EDUCATION/TRAINING PROGRAM

## 2022-11-28 PROCEDURE — 700102 HCHG RX REV CODE 250 W/ 637 OVERRIDE(OP): Performed by: HOSPITALIST

## 2022-11-28 PROCEDURE — 80053 COMPREHEN METABOLIC PANEL: CPT

## 2022-11-28 PROCEDURE — A9270 NON-COVERED ITEM OR SERVICE: HCPCS | Performed by: STUDENT IN AN ORGANIZED HEALTH CARE EDUCATION/TRAINING PROGRAM

## 2022-11-28 RX ORDER — TAMSULOSIN HYDROCHLORIDE 0.4 MG/1
0.4 CAPSULE ORAL
Qty: 90 CAPSULE | Refills: 3 | Status: SHIPPED | OUTPATIENT
Start: 2022-11-29

## 2022-11-28 RX ADMIN — HEPARIN SODIUM 5000 UNITS: 5000 INJECTION, SOLUTION INTRAVENOUS; SUBCUTANEOUS at 06:06

## 2022-11-28 RX ADMIN — CLONAZEPAM 0.5 MG: 0.5 TABLET ORAL at 06:07

## 2022-11-28 RX ADMIN — SODIUM CHLORIDE, POTASSIUM CHLORIDE, SODIUM LACTATE AND CALCIUM CHLORIDE: 600; 310; 30; 20 INJECTION, SOLUTION INTRAVENOUS at 00:53

## 2022-11-28 RX ADMIN — TAMSULOSIN HYDROCHLORIDE 0.4 MG: 0.4 CAPSULE ORAL at 07:25

## 2022-11-28 RX ADMIN — GABAPENTIN 300 MG: 300 CAPSULE ORAL at 06:07

## 2022-11-28 RX ADMIN — FLUOXETINE 20 MG: 20 CAPSULE ORAL at 06:07

## 2022-11-28 ASSESSMENT — COGNITIVE AND FUNCTIONAL STATUS - GENERAL
MOBILITY SCORE: 18
SUGGESTED CMS G CODE MODIFIER MOBILITY: CJ
HELP NEEDED FOR BATHING: A LITTLE
SUGGESTED CMS G CODE MODIFIER DAILY ACTIVITY: CH
DRESSING REGULAR LOWER BODY CLOTHING: A LITTLE
TOILETING: A LITTLE
DAILY ACTIVITIY SCORE: 24
STANDING UP FROM CHAIR USING ARMS: A LITTLE
SUGGESTED CMS G CODE MODIFIER MOBILITY: CK
TURNING FROM BACK TO SIDE WHILE IN FLAT BAD: A LITTLE
DAILY ACTIVITIY SCORE: 20
TURNING FROM BACK TO SIDE WHILE IN FLAT BAD: A LITTLE
SUGGESTED CMS G CODE MODIFIER DAILY ACTIVITY: CJ
MOVING FROM LYING ON BACK TO SITTING ON SIDE OF FLAT BED: A LITTLE
MOVING FROM LYING ON BACK TO SITTING ON SIDE OF FLAT BED: A LITTLE
MOBILITY SCORE: 20
CLIMB 3 TO 5 STEPS WITH RAILING: A LITTLE
CLIMB 3 TO 5 STEPS WITH RAILING: A LITTLE
MOVING TO AND FROM BED TO CHAIR: A LITTLE
DRESSING REGULAR UPPER BODY CLOTHING: A LITTLE
WALKING IN HOSPITAL ROOM: A LITTLE
MOVING TO AND FROM BED TO CHAIR: A LITTLE

## 2022-11-28 ASSESSMENT — GAIT ASSESSMENTS
DISTANCE (FEET): 40
DEVIATION: DECREASED HEEL STRIKE;DECREASED TOE OFF
GAIT LEVEL OF ASSIST: SUPERVISED

## 2022-11-28 ASSESSMENT — ACTIVITIES OF DAILY LIVING (ADL): TOILETING: INDEPENDENT

## 2022-11-28 NOTE — PROGRESS NOTES
Monitor Summary:   SB 47-58 in and out of junctional   (R) PVC, (O)PAC   First degree block  .22/.09/.42

## 2022-11-28 NOTE — CARE PLAN
Problem: Knowledge Deficit - Standard  Goal: Patient and family/care givers will demonstrate understanding of plan of care, disease process/condition, diagnostic tests and medications  Outcome: Progressing     Problem: Skin Integrity  Goal: Skin integrity is maintained or improved  Outcome: Progressing     Problem: Fall Risk  Goal: Patient will remain free from falls  Outcome: Progressing   The patient is Stable - Low risk of patient condition declining or worsening    Shift Goals  Clinical Goals: monitor VS  Patient Goals: rest  Family Goals: NA not present    Progress made toward(s) clinical / shift goals:  Pt educated on POC, all questions answered in regards to disease process, treatment and diet. Pt verbalized understanding and voiced no further questions at this time.

## 2022-11-28 NOTE — THERAPY
"Occupational Therapy   Initial Evaluation     Patient Name: Gerald Oshea  Age:  72 y.o., Sex:  male  Medical Record #: [id number]  Today's Date: 11/28/2022     Precautions: TLSO (Thoracolumbosacral orthosis), Spinal / Back Precautions     Assessment    Patient is 72 y.o. male admitted from [facility] for MRI, found L spine compression fxs but no nerve impingement. Reviewed lumbar spine precautions to minimize discomfort during ADLs, brace wearing instructions and provided education on compensatory strategies for ADLs.  Spine information packet provided for reference of education. Pt very appreciative of brace review/practice and additional education.       Plan    Recommend Occupational Therapy for Evaluation only  DC Equipment Recommendations: None  Discharge Recommendations: Anticipate that the patient will have no further occupational therapy needs after discharge from the hospital     Subjective    \"This is helpful.\" (adjusting his TLSO in the mirror)     Objective     11/28/22 0855   Prior Living Situation   Prior Services Home-Independent   Housing / Facility 1 Story House   Steps Into Home 2   Steps In Home 0   Bathroom Set up Walk In Shower;Shower Chair   Equipment Owned None   Lives with - Patient's Self Care Capacity Alone and Able to Care For Self   Comments reports good social support   Prior Level of ADL Function   Self Feeding Independent   Grooming / Hygiene Independent   Bathing Independent   Dressing Independent   Toileting Independent   Prior Level of IADL Function   Medication Management Independent   Laundry Independent   Kitchen Mobility Independent   Finances Independent   Home Management Independent   Shopping Independent   Prior Level Of Mobility Independent Without Device in Community   Driving / Transportation Driving Independent   Precautions   Precautions TLSO (Thoracolumbosacral orthosis);Spinal / Back Precautions    Cognition    Level of Consciousness Alert   Comments pleasant "   Strength Upper Body   Upper Body Strength  WDL   Sensation Upper Body   Upper Extremity Sensation  WDL   Coordination Upper Body   Coordination WDL   Balance Assessment   Sitting Balance (Static) Good   Sitting Balance (Dynamic) Fair +   Standing Balance (Static) Fair +   Standing Balance (Dynamic) Fair   Weight Shift Sitting Good   Weight Shift Standing Fair   Comments no AD   Bed Mobility    Supine to Sit Supervised   Sit to Supine Supervised   Scooting Supervised   ADL Assessment   Eating Modified Independent   Grooming Supervision;Standing   Upper Body Dressing Supervision   Lower Body Dressing Supervision   Toileting Supervision   How much help from another person does the patient currently need...   Putting on and taking off regular lower body clothing? 4   Bathing (including washing, rinsing, and drying)? 4   Toileting, which includes using a toilet, bedpan, or urinal? 4   Putting on and taking off regular upper body clothing? 4   Taking care of personal grooming such as brushing teeth? 4   Eating meals? 4   6 Clicks Daily Activity Score 24   Functional Mobility   Sit to Stand Supervised   Bed, Chair, Wheelchair Transfer Supervised   Toilet Transfers Supervised   Mobility no AD   Activity Tolerance   Comments functional for ADLs   Education Group   Education Provided Brace Wear and Care;Back Safety;Home Safety;Transfers;Activities of Daily Living;Role of Occupational Therapist   Role of Occupational Therapist Patient Response Patient;Acceptance;Explanation;Verbal Demonstration   Back Safety Patient Response Patient;Acceptance;Explanation;Verbal Demonstration   Splints Wear & Care Patient Response Patient;Acceptance;Explanation;Verbal Demonstration   Brace Wear & Care Patient Response Patient;Acceptance;Explanation;Verbal Demonstration   Home Safety Patient Response Patient;Acceptance;Explanation;Verbal Demonstration   Transfers Patient Response Patient;Acceptance;Explanation;Verbal Demonstration   ADL  Patient Response Patient;Acceptance;Explanation;Verbal Demonstration   Problem List   Problem List None   Anticipated Discharge Equipment and Recommendations   DC Equipment Recommendations None   Discharge Recommendations Anticipate that the patient will have no further occupational therapy needs after discharge from the hospital

## 2022-11-28 NOTE — THERAPY
Physical Therapy   Initial Evaluation     Patient Name: Gerald Oshea  Age:  72 y.o., Sex:  male  Medical Record #: [id number]  Today's Date: 11/28/2022     Precautions  Precautions: Spinal / Back Precautions ;TLSO (Thoracolumbosacral orthosis)  Comments: TLSO donned EOB    Assessment  Patient is 72 y.o. male admitted with concern for cauda equina syndrome due to urinary retention and recent lumbar compression fracture. PMH includes BPH, prostate cancer, colorectal cancer, chronic sciatica, HTN, sleep apnea, anxiety. Pt provided with spine packet and education on TLSO. Pt appears to be mobilizing at his prior level of function and has no concerns for dc home.     Plan    Recommend Physical Therapy for Evaluation only     DC Equipment Recommendations: None  Discharge Recommendations: Recommend outpatient physical therapy services to address higher level deficits        11/28/22 0852   Vitals   O2 (LPM) 3   O2 Delivery Device Silicone Nasal Cannula   Prior Living Situation   Prior Services Home-Independent   Housing / Facility 1 Story House   Steps Into Home 2   Steps In Home 0   Equipment Owned None   Lives with - Patient's Self Care Capacity Alone and Able to Care For Self   Comments pt reports having neighbors that can stop by to assist and a brother local   Prior Level of Functional Mobility   Bed Mobility Independent   Transfer Status Independent   Ambulation Independent   Distance Ambulation (Feet)   (community)   Assistive Devices Used None   Stairs Independent   Comments independent prior   Cognition    Level of Consciousness Alert   Comments receptive   Active ROM Lower Body    Active ROM Lower Body  WDL   Strength Lower Body   Lower Body Strength  WDL   Sensation Lower Body   Lower Extremity Sensation   X   Comments chronic neuropathy   Balance Assessment   Sitting Balance (Static) Good   Sitting Balance (Dynamic) Fair +   Standing Balance (Static) Fair +   Standing Balance (Dynamic) Fair   Weight  Shift Sitting Good   Weight Shift Standing Fair   Comments no AD   Gait Analysis   Gait Level Of Assist Supervised   Assistive Device None   Distance (Feet) 40   # of Times Distance was Traveled 1   Deviation Decreased Heel Strike;Decreased Toe Off   Weight Bearing Status no restrictions   Comments no LOB noted, no AD used   Bed Mobility    Supine to Sit Supervised   Scooting Supervised   Rolling Supervised   Functional Mobility   Sit to Stand Supervised   Bed, Chair, Wheelchair Transfer Supervised   Toilet Transfers Supervised   Transfer Method Stand Step   Mobility in room no AD   Education Group   Education Provided Role of Physical Therapist;Spine Precautions;Brace Wear and Care   Spine Precautions Patient Response Patient;Acceptance;Explanation;Handout;Verbal Demonstration   Role of Physical Therapist Patient Response Patient;Acceptance;Demonstration;Action Demonstration   Brace Wear & Care Patient Response Patient;Acceptance;Explanation;Handout;Action Demonstration   Anticipated Discharge Equipment and Recommendations   DC Equipment Recommendations None   Discharge Recommendations Recommend outpatient physical therapy services to address higher level deficits

## 2022-11-28 NOTE — CARE PLAN
The patient is Stable - Low risk of patient condition declining or worsening    Shift Goals  Clinical Goals: monitor VS  Patient Goals: rest  Family Goals: NA not present    Problem: Knowledge Deficit - Standard  Goal: Patient and family/care givers will demonstrate understanding of plan of care, disease process/condition, diagnostic tests and medications  Outcome: Progressing     Problem: Skin Integrity  Goal: Skin integrity is maintained or improved  Outcome: Progressing

## 2022-11-28 NOTE — PROGRESS NOTES
Report received from day shift RN, assumed care of pt. Pt A&Ox4. Plan of care discussed with pt, labs and chart reviewed. All needs met at this time. Tele box on. On 3L O2 via nasal canula. Call light within reach, bed locked and in lowest position. All fall precautions and hourly rounding in place.

## 2022-11-28 NOTE — DISCHARGE SUMMARY
Discharge Summary    CHIEF COMPLAINT ON ADMISSION  No chief complaint on file.      Reason for Admission  Acute kidney injury/urinary retent*     Admission Date  11/26/2022    CODE STATUS  Prior    HPI & HOSPITAL COURSE  This is a 72 y.o. male here with PMHx of BPH, prostate cancer s/p radiation therapy with last therapy 1 month ago, colorectal cancer s/p right hemicolectomy in 2016 complicated by left lung metastasis s/p pneumonectomy in 2018, chronic sciatica, hypertension, sleep apnea on CPAP, anxiety on clonazepam 0.5 mg twice daily, who was a direct transfer from [facility] on 11/26/2022 for concerns of cauda equina syndrome due to inability to obtain MRI due to patient's body habitus. Patient presented to the hospital with VANNA.  Creatinine was increased to 4.58 and baseline is 1.3.  CT scan of the abdomen that did not show any evidence of hydronephrosis.  MRI of the L-spine found compression fractures but no nerve impingement.  I did speak with neurosurgery Dr Singh who recommended no surgical neurosurgical interventions since there is no evidence of nerve impingement.  Neurosurgery recommended to continue  brace and follow-up as an outpatient.  Patient was given IV hydration which significantly improved his kidney function.  A Valenzuela catheter was placed and then removed.  Patient did not void properly after the Valenzuela catheter was removed.  He had 20 mL postvoid residual.  I asked him to discontinue his hydrochlorothiazide as an outpatient.  I have asked the patient to continue his home Flomax.  I asked him to follow-up with his urologist as an outpatient.      Therefore, he is discharged in good and stable condition to home with close outpatient follow-up.    The patient met 2-midnight criteria for an inpatient stay at the time of discharge.    Discharge Date  11/28/2022    FOLLOW UP ITEMS POST DISCHARGE  Follow up with urology     DISCHARGE DIAGNOSES  Principal Problem:    VANNA (acute kidney injury)  (HCC) POA: Yes  Active Problems:    Hypertension POA: Unknown      Overview: Controlled on triamterene-HCTZ, losartan    Anxiety POA: Unknown    Neuropathy associated with cancer (HCC) POA: Yes      Overview: Well controlled. Originally secondary to lung       He oftentimes forgot the night time dose.      On gabapentin 800 mg TID     Obstructive sleep apnea POA: Yes      Overview: BiPAP      Follow with pulmonary medicine           Morbid obesity with BMI of 40.0-44.9, adult (HCC) POA: Yes      Overview: Body mass index is 45.25 kg/m².      Healthful lifestyle measures          Hyperlipidemia POA: Unknown    Compression fracture of L1 lumbar vertebra, sequela POA: Unknown  Resolved Problems:    * No resolved hospital problems. *      FOLLOW UP  Future Appointments   Date Time Provider Department Center   1/4/2023 11:20 AM SUPRIYA Robb   1/17/2023 11:00 AM SUPRIYA Robb D.O.  [address]  [facility] [address]  [facility] [address]  [phone]    Follow up        MEDICATIONS ON DISCHARGE     Medication List        START taking these medications        Instructions   tamsulosin 0.4 MG capsule  Commonly known as: FLOMAX   Take 1 Capsule by mouth 1/2 hour after breakfast.  Dose: 0.4 mg            CONTINUE taking these medications        Instructions   acetaminophen 325 MG Tabs  Commonly known as: Tylenol   Take 650 mg by mouth every four hours as needed for Mild Pain.  Dose: 650 mg     clonazePAM 0.5 MG Tabs  Commonly known as: KLONOPIN   Take 0.5 mg by mouth 2 times a day.  Dose: 0.5 mg     Diclofenac Sodium 1 % Gel   Apply 1 Application topically 2 times a day as needed (Back Pain).  Dose: 1 Application     FLUoxetine 20 MG Caps  Commonly known as: PROZAC   Doctor's comments: DX Code Needed  NEED REFILL PLEASE.  TAKE 1 CAPSULE BY MOUTH EVERY DAY  Dose: 20 mg     gabapentin 800 MG tablet  Commonly known as: NEURONTIN   Take 800 mg by mouth 2 times a day.  Dose:  "800 mg     losartan 100 MG Tabs  Commonly known as: COZAAR   Take 1 Tablet by mouth every day.  Dose: 100 mg     methocarbamol 750 MG Tabs  Commonly known as: ROBAXIN   Take 750 mg by mouth 2 times a day as needed. Indications: Musculoskeletal Pain  Dose: 750 mg     rosuvastatin 40 MG tablet  Commonly known as: CRESTOR   Take 40 mg by mouth at bedtime.  Dose: 40 mg            STOP taking these medications      triamterene-hctz 37.5-25 MG Tabs  Commonly known as: MAXZIDE-25/DYAZIDE              Allergies  Allergies   Allergen Reactions    Duloxetine Unspecified     Unable to urinate     Ketamine Unspecified     \"I hallucinate\".      Lidocaine Rash     QPE=[id number]  Pt states \"Rash all over my body\".      Lidocaine-Tetracaine-Epineph Hives and Rash    Morphine Unspecified     \"Can not have too much, my oxygen goes down\"       DIET  No orders of the defined types were placed in this encounter.      ACTIVITY  As tolerated.  Weight bearing as tolerated    CONSULTATIONS  None    PROCEDURES  none    LABORATORY  Lab Results   Component Value Date    SODIUM 135 11/28/2022    POTASSIUM 4.1 11/28/2022    CHLORIDE 103 11/28/2022    CO2 24 11/28/2022    GLUCOSE 97 11/28/2022    BUN 34 (H) 11/28/2022    CREATININE 1.85 (H) 11/28/2022        Lab Results   Component Value Date    WBC 6.5 11/28/2022    HEMOGLOBIN 9.7 (L) 11/28/2022    HEMATOCRIT 28.4 (L) 11/28/2022    PLATELETCT 180 11/28/2022        Total time of the discharge process exceeds 50 minutes.  "

## 2022-11-29 ENCOUNTER — PATIENT OUTREACH (OUTPATIENT)
Dept: MEDICAL GROUP | Facility: MEDICAL CENTER | Age: 72
End: 2022-11-29
Payer: MEDICARE

## 2022-11-29 NOTE — PROGRESS NOTES
"Subjective:     Gerald Oshea is a 72 y.o. male who presents for Hospital Follow-up.    POST DISCHARGE CALL:  Discharge Date:11/28/2022   Date of Outreach Call: 11/29/2022 10:13 AM  Now that you're home, how are you doing? Good  Did you receive any new prescriptions? Yes  Were you able to fill those medications? Yes  How did you fill those prescriptions? Pharmacy  If not able to fill prescriptions, why? NA  Do you have questions about your medications? No  Do you have a follow-up appointment scheduled?Yes  Any issues or paperwork you wish to discuss with your PCP? would like to discuss the discontinuation of triameterene/hctz 37.5/25, he is now on tamsulosin 0.4  Does patient qualify for CCM Program? Yes  If patient qualifies, was CCM Program Introduced? Yes  If patient does not qualify, comment? *  Number of Attempts: 2  Current or previous attempts completed within two business days of discharge? Yes  Provided education regarding treatment plan, medication, self-management, ADLs? Yes  Has patient completed Advance Directive? If yes, advise them to bring to appointment. No  Care Manager phone number provided? Yes  Is there anything else I can help you with? No  Chief Complaint? urinary retention, dizziness  Admitting Dx? Acute kidney injury/urinary retention  Discharge Diagnosis? VANNA    HPI:   Discharge summary listed below;    \"72 y.o. male here with PMHx of BPH, prostate cancer s/p radiation therapy with last therapy 1 month ago, colorectal cancer s/p right hemicolectomy in 2016 complicated by left lung metastasis s/p pneumonectomy in 2018, chronic sciatica, hypertension, sleep apnea on CPAP, anxiety on clonazepam 0.5 mg twice daily, who was a direct transfer from [facility] on 11/26/2022 for concerns of cauda equina syndrome due to inability to obtain MRI due to patient's body habitus. Patient presented to the hospital with VANNA.  Creatinine was increased to 4.58 and baseline is 1.3.  CT scan of the " "abdomen that did not show any evidence of hydronephrosis.  MRI of the L-spine found compression fractures but no nerve impingement.  I did speak with neurosurgery Dr Singh who recommended no surgical neurosurgical interventions since there is no evidence of nerve impingement.  Neurosurgery recommended to continue  brace and follow-up as an outpatient.  Patient was given IV hydration which significantly improved his kidney function.  A Valenzuela catheter was placed and then removed.  Patient did not void properly after the Valenzuela catheter was removed.  He had 20 mL postvoid residual.  I asked him to discontinue his hydrochlorothiazide as an outpatient.  I have asked the patient to continue his home Flomax.  I asked him to follow-up with his urologist as an outpatient\"    Current medicines (including reconciliation performed today)  Current Outpatient Medications   Medication Sig Dispense Refill    tamsulosin (FLOMAX) 0.4 MG capsule Take 1 Capsule by mouth 1/2 hour after breakfast. 90 Capsule 3    rosuvastatin (CRESTOR) 40 MG tablet Take 40 mg by mouth at bedtime.      gabapentin (NEURONTIN) 800 MG tablet Take 800 mg by mouth 2 times a day.      clonazePAM (KLONOPIN) 0.5 MG Tab Take 0.5 mg by mouth 2 times a day.      acetaminophen (TYLENOL) 325 MG Tab Take 650 mg by mouth every four hours as needed for Mild Pain.      FLUoxetine (PROZAC) 20 MG Cap TAKE 1 CAPSULE BY MOUTH EVERY DAY 90 Capsule 0    losartan (COZAAR) 100 MG Tab Take 1 Tablet by mouth every day. 90 Tablet 3    methocarbamol (ROBAXIN) 750 MG Tab Take 750 mg by mouth 2 times a day as needed. Indications: Musculoskeletal Pain      Diclofenac Sodium 1 % Gel Apply 1 Application topically 2 times a day as needed (Back Pain).       No current facility-administered medications for this visit.       Allergies:   Duloxetine, Ketamine, Lidocaine, Lidocaine-tetracaine-epineph, and Morphine    Social History     Tobacco Use    Smoking status: Former     Packs/day: 1.00 " "    Years: 20.00     Pack years: 20.00     Types: Cigarettes     Quit date: 1998     Years since quittin.9    Smokeless tobacco: Never   Vaping Use    Vaping Use: Never used   Substance Use Topics    Alcohol use: Yes     Alcohol/week: 1.8 oz     Types: 3 Glasses of wine per week     Comment: 2-3 per day    Drug use: No       ROS:  Review of Systems   Constitutional:  Negative for chills and fever.   Respiratory:  Negative for shortness of breath.    Cardiovascular:  Negative for chest pain.   Musculoskeletal:  Positive for back pain.       Objective:     Vitals:    22 1355   BP: 126/60   BP Location: Left arm   Patient Position: Sitting   BP Cuff Size: Large adult   Pulse: 62   Temp: 36.3 °C (97.3 °F)   TempSrc: Temporal   SpO2: 96%   Weight: 117 kg (259 lb)   Height: 1.753 m (5' 9\")     Body mass index is 38.25 kg/m².    Physical Exam:  Physical Exam  Constitutional:       Appearance: Normal appearance.   Pulmonary:      Effort: No respiratory distress.      Breath sounds: No wheezing or rales.      Comments: NC noted  Neurological:      General: No focal deficit present.      Mental Status: He is alert and oriented to person, place, and time.      Cranial Nerves: No cranial nerve deficit.      Motor: No weakness.      Coordination: Coordination normal.      Gait: Gait normal.         Assessment and Plan:   1. VANNA (acute kidney injury) (HCC)  - Basic Metabolic Panel; Future    2. Hospital discharge follow-up  - Referral to Neurosurgery    3. Compression fracture of L1 lumbar vertebra, sequela  - Referral to Neurosurgery    - Chart and discharge summary were reviewed.   - Hospitalization and results reviewed with patient.   - Medications reviewed including instructions regarding high risk medications, dosing and side effects.  - Recommended Services: No services needed at this time  - Advance directive/POLST on file?  No         Face-to-face transitional care management services with HIGH (today's " visit is within days post discharge & LACE+ score 59+) medical decision complexity were provided.     LACE+ Historical Score Over Time (0-28: Low, 29-58: Medium, 59+: High): 67

## 2022-12-01 ENCOUNTER — OFFICE VISIT (OUTPATIENT)
Dept: MEDICAL GROUP | Facility: MEDICAL CENTER | Age: 72
End: 2022-12-01
Payer: MEDICARE

## 2022-12-01 VITALS
WEIGHT: 259 LBS | OXYGEN SATURATION: 96 % | SYSTOLIC BLOOD PRESSURE: 126 MMHG | HEART RATE: 62 BPM | BODY MASS INDEX: 38.36 KG/M2 | DIASTOLIC BLOOD PRESSURE: 60 MMHG | TEMPERATURE: 97.3 F | HEIGHT: 69 IN

## 2022-12-01 DIAGNOSIS — N17.9 AKI (ACUTE KIDNEY INJURY) (HCC): ICD-10-CM

## 2022-12-01 DIAGNOSIS — S32.010S COMPRESSION FRACTURE OF L1 LUMBAR VERTEBRA, SEQUELA: ICD-10-CM

## 2022-12-01 DIAGNOSIS — Z09 HOSPITAL DISCHARGE FOLLOW-UP: ICD-10-CM

## 2022-12-01 PROCEDURE — 99214 OFFICE O/P EST MOD 30 MIN: CPT | Performed by: STUDENT IN AN ORGANIZED HEALTH CARE EDUCATION/TRAINING PROGRAM

## 2022-12-01 RX ORDER — CLONAZEPAM 1 MG/1
TABLET ORAL
COMMUNITY
End: 2022-12-01

## 2022-12-01 ASSESSMENT — ENCOUNTER SYMPTOMS
FEVER: 0
SHORTNESS OF BREATH: 0
CHILLS: 0
BACK PAIN: 1

## 2022-12-01 ASSESSMENT — FIBROSIS 4 INDEX: FIB4 SCORE: 1.183333333333333333

## 2022-12-29 ENCOUNTER — TELEPHONE (OUTPATIENT)
Dept: MEDICAL GROUP | Facility: MEDICAL CENTER | Age: 72
End: 2022-12-29
Payer: MEDICARE

## 2022-12-29 NOTE — TELEPHONE ENCOUNTER
Gerald Oshea called states he needs an order for a walker to be sent to [facility] since his fall he is very unbalanced and doesn't want to fall again

## 2022-12-30 DIAGNOSIS — Z91.81 RISK FOR FALLS: ICD-10-CM

## 2023-01-04 ENCOUNTER — OFFICE VISIT (OUTPATIENT)
Dept: MEDICAL GROUP | Facility: MEDICAL CENTER | Age: 73
End: 2023-01-04
Payer: MEDICARE

## 2023-01-04 VITALS
HEIGHT: 69 IN | HEART RATE: 68 BPM | SYSTOLIC BLOOD PRESSURE: 120 MMHG | OXYGEN SATURATION: 97 % | BODY MASS INDEX: 37.03 KG/M2 | TEMPERATURE: 97 F | WEIGHT: 250 LBS | DIASTOLIC BLOOD PRESSURE: 60 MMHG

## 2023-01-04 DIAGNOSIS — N18.32 STAGE 3B CHRONIC KIDNEY DISEASE: ICD-10-CM

## 2023-01-04 DIAGNOSIS — E11.22 TYPE 2 DIABETES MELLITUS WITH STAGE 3 CHRONIC KIDNEY DISEASE, WITHOUT LONG-TERM CURRENT USE OF INSULIN, UNSPECIFIED WHETHER STAGE 3A OR 3B CKD (HCC): ICD-10-CM

## 2023-01-04 DIAGNOSIS — E11.29 MICROALBUMINURIA DUE TO TYPE 2 DIABETES MELLITUS (HCC): ICD-10-CM

## 2023-01-04 DIAGNOSIS — G63 NEUROPATHY ASSOCIATED WITH CANCER (HCC): ICD-10-CM

## 2023-01-04 DIAGNOSIS — D63.8 ANEMIA IN OTHER CHRONIC DISEASES CLASSIFIED ELSEWHERE: ICD-10-CM

## 2023-01-04 DIAGNOSIS — J96.11 CHRONIC RESPIRATORY FAILURE WITH HYPOXIA (HCC): ICD-10-CM

## 2023-01-04 DIAGNOSIS — N18.30 TYPE 2 DIABETES MELLITUS WITH STAGE 3 CHRONIC KIDNEY DISEASE, WITHOUT LONG-TERM CURRENT USE OF INSULIN, UNSPECIFIED WHETHER STAGE 3A OR 3B CKD (HCC): ICD-10-CM

## 2023-01-04 DIAGNOSIS — E66.01 MORBID (SEVERE) OBESITY DUE TO EXCESS CALORIES (HCC): ICD-10-CM

## 2023-01-04 DIAGNOSIS — E78.2 MIXED HYPERLIPIDEMIA: ICD-10-CM

## 2023-01-04 DIAGNOSIS — C80.1 NEUROPATHY ASSOCIATED WITH CANCER (HCC): ICD-10-CM

## 2023-01-04 DIAGNOSIS — R80.9 MICROALBUMINURIA DUE TO TYPE 2 DIABETES MELLITUS (HCC): ICD-10-CM

## 2023-01-04 PROBLEM — D64.89 OTHER SPECIFIED ANEMIAS: Status: ACTIVE | Noted: 2023-01-04

## 2023-01-04 PROBLEM — N17.9 AKI (ACUTE KIDNEY INJURY) (HCC): Status: RESOLVED | Noted: 2022-11-26 | Resolved: 2023-01-04

## 2023-01-04 PROCEDURE — 99214 OFFICE O/P EST MOD 30 MIN: CPT | Performed by: STUDENT IN AN ORGANIZED HEALTH CARE EDUCATION/TRAINING PROGRAM

## 2023-01-04 ASSESSMENT — FIBROSIS 4 INDEX: FIB4 SCORE: 1.183333333333333333

## 2023-01-04 ASSESSMENT — ENCOUNTER SYMPTOMS
SHORTNESS OF BREATH: 0
CHILLS: 0
FEVER: 0

## 2023-01-04 NOTE — PROGRESS NOTES
Subjective:     CC: follow up    HPI:   Gerald presents today for the following;    Problem   Other Specified Anemias    Patient recently developed anemia.      Stage 3b Chronic Kidney Disease (Hcc)    Prediabetes diet control  On statin  On losartan for microalbuminuria  Due for eye exam   Ref. Range 1/4/2022 06:34   Glycohemoglobin Latest Ref Range: 4.8 - 5.6 % 6.0 (H)        Ref. Range 8/26/2021 09:02   Creatinine, Random Urine Latest Ref Range: Not Estab. mg/dL 45.9   Microalbumin, Urine Random Latest Ref Range: Not Estab. ug/mL 43.9   Microalbumin-Creatinine Latest Ref Range: 0 - 29 mg/g creat 96 (H)        Driss (Acute Kidney Injury) (Hcc) (Resolved)    Patient was hospitalized due to a traumatic back injury, during his hospitalization he was unable to urinate and his creatine and GFR were negatively impacted. MRI back apparently did not report spinal cord injury. He eventually started to urinate and his creatine and GFR improved          Current Outpatient Medications Ordered in Epic   Medication Sig Dispense Refill    FLUoxetine (PROZAC) 20 MG Cap TAKE 1 CAPSULE BY MOUTH EVERY DAY 90 Capsule 2    tamsulosin (FLOMAX) 0.4 MG capsule Take 1 Capsule by mouth 1/2 hour after breakfast. 90 Capsule 3    rosuvastatin (CRESTOR) 40 MG tablet Take 40 mg by mouth at bedtime.      gabapentin (NEURONTIN) 800 MG tablet Take 800 mg by mouth 2 times a day.      clonazePAM (KLONOPIN) 0.5 MG Tab Take 0.5 mg by mouth 2 times a day.      acetaminophen (TYLENOL) 325 MG Tab Take 650 mg by mouth every four hours as needed for Mild Pain.      losartan (COZAAR) 100 MG Tab Take 1 Tablet by mouth every day. 90 Tablet 3    methocarbamol (ROBAXIN) 750 MG Tab Take 750 mg by mouth 2 times a day as needed. Indications: Musculoskeletal Pain      Diclofenac Sodium 1 % Gel Apply 1 Application topically 2 times a day as needed (Back Pain).       No current Epic-ordered facility-administered medications on file.           ROS:  Review of Systems  "  Constitutional:  Negative for chills and fever.   Respiratory:  Negative for shortness of breath.    Cardiovascular:  Negative for chest pain.     Objective:     Exam:  /60 (BP Location: Right arm, Patient Position: Sitting, BP Cuff Size: Adult)   Pulse 68   Temp 36.1 °C (97 °F) (Temporal)   Ht 1.753 m (5' 9\")   Wt 113 kg (250 lb)   SpO2 97% Comment: on 3 liters  BMI 36.92 kg/m²  Body mass index is 36.92 kg/m².    Physical Exam  Constitutional:       General: He is not in acute distress.     Appearance: He is not ill-appearing.   Pulmonary:      Effort: Pulmonary effort is normal.   Neurological:      Mental Status: He is alert.   Psychiatric:         Mood and Affect: Mood normal.         Behavior: Behavior normal.         Thought Content: Thought content normal.         Judgment: Judgment normal.             Assessment & Plan:     Problem List Items Addressed This Visit       Chronic respiratory failure with hypoxia (HCC)    Hyperlipidemia    Microalbuminuria due to type 2 diabetes mellitus (HCC)    Neuropathy associated with cancer (HCC)    Other specified anemias     Chronic  -further evaluate anemia          Relevant Orders    CBC WITH DIFFERENTIAL    IRON    HAPTOGLOBIN    LDH    FERRITIN    FOLATE    VITAMIN B12    IRON/TOTAL IRON BIND    RETICULOCYTES COUNT    OCCULT BLOOD STOOL    Stage 3b chronic kidney disease (HCC)     Chronic  -refer to nephrology for further management          Other Visit Diagnoses       Morbid (severe) obesity due to excess calories (HCC)        Body mass index (BMI) 36.0-36.9, adult                HCC Gap Form    Diagnosis: E66.01 - Morbid (severe) obesity due to excess calories (HCC)  Z68.36 - Body mass index (BMI) 36.0-36.9, adult  Comorbidity Diagnosis: Mixed hyperlipidemia  The current BMI is 36.92 kg/m2 as of 01/04/23 11:27 PST  Assessment and plan: Chronic, stable. Encouraged healthy diet and physical activity changes with a goal of weight loss. Follow up at least " annually. The comorbid condition is unchanged based on today's assessment. Continue current treatment plan including control of risk factors. Follow up at least yearly.  Diagnosis to address: E11.22, N18.30 - Type 2 diabetes mellitus with stage 3 chronic kidney disease, without long-term current use of insulin, unspecified whether stage 3a or 3b CKD (HCC)  Assessment and plan: Chronic, stable. Continue with current defined treatment plan with labs. Follow-up at least annually.  Diagnosis: E11.29, R80.9 - Microalbuminuria due to type 2 diabetes mellitus (HCC)  Assessment and plan: Chronic, stable. Continue with current defined treatment plan: losartan 100mg. Follow-up at least annually.  Diagnosis: C80.1, G63 - Neuropathy associated with cancer (HCC)  Assessment and plan: Chronic, stable. Continue with current defined treatment plan with gabapentin. Follow-up at least annually.  Diagnosis: J96.11 - Chronic respiratory failure with hypoxia (HCC)  Assessment and plan: Chronic, stable. Continue with current defined treatment plan: with oxygen therapy. Follow-up at least annually.  Last edited 01/04/23 11:30 PST by Luis Toth D.O.             Return in about 1 month (around 2/4/2023) for F/U Labs.    Please note that this dictation was created using voice recognition software. I have made every reasonable attempt to correct obvious errors, but I expect that there are errors of grammar and possibly content that I did not discover before finalizing the note.

## 2023-01-06 NOTE — TELEPHONE ENCOUNTER
Received request via: Patient    Was the patient seen in the last year in this department? Yes    Does the patient have an active prescription (recently filled or refills available) for medication(s) requested? No    Does the patient have snf Plus and need 100 day supply (blood pressure, diabetes and cholesterol meds only)? Patient does not have SCP

## 2023-01-09 RX ORDER — ROSUVASTATIN CALCIUM 40 MG/1
40 TABLET, COATED ORAL
Qty: 90 TABLET | Refills: 2 | Status: SHIPPED | OUTPATIENT
Start: 2023-01-09 | End: 2023-10-03

## 2023-01-17 ENCOUNTER — APPOINTMENT (OUTPATIENT)
Dept: MEDICAL GROUP | Facility: MEDICAL CENTER | Age: 73
End: 2023-01-17
Payer: MEDICARE

## 2023-01-20 ENCOUNTER — HOSPITAL ENCOUNTER (OUTPATIENT)
Dept: LAB | Facility: MEDICAL CENTER | Age: 73
End: 2023-01-20
Attending: STUDENT IN AN ORGANIZED HEALTH CARE EDUCATION/TRAINING PROGRAM
Payer: MEDICARE

## 2023-01-20 DIAGNOSIS — D63.8 ANEMIA IN OTHER CHRONIC DISEASES CLASSIFIED ELSEWHERE: ICD-10-CM

## 2023-01-20 LAB
BASOPHILS # BLD AUTO: 0.8 % (ref 0–1.8)
BASOPHILS # BLD: 0.05 K/UL (ref 0–0.12)
EOSINOPHIL # BLD AUTO: 0.17 K/UL (ref 0–0.51)
EOSINOPHIL NFR BLD: 2.6 % (ref 0–6.9)
ERYTHROCYTE [DISTWIDTH] IN BLOOD BY AUTOMATED COUNT: 49.5 FL (ref 35.9–50)
FERRITIN SERPL-MCNC: 61.1 NG/ML (ref 22–322)
FOLATE SERPL-MCNC: 7 NG/ML
HCT VFR BLD AUTO: 36.4 % (ref 42–52)
HGB BLD-MCNC: 11.8 G/DL (ref 14–18)
HGB RETIC QN AUTO: 36.2 PG/CELL (ref 29–35)
IMM GRANULOCYTES # BLD AUTO: 0.02 K/UL (ref 0–0.11)
IMM GRANULOCYTES NFR BLD AUTO: 0.3 % (ref 0–0.9)
IMM RETICS NFR: 20.6 % (ref 9.3–17.4)
IRON SATN MFR SERPL: 21 % (ref 15–55)
IRON SERPL-MCNC: 67 UG/DL (ref 50–180)
LDH SERPL L TO P-CCNC: 141 U/L (ref 107–266)
LYMPHOCYTES # BLD AUTO: 0.71 K/UL (ref 1–4.8)
LYMPHOCYTES NFR BLD: 11 % (ref 22–41)
MCH RBC QN AUTO: 29.4 PG (ref 27–33)
MCHC RBC AUTO-ENTMCNC: 32.4 G/DL (ref 33.7–35.3)
MCV RBC AUTO: 90.8 FL (ref 81.4–97.8)
MONOCYTES # BLD AUTO: 0.64 K/UL (ref 0–0.85)
MONOCYTES NFR BLD AUTO: 9.9 % (ref 0–13.4)
NEUTROPHILS # BLD AUTO: 4.89 K/UL (ref 1.82–7.42)
NEUTROPHILS NFR BLD: 75.4 % (ref 44–72)
NRBC # BLD AUTO: 0 K/UL
NRBC BLD-RTO: 0 /100 WBC
PLATELET # BLD AUTO: 263 K/UL (ref 164–446)
PMV BLD AUTO: 10.1 FL (ref 9–12.9)
RBC # BLD AUTO: 4.01 M/UL (ref 4.7–6.1)
RETICS # AUTO: 0.09 M/UL (ref 0.04–0.06)
RETICS/RBC NFR: 2.1 % (ref 0.8–2.1)
TIBC SERPL-MCNC: 319 UG/DL (ref 250–450)
UIBC SERPL-MCNC: 252 UG/DL (ref 110–370)
VIT B12 SERPL-MCNC: 570 PG/ML (ref 211–911)
WBC # BLD AUTO: 6.5 K/UL (ref 4.8–10.8)

## 2023-01-20 PROCEDURE — 83540 ASSAY OF IRON: CPT

## 2023-01-20 PROCEDURE — 82728 ASSAY OF FERRITIN: CPT

## 2023-01-20 PROCEDURE — 85025 COMPLETE CBC W/AUTO DIFF WBC: CPT

## 2023-01-20 PROCEDURE — 83550 IRON BINDING TEST: CPT

## 2023-01-20 PROCEDURE — 83615 LACTATE (LD) (LDH) ENZYME: CPT

## 2023-01-20 PROCEDURE — 83010 ASSAY OF HAPTOGLOBIN QUANT: CPT

## 2023-01-20 PROCEDURE — 36415 COLL VENOUS BLD VENIPUNCTURE: CPT

## 2023-01-20 PROCEDURE — 85046 RETICYTE/HGB CONCENTRATE: CPT

## 2023-01-20 PROCEDURE — 82607 VITAMIN B-12: CPT

## 2023-01-20 PROCEDURE — 82746 ASSAY OF FOLIC ACID SERUM: CPT

## 2023-01-23 LAB — HAPTOGLOB SERPL-MCNC: 231 MG/DL (ref 30–200)

## 2023-01-24 ENCOUNTER — TELEPHONE (OUTPATIENT)
Dept: NEPHROLOGY | Facility: MEDICAL CENTER | Age: 73
End: 2023-01-24
Payer: MEDICARE

## 2023-01-24 NOTE — TELEPHONE ENCOUNTER
Please order new labs. Needs to be completed before New patient appointment 01/31/2023.    Basic metabolic panel   CBC with or without differential   Urinalysis   Urine protein/creatinine ratio   Microalbumin/creatinine ratio      Thank you

## 2023-01-25 DIAGNOSIS — N18.32 STAGE 3B CHRONIC KIDNEY DISEASE: ICD-10-CM

## 2023-01-26 ENCOUNTER — HOSPITAL ENCOUNTER (OUTPATIENT)
Dept: LAB | Facility: MEDICAL CENTER | Age: 73
End: 2023-01-26
Attending: STUDENT IN AN ORGANIZED HEALTH CARE EDUCATION/TRAINING PROGRAM
Payer: MEDICARE

## 2023-01-26 DIAGNOSIS — E78.00 PURE HYPERCHOLESTEROLEMIA: ICD-10-CM

## 2023-01-26 DIAGNOSIS — N17.9 AKI (ACUTE KIDNEY INJURY) (HCC): ICD-10-CM

## 2023-01-26 DIAGNOSIS — N18.32 STAGE 3B CHRONIC KIDNEY DISEASE: ICD-10-CM

## 2023-01-26 LAB
ANION GAP SERPL CALC-SCNC: 13 MMOL/L (ref 7–16)
APPEARANCE UR: CLEAR
BACTERIA #/AREA URNS HPF: NEGATIVE /HPF
BASOPHILS # BLD AUTO: 0.5 % (ref 0–1.8)
BASOPHILS # BLD: 0.03 K/UL (ref 0–0.12)
BILIRUB UR QL STRIP.AUTO: NEGATIVE
BUN SERPL-MCNC: 12 MG/DL (ref 8–22)
CALCIUM SERPL-MCNC: 9.9 MG/DL (ref 8.5–10.5)
CHLORIDE SERPL-SCNC: 97 MMOL/L (ref 96–112)
CO2 SERPL-SCNC: 23 MMOL/L (ref 20–33)
COLOR UR: YELLOW
CREAT SERPL-MCNC: 0.96 MG/DL (ref 0.5–1.4)
CREAT UR-MCNC: 84.89 MG/DL
CREAT UR-MCNC: 85.36 MG/DL
EOSINOPHIL # BLD AUTO: 0.2 K/UL (ref 0–0.51)
EOSINOPHIL NFR BLD: 3.3 % (ref 0–6.9)
EPI CELLS #/AREA URNS HPF: NEGATIVE /HPF
ERYTHROCYTE [DISTWIDTH] IN BLOOD BY AUTOMATED COUNT: 48.3 FL (ref 35.9–50)
FASTING STATUS PATIENT QL REPORTED: NORMAL
GFR SERPLBLD CREATININE-BSD FMLA CKD-EPI: 84 ML/MIN/1.73 M 2
GLUCOSE SERPL-MCNC: 113 MG/DL (ref 65–99)
GLUCOSE UR STRIP.AUTO-MCNC: NEGATIVE MG/DL
HCT VFR BLD AUTO: 35 % (ref 42–52)
HGB BLD-MCNC: 11.5 G/DL (ref 14–18)
HYALINE CASTS #/AREA URNS LPF: ABNORMAL /LPF
IMM GRANULOCYTES # BLD AUTO: 0.03 K/UL (ref 0–0.11)
IMM GRANULOCYTES NFR BLD AUTO: 0.5 % (ref 0–0.9)
KETONES UR STRIP.AUTO-MCNC: NEGATIVE MG/DL
LEUKOCYTE ESTERASE UR QL STRIP.AUTO: ABNORMAL
LYMPHOCYTES # BLD AUTO: 0.72 K/UL (ref 1–4.8)
LYMPHOCYTES NFR BLD: 12 % (ref 22–41)
MCH RBC QN AUTO: 29.5 PG (ref 27–33)
MCHC RBC AUTO-ENTMCNC: 32.9 G/DL (ref 33.7–35.3)
MCV RBC AUTO: 89.7 FL (ref 81.4–97.8)
MICRO URNS: ABNORMAL
MICROALBUMIN UR-MCNC: 2.1 MG/DL
MICROALBUMIN/CREAT UR: 25 MG/G (ref 0–30)
MONOCYTES # BLD AUTO: 0.5 K/UL (ref 0–0.85)
MONOCYTES NFR BLD AUTO: 8.3 % (ref 0–13.4)
NEUTROPHILS # BLD AUTO: 4.54 K/UL (ref 1.82–7.42)
NEUTROPHILS NFR BLD: 75.4 % (ref 44–72)
NITRITE UR QL STRIP.AUTO: NEGATIVE
NRBC # BLD AUTO: 0 K/UL
NRBC BLD-RTO: 0 /100 WBC
PH UR STRIP.AUTO: 6.5 [PH] (ref 5–8)
PLATELET # BLD AUTO: 260 K/UL (ref 164–446)
PMV BLD AUTO: 9.7 FL (ref 9–12.9)
POTASSIUM SERPL-SCNC: 4.2 MMOL/L (ref 3.6–5.5)
PROT UR QL STRIP: NEGATIVE MG/DL
PROT UR-MCNC: 10 MG/DL (ref 0–15)
PROT/CREAT UR: 118 MG/G (ref 15–68)
RBC # BLD AUTO: 3.9 M/UL (ref 4.7–6.1)
RBC # URNS HPF: ABNORMAL /HPF
RBC UR QL AUTO: NEGATIVE
SODIUM SERPL-SCNC: 133 MMOL/L (ref 135–145)
SP GR UR STRIP.AUTO: 1.01
UROBILINOGEN UR STRIP.AUTO-MCNC: 0.2 MG/DL
WBC # BLD AUTO: 6 K/UL (ref 4.8–10.8)
WBC #/AREA URNS HPF: ABNORMAL /HPF

## 2023-01-26 PROCEDURE — 80048 BASIC METABOLIC PNL TOTAL CA: CPT

## 2023-01-26 PROCEDURE — 84156 ASSAY OF PROTEIN URINE: CPT

## 2023-01-26 PROCEDURE — 82043 UR ALBUMIN QUANTITATIVE: CPT

## 2023-01-26 PROCEDURE — 81001 URINALYSIS AUTO W/SCOPE: CPT

## 2023-01-26 PROCEDURE — 85025 COMPLETE CBC W/AUTO DIFF WBC: CPT

## 2023-01-26 PROCEDURE — 36415 COLL VENOUS BLD VENIPUNCTURE: CPT

## 2023-01-26 PROCEDURE — 82570 ASSAY OF URINE CREATININE: CPT | Mod: 91

## 2023-01-31 ENCOUNTER — OFFICE VISIT (OUTPATIENT)
Dept: NEPHROLOGY | Facility: MEDICAL CENTER | Age: 73
End: 2023-01-31
Payer: MEDICARE

## 2023-01-31 VITALS
OXYGEN SATURATION: 94 % | SYSTOLIC BLOOD PRESSURE: 128 MMHG | HEIGHT: 69 IN | TEMPERATURE: 97.5 F | HEART RATE: 83 BPM | BODY MASS INDEX: 36.88 KG/M2 | WEIGHT: 249 LBS | DIASTOLIC BLOOD PRESSURE: 68 MMHG

## 2023-01-31 DIAGNOSIS — Z85.038 HISTORY OF COLON CANCER: ICD-10-CM

## 2023-01-31 DIAGNOSIS — I10 PRIMARY HYPERTENSION: ICD-10-CM

## 2023-01-31 DIAGNOSIS — R33.9 URINARY RETENTION: ICD-10-CM

## 2023-01-31 DIAGNOSIS — D50.9 IRON DEFICIENCY ANEMIA, UNSPECIFIED IRON DEFICIENCY ANEMIA TYPE: ICD-10-CM

## 2023-01-31 DIAGNOSIS — N17.9 AKI (ACUTE KIDNEY INJURY) (HCC): ICD-10-CM

## 2023-01-31 PROCEDURE — 99204 OFFICE O/P NEW MOD 45 MIN: CPT | Performed by: INTERNAL MEDICINE

## 2023-01-31 RX ORDER — FERROUS SULFATE 325(65) MG
325 TABLET ORAL DAILY
Qty: 90 TABLET | Refills: 1 | Status: SHIPPED | OUTPATIENT
Start: 2023-01-31

## 2023-01-31 ASSESSMENT — ENCOUNTER SYMPTOMS
ABDOMINAL PAIN: 0
FEVER: 0
SHORTNESS OF BREATH: 1

## 2023-01-31 ASSESSMENT — FIBROSIS 4 INDEX: FIB4 SCORE: 0.82

## 2023-01-31 NOTE — Clinical Note
Thanks for the referral. Gerald declined regular follow up as his VANNA resolved. He is low risk for CKD progression to ESRD.  Patient says he was told to drink a lot of water. I'm not sure if it was you who told him this, but just for your own knowledge, please be mindful - do not tell patients to drink more water to fix their kidneys. In Gerald's case, he became mildly hyponatremic because he was drinking too much water. If someone has a functioning brain (not a dementia or bad stroke patient), their brain will tell them when they're thirsty and need to drink. I tell patients to 1) drink when they're thirsty and 2) drink enough so that urine is pale yellow or clear, never dark yellow or orange. For most folks, that's between 1-2 L of liquid per day. Only kidney stone patients should be advised to drink 2L or more of liquid per day. Sincerely, Carlos Eduardo Lynch MD Nephrology [facility]

## 2023-01-31 NOTE — PROGRESS NOTES
"Chief Complaint   Patient presents with    New Patient     Type 2 diabetes mellitus with stage 3 chronic kidney disease, without long-term current use of insulin, unspecified whether stage 3a or 3b CKD (HCC)     CC: my tests are abnormal  Requesting Provider: Luis Toth D.O.    HPI:  Gerald Oshea is a 72 y.o. male with a history of colon cancer status post right hemicolectomy in 2016 with lung metastasis status post pneumonectomy in 2018, hypertension, chronic sciatica, sleep apnea on CPAP, prostate cancer status post radiation therapy, right kidney atrophy, admission in November 2022 with VANNA from urinary retention, who presents today to establish nephrology care.     Re: VANNA, in 11/2022, resolved quickly after Valenzuela catheter was placed. He was discharged without catheter. He was told he had right kidney atrophy in his 40's, but doesn't know what happened to it.     Re: HTN, diagnosed 1990's. BP control over the years has been well controlled. He checks BP at home occasionally, usually 120's / 60's. He denies LE edema, but has bad neuropathy.     Re: Prostate cancer, diagnosed in 2022, it was treated with radiation therapy in 2022. He now feels like he is urinating ok.       Past Medical History:   Diagnosis Date    Anemia     Anxiety     Atrophy of right kidney     one functioning kidney    Breath shortness     Cancer (HCC) 2016    colon   rx surgery and chemo    Colon cancer (HCC)     Former tobacco use     Gout     High cholesterol 12/12/2017    Hyperlipidemia     Hypertension 12/12/2017    Neuropathy 12/12/2017    Pain 12/12/2017    \"Lower Back & Left Leg\"    Pneumonia 1991    Left upper lobe    Psychiatric problem     anxiety    Sleep apnea 12/12/2017    CPAP USE    Snoring     DX SU       Past Surgical History:   Procedure Laterality Date    THORACOSCOPY  2/5/2018    Procedure: THORACOSCOPY- WEDGE RESECTION LT UPPER LOBE METASTASIS;  Surgeon: John H Ganser, M.D.;  Location: SURGERY [facility]" "ORS;  Service: General    CATH PLACEMENT Left 10/7/2016    Procedure: CATH PLACEMENT - SUBCLAVIAN PORT;  Surgeon: Sho Bajwa M.D.;  Location: SURGERY SAME DAY [facility] ORS;  Service:     COLON RESECTION  2016    [place]    CLOSED REDUCTION  10/25/2012    Performed by Chavez Duran M.D. at SURGERY [facility] ORS        Outpatient Encounter Medications as of 1/31/2023   Medication Sig Dispense Refill    clonazePAM (KLONOPIN) 0.5 MG Tab TAKE 1 TABLET BY MOUTH 2 TIMES A DAY 60 Tablet 2    rosuvastatin (CRESTOR) 40 MG tablet Take 1 Tablet by mouth at bedtime. 90 Tablet 2    FLUoxetine (PROZAC) 20 MG Cap TAKE 1 CAPSULE BY MOUTH EVERY DAY 90 Capsule 2    tamsulosin (FLOMAX) 0.4 MG capsule Take 1 Capsule by mouth 1/2 hour after breakfast. 90 Capsule 3    gabapentin (NEURONTIN) 800 MG tablet Take 800 mg by mouth 2 times a day.      acetaminophen (TYLENOL) 325 MG Tab Take 650 mg by mouth every four hours as needed for Mild Pain.      losartan (COZAAR) 100 MG Tab Take 1 Tablet by mouth every day. 90 Tablet 3    methocarbamol (ROBAXIN) 750 MG Tab Take 750 mg by mouth 2 times a day as needed. Indications: Musculoskeletal Pain      Diclofenac Sodium 1 % Gel Apply 1 Application topically 2 times a day as needed (Back Pain).       No facility-administered encounter medications on file as of 1/31/2023.        Allergies   Allergen Reactions    Duloxetine Unspecified     Unable to urinate     Ketamine Unspecified     \"I hallucinate\".      Lidocaine Rash     [id number]  Pt states \"Rash all over my body\".      Lidocaine-Tetracaine-Epineph Hives and Rash    Morphine Unspecified     \"Can not have too much, my oxygen goes down\"       Social History     Socioeconomic History    Marital status: Single     Spouse name: Not on file    Number of children: Not on file    Years of education: Not on file    Highest education level: Not on file   Occupational History    Occupation: Retired    Occupation: Former business owner " "  Tobacco Use    Smoking status: Former     Packs/day: 1.00     Years: 20.00     Pack years: 20.00     Types: Cigarettes     Quit date: 1998     Years since quittin.0    Smokeless tobacco: Never   Vaping Use    Vaping Use: Never used   Substance and Sexual Activity    Alcohol use: Yes     Alcohol/week: 1.8 oz     Types: 3 Glasses of wine per week     Comment: 2-3 per day    Drug use: No    Sexual activity: Not Currently   Other Topics Concern    Not on file   Social History Narrative    Not on file     Social Determinants of Health     Financial Resource Strain: Not on file   Food Insecurity: Not on file   Transportation Needs: Not on file   Physical Activity: Not on file   Stress: Not on file   Social Connections: Not on file   Intimate Partner Violence: Not on file   Housing Stability: Not on file       Family History   Problem Relation Age of Onset    Cancer Mother         Bladder    Heart Disease Father     Heart Disease Brother         CABG x2    Hyperlipidemia Brother     No Known Problems Maternal Grandmother     No Known Problems Paternal Grandmother     Heart Disease Paternal Grandfather     Hypertension Paternal Grandfather     Hyperlipidemia Paternal Grandfather     No Known Problems Son     Kidney Disease Neg Hx        Review of Systems   Constitutional:  Negative for fever.   Respiratory:  Positive for shortness of breath (with exertion).    Cardiovascular:  Negative for chest pain.   Gastrointestinal:  Negative for abdominal pain.   Genitourinary:  Negative for dysuria.   All other systems reviewed and are negative.    /68 (BP Location: Left arm, Patient Position: Sitting, BP Cuff Size: Large adult)   Pulse 83   Temp 36.4 °C (97.5 °F) (Temporal)   Ht 1.74 m (5' 8.5\")   Wt 113 kg (249 lb)   SpO2 94%   BMI 37.31 kg/m²     Physical Exam  Constitutional:       General: He is not in acute distress.     Appearance: He is obese.      Interventions: Nasal cannula in place.   HENT:      " Mouth/Throat:      Pharynx: No oropharyngeal exudate.   Eyes:      General: No scleral icterus.  Neck:      Trachea: No tracheal deviation.   Cardiovascular:      Rate and Rhythm: Normal rate and regular rhythm.      Heart sounds: Normal heart sounds. No murmur heard.  Pulmonary:      Effort: Pulmonary effort is normal.      Breath sounds: Normal breath sounds. No stridor. No rales.   Abdominal:      General: Bowel sounds are normal.      Palpations: Abdomen is soft.      Tenderness: There is no abdominal tenderness.   Musculoskeletal:         General: Normal range of motion.      Cervical back: Neck supple.      Right lower leg: No edema.      Left lower leg: No edema.   Skin:     General: Skin is warm and dry.      Findings: No rash.   Neurological:      General: No focal deficit present.      Mental Status: He is alert and oriented to person, place, and time.   Psychiatric:         Mood and Affect: Mood and affect normal.         Behavior: Behavior normal.         Labs reviewed.    Recent Labs     10/05/22  0638 11/24/22  1046 11/24/22  1046 11/26/22  1404 11/28/22  0056 01/26/23  0942   ALBUMIN 4.7 4.3  --  3.9 3.3  --    HDL 62  --   --   --   --   --    TRIGLYCERIDE 79  --   --   --   --   --    SODIUM 138 130*   < > 132* 135 133*   POTASSIUM 4.2 3.8   < > 4.1 4.1 4.2   CHLORIDE 98 93*   < > 94* 103 97   CO2 24 23   < > 21 24 23   BUN 33* 21   < > 48* 34* 12   CREATININE 1.54* 1.38   < > 4.58* 1.85* 0.96    < > = values in this interval not displayed.       Lab Results   Component Value Date/Time    WBC 6.0 01/26/2023 09:42 AM    RBC 3.90 (L) 01/26/2023 09:42 AM    HEMOGLOBIN 11.5 (L) 01/26/2023 09:42 AM    HEMATOCRIT 35.0 (L) 01/26/2023 09:42 AM    MCV 89.7 01/26/2023 09:42 AM    MCH 29.5 01/26/2023 09:42 AM    MCHC 32.9 (L) 01/26/2023 09:42 AM    MPV 9.7 01/26/2023 09:42 AM             URINALYSIS:  Lab Results   Component Value Date/Time    COLORURINE Yellow 01/26/2023 0941    CLARITY Clear 01/26/2023 0941     SPECGRAVITY 1.010 01/26/2023 0941    PHURINE 6.5 01/26/2023 0941    KETONES Negative 01/26/2023 0941    PROTEINURIN Negative 01/26/2023 0941    BILIRUBINUR Negative 01/26/2023 0941    UROBILU 0.2 01/26/2023 0941    NITRITE Negative 01/26/2023 0941    LEUKESTERAS Trace (A) 01/26/2023 0941    OCCULTBLOOD Negative 01/26/2023 0941     UPC  Lab Results   Component Value Date/Time    TOTPROTUR 10.0 01/26/2023 0941      Lab Results   Component Value Date/Time    CREATININEU 84.89 01/26/2023 0941         Imaging report(s) reviewed  No orders to display         Assessment:  Gerald Oshea is a 72 y.o. male with a history of colon cancer status post right hemicolectomy in 2016 with lung metastasis status post pneumonectomy in 2018, hypertension, chronic sciatica, sleep apnea on CPAP, prostate cancer status post radiation therapy, right kidney atrophy, admission in November 2022 with VANNA from urinary retention, who presents today to establish nephrology care.     Plan:  1. VANNA (acute kidney injury) (HCC)  -Patient had VANNA in November 2022 from acute urinary retention, resolved by discharge.  Repeat labs show VANNA is resolved.  Patient likely has some residual stage II CKD.  His risk factor for CKD includes hypertension and functional solitary kidney, as he has right kidney atrophy.  The patient has no proteinuria, well-controlled blood pressure, and is at very low risk for progression of CKD to ESRD.  Recommend continue to monitor in primary care, Refer back to nephrology as needed in the future.    2. Urinary retention  -Noted during hospitalization in November 2022, resolved by discharge.  Follow-up with urology as needed    3. History of colon cancer  -Treated in 2016 with surgery and chemotherapy, and then in 2018 with surgery of lung metastasis and further chemotherapy.  Patient has neuropathy from history of chemotherapy.  Defer further management to primary team and oncology.    4. Primary  hypertension  -Well-controlled with losartan.  Continue ARB for long-term kidney protection.    5.  Hyponatremia  - Mild.  I advised the patient to not drink too much water, he can likely get by with between 1 and 2 L of liquid a day depending on how hot it is outside, and how active the patient is.    As the patient's kidney injury has resolved, and he is low risk for further kidney decline, will discharge from nephrology clinic.  Return to clinic as needed    Carlos Eduardo Lynch MD  Nephrology  [facility]

## 2023-02-02 NOTE — PROGRESS NOTES
CC: Annual follow-up for SU on BiPAP 20/16 cm water and O2 3 L/min.        HPI:  Gerald Oshea is a 72 y.o.male  kindly referred by Luis Toth D.O. and last seen via telemedicine on 1/13/2022 when we were unable to do a wireless download.    He was reportedly doing quite well at that time.The patient reports improved symptoms using positive airway pressure.  Has experienced no significant problems with mask fit, mask leak, pressure tolerance, excessive airway dryness, nasal congestion, aerophagia, or chest pain.    He is on oxygen 24/7.    Today, 2/6/2023, his 90-day compliance is 97% for 9 hours and 20 minutes.  On auto titrating BiPAP 10/20/4 centimeters water his average residual apnea-hypopnea index is 22.6.  He has a severe leak with a median leak of 67.9 L/min and a maximum of 103.1 L/min.    The patient reports improved symptoms using positive airway pressure.  Has experienced no significant problems with mask fit, mask leak, pressure tolerance, excessive airway dryness, nasal congestion, aerophagia, or chest pain.      A prior sleep study showed an AHI of 97.          Significant comorbidities and modifying factors include hypertension, history of colon cancer, prostate cancer, history of lung metastases status post wedge resection, status post chemotherapy, history of tobacco abuse, peripheral neuropathy secondary to chemotherapy, diabetes mellitus, chronic respiratory failure on supplemental O2, anxiety, hypercholesterolemia, and gout. Thanksgiving broke 2 vertebrae. Finished rads for prostate cancer.          Patient Active Problem List    Diagnosis Date Noted    Urinary retention 01/31/2023    Other specified anemias 01/04/2023    Hospital discharge follow-up 12/01/2022    Hyperlipidemia 11/27/2022    Compression fracture of L1 lumbar vertebra, sequela 11/27/2022    Medicare annual wellness visit, subsequent 07/14/2022    Long-term current use of benzodiazepine 06/09/2022    VANNA (acute  "kidney injury) (HCC) 10/01/2021    Generalized anxiety disorder 04/30/2021    Microalbuminuria due to type 2 diabetes mellitus ([facility]) 06/22/2020    Chronic respiratory failure with hypoxia ([facility]) 04/11/2018    History of skin cancer 12/06/2017    Morbid obesity with BMI of 40.0-44.9, adult ([facility]) 09/20/2017    Obstructive sleep apnea 05/09/2017    Neuropathy associated with cancer (HCC) 03/28/2017    History of colon cancer 09/28/2016    History of gout 09/28/2016    Hypertension 09/28/2016    Anxiety 09/28/2016    Pure hypercholesterolemia 09/28/2016    Prostate cancer (HCC) 09/28/2016       Past Medical History:   Diagnosis Date    Anemia     Anxiety     Atrophy of right kidney     one functioning kidney    Breath shortness     Cancer ([facility]) 2016    colon   rx surgery and chemo    Colon cancer (HCC)     Former tobacco use     Gout     High cholesterol 12/12/2017    Hyperlipidemia     Hypertension 12/12/2017    Neuropathy 12/12/2017    Pain 12/12/2017    \"Lower Back & Left Leg\"    Pneumonia 1991    Left upper lobe    Psychiatric problem     anxiety    Sleep apnea 12/12/2017    CPAP USE    Snoring     DX SU        Past Surgical History:   Procedure Laterality Date    THORACOSCOPY  2/5/2018    Procedure: THORACOSCOPY- WEDGE RESECTION LT UPPER LOBE METASTASIS;  Surgeon: John H Ganser, M.D.;  Location: SURGERY [facility];  Service: General    CATH PLACEMENT Left 10/7/2016    Procedure: CATH PLACEMENT - SUBCLAVIAN PORT;  Surgeon: Sho Bajwa M.D.;  Location: SURGERY SAME DAY [facility];  Service:     COLON RESECTION  2016    [place]    CLOSED REDUCTION  10/25/2012    Performed by Chavez Duran M.D. at SURGERY [facility]       Family History   Problem Relation Age of Onset    Cancer Mother         Bladder    Heart Disease Father     Heart Disease Brother         CABG x2    Hyperlipidemia Brother     No Known Problems Maternal Grandmother     No Known Problems Paternal Grandmother     Heart Disease " Paternal Grandfather     Hypertension Paternal Grandfather     Hyperlipidemia Paternal Grandfather     No Known Problems Son     Kidney Disease Neg Hx        Social History     Socioeconomic History    Marital status: Single     Spouse name: Not on file    Number of children: Not on file    Years of education: Not on file    Highest education level: Not on file   Occupational History    Occupation: Retired    Occupation: Former business owner   Tobacco Use    Smoking status: Former     Packs/day: 1.00     Years: 20.00     Pack years: 20.00     Types: Cigarettes     Quit date: 1998     Years since quittin.1    Smokeless tobacco: Never   Vaping Use    Vaping Use: Never used   Substance and Sexual Activity    Alcohol use: Yes     Alcohol/week: 1.8 oz     Types: 3 Glasses of wine per week     Comment: 2-3 per day    Drug use: No    Sexual activity: Not Currently   Other Topics Concern    Not on file   Social History Narrative    Not on file     Social Determinants of Health     Financial Resource Strain: Not on file   Food Insecurity: Not on file   Transportation Needs: Not on file   Physical Activity: Not on file   Stress: Not on file   Social Connections: Not on file   Intimate Partner Violence: Not on file   Housing Stability: Not on file       Current Outpatient Medications   Medication Sig Dispense Refill    ferrous sulfate 325 (65 Fe) MG tablet Take 1 Tablet by mouth every day. 90 Tablet 1    clonazePAM (KLONOPIN) 0.5 MG Tab TAKE 1 TABLET BY MOUTH 2 TIMES A DAY 60 Tablet 2    rosuvastatin (CRESTOR) 40 MG tablet Take 1 Tablet by mouth at bedtime. 90 Tablet 2    FLUoxetine (PROZAC) 20 MG Cap TAKE 1 CAPSULE BY MOUTH EVERY DAY 90 Capsule 2    tamsulosin (FLOMAX) 0.4 MG capsule Take 1 Capsule by mouth 1/2 hour after breakfast. 90 Capsule 3    gabapentin (NEURONTIN) 800 MG tablet Take 800 mg by mouth 2 times a day.      acetaminophen (TYLENOL) 325 MG Tab Take 650 mg by mouth every four hours as needed for Mild  "Pain.      losartan (COZAAR) 100 MG Tab Take 1 Tablet by mouth every day. 90 Tablet 3    methocarbamol (ROBAXIN) 750 MG Tab Take 750 mg by mouth 2 times a day as needed. Indications: Musculoskeletal Pain      Diclofenac Sodium 1 % Gel Apply 1 Application topically 2 times a day as needed (Back Pain).       No current facility-administered medications for this visit.    \"CURRENT RX\"    ALLERGIES: Duloxetine, Ketamine, Lidocaine, Lidocaine-tetracaine-epineph, and Morphine    ROS    Constitutional: Denies fever, chills, sweats,  weight loss, fatigue  Cardiovascular: Denies chest pain, tightness, palpitations, swelling in legs/feet, fainting, difficulty breathing when lying down but gets better when sitting up.   Respiratory: Denies shortness of breath, cough, sputum, wheezing, painful breathing, coughing up blood.   Sleep: per HPI  Gastrointestinal: Denies  difficulty swallowing, nausea, abdominal pain, diarrhea, constipation, heartburn.  Musculoskeletal: Denies painful joints, sore muscles, back pain.        PHYSICAL EXAM  On O2    BP (!) 140/80 (BP Location: Left arm, Patient Position: Sitting, BP Cuff Size: Large adult)   Pulse (!) 106   Resp 16   Ht 1.753 m (5' 9\")   Wt 112 kg (248 lb)   SpO2 93%   BMI 36.62 kg/m²   Appearance: Well-nourished, well-developed, no acute distress  Eyes:  PERRLA, EOMI  ENMT: masked  Neck: Supple, trachea midline, no masses  Respiratory effort:  No intercostal retractions or use of accessory muscles  Lung auscultation:  No wheezes rhonchi rubs or rales  Cardiac: No murmurs, rubs, or gallops; regular rhythm, normal rate; no edema  Abdomen:  No tenderness, no organomegaly.  Musculoskeletal:  Grossly normal; gait and station normal; digits and nails normal  Skin:  No rashes, petechiae, cyanosis  Neurologic: without focal signs; oriented to person, time, place, and purpose; judgement intact  Psychiatric:  No depression, anxiety, agitation      Medical Decision Making       The medical " record was reviewed in its entirety including the referral notes, records from primary care, consultants notes, hospital records, lab, imaging, microbiology, immunology, and immunizations.  Care gaps identified and reviewed with the patient.    Diagnostic and titration nocturnal polysomnogram's, home sleep apnea tests, continuous nocturnal oximetry results, multiple sleep latency tests, and compliance reports reviewed.  1. Obstructive sleep apnea  - DME Mask and Supplies      PLAN:   Has been advised to continue the current PAP and O2, clean equipment frequently, and get new mask and supplies as allowed by insurance and DME. Advised about potential problems including nasal obstruction/stuffiness, mask leak or discomfort , frequent awakenings, chill or dryness of the upper airway, noise, inconvenience, and lack of benefit. Recommend an earlier appointment, if significant treatment barriers develop.    The risks of untreated sleep apnea were discussed with the patient at length. Patients with SU are at increased risk of cardiovascular disease including systemic arterial hypertension, pulmonary arterial hypertension (transient or fixed), TIA, and an elevated risk of stroke, heart attack, sudden death, or arrhythmia between the hours of 11 PM and 6 AM. SU patients have an increased risk of motor vehicle accidents, type 2 diabetes, GERD, repetitive mechanical trauma to pharyngeal muscles with inflammation and denervation, frequent EEG arousals leading to nonrestorative sleep, excessive daytime sleepiness, fatigue, depression, poor pain control, irritability, and lower quality of life.  The patient was advised to avoid driving a motor vehicle when drowsy.    Positive airway pressure will favorably impact many of the adverse conditions and effects provoked by SU.    Have advised the patient to follow up with the appropriate healthcare practitioners for all other medical problems and issues.    Return in about 1 year  (around 2/6/2024).    Total time 30 minutes  In my estimation his big problem is he is future mask leak.  He has tried multiple masks unsuccessfully.  I have recommended he go down to his DME company for a repeat mask fit.  He is not interested in a retitration at this time given his multiple other significant comorbidities.

## 2023-02-06 ENCOUNTER — OFFICE VISIT (OUTPATIENT)
Dept: SLEEP MEDICINE | Facility: MEDICAL CENTER | Age: 73
End: 2023-02-06
Payer: MEDICARE

## 2023-02-06 VITALS
SYSTOLIC BLOOD PRESSURE: 140 MMHG | DIASTOLIC BLOOD PRESSURE: 80 MMHG | HEIGHT: 69 IN | RESPIRATION RATE: 16 BRPM | OXYGEN SATURATION: 93 % | HEART RATE: 106 BPM | WEIGHT: 248 LBS | BODY MASS INDEX: 36.73 KG/M2

## 2023-02-06 DIAGNOSIS — G47.33 OBSTRUCTIVE SLEEP APNEA: ICD-10-CM

## 2023-02-06 PROCEDURE — 99214 OFFICE O/P EST MOD 30 MIN: CPT | Performed by: INTERNAL MEDICINE

## 2023-02-06 ASSESSMENT — FIBROSIS 4 INDEX: FIB4 SCORE: 0.82

## 2023-02-07 ENCOUNTER — OFFICE VISIT (OUTPATIENT)
Dept: MEDICAL GROUP | Facility: MEDICAL CENTER | Age: 73
End: 2023-02-07
Payer: MEDICARE

## 2023-02-07 VITALS
OXYGEN SATURATION: 97 % | BODY MASS INDEX: 36.14 KG/M2 | WEIGHT: 244 LBS | TEMPERATURE: 97 F | DIASTOLIC BLOOD PRESSURE: 82 MMHG | HEIGHT: 69 IN | HEART RATE: 76 BPM | SYSTOLIC BLOOD PRESSURE: 126 MMHG

## 2023-02-07 DIAGNOSIS — E78.2 MIXED HYPERLIPIDEMIA: ICD-10-CM

## 2023-02-07 DIAGNOSIS — Z79.899 LONG-TERM CURRENT USE OF BENZODIAZEPINE: ICD-10-CM

## 2023-02-07 DIAGNOSIS — D63.8 ANEMIA IN OTHER CHRONIC DISEASES CLASSIFIED ELSEWHERE: ICD-10-CM

## 2023-02-07 DIAGNOSIS — I10 PRIMARY HYPERTENSION: ICD-10-CM

## 2023-02-07 PROBLEM — R33.9 URINARY RETENTION: Status: RESOLVED | Noted: 2023-01-31 | Resolved: 2023-02-07

## 2023-02-07 PROCEDURE — 99215 OFFICE O/P EST HI 40 MIN: CPT | Performed by: STUDENT IN AN ORGANIZED HEALTH CARE EDUCATION/TRAINING PROGRAM

## 2023-02-07 ASSESSMENT — FIBROSIS 4 INDEX: FIB4 SCORE: 0.82

## 2023-02-07 ASSESSMENT — ENCOUNTER SYMPTOMS
CHILLS: 0
SHORTNESS OF BREATH: 0
FEVER: 0

## 2023-02-07 NOTE — PROGRESS NOTES
Subjective:     CC: Labs    HPI:   Gerald presents today for the following;    Problem   Other Specified Anemias    Patient's hemoglobin is now increasing.  Patient had radiation for his prostate.  Patient is asymptomatic.     Hyperlipidemia    Chronic-on rosuvastatin 40 mg  - Tolerating medication well       Long-Term Current Use of Benzodiazepine    6/9/2022  Patient has been on clonzaepam for 20 years or so.  I had a long discussion with the patient that benzodiazepines are not first-line treatment for generalized anxiety.  Patient is undergoing prostate cancer treatment over the next 10 weeks.  He states that with all the imaging and treatment his anxiety is very high right now and the clonazepam helps.    2/7/2023  - We have been trying to taper down his clonazepam requirement  -Patient is currently on 0.5 mg twice daily of clonazepam.  He was originally on 1 mg of clonazepam twice daily     Hypertension    Patient previously was on hydrochlorothiazide and triamterene which have been held.  Patient continues to take losartan 100 mg.     Urinary Retention (Resolved)       Current Outpatient Medications Ordered in Epic   Medication Sig Dispense Refill    ferrous sulfate 325 (65 Fe) MG tablet Take 1 Tablet by mouth every day. 90 Tablet 1    clonazePAM (KLONOPIN) 0.5 MG Tab TAKE 1 TABLET BY MOUTH 2 TIMES A DAY 60 Tablet 2    rosuvastatin (CRESTOR) 40 MG tablet Take 1 Tablet by mouth at bedtime. 90 Tablet 2    FLUoxetine (PROZAC) 20 MG Cap TAKE 1 CAPSULE BY MOUTH EVERY DAY 90 Capsule 2    tamsulosin (FLOMAX) 0.4 MG capsule Take 1 Capsule by mouth 1/2 hour after breakfast. 90 Capsule 3    gabapentin (NEURONTIN) 800 MG tablet Take 800 mg by mouth 2 times a day.      acetaminophen (TYLENOL) 325 MG Tab Take 650 mg by mouth every four hours as needed for Mild Pain.      losartan (COZAAR) 100 MG Tab Take 1 Tablet by mouth every day. 90 Tablet 3    methocarbamol (ROBAXIN) 750 MG Tab Take 750 mg by mouth 2 times a day as  "needed. Indications: Musculoskeletal Pain      Diclofenac Sodium 1 % Gel Apply 1 Application topically 2 times a day as needed (Back Pain).       No current Epic-ordered facility-administered medications on file.           ROS:  Review of Systems   Constitutional:  Negative for chills and fever.   Respiratory:  Negative for shortness of breath.    Cardiovascular:  Negative for chest pain.     Objective:     Exam:  /82 (BP Location: Left arm, Patient Position: Sitting, BP Cuff Size: Adult)   Pulse 76   Temp 36.1 °C (97 °F) (Temporal)   Ht 1.753 m (5' 9\")   Wt 111 kg (244 lb)   SpO2 97% Comment: 2 liters  BMI 36.03 kg/m²  Body mass index is 36.03 kg/m².    Physical Exam  Constitutional:       General: He is not in acute distress.     Appearance: He is not ill-appearing.   Pulmonary:      Effort: Pulmonary effort is normal.   Neurological:      Mental Status: He is alert.   Psychiatric:         Mood and Affect: Mood normal.         Behavior: Behavior normal.         Thought Content: Thought content normal.         Judgment: Judgment normal.             Assessment & Plan:     Problem List Items Addressed This Visit       Hyperlipidemia     Chronic-on rosuvastatin 40 mg  - Tolerating medication well         Hypertension     Chronic  - Blood pressure in office and checked, continue losartan 100 mg         Long-term current use of benzodiazepine     Chronic  - Long discussion was had with patient in regards to decreasing his requirement for clonazepam.  Patient understands benefits of decreasing clonazepam use  -We will continue to decrease his clonazepam safely         Other specified anemias     Acute-improving  - Dip in hemoglobin likely related to radiation to his prostate 4 months ago.  Patient's hemoglobin is improving.            42 minutes for chart review, patient interaction/evaluation, counseling, placing orders, and coordination of care with MA, documentation of this encounter. End of life planning " discussion had as well.      No follow-ups on file.    Please note that this dictation was created using voice recognition software. I have made every reasonable attempt to correct obvious errors, but I expect that there are errors of grammar and possibly content that I did not discover before finalizing the note.

## 2023-02-07 NOTE — ASSESSMENT & PLAN NOTE
Chronic  - Long discussion was had with patient in regards to decreasing his requirement for clonazepam.  Patient understands benefits of decreasing clonazepam use  -We will continue to decrease his clonazepam safely

## 2023-02-07 NOTE — ASSESSMENT & PLAN NOTE
Acute-improving  - Dip in hemoglobin likely related to radiation to his prostate 4 months ago.  Patient's hemoglobin is improving.

## 2023-03-07 ENCOUNTER — TELEPHONE (OUTPATIENT)
Dept: SLEEP MEDICINE | Facility: MEDICAL CENTER | Age: 73
End: 2023-03-07
Payer: MEDICARE

## 2023-03-07 NOTE — TELEPHONE ENCOUNTER
Patient did call stated his Portable Oxygen Concentrator M6 Tank is not working any longer, he did contact his DME [employer], they did let patient know he does need a new order for the Portable Oxygen Concentrator. Did stop working a week ago.

## 2023-03-08 NOTE — TELEPHONE ENCOUNTER
Patient needs OV, he most likely needs a multi ox with updated testing for 5 year contract of O2. DME O2 order 2/23/18 noted need for 2LPM at rest and 3LPM on exertion.  Patient has currently been seen for SU and only documented as using as bleed in O2 at night.  Please call patient and offer first available appt with any provider to address this and please contact DME to see if concentrator can be replaced until he is seen in office to assess.

## 2023-03-09 DIAGNOSIS — J96.11 CHRONIC RESPIRATORY FAILURE WITH HYPOXIA (HCC): ICD-10-CM

## 2023-03-14 NOTE — TELEPHONE ENCOUNTER
Called pt to relay Yeimi Ash CHRISTINA message regarding his oxygen and informed the pt that I would need to schedule him an OV to provide him a new order. However the pt informed me that he contact Luis Toth D.O and an order for the oxygen was faxed to [employer].

## 2023-03-15 NOTE — TELEPHONE ENCOUNTER
PT LVM stating the order is still incorrect. I did speak to [employer] and they need order to state M6 tanks

## 2023-03-22 NOTE — TELEPHONE ENCOUNTER
Called [facility], we still have not received anything they in formed me that a progress note with a prescription script needs to be sent with the following OCD REGULATOR ON PULSE DOSE WITH M6 TANK     Message sent to dr. Toth

## 2023-03-23 ENCOUNTER — TELEMEDICINE (OUTPATIENT)
Dept: MEDICAL GROUP | Facility: LAB | Age: 73
End: 2023-03-23
Payer: MEDICARE

## 2023-03-23 DIAGNOSIS — G47.33 OBSTRUCTIVE SLEEP APNEA: ICD-10-CM

## 2023-03-23 DIAGNOSIS — E78.00 PURE HYPERCHOLESTEROLEMIA: ICD-10-CM

## 2023-03-23 DIAGNOSIS — J96.11 CHRONIC RESPIRATORY FAILURE WITH HYPOXIA (HCC): ICD-10-CM

## 2023-03-23 PROCEDURE — 99214 OFFICE O/P EST MOD 30 MIN: CPT | Mod: 95 | Performed by: STUDENT IN AN ORGANIZED HEALTH CARE EDUCATION/TRAINING PROGRAM

## 2023-03-23 ASSESSMENT — ENCOUNTER SYMPTOMS
FEVER: 0
CHILLS: 0
SHORTNESS OF BREATH: 0

## 2023-03-23 NOTE — PROGRESS NOTES
Subjective:   This evaluation was conducted via Zoom using secure and encrypted videoconferencing technology. The patient was in their home in the [facility].    The patient's identity was confirmed and verbal consent was obtained for this virtual visit.    CC: follow up    HPI:   Gerald presents today for the following;    Problem   Chronic Respiratory Failure With Hypoxia (Hcc)    Using 3L NC 24/7, follow with pulmonology. Without his oxygen he does desaturate into the 80's       History of partial left pneumonectomy due metastatic colon cancer      SU    He uses a portable oxygen machine, and requires a new one. His current portable oxygen machine is no longer provided by his oxygen company.     Obstructive Sleep Apnea    BiPAP  Follow with pulmonary medicine        Pure Hypercholesterolemia    Lab Results   Component Value Date/Time    CHOLSTRLTOT 114 01/10/2022 08:48 AM    CHOLSTRLTOT 137 11/24/2014 09:42 AM    LDL 48 06/17/2020 09:34 AM    LDL 63 11/24/2014 09:42 AM    HDL 57 01/10/2022 08:48 AM    HDL 40 11/24/2014 09:42 AM    TRIGLYCERIDE 117 01/10/2022 08:48 AM    TRIGLYCERIDE 169 (H) 11/24/2014 09:42 AM       -currently on rosuvastatin 40mg         Current Outpatient Medications Ordered in Epic   Medication Sig Dispense Refill    NON SPECIFIED OCD Regulator M6 tanks      ferrous sulfate 325 (65 Fe) MG tablet Take 1 Tablet by mouth every day. 90 Tablet 1    rosuvastatin (CRESTOR) 40 MG tablet Take 1 Tablet by mouth at bedtime. 90 Tablet 2    FLUoxetine (PROZAC) 20 MG Cap TAKE 1 CAPSULE BY MOUTH EVERY DAY 90 Capsule 2    tamsulosin (FLOMAX) 0.4 MG capsule Take 1 Capsule by mouth 1/2 hour after breakfast. 90 Capsule 3    gabapentin (NEURONTIN) 800 MG tablet Take 800 mg by mouth 2 times a day.      acetaminophen (TYLENOL) 325 MG Tab Take 650 mg by mouth every four hours as needed for Mild Pain.      losartan (COZAAR) 100 MG Tab Take 1 Tablet by mouth every day. 90 Tablet 3    methocarbamol (ROBAXIN) 750 MG  Tab Take 750 mg by mouth 2 times a day as needed. Indications: Musculoskeletal Pain      Diclofenac Sodium 1 % Gel Apply 1 Application topically 2 times a day as needed (Back Pain).       No current Epic-ordered facility-administered medications on file.           ROS:  Review of Systems   Constitutional:  Negative for chills and fever.   Respiratory:  Negative for shortness of breath.    Cardiovascular:  Negative for chest pain.     Objective:     Exam:  There were no vitals taken for this visit. There is no height or weight on file to calculate BMI.    Physical Exam  Constitutional:       General: He is not in acute distress.     Appearance: He is not ill-appearing.   Pulmonary:      Effort: Pulmonary effort is normal.   Neurological:      Mental Status: He is alert.   Psychiatric:         Mood and Affect: Mood normal.         Behavior: Behavior normal.         Thought Content: Thought content normal.         Judgment: Judgment normal.             Assessment & Plan:     Problem List Items Addressed This Visit       Chronic respiratory failure with hypoxia (HCC)     Chronic  - Patient needs OCD regulator on pulse dose with M6 tank         Relevant Orders    DME Order    DME Portable Oxygen Concentrator    Obstructive sleep apnea     Chronic           Pure hypercholesterolemia     Chronic  -continue rosuvastatin 40mg                  Please note that this dictation was created using voice recognition software. I have made every reasonable attempt to correct obvious errors, but I expect that there are errors of grammar and possibly content that I did not discover before finalizing the note.

## 2023-05-09 DIAGNOSIS — F41.1 GENERALIZED ANXIETY DISORDER: ICD-10-CM

## 2023-05-09 RX ORDER — CLONAZEPAM 0.5 MG/1
TABLET ORAL 2 TIMES DAILY
COMMUNITY
End: 2023-05-09 | Stop reason: SDUPTHER

## 2023-05-10 RX ORDER — CLONAZEPAM 0.5 MG/1
0.25 TABLET ORAL 2 TIMES DAILY
Qty: 90 TABLET | Refills: 0 | Status: SHIPPED | OUTPATIENT
Start: 2023-05-10 | End: 2023-08-28 | Stop reason: SDUPTHER

## 2023-05-18 ENCOUNTER — HOSPITAL ENCOUNTER (OUTPATIENT)
Dept: LAB | Facility: MEDICAL CENTER | Age: 73
End: 2023-05-18
Attending: PHYSICIAN ASSISTANT
Payer: MEDICARE

## 2023-05-18 LAB
ALBUMIN SERPL BCP-MCNC: 4.4 G/DL (ref 3.2–4.9)
ALBUMIN/GLOB SERPL: 2.3 G/DL
ALP SERPL-CCNC: 87 U/L (ref 30–99)
ALT SERPL-CCNC: 19 U/L (ref 2–50)
ANION GAP SERPL CALC-SCNC: 14 MMOL/L (ref 7–16)
AST SERPL-CCNC: 17 U/L (ref 12–45)
BASOPHILS # BLD AUTO: 0.7 % (ref 0–1.8)
BASOPHILS # BLD: 0.04 K/UL (ref 0–0.12)
BILIRUB SERPL-MCNC: 1.1 MG/DL (ref 0.1–1.5)
BUN SERPL-MCNC: 13 MG/DL (ref 8–22)
CALCIUM ALBUM COR SERPL-MCNC: 9.4 MG/DL (ref 8.5–10.5)
CALCIUM SERPL-MCNC: 9.7 MG/DL (ref 8.5–10.5)
CEA SERPL-MCNC: 3.3 NG/ML (ref 0–3)
CHLORIDE SERPL-SCNC: 98 MMOL/L (ref 96–112)
CO2 SERPL-SCNC: 20 MMOL/L (ref 20–33)
CREAT SERPL-MCNC: 0.99 MG/DL (ref 0.5–1.4)
EOSINOPHIL # BLD AUTO: 0.19 K/UL (ref 0–0.51)
EOSINOPHIL NFR BLD: 3.5 % (ref 0–6.9)
ERYTHROCYTE [DISTWIDTH] IN BLOOD BY AUTOMATED COUNT: 50.8 FL (ref 35.9–50)
GFR SERPLBLD CREATININE-BSD FMLA CKD-EPI: 81 ML/MIN/1.73 M 2
GLOBULIN SER CALC-MCNC: 1.9 G/DL (ref 1.9–3.5)
GLUCOSE SERPL-MCNC: 118 MG/DL (ref 65–99)
HCT VFR BLD AUTO: 40.5 % (ref 42–52)
HGB BLD-MCNC: 13.1 G/DL (ref 14–18)
IMM GRANULOCYTES # BLD AUTO: 0.03 K/UL (ref 0–0.11)
IMM GRANULOCYTES NFR BLD AUTO: 0.5 % (ref 0–0.9)
LYMPHOCYTES # BLD AUTO: 0.55 K/UL (ref 1–4.8)
LYMPHOCYTES NFR BLD: 10.1 % (ref 22–41)
MCH RBC QN AUTO: 29.6 PG (ref 27–33)
MCHC RBC AUTO-ENTMCNC: 32.3 G/DL (ref 33.7–35.3)
MCV RBC AUTO: 91.6 FL (ref 81.4–97.8)
MONOCYTES # BLD AUTO: 0.55 K/UL (ref 0–0.85)
MONOCYTES NFR BLD AUTO: 10.1 % (ref 0–13.4)
NEUTROPHILS # BLD AUTO: 4.1 K/UL (ref 1.82–7.42)
NEUTROPHILS NFR BLD: 75.1 % (ref 44–72)
NRBC # BLD AUTO: 0 K/UL
NRBC BLD-RTO: 0 /100 WBC
PLATELET # BLD AUTO: 198 K/UL (ref 164–446)
PMV BLD AUTO: 9.3 FL (ref 9–12.9)
POTASSIUM SERPL-SCNC: 3.9 MMOL/L (ref 3.6–5.5)
PROT SERPL-MCNC: 6.3 G/DL (ref 6–8.2)
PSA SERPL-MCNC: 4.04 NG/ML (ref 0–4)
RBC # BLD AUTO: 4.42 M/UL (ref 4.7–6.1)
SODIUM SERPL-SCNC: 132 MMOL/L (ref 135–145)
WBC # BLD AUTO: 5.5 K/UL (ref 4.8–10.8)

## 2023-05-18 PROCEDURE — 36415 COLL VENOUS BLD VENIPUNCTURE: CPT

## 2023-05-18 PROCEDURE — 80053 COMPREHEN METABOLIC PANEL: CPT

## 2023-05-18 PROCEDURE — 82378 CARCINOEMBRYONIC ANTIGEN: CPT

## 2023-05-18 PROCEDURE — 84153 ASSAY OF PSA TOTAL: CPT

## 2023-05-18 PROCEDURE — 85025 COMPLETE CBC W/AUTO DIFF WBC: CPT

## 2023-05-22 ENCOUNTER — HOSPITAL ENCOUNTER (OUTPATIENT)
Dept: RADIOLOGY | Facility: MEDICAL CENTER | Age: 73
End: 2023-05-22
Attending: INTERNAL MEDICINE
Payer: MEDICARE

## 2023-05-22 DIAGNOSIS — C78.02 SECONDARY MALIGNANT NEOPLASM OF LEFT LUNG (HCC): ICD-10-CM

## 2023-05-22 DIAGNOSIS — C18.2 MALIGNANT NEOPLASM OF ASCENDING COLON (HCC): ICD-10-CM

## 2023-05-22 DIAGNOSIS — Z51.12 ENCOUNTER FOR ANTINEOPLASTIC IMMUNOTHERAPY: ICD-10-CM

## 2023-05-22 DIAGNOSIS — Z51.11 ENCOUNTER FOR ANTINEOPLASTIC CHEMOTHERAPY: ICD-10-CM

## 2023-05-22 DIAGNOSIS — R19.7 DIARRHEA OF PRESUMED INFECTIOUS ORIGIN: ICD-10-CM

## 2023-05-22 DIAGNOSIS — R91.1 COIN LESION: ICD-10-CM

## 2023-05-22 DIAGNOSIS — E86.0 DEHYDRATION: ICD-10-CM

## 2023-05-22 PROCEDURE — 71250 CT THORAX DX C-: CPT

## 2023-06-07 ENCOUNTER — HOSPITAL ENCOUNTER (OUTPATIENT)
Dept: LAB | Facility: MEDICAL CENTER | Age: 73
End: 2023-06-07
Attending: STUDENT IN AN ORGANIZED HEALTH CARE EDUCATION/TRAINING PROGRAM
Payer: MEDICARE

## 2023-06-07 DIAGNOSIS — N18.32 STAGE 3B CHRONIC KIDNEY DISEASE: ICD-10-CM

## 2023-06-07 LAB
ANION GAP SERPL CALC-SCNC: 14 MMOL/L (ref 7–16)
BUN SERPL-MCNC: 17 MG/DL (ref 8–22)
CALCIUM SERPL-MCNC: 10.3 MG/DL (ref 8.5–10.5)
CHLORIDE SERPL-SCNC: 98 MMOL/L (ref 96–112)
CO2 SERPL-SCNC: 25 MMOL/L (ref 20–33)
CREAT SERPL-MCNC: 1.04 MG/DL (ref 0.5–1.4)
ERYTHROCYTE [DISTWIDTH] IN BLOOD BY AUTOMATED COUNT: 51.9 FL (ref 35.9–50)
FASTING STATUS PATIENT QL REPORTED: NORMAL
GFR SERPLBLD CREATININE-BSD FMLA CKD-EPI: 76 ML/MIN/1.73 M 2
GLUCOSE SERPL-MCNC: 117 MG/DL (ref 65–99)
HCT VFR BLD AUTO: 41.2 % (ref 42–52)
HGB BLD-MCNC: 13.7 G/DL (ref 14–18)
MCH RBC QN AUTO: 30.6 PG (ref 27–33)
MCHC RBC AUTO-ENTMCNC: 33.3 G/DL (ref 32.3–36.5)
MCV RBC AUTO: 92.2 FL (ref 81.4–97.8)
PLATELET # BLD AUTO: 272 K/UL (ref 164–446)
PMV BLD AUTO: 9.8 FL (ref 9–12.9)
POTASSIUM SERPL-SCNC: 4.6 MMOL/L (ref 3.6–5.5)
RBC # BLD AUTO: 4.47 M/UL (ref 4.7–6.1)
SODIUM SERPL-SCNC: 137 MMOL/L (ref 135–145)
WBC # BLD AUTO: 7.4 K/UL (ref 4.8–10.8)

## 2023-06-07 PROCEDURE — 36415 COLL VENOUS BLD VENIPUNCTURE: CPT

## 2023-06-07 PROCEDURE — 85027 COMPLETE CBC AUTOMATED: CPT

## 2023-06-07 PROCEDURE — 80048 BASIC METABOLIC PNL TOTAL CA: CPT

## 2023-06-14 ENCOUNTER — OFFICE VISIT (OUTPATIENT)
Dept: MEDICAL GROUP | Facility: LAB | Age: 73
End: 2023-06-14
Payer: MEDICARE

## 2023-06-14 VITALS
HEART RATE: 68 BPM | BODY MASS INDEX: 36.35 KG/M2 | TEMPERATURE: 97.6 F | DIASTOLIC BLOOD PRESSURE: 78 MMHG | OXYGEN SATURATION: 96 % | RESPIRATION RATE: 16 BRPM | SYSTOLIC BLOOD PRESSURE: 128 MMHG | WEIGHT: 245.4 LBS | HEIGHT: 69 IN

## 2023-06-14 DIAGNOSIS — E78.00 PURE HYPERCHOLESTEROLEMIA: ICD-10-CM

## 2023-06-14 DIAGNOSIS — E11.22 TYPE 2 DIABETES MELLITUS WITH STAGE 3 CHRONIC KIDNEY DISEASE, WITHOUT LONG-TERM CURRENT USE OF INSULIN, UNSPECIFIED WHETHER STAGE 3A OR 3B CKD (HCC): ICD-10-CM

## 2023-06-14 DIAGNOSIS — Z79.899 LONG-TERM CURRENT USE OF BENZODIAZEPINE: ICD-10-CM

## 2023-06-14 DIAGNOSIS — E78.2 MIXED HYPERLIPIDEMIA: ICD-10-CM

## 2023-06-14 DIAGNOSIS — N18.30 TYPE 2 DIABETES MELLITUS WITH STAGE 3 CHRONIC KIDNEY DISEASE, WITHOUT LONG-TERM CURRENT USE OF INSULIN, UNSPECIFIED WHETHER STAGE 3A OR 3B CKD (HCC): ICD-10-CM

## 2023-06-14 PROCEDURE — 3078F DIAST BP <80 MM HG: CPT | Performed by: STUDENT IN AN ORGANIZED HEALTH CARE EDUCATION/TRAINING PROGRAM

## 2023-06-14 PROCEDURE — 3074F SYST BP LT 130 MM HG: CPT | Performed by: STUDENT IN AN ORGANIZED HEALTH CARE EDUCATION/TRAINING PROGRAM

## 2023-06-14 PROCEDURE — 99214 OFFICE O/P EST MOD 30 MIN: CPT | Performed by: STUDENT IN AN ORGANIZED HEALTH CARE EDUCATION/TRAINING PROGRAM

## 2023-06-14 ASSESSMENT — FIBROSIS 4 INDEX: FIB4 SCORE: 1.02

## 2023-06-14 ASSESSMENT — ENCOUNTER SYMPTOMS
SHORTNESS OF BREATH: 0
FEVER: 0
CHILLS: 0

## 2023-06-14 ASSESSMENT — PATIENT HEALTH QUESTIONNAIRE - PHQ9: CLINICAL INTERPRETATION OF PHQ2 SCORE: 0

## 2023-06-14 NOTE — PROGRESS NOTES
Subjective:     CC: lab follow up    HPI:   Gerald presents today for the following;    Problem   Long-Term Current Use of Benzodiazepine    6/9/2022  Patient has been on clonzaepam for 20 years or so.  I had a long discussion with the patient that benzodiazepines are not first-line treatment for generalized anxiety.  Patient is undergoing prostate cancer treatment over the next 10 weeks.  He states that with all the imaging and treatment his anxiety is very high right now and the clonazepam helps.    2/7/2023  - We have been trying to taper down his clonazepam requirement  -Patient is currently on 0.5 mg twice daily of clonazepam.  He was originally on 1 mg of clonazepam twice daily    6/14/23  -patient has only been taking clonazepam 0.5mg once a daily without any issues      Pure Hypercholesterolemia    Lab Results   Component Value Date/Time    CHOLSTRLTOT 114 01/10/2022 08:48 AM    CHOLSTRLTOT 137 11/24/2014 09:42 AM    LDL 48 06/17/2020 09:34 AM    LDL 63 11/24/2014 09:42 AM    HDL 57 01/10/2022 08:48 AM    HDL 40 11/24/2014 09:42 AM    TRIGLYCERIDE 117 01/10/2022 08:48 AM    TRIGLYCERIDE 169 (H) 11/24/2014 09:42 AM       -currently on rosuvastatin 40mg         Current Outpatient Medications Ordered in Epic   Medication Sig Dispense Refill    clonazePAM (KLONOPIN) 0.5 MG Tab Take 0.5 Tablets by mouth 2 times a day for 90 days. 90 Tablet 0    losartan (COZAAR) 100 MG Tab TAKE 1 TABLET BY MOUTH EVERY DAY 90 Tablet 3    NON SPECIFIED OCD Regulator  tanks      ferrous sulfate 325 (65 Fe) MG tablet Take 1 Tablet by mouth every day. 90 Tablet 1    rosuvastatin (CRESTOR) 40 MG tablet Take 1 Tablet by mouth at bedtime. 90 Tablet 2    FLUoxetine (PROZAC) 20 MG Cap TAKE 1 CAPSULE BY MOUTH EVERY DAY 90 Capsule 2    tamsulosin (FLOMAX) 0.4 MG capsule Take 1 Capsule by mouth 1/2 hour after breakfast. 90 Capsule 3    gabapentin (NEURONTIN) 800 MG tablet Take 800 mg by mouth 2 times a day.      acetaminophen (TYLENOL) 325 MG  "Tab Take 650 mg by mouth every four hours as needed for Mild Pain.      methocarbamol (ROBAXIN) 750 MG Tab Take 750 mg by mouth 2 times a day as needed. Indications: Musculoskeletal Pain      Diclofenac Sodium 1 % Gel Apply 1 Application topically 2 times a day as needed (Back Pain).       No current Epic-ordered facility-administered medications on file.     6/7 point appreciated bilaterally on monofilament testing       ROS:  Review of Systems   Constitutional:  Negative for chills and fever.   Respiratory:  Negative for shortness of breath.    Cardiovascular:  Negative for chest pain.       Objective:     Exam:  /78 (BP Location: Right arm, Patient Position: Sitting, BP Cuff Size: Adult long)   Pulse 68   Temp 36.4 °C (97.6 °F)   Resp 16   Ht 1.753 m (5' 9\")   Wt 111 kg (245 lb 6.4 oz)   SpO2 96%   BMI 36.24 kg/m²  Body mass index is 36.24 kg/m².    Physical Exam  Constitutional:       General: He is not in acute distress.     Appearance: He is not ill-appearing.   Pulmonary:      Effort: Pulmonary effort is normal.   Neurological:      Mental Status: He is alert.   Psychiatric:         Mood and Affect: Mood normal.         Behavior: Behavior normal.         Thought Content: Thought content normal.         Judgment: Judgment normal.               Assessment & Plan:     Problem List Items Addressed This Visit       Hyperlipidemia    Relevant Orders    Comp Metabolic Panel    Lipid Profile    TSH WITH REFLEX TO FT4    Long-term current use of benzodiazepine     Chronic-stable  -continue to taper  -like at next refill I will decrease to 0.25mg          Pure hypercholesterolemia     Chronic-stable  Continue rosuvastatin          Other Visit Diagnoses       Type 2 diabetes mellitus with stage 3 chronic kidney disease, without long-term current use of insulin, unspecified whether stage 3a or 3b CKD (HCC)        Relevant Orders    HEMOGLOBIN A1C    Diabetic Monofilament LE Exam (Completed)            Please " note that this dictation was created using voice recognition software. I have made every reasonable attempt to correct obvious errors, but I expect that there are errors of grammar and possibly content that I did not discover before finalizing the note.

## 2023-06-15 ENCOUNTER — TELEPHONE (OUTPATIENT)
Dept: MEDICAL GROUP | Facility: LAB | Age: 73
End: 2023-06-15
Payer: MEDICARE

## 2023-06-16 NOTE — TELEPHONE ENCOUNTER
6/13/23:Argelia sent me msg she requested eye records. Not sure from where or when it was done.   I spoke with Gerald and he said he has not had eye appt yet and needs to schedule.     He said he will schedule eye exam soon.

## 2023-06-22 ENCOUNTER — HOSPITAL ENCOUNTER (OUTPATIENT)
Dept: LAB | Facility: MEDICAL CENTER | Age: 73
End: 2023-06-22
Attending: RADIOLOGY
Payer: MEDICARE

## 2023-06-22 LAB — PSA SERPL-MCNC: 3.13 NG/ML (ref 0–4)

## 2023-06-22 PROCEDURE — 84153 ASSAY OF PSA TOTAL: CPT

## 2023-06-22 PROCEDURE — 36415 COLL VENOUS BLD VENIPUNCTURE: CPT

## 2023-07-24 ENCOUNTER — APPOINTMENT (RX ONLY)
Dept: URBAN - METROPOLITAN AREA CLINIC 35 | Facility: CLINIC | Age: 73
Setting detail: DERMATOLOGY
End: 2023-07-24

## 2023-07-24 DIAGNOSIS — L72.0 EPIDERMAL CYST: ICD-10-CM

## 2023-07-24 DIAGNOSIS — D22 MELANOCYTIC NEVI: ICD-10-CM

## 2023-07-24 DIAGNOSIS — Z85.828 PERSONAL HISTORY OF OTHER MALIGNANT NEOPLASM OF SKIN: ICD-10-CM

## 2023-07-24 DIAGNOSIS — L11.1 TRANSIENT ACANTHOLYTIC DERMATOSIS [GROVER]: ICD-10-CM

## 2023-07-24 DIAGNOSIS — F42.4 EXCORIATION (SKIN-PICKING) DISORDER: ICD-10-CM

## 2023-07-24 DIAGNOSIS — D18.0 HEMANGIOMA: ICD-10-CM

## 2023-07-24 DIAGNOSIS — L82.1 OTHER SEBORRHEIC KERATOSIS: ICD-10-CM

## 2023-07-24 DIAGNOSIS — L81.4 OTHER MELANIN HYPERPIGMENTATION: ICD-10-CM

## 2023-07-24 DIAGNOSIS — Z71.89 OTHER SPECIFIED COUNSELING: ICD-10-CM

## 2023-07-24 DIAGNOSIS — Z87.2 PERSONAL HISTORY OF DISEASES OF THE SKIN AND SUBCUTANEOUS TISSUE: ICD-10-CM

## 2023-07-24 PROBLEM — D18.01 HEMANGIOMA OF SKIN AND SUBCUTANEOUS TISSUE: Status: ACTIVE | Noted: 2023-07-24

## 2023-07-24 PROBLEM — D22.62 MELANOCYTIC NEVI OF LEFT UPPER LIMB, INCLUDING SHOULDER: Status: ACTIVE | Noted: 2023-07-24

## 2023-07-24 PROBLEM — D22.61 MELANOCYTIC NEVI OF RIGHT UPPER LIMB, INCLUDING SHOULDER: Status: ACTIVE | Noted: 2023-07-24

## 2023-07-24 PROBLEM — D22.5 MELANOCYTIC NEVI OF TRUNK: Status: ACTIVE | Noted: 2023-07-24

## 2023-07-24 PROCEDURE — ? DEFER

## 2023-07-24 PROCEDURE — ? OBSERVATION AND MEASURE

## 2023-07-24 PROCEDURE — 99213 OFFICE O/P EST LOW 20 MIN: CPT

## 2023-07-24 PROCEDURE — ? COUNSELING

## 2023-07-24 ASSESSMENT — LOCATION DETAILED DESCRIPTION DERM
LOCATION DETAILED: RIGHT PROXIMAL DORSAL FOREARM
LOCATION DETAILED: LEFT DISTAL POSTERIOR UPPER ARM
LOCATION DETAILED: SUPERIOR LUMBAR SPINE
LOCATION DETAILED: RIGHT DISTAL DORSAL FOREARM
LOCATION DETAILED: LEFT LATERAL SUPERIOR CHEST
LOCATION DETAILED: RIGHT MEDIAL UPPER BACK
LOCATION DETAILED: RIGHT CENTRAL LATERAL NECK
LOCATION DETAILED: LEFT PROXIMAL DORSAL FOREARM
LOCATION DETAILED: RIGHT FOREHEAD
LOCATION DETAILED: LEFT DISTAL DORSAL FOREARM
LOCATION DETAILED: SUPERIOR THORACIC SPINE
LOCATION DETAILED: RIGHT PROXIMAL POSTERIOR UPPER ARM
LOCATION DETAILED: EPIGASTRIC SKIN
LOCATION DETAILED: LEFT INFERIOR UPPER BACK
LOCATION DETAILED: LEFT SUPERIOR MEDIAL MIDBACK
LOCATION DETAILED: LEFT SUPERIOR UPPER BACK
LOCATION DETAILED: LEFT INFERIOR LATERAL MIDBACK
LOCATION DETAILED: LEFT ANTERIOR DISTAL UPPER ARM
LOCATION DETAILED: RIGHT ANTERIOR DISTAL UPPER ARM
LOCATION DETAILED: INFERIOR THORACIC SPINE
LOCATION DETAILED: RIGHT INFERIOR MEDIAL UPPER BACK
LOCATION DETAILED: LEFT SUPERIOR LATERAL UPPER BACK

## 2023-07-24 ASSESSMENT — LOCATION ZONE DERM
LOCATION ZONE: FACE
LOCATION ZONE: NECK
LOCATION ZONE: ARM
LOCATION ZONE: TRUNK

## 2023-07-24 ASSESSMENT — LOCATION SIMPLE DESCRIPTION DERM
LOCATION SIMPLE: RIGHT FOREARM
LOCATION SIMPLE: CHEST
LOCATION SIMPLE: ABDOMEN
LOCATION SIMPLE: RIGHT UPPER ARM
LOCATION SIMPLE: LEFT UPPER ARM
LOCATION SIMPLE: LOWER BACK
LOCATION SIMPLE: RIGHT FOREHEAD
LOCATION SIMPLE: RIGHT UPPER BACK
LOCATION SIMPLE: UPPER BACK
LOCATION SIMPLE: LEFT UPPER BACK
LOCATION SIMPLE: NECK
LOCATION SIMPLE: LEFT LOWER BACK
LOCATION SIMPLE: LEFT FOREARM

## 2023-07-24 NOTE — PROCEDURE: DEFER
Instructions (Optional): If not resolved
Procedure To Be Performed At Next Visit: Biopsy by shave method
Size Of Lesion In Cm (Optional): 0
Introduction Text (Please End With A Colon): The following procedure was deferred:
Detail Level: Simple

## 2023-08-28 DIAGNOSIS — F41.1 GENERALIZED ANXIETY DISORDER: ICD-10-CM

## 2023-08-28 NOTE — TELEPHONE ENCOUNTER
Received request via: Patient    Was the patient seen in the last year in this department? Yes    Does the patient have an active prescription (recently filled or refills available) for medication(s) requested? No    Does the patient have [facility] Plus and need 100 day supply (blood pressure, diabetes and cholesterol meds only)? Patient does not have SCP          Patient called and LVM requesting refill of medication please advise.

## 2023-08-29 ENCOUNTER — APPOINTMENT (RX ONLY)
Dept: URBAN - METROPOLITAN AREA CLINIC 35 | Facility: CLINIC | Age: 73
Setting detail: DERMATOLOGY
End: 2023-08-29

## 2023-08-29 DIAGNOSIS — D69.2 OTHER NONTHROMBOCYTOPENIC PURPURA: ICD-10-CM

## 2023-08-29 DIAGNOSIS — F42.4 EXCORIATION (SKIN-PICKING) DISORDER: ICD-10-CM

## 2023-08-29 PROCEDURE — ? COUNSELING

## 2023-08-29 PROCEDURE — 99212 OFFICE O/P EST SF 10 MIN: CPT

## 2023-08-29 PROCEDURE — ? ADDITIONAL NOTES

## 2023-08-29 RX ORDER — CLONAZEPAM 0.5 MG/1
0.25 TABLET ORAL 2 TIMES DAILY
Qty: 90 TABLET | Refills: 0 | Status: SHIPPED | OUTPATIENT
Start: 2023-08-29 | End: 2023-10-31 | Stop reason: SDUPTHER

## 2023-08-29 ASSESSMENT — LOCATION ZONE DERM
LOCATION ZONE: ARM
LOCATION ZONE: FACE
LOCATION ZONE: TRUNK

## 2023-08-29 ASSESSMENT — LOCATION SIMPLE DESCRIPTION DERM
LOCATION SIMPLE: RIGHT FOREHEAD
LOCATION SIMPLE: LEFT FOREARM
LOCATION SIMPLE: LEFT LOWER BACK

## 2023-08-29 ASSESSMENT — LOCATION DETAILED DESCRIPTION DERM
LOCATION DETAILED: LEFT PROXIMAL DORSAL FOREARM
LOCATION DETAILED: RIGHT FOREHEAD
LOCATION DETAILED: LEFT INFERIOR MEDIAL MIDBACK

## 2023-08-29 NOTE — PROCEDURE: ADDITIONAL NOTES
Detail Level: Simple
Additional Notes: right forehead appears as a scar today \\nleft lower back, no lesion present on exam today
Render Risk Assessment In Note?: no

## 2023-09-19 ENCOUNTER — HOSPITAL ENCOUNTER (OUTPATIENT)
Dept: LAB | Facility: MEDICAL CENTER | Age: 73
End: 2023-09-19
Attending: RADIOLOGY
Payer: MEDICARE

## 2023-09-19 LAB — PSA SERPL-MCNC: 2.48 NG/ML (ref 0–4)

## 2023-09-19 PROCEDURE — 36415 COLL VENOUS BLD VENIPUNCTURE: CPT

## 2023-09-19 PROCEDURE — 84153 ASSAY OF PSA TOTAL: CPT

## 2023-09-28 ENCOUNTER — TELEPHONE (OUTPATIENT)
Dept: MEDICAL GROUP | Facility: LAB | Age: 73
End: 2023-09-28
Payer: MEDICARE

## 2023-09-28 DIAGNOSIS — N18.30 TYPE 2 DIABETES MELLITUS WITH STAGE 3 CHRONIC KIDNEY DISEASE, WITHOUT LONG-TERM CURRENT USE OF INSULIN, UNSPECIFIED WHETHER STAGE 3A OR 3B CKD (HCC): ICD-10-CM

## 2023-09-28 DIAGNOSIS — E11.22 TYPE 2 DIABETES MELLITUS WITH STAGE 3 CHRONIC KIDNEY DISEASE, WITHOUT LONG-TERM CURRENT USE OF INSULIN, UNSPECIFIED WHETHER STAGE 3A OR 3B CKD (HCC): ICD-10-CM

## 2023-09-28 NOTE — TELEPHONE ENCOUNTER
Patient missed his last appt. They can't get him in until end of December. He is asking for a new referral to [facility] they can get him in mid October.

## 2023-10-03 RX ORDER — ROSUVASTATIN CALCIUM 40 MG/1
40 TABLET, COATED ORAL
Qty: 90 TABLET | Refills: 2 | Status: SHIPPED | OUTPATIENT
Start: 2023-10-03

## 2023-10-03 NOTE — TELEPHONE ENCOUNTER
Received request via: Pharmacy    Was the patient seen in the last year in this department? Yes    Does the patient have an active prescription (recently filled or refills available) for medication(s) requested? YES    Does the patient have [facility] and need 100 day supply (blood pressure, diabetes and cholesterol meds only)? Patient does not have SCP   Requested Prescriptions     Pending Prescriptions Disp Refills    rosuvastatin (CRESTOR) 40 MG tablet [Pharmacy Med Name: ROSUVASTATIN CALCIUM 40 MG TAB] 90 Tablet 2     Sig: TAKE 1 TABLET BY MOUTH EVERYDAY AT BEDTIME

## 2023-10-14 ENCOUNTER — HOSPITAL ENCOUNTER (OUTPATIENT)
Dept: RADIOLOGY | Facility: MEDICAL CENTER | Age: 73
End: 2023-10-14
Attending: INTERNAL MEDICINE
Payer: MEDICARE

## 2023-10-14 DIAGNOSIS — E86.0 DEHYDRATION: ICD-10-CM

## 2023-10-14 DIAGNOSIS — Z51.11 ENCOUNTER FOR ANTINEOPLASTIC CHEMOTHERAPY: ICD-10-CM

## 2023-10-14 DIAGNOSIS — C18.2 MALIGNANT NEOPLASM OF ASCENDING COLON (HCC): ICD-10-CM

## 2023-10-14 PROCEDURE — 71250 CT THORAX DX C-: CPT

## 2023-10-31 DIAGNOSIS — F41.1 GENERALIZED ANXIETY DISORDER: ICD-10-CM

## 2023-10-31 NOTE — TELEPHONE ENCOUNTER
Received request via: Pharmacy  11/27/23 next fill  Was the patient seen in the last year in this department? Yes  6/14/23  Does the patient have an active prescription (recently filled or refills available) for medication(s) requested? No    Does the patient have [facility] Plus and need 100 day supply (blood pressure, diabetes and cholesterol meds only)? Medication is not for cholesterol, blood pressure or diabetes

## 2023-11-01 ENCOUNTER — TELEPHONE (OUTPATIENT)
Dept: MEDICAL GROUP | Facility: LAB | Age: 73
End: 2023-11-01
Payer: MEDICARE

## 2023-11-01 RX ORDER — CLONAZEPAM 0.5 MG/1
0.25 TABLET ORAL 2 TIMES DAILY
Qty: 90 TABLET | Refills: 0 | Status: SHIPPED | OUTPATIENT
Start: 2023-11-27 | End: 2023-11-20

## 2023-11-01 NOTE — TELEPHONE ENCOUNTER
clonazePAM (KLONOPIN) 0.5 MG Tab  Patient is out of medication. He spoke with pharmacy and said he was to cut tablets in half. He is now out of the medication. Please confirm the correct dose and send in a new script. Thanks

## 2023-11-02 DIAGNOSIS — F41.1 GENERALIZED ANXIETY DISORDER: ICD-10-CM

## 2023-11-02 DIAGNOSIS — F41.9 ANXIETY: ICD-10-CM

## 2023-11-02 RX ORDER — CLONAZEPAM 0.25 MG/1
1 TABLET, ORALLY DISINTEGRATING ORAL 2 TIMES DAILY PRN
Qty: 180 TABLET | Refills: 0 | Status: SHIPPED | OUTPATIENT
Start: 2023-11-02 | End: 2023-11-20 | Stop reason: SDUPTHER

## 2023-11-02 NOTE — TELEPHONE ENCOUNTER
The script sent in is to start on 11/27. Was this an error? He stated he is taking one sometimes two a day. He is asking if he takes the entire 0.5 mg tablet or needs to cut in half.he has not cut them in half. The pharmacy said he was to take half a pill.

## 2023-11-14 ENCOUNTER — HOSPITAL ENCOUNTER (OUTPATIENT)
Dept: LAB | Facility: MEDICAL CENTER | Age: 73
End: 2023-11-14
Attending: STUDENT IN AN ORGANIZED HEALTH CARE EDUCATION/TRAINING PROGRAM
Payer: MEDICARE

## 2023-11-14 DIAGNOSIS — N18.30 TYPE 2 DIABETES MELLITUS WITH STAGE 3 CHRONIC KIDNEY DISEASE, WITHOUT LONG-TERM CURRENT USE OF INSULIN, UNSPECIFIED WHETHER STAGE 3A OR 3B CKD (HCC): ICD-10-CM

## 2023-11-14 DIAGNOSIS — E11.22 TYPE 2 DIABETES MELLITUS WITH STAGE 3 CHRONIC KIDNEY DISEASE, WITHOUT LONG-TERM CURRENT USE OF INSULIN, UNSPECIFIED WHETHER STAGE 3A OR 3B CKD (HCC): ICD-10-CM

## 2023-11-14 DIAGNOSIS — E78.2 MIXED HYPERLIPIDEMIA: ICD-10-CM

## 2023-11-14 LAB
ALBUMIN SERPL BCP-MCNC: 4.7 G/DL (ref 3.2–4.9)
ALBUMIN SERPL BCP-MCNC: 4.8 G/DL (ref 3.2–4.9)
ALBUMIN/GLOB SERPL: 2.5 G/DL
ALBUMIN/GLOB SERPL: 2.7 G/DL
ALP SERPL-CCNC: 88 U/L (ref 30–99)
ALP SERPL-CCNC: 90 U/L (ref 30–99)
ALT SERPL-CCNC: 29 U/L (ref 2–50)
ALT SERPL-CCNC: 32 U/L (ref 2–50)
ANION GAP SERPL CALC-SCNC: 15 MMOL/L (ref 7–16)
ANION GAP SERPL CALC-SCNC: 16 MMOL/L (ref 7–16)
AST SERPL-CCNC: 33 U/L (ref 12–45)
AST SERPL-CCNC: 35 U/L (ref 12–45)
BASOPHILS # BLD AUTO: 0.7 % (ref 0–1.8)
BASOPHILS # BLD: 0.03 K/UL (ref 0–0.12)
BILIRUB SERPL-MCNC: 0.7 MG/DL (ref 0.1–1.5)
BILIRUB SERPL-MCNC: 0.7 MG/DL (ref 0.1–1.5)
BUN SERPL-MCNC: 10 MG/DL (ref 8–22)
BUN SERPL-MCNC: 10 MG/DL (ref 8–22)
CALCIUM ALBUM COR SERPL-MCNC: 9.1 MG/DL (ref 8.5–10.5)
CALCIUM ALBUM COR SERPL-MCNC: 9.2 MG/DL (ref 8.5–10.5)
CALCIUM SERPL-MCNC: 9.7 MG/DL (ref 8.5–10.5)
CALCIUM SERPL-MCNC: 9.8 MG/DL (ref 8.5–10.5)
CEA SERPL-MCNC: 3.3 NG/ML (ref 0–3)
CHLORIDE SERPL-SCNC: 101 MMOL/L (ref 96–112)
CHLORIDE SERPL-SCNC: 103 MMOL/L (ref 96–112)
CHOLEST SERPL-MCNC: 124 MG/DL (ref 100–199)
CO2 SERPL-SCNC: 20 MMOL/L (ref 20–33)
CO2 SERPL-SCNC: 21 MMOL/L (ref 20–33)
CREAT SERPL-MCNC: 0.84 MG/DL (ref 0.5–1.4)
CREAT SERPL-MCNC: 0.86 MG/DL (ref 0.5–1.4)
EOSINOPHIL # BLD AUTO: 0.14 K/UL (ref 0–0.51)
EOSINOPHIL NFR BLD: 3.1 % (ref 0–6.9)
ERYTHROCYTE [DISTWIDTH] IN BLOOD BY AUTOMATED COUNT: 45.1 FL (ref 35.9–50)
EST. AVERAGE GLUCOSE BLD GHB EST-MCNC: 114 MG/DL
FASTING STATUS PATIENT QL REPORTED: NORMAL
FASTING STATUS PATIENT QL REPORTED: NORMAL
GFR SERPLBLD CREATININE-BSD FMLA CKD-EPI: 92 ML/MIN/1.73 M 2
GFR SERPLBLD CREATININE-BSD FMLA CKD-EPI: 92 ML/MIN/1.73 M 2
GLOBULIN SER CALC-MCNC: 1.8 G/DL (ref 1.9–3.5)
GLOBULIN SER CALC-MCNC: 1.9 G/DL (ref 1.9–3.5)
GLUCOSE SERPL-MCNC: 83 MG/DL (ref 65–99)
GLUCOSE SERPL-MCNC: 83 MG/DL (ref 65–99)
HBA1C MFR BLD: 5.6 % (ref 4–5.6)
HCT VFR BLD AUTO: 40.2 % (ref 42–52)
HDLC SERPL-MCNC: 73 MG/DL
HGB BLD-MCNC: 12.8 G/DL (ref 14–18)
IMM GRANULOCYTES # BLD AUTO: 0.01 K/UL (ref 0–0.11)
IMM GRANULOCYTES NFR BLD AUTO: 0.2 % (ref 0–0.9)
LDLC SERPL CALC-MCNC: 34 MG/DL
LYMPHOCYTES # BLD AUTO: 0.66 K/UL (ref 1–4.8)
LYMPHOCYTES NFR BLD: 14.8 % (ref 22–41)
MCH RBC QN AUTO: 30.9 PG (ref 27–33)
MCHC RBC AUTO-ENTMCNC: 31.8 G/DL (ref 32.3–36.5)
MCV RBC AUTO: 97.1 FL (ref 81.4–97.8)
MONOCYTES # BLD AUTO: 0.38 K/UL (ref 0–0.85)
MONOCYTES NFR BLD AUTO: 8.5 % (ref 0–13.4)
NEUTROPHILS # BLD AUTO: 3.24 K/UL (ref 1.82–7.42)
NEUTROPHILS NFR BLD: 72.7 % (ref 44–72)
NRBC # BLD AUTO: 0 K/UL
NRBC BLD-RTO: 0 /100 WBC (ref 0–0.2)
PLATELET # BLD AUTO: 190 K/UL (ref 164–446)
PMV BLD AUTO: 9.6 FL (ref 9–12.9)
POTASSIUM SERPL-SCNC: 4.1 MMOL/L (ref 3.6–5.5)
POTASSIUM SERPL-SCNC: 4.3 MMOL/L (ref 3.6–5.5)
PROT SERPL-MCNC: 6.6 G/DL (ref 6–8.2)
PROT SERPL-MCNC: 6.6 G/DL (ref 6–8.2)
RBC # BLD AUTO: 4.14 M/UL (ref 4.7–6.1)
SODIUM SERPL-SCNC: 137 MMOL/L (ref 135–145)
SODIUM SERPL-SCNC: 139 MMOL/L (ref 135–145)
TRIGL SERPL-MCNC: 85 MG/DL (ref 0–149)
WBC # BLD AUTO: 4.5 K/UL (ref 4.8–10.8)

## 2023-11-14 PROCEDURE — 85025 COMPLETE CBC W/AUTO DIFF WBC: CPT

## 2023-11-14 PROCEDURE — 80053 COMPREHEN METABOLIC PANEL: CPT

## 2023-11-14 PROCEDURE — 80053 COMPREHEN METABOLIC PANEL: CPT | Mod: 91

## 2023-11-14 PROCEDURE — 82378 CARCINOEMBRYONIC ANTIGEN: CPT

## 2023-11-14 PROCEDURE — 83036 HEMOGLOBIN GLYCOSYLATED A1C: CPT | Mod: GA

## 2023-11-14 PROCEDURE — 80061 LIPID PANEL: CPT

## 2023-11-14 PROCEDURE — 36415 COLL VENOUS BLD VENIPUNCTURE: CPT

## 2023-11-15 DIAGNOSIS — F41.1 GENERALIZED ANXIETY DISORDER: ICD-10-CM

## 2023-11-15 RX ORDER — FLUOXETINE HYDROCHLORIDE 20 MG/1
20 CAPSULE ORAL DAILY
Qty: 90 CAPSULE | Refills: 2 | Status: SHIPPED | OUTPATIENT
Start: 2023-11-15 | End: 2023-11-20

## 2023-11-15 NOTE — TELEPHONE ENCOUNTER
Received request via: Pharmacy    Was the patient seen in the last year in this department? Yes    Does the patient have an active prescription (recently filled or refills available) for medication(s) requested? yes    Does the patient have [facility] Plus and need 100 day supply (blood pressure, diabetes and cholesterol meds only)? Patient does not have SCP  Requested Prescriptions     Pending Prescriptions Disp Refills    FLUoxetine (PROZAC) 20 MG Cap [Pharmacy Med Name: FLUOXETINE HCL 20 MG CAPSULE] 90 Capsule 2     Sig: TAKE 1 CAPSULE BY MOUTH EVERY DAY

## 2023-11-20 ENCOUNTER — OFFICE VISIT (OUTPATIENT)
Dept: MEDICAL GROUP | Facility: LAB | Age: 73
End: 2023-11-20
Payer: MEDICARE

## 2023-11-20 VITALS
TEMPERATURE: 97.7 F | SYSTOLIC BLOOD PRESSURE: 132 MMHG | HEIGHT: 69 IN | RESPIRATION RATE: 16 BRPM | DIASTOLIC BLOOD PRESSURE: 62 MMHG | BODY MASS INDEX: 38.18 KG/M2 | OXYGEN SATURATION: 96 % | HEART RATE: 72 BPM | WEIGHT: 257.8 LBS

## 2023-11-20 DIAGNOSIS — R73.03 PREDIABETES: ICD-10-CM

## 2023-11-20 DIAGNOSIS — Z79.899 LONG-TERM CURRENT USE OF BENZODIAZEPINE: ICD-10-CM

## 2023-11-20 DIAGNOSIS — C80.1 NEUROPATHY ASSOCIATED WITH CANCER (HCC): ICD-10-CM

## 2023-11-20 DIAGNOSIS — C61 MALIGNANT NEOPLASM OF PROSTATE (HCC): ICD-10-CM

## 2023-11-20 DIAGNOSIS — Z00.00 MEDICARE ANNUAL WELLNESS VISIT, SUBSEQUENT: ICD-10-CM

## 2023-11-20 DIAGNOSIS — F41.9 ANXIETY: ICD-10-CM

## 2023-11-20 DIAGNOSIS — G63 NEUROPATHY ASSOCIATED WITH CANCER (HCC): ICD-10-CM

## 2023-11-20 PROBLEM — N17.9 AKI (ACUTE KIDNEY INJURY) (HCC): Status: RESOLVED | Noted: 2021-10-01 | Resolved: 2023-11-20

## 2023-11-20 PROBLEM — E66.9 OBESITY (BMI 30-39.9): Status: ACTIVE | Noted: 2017-09-20

## 2023-11-20 PROCEDURE — 3078F DIAST BP <80 MM HG: CPT | Performed by: STUDENT IN AN ORGANIZED HEALTH CARE EDUCATION/TRAINING PROGRAM

## 2023-11-20 PROCEDURE — G0439 PPPS, SUBSEQ VISIT: HCPCS | Performed by: STUDENT IN AN ORGANIZED HEALTH CARE EDUCATION/TRAINING PROGRAM

## 2023-11-20 PROCEDURE — 3075F SYST BP GE 130 - 139MM HG: CPT | Performed by: STUDENT IN AN ORGANIZED HEALTH CARE EDUCATION/TRAINING PROGRAM

## 2023-11-20 RX ORDER — CLONAZEPAM 0.25 MG/1
1 TABLET, ORALLY DISINTEGRATING ORAL
Qty: 90 TABLET | Refills: 0 | Status: SHIPPED | OUTPATIENT
Start: 2023-12-01 | End: 2024-02-29

## 2023-11-20 ASSESSMENT — FIBROSIS 4 INDEX: FIB4 SCORE: 2.34

## 2023-11-20 ASSESSMENT — ACTIVITIES OF DAILY LIVING (ADL): BATHING_REQUIRES_ASSISTANCE: 0

## 2023-11-20 ASSESSMENT — ENCOUNTER SYMPTOMS: GENERAL WELL-BEING: FAIR

## 2023-11-20 ASSESSMENT — PATIENT HEALTH QUESTIONNAIRE - PHQ9: CLINICAL INTERPRETATION OF PHQ2 SCORE: 0

## 2023-11-20 NOTE — ASSESSMENT & PLAN NOTE
Chronic-improving  - Decrease clonazepam to 0.25 mg every other day  -PDMP checked  - Controlled substance previously signed

## 2023-11-21 ENCOUNTER — HOSPITAL ENCOUNTER (OUTPATIENT)
Facility: MEDICAL CENTER | Age: 73
End: 2023-11-21
Attending: STUDENT IN AN ORGANIZED HEALTH CARE EDUCATION/TRAINING PROGRAM
Payer: MEDICARE

## 2023-11-21 DIAGNOSIS — Z79.899 LONG-TERM CURRENT USE OF BENZODIAZEPINE: ICD-10-CM

## 2023-11-21 PROCEDURE — 80307 DRUG TEST PRSMV CHEM ANLYZR: CPT

## 2024-01-23 ENCOUNTER — HOSPITAL ENCOUNTER (OUTPATIENT)
Dept: LAB | Facility: MEDICAL CENTER | Age: 74
End: 2024-01-23
Attending: RADIOLOGY
Payer: MEDICARE

## 2024-01-23 LAB — PSA SERPL-MCNC: 1.52 NG/ML (ref 0–4)

## 2024-01-23 PROCEDURE — 36415 COLL VENOUS BLD VENIPUNCTURE: CPT

## 2024-01-23 PROCEDURE — 84153 ASSAY OF PSA TOTAL: CPT

## 2024-02-27 NOTE — PROGRESS NOTES
CC: Follow-up for severe SU on auto titrating BiPAP 10/20/4 CWP and O2 3 lpm        HPI:  Gerald Oshea is a 73 y.o.male  kindly referred by Luis Toth D.O. and last seen by Dr. SANDS on 2/6/2023 when his 90-day compliance was 97% for 9 hours and 20 minutes.  On auto titrating BiPAP 10/20/4 CWP his average residual apnea-hypopnea index was elevated 22.6.  He had a severe leak with a median leak of 67.9 L/min.    A prior remote sleep study showed an AHI of 97.    He was offered a retitration but given his other medical issues he was not interested in that.  He was advised at the time of his last visit to make an appointment with his DME to get a another mask fit.    Today, 2/20/2024, his 30-day compliance is 100% for greater than 4 hours for 9 hours and 9 minutes on days used.  On the aforementioned settings of auto titrating BiPAP 20/16/4 CWP his average residual estimated apnea-hypopnea index is a normal 3.4. Still benefiting from O2.      The patient reports improved symptoms using positive airway pressure.  Has experienced no significant problems with claustrophobia, nosebleeds, sore throat, dry eyes, mask fit, air leaks around the mask, pressure tolerance, excessive airway dryness, dry or runny nose, nasal congestion, facial irritation, aerophagia, or chest pain.     Significant comorbidities and modifying factors include anxiety, hypertension, history of colon cancer, prostate cancer, history of lung metastases status post wedge resection, status post chemotherapy, history of tobacco abuse, peripheral neuropathy secondary to chemotherapy, diabetes mellitus, chronic respiratory failure on supplemental O2, anxiety, hypercholesterolemia, and gout. Lissette broke 2 vertebrae. Finished rads for prostate cancer. Says he is cancer free but chemo gave him neuropathy treated with gabapentin but doesn't think it works well..      J96.11 - Chronic respiratory failure with hypoxia (HCC), first noted by Jocelyn  "MEG Ramos M.D. on 4/11/2018; last updated on 3/23/2023 3:27 PM PDT     Chronic, stable, as based on today's assessment and impact on other conditions evaluated today. Continue with current treatment plan:  Follow-up with specialist as directed, but at least annually.    Patient Active Problem List    Diagnosis Date Noted    Other specified anemias 01/04/2023    Hospital discharge follow-up 12/01/2022    Hyperlipidemia 11/27/2022    Compression fracture of L1 lumbar vertebra, sequela 11/27/2022    Medicare annual wellness visit, subsequent 07/14/2022    Long-term current use of benzodiazepine 06/09/2022    Generalized anxiety disorder 04/30/2021    Microalbuminuria due to type 2 diabetes mellitus (HCC) 06/22/2020    Chronic respiratory failure with hypoxia (HCC) 04/11/2018    History of skin cancer 12/06/2017    Obesity (BMI 30-39.9) 09/20/2017    Obstructive sleep apnea 05/09/2017    Neuropathy associated with cancer (HCC) 03/28/2017    History of colon cancer 09/28/2016    History of gout 09/28/2016    Hypertension 09/28/2016    Anxiety 09/28/2016    Pure hypercholesterolemia 09/28/2016    Prostate cancer (HCC) 09/28/2016    Prediabetes 10/25/2012       Past Medical History:   Diagnosis Date    Anemia     Anxiety     Atrophy of right kidney     one functioning kidney    Breath shortness     Cancer (HCC) 2016    colon   rx surgery and chemo    Colon cancer (HCC)     Former tobacco use     Gout     High cholesterol 12/12/2017    Hyperlipidemia     Hypertension 12/12/2017    Neuropathy 12/12/2017    Pain 12/12/2017    \"Lower Back & Left Leg\"    Pneumonia 1991    Left upper lobe    Psychiatric problem     anxiety    Sleep apnea 12/12/2017    CPAP USE    Snoring     DX SU        Past Surgical History:   Procedure Laterality Date    THORACOSCOPY  2/5/2018    Procedure: THORACOSCOPY- WEDGE RESECTION LT UPPER LOBE METASTASIS;  Surgeon: John H Ganser, M.D.;  Location: SURGERY [facility];  Service: General    [facility] " PLACEMENT Left 10/7/2016    Procedure: CATH PLACEMENT - SUBCLAVIAN PORT;  Surgeon: Sho Bajwa M.D.;  Location: SURGERY SAME DAY [facility] ORS;  Service:     COLON RESECTION  2016    [facility]    CLOSED REDUCTION  10/25/2012    Performed by Chavez Duran M.D. at SURGERY [facility]S       Family History   Problem Relation Age of Onset    Cancer Mother         Bladder    Heart Disease Father     Heart Disease Brother         CABG x2    Hyperlipidemia Brother     No Known Problems Maternal Grandmother     No Known Problems Paternal Grandmother     Heart Disease Paternal Grandfather     Hypertension Paternal Grandfather     Hyperlipidemia Paternal Grandfather     No Known Problems Son     Kidney Disease Neg Hx        Social History     Socioeconomic History    Marital status: Single     Spouse name: Not on file    Number of children: Not on file    Years of education: Not on file    Highest education level: Not on file   Occupational History    Occupation: Retired    Occupation: Former business owner   Tobacco Use    Smoking status: Former     Current packs/day: 0.00     Average packs/day: 1 pack/day for 20.0 years (20.0 ttl pk-yrs)     Types: Cigarettes     Start date: 1978     Quit date: 1998     Years since quittin.1    Smokeless tobacco: Never   Vaping Use    Vaping Use: Never used   Substance and Sexual Activity    Alcohol use: Yes     Alcohol/week: 1.8 oz     Types: 3 Glasses of wine per week     Comment: 2-3 per day    Drug use: No    Sexual activity: Not Currently   Other Topics Concern    Not on file   Social History Narrative    Not on file     Social Determinants of Health     Financial Resource Strain: Not on file   Food Insecurity: Not on file   Transportation Needs: Not on file   Physical Activity: Not on file   Stress: Not on file   Social Connections: Not on file   Intimate Partner Violence: Not on file   Housing Stability: Not on file       Current Outpatient Medications  "  Medication Sig Dispense Refill    Clonazepam 0.25 MG TABLET DISPERSIBLE Take 1 Tablet by mouth 1 time a day as needed (anxiety) for up to 90 days. Indications: Feeling Anxious 90 Tablet 0    rosuvastatin (CRESTOR) 40 MG tablet TAKE 1 TABLET BY MOUTH EVERYDAY AT BEDTIME 90 Tablet 2    losartan (COZAAR) 100 MG Tab TAKE 1 TABLET BY MOUTH EVERY DAY 90 Tablet 3    NON SPECIFIED OCD Regulator M6 tanks      ferrous sulfate 325 (65 Fe) MG tablet Take 1 Tablet by mouth every day. 90 Tablet 1    tamsulosin (FLOMAX) 0.4 MG capsule Take 1 Capsule by mouth 1/2 hour after breakfast. 90 Capsule 3    gabapentin (NEURONTIN) 800 MG tablet Take 800 mg by mouth 2 times a day.      acetaminophen (TYLENOL) 325 MG Tab Take 650 mg by mouth every four hours as needed for Mild Pain.      methocarbamol (ROBAXIN) 750 MG Tab Take 750 mg by mouth 2 times a day as needed. Indications: Musculoskeletal Pain      Diclofenac Sodium 1 % Gel Apply 1 Application topically 2 times a day as needed (Back Pain).       No current facility-administered medications for this visit.    \"CURRENT RX\"    ALLERGIES: Duloxetine, Ketamine, Lidocaine, Lidocaine-tetracaine-epineph, and Morphine    ROS    Constitutional: Denies fever, chills, sweats,  weight loss, fatigue  Cardiovascular: Denies chest pain, tightness, palpitations, swelling in legs/feet, fainting, difficulty breathing when lying down but gets better when sitting up.   Respiratory: Denies shortness of breath, cough, sputum, wheezing, painful breathing, coughing up blood.   Sleep: per HPI  Gastrointestinal: Denies  difficulty swallowing, nausea, abdominal pain, diarrhea, constipation, heartburn.  Musculoskeletal: Denies painful joints, sore muscles, back pain.        PHYSICAL EXAM  Supplemental 02    /88 (BP Location: Left arm, Patient Position: Sitting, BP Cuff Size: Adult)   Pulse 79   Resp 16   Ht 1.778 m (5' 10\")   Wt 120 kg (264 lb)   SpO2 94%   BMI 37.88 kg/m²   Appearance: " Well-nourished, well-developed, no acute distress  Eyes:  PERRLA, EOMI  ENMT: without change  Neck: Supple, trachea midline, no masses  Respiratory effort:  No intercostal retractions or use of accessory muscles  Lung auscultation:  No wheezes rhonchi rubs or rales  Cardiac: No murmurs, rubs, or gallops; regular rhythm, normal rate; no edema  Abdomen:  No tenderness, no organomegaly.  Musculoskeletal:  Grossly normal; gait and station normal; digits and nails normal  Skin:  No rashes, petechiae, cyanosis  Neurologic: without focal signs; oriented to person, time, place, and purpose; judgement intact  Psychiatric:  No depression, anxiety, agitation      Medical Decision Making       The medical record was reviewed in its entirety including the referral notes, records from primary care, consultants notes, hospital records, lab, imaging, microbiology, immunology, and immunizations.  Care gaps identified and reviewed with the patient.    Diagnostic and titration nocturnal polysomnogram's, home sleep apnea tests, continuous nocturnal oximetry results, multiple sleep latency tests, and compliance reports reviewed.  1. Obstructive sleep apnea  - DME Mask and Supplies      PLAN:   Has been advised to continue the current auto titrating BiPAP therapy, clean equipment frequently, and get new mask and supplies as allowed by insurance and DME. Advised about potential problems including nasal obstruction/stuffiness, mask leak or discomfort , frequent awakenings, chill or dryness of the upper airway, noise, inconvenience, and lack of benefit. Recommend an earlier appointment, if significant treatment barriers develop.    The risks of untreated sleep apnea were discussed with the patient at length. Patients with SU are at increased risk of cardiovascular disease including systemic arterial hypertension, pulmonary arterial hypertension (transient or fixed), TIA, and an elevated risk of stroke, heart attack, sudden death, or  arrhythmia between the hours of 11 PM and 6 AM. SU patients have an increased risk of motor vehicle accidents, type 2 diabetes, GERD, repetitive mechanical trauma to pharyngeal muscles with inflammation and denervation, frequent EEG arousals leading to nonrestorative sleep, excessive daytime sleepiness, fatigue, depression, poor pain control, irritability, and lower quality of life.  The patient was advised to avoid driving a motor vehicle when drowsy.    Positive airway pressure will favorably impact many of the adverse conditions and effects provoked by SU.    Have advised the patient to follow up with the appropriate healthcare practitioners for all other medical problems and issues.    Return in about 1 year (around 2/28/2025).    Total time 30 minutes

## 2024-02-28 ENCOUNTER — OFFICE VISIT (OUTPATIENT)
Dept: SLEEP MEDICINE | Facility: MEDICAL CENTER | Age: 74
End: 2024-02-28
Attending: INTERNAL MEDICINE
Payer: MEDICARE

## 2024-02-28 VITALS
HEIGHT: 70 IN | DIASTOLIC BLOOD PRESSURE: 88 MMHG | BODY MASS INDEX: 37.8 KG/M2 | WEIGHT: 264 LBS | OXYGEN SATURATION: 94 % | SYSTOLIC BLOOD PRESSURE: 128 MMHG | RESPIRATION RATE: 16 BRPM | HEART RATE: 79 BPM

## 2024-02-28 DIAGNOSIS — G47.33 OBSTRUCTIVE SLEEP APNEA: ICD-10-CM

## 2024-02-28 PROCEDURE — 99213 OFFICE O/P EST LOW 20 MIN: CPT | Performed by: INTERNAL MEDICINE

## 2024-02-28 PROCEDURE — 3079F DIAST BP 80-89 MM HG: CPT | Performed by: INTERNAL MEDICINE

## 2024-02-28 PROCEDURE — 99214 OFFICE O/P EST MOD 30 MIN: CPT | Performed by: INTERNAL MEDICINE

## 2024-02-28 PROCEDURE — 3074F SYST BP LT 130 MM HG: CPT | Performed by: INTERNAL MEDICINE

## 2024-02-28 ASSESSMENT — FIBROSIS 4 INDEX: FIB4 SCORE: 2.34

## 2024-02-28 ASSESSMENT — PATIENT HEALTH QUESTIONNAIRE - PHQ9: CLINICAL INTERPRETATION OF PHQ2 SCORE: 0

## 2024-03-07 RX ORDER — GABAPENTIN 800 MG/1
800 TABLET ORAL 3 TIMES DAILY
Qty: 270 TABLET | Refills: 0 | Status: SHIPPED | OUTPATIENT
Start: 2024-03-07 | End: 2024-06-05

## 2024-03-07 NOTE — TELEPHONE ENCOUNTER
Received request via: Patient    Was the patient seen in the last year in this department? Yes  11/20/23  Does the patient have an active prescription (recently filled or refills available) for medication(s) requested? No    Pharm[facility] Name[facility]    Does the patient have [facility] Plus and need 100 day supply (blood pressure, diabetes and cholesterol meds only)? Medication is not for cholesterol, blood pressure or diabetes

## 2024-04-29 ENCOUNTER — HOSPITAL ENCOUNTER (EMERGENCY)
Facility: MEDICAL CENTER | Age: 74
End: 2024-04-29
Attending: EMERGENCY MEDICINE
Payer: MEDICARE

## 2024-04-29 VITALS
OXYGEN SATURATION: 98 % | WEIGHT: 266.98 LBS | DIASTOLIC BLOOD PRESSURE: 85 MMHG | RESPIRATION RATE: 18 BRPM | HEART RATE: 80 BPM | TEMPERATURE: 97.9 F | HEIGHT: 70 IN | BODY MASS INDEX: 38.22 KG/M2 | SYSTOLIC BLOOD PRESSURE: 145 MMHG

## 2024-04-29 DIAGNOSIS — L02.91 ABSCESS: ICD-10-CM

## 2024-04-29 PROCEDURE — 700101 HCHG RX REV CODE 250: Performed by: EMERGENCY MEDICINE

## 2024-04-29 RX ORDER — TETRACAINE HCL 10 MG/ML
4 INJECTION SUBARACHNOID ONCE
Status: COMPLETED | OUTPATIENT
Start: 2024-04-29 | End: 2024-04-29

## 2024-04-29 RX ORDER — SULFAMETHOXAZOLE AND TRIMETHOPRIM 800; 160 MG/1; MG/1
1 TABLET ORAL 2 TIMES DAILY
Qty: 10 TABLET | Refills: 0 | Status: ACTIVE | OUTPATIENT
Start: 2024-04-29 | End: 2024-05-04

## 2024-04-29 RX ADMIN — TETRACAINE HCL INJECTION 4 ML: 10 INJECTION SUBARACHNOID at 14:49

## 2024-04-29 ASSESSMENT — FIBROSIS 4 INDEX: FIB4 SCORE: 2.34

## 2024-04-29 NOTE — ED PROVIDER NOTES
"ER Provider Note    Scribed for Joao Richard M.d. by Tristan Duong. 4/29/2024  2:15 PM    Primary Care Provider: Luis Toth D.O.    CHIEF COMPLAINT   Chief Complaint   Patient presents with    Abscess     Started about 3 days ago  red swollen red and painful area the size of a golf ball      HPI/ROS  LIMITATION TO HISTORY   Select: : None  OUTSIDE HISTORIAN(S):  None    Gerald Oshea is a 73 y.o. male who presents to the ED complaining of an abscess to his back onset 3 days ago. Describes the abscess as large, red, and add that it is the size of a golf ball. He denies any discharge from the abscess. The patient notes that he is mildly allergic to lidocaine but adds that he is comfortable with small doses. He adds that he is allergic to ketamine as well.     PAST MEDICAL HISTORY  Past Medical History:   Diagnosis Date    Anemia     Anxiety     Atrophy of right kidney     one functioning kidney    Breath shortness     Cancer (HCC) 2016    colon   rx surgery and chemo    Colon cancer (HCC)     Former tobacco use     Gout     High cholesterol 12/12/2017    Hyperlipidemia     Hypertension 12/12/2017    Neuropathy 12/12/2017    Pain 12/12/2017    \"Lower Back & Left Leg\"    Pneumonia 1991    Left upper lobe    Psychiatric problem     anxiety    Sleep apnea 12/12/2017    CPAP USE    Snoring     DX SU     SURGICAL HISTORY  Past Surgical History:   Procedure Laterality Date    THORACOSCOPY  2/5/2018    Procedure: THORACOSCOPY- WEDGE RESECTION LT UPPER LOBE METASTASIS;  Surgeon: John H Ganser, M.D.;  Location: SURGERY [facility];  Service: General    CATH PLACEMENT Left 10/7/2016    Procedure: CATH PLACEMENT - SUBCLAVIAN PORT;  Surgeon: Sho Bajwa M.D.;  Location: SURGERY SAME DAY [facility];  Service:     COLON RESECTION  2016    [place]    CLOSED REDUCTION  10/25/2012    Performed by Chavez Duran M.D. at SURGERY [facility]     FAMILY HISTORY  Family History   Problem Relation Age " of Onset    Cancer Mother         Bladder    Heart Disease Father     Heart Disease Brother         CABG x2    Hyperlipidemia Brother     No Known Problems Maternal Grandmother     No Known Problems Paternal Grandmother     Heart Disease Paternal Grandfather     Hypertension Paternal Grandfather     Hyperlipidemia Paternal Grandfather     No Known Problems Son     Kidney Disease Neg Hx      SOCIAL HISTORY   reports that he quit smoking about 26 years ago. His smoking use included cigarettes. He started smoking about 46 years ago. He has a 20 pack-year smoking history. He has never used smokeless tobacco. He reports current alcohol use of about 1.8 oz of alcohol per week. He reports that he does not use drugs.    CURRENT MEDICATIONS  Discharge Medication List as of 4/29/2024  3:06 PM        CONTINUE these medications which have NOT CHANGED    Details   gabapentin (NEURONTIN) 800 MG tablet Take 1 Tablet by mouth 3 times a day for 90 days., Disp-270 Tablet, R-0, Normal      rosuvastatin (CRESTOR) 40 MG tablet TAKE 1 TABLET BY MOUTH EVERYDAY AT BEDTIME, Disp-90 Tablet, R-2, Normal      losartan (COZAAR) 100 MG Tab TAKE 1 TABLET BY MOUTH EVERY DAY, Disp-90 Tablet, R-3, Normal      NON SPECIFIED OCD Regulator M6 tanks, Historical Med      ferrous sulfate 325 (65 Fe) MG tablet Take 1 Tablet by mouth every day., Disp-90 Tablet, R-1, Normal      tamsulosin (FLOMAX) 0.4 MG capsule Take 1 Capsule by mouth 1/2 hour after breakfast., Disp-90 Capsule, R-3, Normal      acetaminophen (TYLENOL) 325 MG Tab Take 650 mg by mouth every four hours as needed for Mild Pain., Historical Med      methocarbamol (ROBAXIN) 750 MG Tab Take 750 mg by mouth 2 times a day as needed. Indications: Musculoskeletal Pain, Historical Med      Diclofenac Sodium 1 % Gel Apply 1 Application topically 2 times a day as needed (Back Pain)., Historical Med           ALLERGIES  Duloxetine, Ketamine, Lidocaine, Lidocaine-tetracaine-epineph, and  "Morphine    PHYSICAL EXAM  BP (!) 156/89   Pulse 82   Temp 36.8 °C (98.3 °F) (Temporal)   Resp 18   Ht 1.778 m (5' 10\")   Wt 121 kg (266 lb 15.6 oz)   SpO2 98%   BMI 38.31 kg/m²     Constitutional: Alert in no apparent distress.  HENT: Normocephalic, Atraumatic, Bilateral external ears normal. Nose normal.   Eyes: Pupils are equal and reactive. Conjunctiva normal, non-icteric.   Heart: Regular rate and rythm, no murmurs.    Lungs: Clear to auscultation bilaterally.  Skin: Warm, Dry, No rash, 4 cm by 4 cm abscess to back that is erythematous with area fluctuance.   Neurologic: Alert, Grossly non-focal.   Psychiatric: Affect normal, Judgment normal, Mood normal, Appears appropriate and not intoxicated.     DIAGNOSTIC STUDIES    PROCEDURES     INCISION AND DRAINAGE PROCEDURE NOTE:  Patient identification was confirmed and consent was obtained.  This procedure was performed by Dr. Richard at 2:45 PM.  Site: Middle back   Sterile procedures observed  Needle size: 27   Anesthetic used (type and amt): Pontocaine 1% 4 mL injection  Blade size: 11   Drainage: Copious   Packing used: none    Site anesthetized, incision made over site, wound drained and explored loculations, rinsed with copious amounts of normal saline, wound packed with sterile gauze, covered with dry, sterile dressing. Pt tolerated procedure well without complications. Instructions for care discussed verbally and pt provided with additional written instructions for home care and f/u.    COURSE & MEDICAL DECISION MAKING     ASSESSMENT, COURSE AND PLAN  Care Narrative:     2:15 PM - Patient seen and examined at bedside. The patient is a 73 year old male with a large abscess to his back. Discussed plan of care, including an incision and drainage procedure. The patient notes that he is comfortable with low dosage lidocaine but will explore potential alternatives. Patient agrees to the plan of care.     Differential diagnoses include but not limited to: " Cellulitis with abscess     2:45 PM - The patient was medicated with Pontocaine 1% 4 mL injection. The incision and drainage was performed by me at this time as seen above. Advised the patient to follow up with his PCP. The patient will also be discharged with antibiotics. I discussed plan for discharge and follow up as outlined below. The patient is stable for discharge at this time and will return for any new or worsening symptoms. Patient verbalizes understanding and support with my plan for discharge.     I did not feel like the abscess was amenable for a loop drain or packing therefore wound left open and thoroughly irrigated  Patient started on Bactrim      DISPOSITION AND DISCUSSIONS  I have discussed management of the patient with the following physicians and NETTIE's:  None    Discussion of management with other QHP or appropriate source(s): None     Escalation of care considered, and ultimately not performed: acute inpatient care management, however at this time, the patient is most appropriate for outpatient management.    Barriers to care at this time, including but not limited to:  None .     Decision tools and prescription drugs considered including, but not limited to: Antibiotics Bactrim DS .    The patient will return for new or worsening symptoms and is stable at the time of discharge.    DISPOSITION:  Patient will be discharged home in stable condition.    FOLLOW UP:  Luis Toth D.O.  [address]  [facility] [id number] [phone]    In 1 week  For wound re-check    [facility], Emergency Dept  [address]  [facility] [id number] [phone]    If symptoms worsen    OUTPATIENT MEDICATIONS:  Discharge Medication List as of 4/29/2024  3:06 PM        START taking these medications    Details   sulfamethoxazole-trimethoprim (BACTRIM DS) 800-160 MG tablet Take 1 Tablet by mouth 2 times a day for 5 days., Disp-10 Tablet, R-0, Normal           FINAL  DIAGNOSIS  1. Abscess    2. Incision and drainage performed as seen above.    ITristan (Scribe), am scribing for, and in the presence of, Joao Richard M.D..    Electronically signed by: Tristan Duong (Scribe), 4/29/2024    Joao ACKERMAN M.D. personally performed the services described in this documentation, as scribed by Tristan Duong in my presence, and it is both accurate and complete.     The note accurately reflects work and decisions made by me.  Joao Richard M.D.  4/29/2024  9:08 PM

## 2024-04-29 NOTE — ED TRIAGE NOTES
Pt comes in by himself   c/o abscess to upper back area that started about 3 days ago  area the size of a golf ball noted   tender, red and warm to touch   unknown cause per pt  no Hx of such

## 2024-05-05 ENCOUNTER — HOSPITAL ENCOUNTER (EMERGENCY)
Facility: MEDICAL CENTER | Age: 74
End: 2024-05-05
Attending: EMERGENCY MEDICINE
Payer: MEDICARE

## 2024-05-05 VITALS
HEART RATE: 77 BPM | DIASTOLIC BLOOD PRESSURE: 72 MMHG | HEIGHT: 70 IN | TEMPERATURE: 98 F | OXYGEN SATURATION: 97 % | WEIGHT: 263.23 LBS | RESPIRATION RATE: 16 BRPM | SYSTOLIC BLOOD PRESSURE: 123 MMHG | BODY MASS INDEX: 37.68 KG/M2

## 2024-05-05 DIAGNOSIS — L02.91 ABSCESS: ICD-10-CM

## 2024-05-05 RX ORDER — SULFAMETHOXAZOLE AND TRIMETHOPRIM 800; 160 MG/1; MG/1
1 TABLET ORAL 2 TIMES DAILY
Qty: 10 TABLET | Refills: 0 | Status: ACTIVE | OUTPATIENT
Start: 2024-05-05 | End: 2024-05-10

## 2024-05-05 RX ORDER — TETRACAINE HCL 10 MG/ML
20 INJECTION SUBARACHNOID ONCE
Status: COMPLETED | OUTPATIENT
Start: 2024-05-05 | End: 2024-05-05

## 2024-05-05 RX ADMIN — TETRACAINE HCL INJECTION 2 ML: 10 INJECTION SUBARACHNOID at 12:00

## 2024-05-05 ASSESSMENT — FIBROSIS 4 INDEX: FIB4 SCORE: 2.34

## 2024-05-05 NOTE — ED NOTES
Assisted w/ discharge.  Reviewed discharge instructions and prescription x 1 sent to selected Pharmacy w/ pt, verbalized understanding to information provided including follow up care, return precautions and wound care, denied questions/concerns.  Pt ambulated from ED.

## 2024-05-05 NOTE — ED NOTES
Pt states he had an abscess on his back that was treated here on Monday. Pt states it seems to be coming back.

## 2024-05-05 NOTE — ED TRIAGE NOTES
"Chief Complaint   Patient presents with    Abscess     Had an ID on 4/27/24 abscess on back, feels like its back.       BP (!) 157/89   Pulse 90   Temp 36.7 °C (98 °F) (Temporal)   Resp 14   Ht 1.778 m (5' 10\")   Wt 119 kg (263 lb 3.7 oz)   SpO2 92%   BMI 37.77 kg/m²     "

## 2024-05-05 NOTE — ED PROVIDER NOTES
ED Provider Note    CHIEF COMPLAINT  Chief Complaint   Patient presents with    Abscess     Had an ID on 24 abscess on back, feels like its back.         EXTERNAL RECORDS REVIEWED  Outpatient labs & studies   and Other patient was here in the ER on the  incision and drainage.  I see his recent hemoglobin A1c 6 months ago was normal.    HPI/KALYANI      Gerald Oshea is a 73 y.o. male who presents with a concern that the spot on his back is back.  He had this drained on Monday, but feels like it has reaccumulated again.  Is uncomfortable.  Is been no fever.  There is no other complaint.    PAST MEDICAL HISTORY   has a past medical history of Anemia, Anxiety, Atrophy of right kidney, Breath shortness, Cancer (HCC) (), Colon cancer (), Former tobacco use, Gout, High cholesterol (2017), Hyperlipidemia, Hypertension (2017), Neuropathy (2017), Pain (2017), Pneumonia (), Psychiatric problem, Sleep apnea (2017), and Snoring.    SURGICAL HISTORY   has a past surgical history that includes cath placement (Left, 10/7/2016); closed reduction (10/25/2012); colon resection (); and thoracoscopy (2018).    FAMILY HISTORY  Family History   Problem Relation Age of Onset    Cancer Mother         Bladder    Heart Disease Father     Heart Disease Brother         CABG x2    Hyperlipidemia Brother     No Known Problems Maternal Grandmother     No Known Problems Paternal Grandmother     Heart Disease Paternal Grandfather     Hypertension Paternal Grandfather     Hyperlipidemia Paternal Grandfather     No Known Problems Son     Kidney Disease Neg Hx        SOCIAL HISTORY  Social History     Tobacco Use    Smoking status: Former     Current packs/day: 0.00     Average packs/day: 1 pack/day for 20.0 years (20.0 ttl pk-yrs)     Types: Cigarettes     Start date: 1978     Quit date: 1998     Years since quittin.3    Smokeless tobacco: Never   Vaping Use    Vaping Use:  "Never used   Substance and Sexual Activity    Alcohol use: Yes     Alcohol/week: 1.8 oz     Types: 3 Glasses of wine per week     Comment: 2-3 per day    Drug use: No    Sexual activity: Not Currently       CURRENT MEDICATIONS  Home Medications    **Home medications have not yet been reviewed for this encounter**         ALLERGIES  Allergies   Allergen Reactions    Duloxetine Unspecified     Unable to urinate     Ketamine Unspecified     \"I hallucinate\".      Lidocaine Rash     [id number]  Pt states \"Rash all over my body\".      Lidocaine-Tetracaine-Epineph Hives and Rash    Morphine Unspecified     \"Can not have too much, my oxygen goes down\"       PHYSICAL EXAM  VITAL SIGNS: BP (!) 157/89   Pulse 90   Temp 36.7 °C (98 °F) (Temporal)   Resp 14   Ht 1.778 m (5' 10\")   Wt 119 kg (263 lb 3.7 oz)   SpO2 92%   BMI 37.77 kg/m²    Back: There is a quarter sized area of induration and inflammation and fluctuance.  This itself is erythematous and tender.  However there is no surrounding erythema or warmth.  No surrounding tenderness.    Procedure note:  Skin was prepped and draped in usual sterile fashion.  The area of maximal fluctuance is anesthetized with 2 cc of 2% Pontocaine.  Then, an 18-gauge needle was placed with aspiration of 3 cc of purulence.  Then a 3 mm incision was made with return of purulent debris.  Loculations were broken down with a hemostat.  This was then packed with quarter inch iodoform gauze.  Patient tolerated procedure with ease.    COURSE & MEDICAL DECISION MAKING    ASSESSMENT, COURSE AND PLAN  Care Narrative: This is a patient presents with recurrence of an abscess.  We have incised and drained.  I will go and put him on 5 more days of Bactrim per guideline.  Imaging and blood work was considered, however this is not felt to be useful given his history and his exam today.  This is clearly localized.  DISPOSITION AND DISCUSSIONS    Escalation of care considered, and ultimately not " performed:Laboratory analysis and diagnostic imaging      Decision tools and prescription drugs considered including, but not limited to: Antibiotics   .    FINAL DIAGNOSIS  1. Abscess           Electronically signed by: Gurdeep Trilpett M.D., 5/5/2024 11:56 AM

## 2024-05-06 DIAGNOSIS — E11.22 TYPE 2 DIABETES MELLITUS WITH STAGE 3 CHRONIC KIDNEY DISEASE, WITHOUT LONG-TERM CURRENT USE OF INSULIN, UNSPECIFIED WHETHER STAGE 3A OR 3B CKD (HCC): ICD-10-CM

## 2024-05-06 DIAGNOSIS — E78.2 MIXED HYPERLIPIDEMIA: ICD-10-CM

## 2024-05-06 DIAGNOSIS — Z12.5 PROSTATE CANCER SCREENING: ICD-10-CM

## 2024-05-06 DIAGNOSIS — N18.30 TYPE 2 DIABETES MELLITUS WITH STAGE 3 CHRONIC KIDNEY DISEASE, WITHOUT LONG-TERM CURRENT USE OF INSULIN, UNSPECIFIED WHETHER STAGE 3A OR 3B CKD (HCC): ICD-10-CM

## 2024-05-08 ENCOUNTER — HOSPITAL ENCOUNTER (EMERGENCY)
Facility: MEDICAL CENTER | Age: 74
End: 2024-05-08
Attending: EMERGENCY MEDICINE
Payer: MEDICARE

## 2024-05-08 ENCOUNTER — APPOINTMENT (OUTPATIENT)
Dept: MEDICAL GROUP | Facility: LAB | Age: 74
End: 2024-05-08
Payer: MEDICARE

## 2024-05-08 VITALS
HEART RATE: 87 BPM | SYSTOLIC BLOOD PRESSURE: 142 MMHG | DIASTOLIC BLOOD PRESSURE: 88 MMHG | OXYGEN SATURATION: 98 % | RESPIRATION RATE: 18 BRPM | HEIGHT: 70 IN | WEIGHT: 263.23 LBS | BODY MASS INDEX: 37.68 KG/M2 | TEMPERATURE: 97 F

## 2024-05-08 DIAGNOSIS — L08.9 INFECTED SEBACEOUS CYST OF SKIN: ICD-10-CM

## 2024-05-08 DIAGNOSIS — L72.3 INFECTED SEBACEOUS CYST OF SKIN: ICD-10-CM

## 2024-05-08 ASSESSMENT — FIBROSIS 4 INDEX: FIB4 SCORE: 2.34

## 2024-05-08 NOTE — ED NOTES
Follow up for abscess on his back. Pt has been seen twice for abscess and was told to follow up today in ED.

## 2024-05-08 NOTE — ED NOTES
Discharge instructions provided. Pt verbalized understanding of discharge instructions to follow up with General Surgery and to return to ER if condition worsens. Pt ambulated out of ER without difficulty.

## 2024-05-08 NOTE — ED TRIAGE NOTES
"Chief Complaint   Patient presents with    Abscess     Back abscess I/D 5/5 was told to return on Tuesday.       BP (!) 155/96   Pulse 93   Temp 35.9 °C (96.6 °F) (Temporal)   Resp 14   Ht 1.778 m (5' 10\")   Wt 119 kg (263 lb 3.7 oz)   SpO2 95%   BMI 37.77 kg/m²     "

## 2024-05-08 NOTE — ED PROVIDER NOTES
ED Provider Note    Primary care provider: Lius Toth D.O.    CHIEF COMPLAINT  Chief Complaint   Patient presents with    Abscess     Back abscess I/D  was told to return on Tuesday.       Chart review: Patient was seen twice previously for an abscess on his back, first-aid was , he was then followed up on , had an I&D on both visits  HPI  Gerald Oshea is a 73 y.o. male who presents to the Emergency Department for a wound recheck.  The patient has had a cyst on his back for the past week or so.  He notes that it continues to drain, he cannot see it, he lives alone,  cannot have anybody reinspect the wound to remove packing.    Denies any fevers or chills.  Overall it is feeling better.    REVIEW OF SYSTEMS  See HPI.     PAST MEDICAL HISTORY   has a past medical history of Anemia, Anxiety, Atrophy of right kidney, Breath shortness, Cancer (HCC) (), Colon cancer (), Former tobacco use, Gout, High cholesterol (2017), Hyperlipidemia, Hypertension (2017), Neuropathy (2017), Pain (2017), Pneumonia (), Psychiatric problem, Sleep apnea (2017), and Snoring.    SURGICAL HISTORY   has a past surgical history that includes cath placement (Left, 10/7/2016); closed reduction (10/25/2012); colon resection (); and thoracoscopy (2018).    SOCIAL HISTORY  Social History     Tobacco Use    Smoking status: Former     Current packs/day: 0.00     Average packs/day: 1 pack/day for 20.0 years (20.0 ttl pk-yrs)     Types: Cigarettes     Start date: 1978     Quit date: 1998     Years since quittin.3    Smokeless tobacco: Never   Vaping Use    Vaping Use: Never used   Substance Use Topics    Alcohol use: Yes     Alcohol/week: 1.8 oz     Types: 3 Glasses of wine per week     Comment: 2-3 per day    Drug use: No      Social History     Substance and Sexual Activity   Drug Use No       FAMILY HISTORY  Family History   Problem Relation Age of Onset    Cancer  "Mother         Bladder    Heart Disease Father     Heart Disease Brother         CABG x2    Hyperlipidemia Brother     No Known Problems Maternal Grandmother     No Known Problems Paternal Grandmother     Heart Disease Paternal Grandfather     Hypertension Paternal Grandfather     Hyperlipidemia Paternal Grandfather     No Known Problems Son     Kidney Disease Neg Hx        CURRENT MEDICATIONS  Reviewed.  See Encounter Summary.     ALLERGIES  Allergies   Allergen Reactions    Duloxetine Unspecified     Unable to urinate     Ketamine Unspecified     \"I hallucinate\".      Lidocaine Rash     [id number]  Pt states \"Rash all over my body\".      Lidocaine-Tetracaine-Epineph Hives and Rash    Morphine Unspecified     \"Can not have too much, my oxygen goes down\"       PHYSICAL EXAM  VITAL SIGNS: BP (!) 155/96   Pulse 93   Temp 35.9 °C (96.6 °F) (Temporal)   Resp 14   Ht 1.778 m (5' 10\")   Wt 119 kg (263 lb 3.7 oz)   SpO2 95%   BMI 37.77 kg/m²   Constitutional: Awake, alert in no apparent distress.  HENT: Normocephalic, Bilateral external ears normal. Nose normal.   Eyes: Conjunctiva normal, non-icteric, EOMI.    Thorax & Lungs: Easy unlabored respirations  Cardiovascular:    Abdomen:  No distention  Skin: At the upper mid back there is a 2 x 2 area of erythema with a central stab incision, packing present.    Extremities:   atraumatic   Neurologic: Alert, Grossly non-focal.   Psychiatric: Affect and Mood normal      Procedure note:  The packing was removed, after this I irrigated the cavity with sterile water, I continue to express copious caseous material.  No packing was replaced, the wound was left open.    Differential diagnoses include but are not limited to: Epidermal inclusion cyst, infected    10:24 AM - Nursing notes reviewed, patient seen and examined at bedside.      Escalation of care considered, and ultimately not performed: Laboratory analysis and diagnostic imaging.      Decision Making:  This is a " pleasant 73 y.o. year old male who presents for a wound recheck.  It appears to be healing well, there is some mild reactive erythema.  I was able to express a large amount of thick caseous material from the wound, this is consistent with an infected epidermal inclusion cyst.  I explained to the patient that although the infection will improve, the cyst will recur until he has definitive removal, ideally from a minor surgery clinic.    No indication to repack the wound, continue the antibiotics until complete.  I think it is worth rechecking the back in about 72 hours to ensure that it is healing well and does not have any signs of acute infection.  Since he does not have anybody to check it and lives alone, he will come back here for this.  General surgery referral placed in the system, recommend he contact them for follow-up for definitive treatment.       The patient was discharged (see d/c instructions) was told to return immediately for any signs or symptoms listed, or any worsening at all.  The patient verbally agreed to the discharge precautions and follow-up plan which is documented in EPIC.    Discharge Medications:  New Prescriptions    No medications on file       FINAL IMPRESSION  1. Infected sebaceous cyst of skin

## 2024-05-11 ENCOUNTER — HOSPITAL ENCOUNTER (EMERGENCY)
Facility: MEDICAL CENTER | Age: 74
End: 2024-05-11
Attending: EMERGENCY MEDICINE
Payer: MEDICARE

## 2024-05-11 VITALS
RESPIRATION RATE: 18 BRPM | BODY MASS INDEX: 38.03 KG/M2 | SYSTOLIC BLOOD PRESSURE: 166 MMHG | HEIGHT: 70 IN | TEMPERATURE: 98.2 F | HEART RATE: 84 BPM | OXYGEN SATURATION: 96 % | WEIGHT: 265.65 LBS | DIASTOLIC BLOOD PRESSURE: 90 MMHG

## 2024-05-11 DIAGNOSIS — Z51.89 ENCOUNTER FOR WOUND RE-CHECK: ICD-10-CM

## 2024-05-11 RX ORDER — SULFAMETHOXAZOLE AND TRIMETHOPRIM 800; 160 MG/1; MG/1
1 TABLET ORAL 2 TIMES DAILY
COMMUNITY

## 2024-05-11 RX ORDER — TETRAHYDROZOLINE HCL 0.05 %
1 DROPS OPHTHALMIC (EYE) 4 TIMES DAILY PRN
COMMUNITY

## 2024-05-11 ASSESSMENT — FIBROSIS 4 INDEX: FIB4 SCORE: 2.34

## 2024-05-11 NOTE — ED PROVIDER NOTES
ED Provider Note    CHIEF COMPLAINT  Chief Complaint   Patient presents with    Wound Check     Patient had abscess removed from upper back 3 days ago. Was told to return to the ER for follow up. Not experiencing any new or worsening symptoms.        EXTERNAL RECORDS REVIEWED  Previous notes from here    HPI/ROS  LIMITATION TO HISTORY     OUTSIDE HISTORIAN(S):      Gerald Oshea is a 73 y.o. male who presents for wound recheck.  The patient had an infected sebaceous cyst in the mid back area, was seen here 3 days ago, was told to follow-up in 3 days for wound recheck.  He states that it feels like it is getting better.    PAST MEDICAL HISTORY   has a past medical history of Anemia, Anxiety, Atrophy of right kidney, Breath shortness, Cancer (HCC) (), Colon cancer (), Former tobacco use, Gout, High cholesterol (2017), Hyperlipidemia, Hypertension (2017), Neuropathy (2017), Pain (2017), Pneumonia (), Psychiatric problem, Sleep apnea (2017), and Snoring.    SURGICAL HISTORY   has a past surgical history that includes cath placement (Left, 10/7/2016); closed reduction (10/25/2012); colon resection (); and thoracoscopy (2018).    FAMILY HISTORY  Family History   Problem Relation Age of Onset    Cancer Mother         Bladder    Heart Disease Father     Heart Disease Brother         CABG x2    Hyperlipidemia Brother     No Known Problems Maternal Grandmother     No Known Problems Paternal Grandmother     Heart Disease Paternal Grandfather     Hypertension Paternal Grandfather     Hyperlipidemia Paternal Grandfather     No Known Problems Son     Kidney Disease Neg Hx        SOCIAL HISTORY  Social History     Tobacco Use    Smoking status: Former     Current packs/day: 0.00     Average packs/day: 1 pack/day for 20.0 years (20.0 ttl pk-yrs)     Types: Cigarettes     Start date: 1978     Quit date: 1998     Years since quittin.3    Smokeless tobacco: Never  "  Vaping Use    Vaping Use: Never used   Substance and Sexual Activity    Alcohol use: Yes     Alcohol/week: 1.8 oz     Types: 3 Glasses of wine per week     Comment: 2-3 per day    Drug use: No    Sexual activity: Not Currently       CURRENT MEDICATIONS  Home Medications    **Home medications have not yet been reviewed for this encounter**         ALLERGIES  Allergies   Allergen Reactions    Lidocaine Rash     [id number]  Pt states \"Rash all over my body\".      Lidocaine-Tetracaine-Epineph Hives and Rash     welts    Duloxetine Unspecified     Unable to urinate     Ketamine Unspecified     \"I hallucinate\".      Morphine Unspecified     \"Can not have too much, my oxygen goes down\"       PHYSICAL EXAM  VITAL SIGNS: BP (!) 166/90   Pulse 84   Temp 36.8 °C (98.2 °F) (Temporal)   Resp 18   Ht 1.778 m (5' 10\")   Wt 121 kg (265 lb 10.5 oz)   SpO2 96%   BMI 38.12 kg/m²    Healing abscess mid back.  No surrounding erythema or purulence        COURSE & MEDICAL DECISION MAKING    ASSESSMENT, COURSE AND PLAN  Care Narrative: Healing abscess mid back.  No indication for repeat visit.  Return for any other concerns.      FINAL DIAGNOSIS  1. Encounter for wound re-check           Electronically signed by: Pavel Yanes M.D., 5/11/2024 9:03 AM      "

## 2024-05-11 NOTE — ED NOTES
Medication history reviewed with PT at bedside    Med rec is complete per PT reporting    Allergies reviewed.     Patient denies any outpatient antibiotics in the last 30 days.     Patient is not taking anticoagulants.    Preferred pharmacy for this visit - [facility] ([phone])

## 2024-05-11 NOTE — ED TRIAGE NOTES
"Chief Complaint   Patient presents with    Wound Check     Patient had abscess removed from upper back 3 days ago. Was told to return to the ER for follow up. Not experiencing any new or worsening symptoms.      BP (!) 166/90   Pulse 84   Temp 36.8 °C (98.2 °F) (Temporal)   Resp 18   Ht 1.778 m (5' 10\")   Wt 121 kg (265 lb 10.5 oz)   SpO2 96%   BMI 38.12 kg/m²     "

## 2024-05-11 NOTE — ED NOTES
Patient had abscess removed from upper back 3 days ago. Was told to return to the ER for follow up. Not experiencing any new or worsening symptoms.

## 2024-05-13 ENCOUNTER — HOSPITAL ENCOUNTER (OUTPATIENT)
Dept: LAB | Facility: MEDICAL CENTER | Age: 74
End: 2024-05-13
Attending: STUDENT IN AN ORGANIZED HEALTH CARE EDUCATION/TRAINING PROGRAM
Payer: MEDICARE

## 2024-05-13 DIAGNOSIS — E11.22 TYPE 2 DIABETES MELLITUS WITH STAGE 3 CHRONIC KIDNEY DISEASE, WITHOUT LONG-TERM CURRENT USE OF INSULIN, UNSPECIFIED WHETHER STAGE 3A OR 3B CKD (HCC): ICD-10-CM

## 2024-05-13 DIAGNOSIS — E78.2 MIXED HYPERLIPIDEMIA: ICD-10-CM

## 2024-05-13 DIAGNOSIS — Z12.5 PROSTATE CANCER SCREENING: ICD-10-CM

## 2024-05-13 DIAGNOSIS — N18.30 TYPE 2 DIABETES MELLITUS WITH STAGE 3 CHRONIC KIDNEY DISEASE, WITHOUT LONG-TERM CURRENT USE OF INSULIN, UNSPECIFIED WHETHER STAGE 3A OR 3B CKD (HCC): ICD-10-CM

## 2024-05-13 LAB
ALBUMIN SERPL BCP-MCNC: 4.5 G/DL (ref 3.2–4.9)
ALBUMIN/GLOB SERPL: 2 G/DL
ALP SERPL-CCNC: 86 U/L (ref 30–99)
ALT SERPL-CCNC: 22 U/L (ref 2–50)
ANION GAP SERPL CALC-SCNC: 17 MMOL/L (ref 7–16)
AST SERPL-CCNC: 25 U/L (ref 12–45)
BASOPHILS # BLD AUTO: 0.8 % (ref 0–1.8)
BASOPHILS # BLD: 0.05 K/UL (ref 0–0.12)
BILIRUB SERPL-MCNC: 0.6 MG/DL (ref 0.1–1.5)
BUN SERPL-MCNC: 15 MG/DL (ref 8–22)
CALCIUM ALBUM COR SERPL-MCNC: 9.8 MG/DL (ref 8.5–10.5)
CALCIUM SERPL-MCNC: 10.2 MG/DL (ref 8.5–10.5)
CHLORIDE SERPL-SCNC: 103 MMOL/L (ref 96–112)
CHOLEST SERPL-MCNC: 127 MG/DL (ref 100–199)
CO2 SERPL-SCNC: 18 MMOL/L (ref 20–33)
CREAT SERPL-MCNC: 1.04 MG/DL (ref 0.5–1.4)
EOSINOPHIL # BLD AUTO: 0.2 K/UL (ref 0–0.51)
EOSINOPHIL NFR BLD: 3.1 % (ref 0–6.9)
ERYTHROCYTE [DISTWIDTH] IN BLOOD BY AUTOMATED COUNT: 44.1 FL (ref 35.9–50)
EST. AVERAGE GLUCOSE BLD GHB EST-MCNC: 126 MG/DL
FASTING STATUS PATIENT QL REPORTED: NORMAL
GFR SERPLBLD CREATININE-BSD FMLA CKD-EPI: 76 ML/MIN/1.73 M 2
GLOBULIN SER CALC-MCNC: 2.2 G/DL (ref 1.9–3.5)
GLUCOSE SERPL-MCNC: 135 MG/DL (ref 65–99)
HBA1C MFR BLD: 6 % (ref 4–5.6)
HCT VFR BLD AUTO: 42.6 % (ref 42–52)
HDLC SERPL-MCNC: 50 MG/DL
HGB BLD-MCNC: 14.2 G/DL (ref 14–18)
IMM GRANULOCYTES # BLD AUTO: 0.02 K/UL (ref 0–0.11)
IMM GRANULOCYTES NFR BLD AUTO: 0.3 % (ref 0–0.9)
LDLC SERPL CALC-MCNC: 55 MG/DL
LYMPHOCYTES # BLD AUTO: 1.23 K/UL (ref 1–4.8)
LYMPHOCYTES NFR BLD: 19.4 % (ref 22–41)
MCH RBC QN AUTO: 31 PG (ref 27–33)
MCHC RBC AUTO-ENTMCNC: 33.3 G/DL (ref 32.3–36.5)
MCV RBC AUTO: 93 FL (ref 81.4–97.8)
MONOCYTES # BLD AUTO: 0.64 K/UL (ref 0–0.85)
MONOCYTES NFR BLD AUTO: 10.1 % (ref 0–13.4)
NEUTROPHILS # BLD AUTO: 4.21 K/UL (ref 1.82–7.42)
NEUTROPHILS NFR BLD: 66.3 % (ref 44–72)
NRBC # BLD AUTO: 0 K/UL
NRBC BLD-RTO: 0 /100 WBC (ref 0–0.2)
PLATELET # BLD AUTO: 255 K/UL (ref 164–446)
PMV BLD AUTO: 10.2 FL (ref 9–12.9)
POTASSIUM SERPL-SCNC: 4.1 MMOL/L (ref 3.6–5.5)
PROT SERPL-MCNC: 6.7 G/DL (ref 6–8.2)
PSA SERPL-MCNC: 1.31 NG/ML (ref 0–4)
RBC # BLD AUTO: 4.58 M/UL (ref 4.7–6.1)
SODIUM SERPL-SCNC: 138 MMOL/L (ref 135–145)
TRIGL SERPL-MCNC: 110 MG/DL (ref 0–149)
TSH SERPL DL<=0.005 MIU/L-ACNC: 3.98 UIU/ML (ref 0.38–5.33)
WBC # BLD AUTO: 6.4 K/UL (ref 4.8–10.8)

## 2024-05-17 ENCOUNTER — OFFICE VISIT (OUTPATIENT)
Dept: MEDICAL GROUP | Facility: LAB | Age: 74
End: 2024-05-17
Payer: MEDICARE

## 2024-05-17 VITALS
WEIGHT: 267.4 LBS | HEART RATE: 70 BPM | OXYGEN SATURATION: 96 % | TEMPERATURE: 98 F | RESPIRATION RATE: 18 BRPM | HEIGHT: 70 IN | DIASTOLIC BLOOD PRESSURE: 88 MMHG | SYSTOLIC BLOOD PRESSURE: 142 MMHG | BODY MASS INDEX: 38.28 KG/M2

## 2024-05-17 DIAGNOSIS — N18.30 TYPE 2 DIABETES MELLITUS WITH STAGE 3 CHRONIC KIDNEY DISEASE, WITHOUT LONG-TERM CURRENT USE OF INSULIN, UNSPECIFIED WHETHER STAGE 3A OR 3B CKD (HCC): ICD-10-CM

## 2024-05-17 DIAGNOSIS — I10 PRIMARY HYPERTENSION: ICD-10-CM

## 2024-05-17 DIAGNOSIS — E11.22 TYPE 2 DIABETES MELLITUS WITH STAGE 3 CHRONIC KIDNEY DISEASE, WITHOUT LONG-TERM CURRENT USE OF INSULIN, UNSPECIFIED WHETHER STAGE 3A OR 3B CKD (HCC): ICD-10-CM

## 2024-05-17 DIAGNOSIS — E78.2 MIXED HYPERLIPIDEMIA: ICD-10-CM

## 2024-05-17 DIAGNOSIS — R73.03 PREDIABETES: ICD-10-CM

## 2024-05-17 PROCEDURE — 3079F DIAST BP 80-89 MM HG: CPT | Performed by: STUDENT IN AN ORGANIZED HEALTH CARE EDUCATION/TRAINING PROGRAM

## 2024-05-17 PROCEDURE — 3077F SYST BP >= 140 MM HG: CPT | Performed by: STUDENT IN AN ORGANIZED HEALTH CARE EDUCATION/TRAINING PROGRAM

## 2024-05-17 PROCEDURE — 99214 OFFICE O/P EST MOD 30 MIN: CPT | Performed by: STUDENT IN AN ORGANIZED HEALTH CARE EDUCATION/TRAINING PROGRAM

## 2024-05-17 ASSESSMENT — ENCOUNTER SYMPTOMS
FEVER: 0
SHORTNESS OF BREATH: 0
CHILLS: 0

## 2024-05-17 ASSESSMENT — FIBROSIS 4 INDEX: FIB4 SCORE: 1.5

## 2024-05-17 NOTE — PROGRESS NOTES
"Subjective:     CC: 6 month        HPI:       Problem   Hypertension    Patient previously was on hydrochlorothiazide and triamterene which have been held.  Patient continues to take losartan 100 mg.    5/17/2024  - Patient's blood pressure today 142/88, on 5/11 patient was in the ER and blood pressure was 166/90.  Patient currently is on losartan 100 mg     Prediabetes    Patient has been consuming more sugar lately         Current Outpatient Medications Ordered in Epic   Medication Sig Dispense Refill    sulfamethoxazole-trimethoprim (BACTRIM DS) 800-160 MG tablet Take 1 Tablet by mouth 2 times a day. 5 day course started 05/05/2024      tetrahydrozoline (VISINE) 0.05 % Solution Administer 1 Drop into both eyes 4 times a day as needed (red eyes).      gabapentin (NEURONTIN) 800 MG tablet Take 1 Tablet by mouth 3 times a day for 90 days. 270 Tablet 0    rosuvastatin (CRESTOR) 40 MG tablet TAKE 1 TABLET BY MOUTH EVERYDAY AT BEDTIME 90 Tablet 2    losartan (COZAAR) 100 MG Tab TAKE 1 TABLET BY MOUTH EVERY DAY 90 Tablet 3    NON SPECIFIED OCD Regulator M6 tanks      acetaminophen (TYLENOL) 325 MG Tab Take 650 mg by mouth every four hours as needed for Mild Pain.       No current Epic-ordered facility-administered medications on file.           ROS:  Review of Systems   Constitutional:  Negative for chills and fever.   Respiratory:  Negative for shortness of breath.    Cardiovascular:  Negative for chest pain.       Objective:     Exam:  BP (!) 142/88 (BP Location: Right arm, Patient Position: Sitting, BP Cuff Size: Adult long)   Pulse 70   Temp 36.7 °C (98 °F) (Temporal)   Resp 18   Ht 1.778 m (5' 10\")   Wt 121 kg (267 lb 6.4 oz)   SpO2 96%   BMI 38.37 kg/m²  Body mass index is 38.37 kg/m².    Physical Exam  Constitutional:       General: He is not in acute distress.     Appearance: He is not ill-appearing.   Pulmonary:      Effort: Pulmonary effort is normal.   Neurological:      Mental Status: He is alert. "   Psychiatric:         Mood and Affect: Mood normal.         Behavior: Behavior normal.         Thought Content: Thought content normal.         Judgment: Judgment normal.                   Assessment & Plan:     Problem List Items Addressed This Visit       Hyperlipidemia    Relevant Orders    Comp Metabolic Panel    Lipid Profile    Hypertension     Chronic  - Patient will log his blood pressure and notify office  -continue losartan 100mg          Prediabetes     Chronic-worsened  - Discussed with patient lifestyle modifications  - Recheck A1c in 6 months         Relevant Orders    MICROALBUMIN CREAT RATIO URINE     Other Visit Diagnoses       Type 2 diabetes mellitus with stage 3 chronic kidney disease, without long-term current use of insulin, unspecified whether stage 3a or 3b CKD (HCC)        Relevant Orders    HEMOGLOBIN A1C    MICROALBUMIN CREAT RATIO URINE                Please note that this dictation was created using voice recognition software. I have made every reasonable attempt to correct obvious errors, but I expect that there are errors of grammar and possibly content that I did not discover before finalizing the note.

## 2024-05-22 ENCOUNTER — HOSPITAL ENCOUNTER (OUTPATIENT)
Facility: MEDICAL CENTER | Age: 74
End: 2024-05-22
Attending: INTERNAL MEDICINE
Payer: MEDICARE

## 2024-05-23 LAB
ALBUMIN SERPL BCP-MCNC: 4.6 G/DL (ref 3.2–4.9)
ALBUMIN/GLOB SERPL: 2.6 G/DL
ALP SERPL-CCNC: 85 U/L (ref 30–99)
ALT SERPL-CCNC: 25 U/L (ref 2–50)
ANION GAP SERPL CALC-SCNC: 12 MMOL/L (ref 7–16)
AST SERPL-CCNC: 21 U/L (ref 12–45)
BILIRUB SERPL-MCNC: 0.7 MG/DL (ref 0.1–1.5)
BUN SERPL-MCNC: 10 MG/DL (ref 8–22)
CALCIUM ALBUM COR SERPL-MCNC: 9.7 MG/DL (ref 8.5–10.5)
CALCIUM SERPL-MCNC: 10.2 MG/DL (ref 8.5–10.5)
CHLORIDE SERPL-SCNC: 107 MMOL/L (ref 96–112)
CO2 SERPL-SCNC: 23 MMOL/L (ref 20–33)
CREAT SERPL-MCNC: 0.95 MG/DL (ref 0.5–1.4)
FERRITIN SERPL-MCNC: 61.3 NG/ML (ref 22–322)
FOLATE SERPL-MCNC: 13.6 NG/ML
GFR SERPLBLD CREATININE-BSD FMLA CKD-EPI: 85 ML/MIN/1.73 M 2
GLOBULIN SER CALC-MCNC: 1.8 G/DL (ref 1.9–3.5)
GLUCOSE SERPL-MCNC: 118 MG/DL (ref 65–99)
IRON SATN MFR SERPL: 18 % (ref 15–55)
IRON SERPL-MCNC: 66 UG/DL (ref 50–180)
POTASSIUM SERPL-SCNC: 4.3 MMOL/L (ref 3.6–5.5)
PROT SERPL-MCNC: 6.4 G/DL (ref 6–8.2)
SODIUM SERPL-SCNC: 142 MMOL/L (ref 135–145)
TIBC SERPL-MCNC: 373 UG/DL (ref 250–450)
UIBC SERPL-MCNC: 307 UG/DL (ref 110–370)
VIT B12 SERPL-MCNC: 432 PG/ML (ref 211–911)

## 2024-05-26 LAB
ALBUMIN SERPL ELPH-MCNC: 4.15 G/DL (ref 3.75–5.01)
ALPHA1 GLOB SERPL ELPH-MCNC: 0.31 G/DL (ref 0.19–0.46)
ALPHA2 GLOB SERPL ELPH-MCNC: 0.91 G/DL (ref 0.48–1.05)
B-GLOBULIN SERPL ELPH-MCNC: 0.85 G/DL (ref 0.48–1.1)
GAMMA GLOB SERPL ELPH-MCNC: 0.48 G/DL (ref 0.62–1.51)
IGA SERPL-MCNC: 118 MG/DL (ref 68–408)
IGG SERPL-MCNC: 462 MG/DL (ref 768–1632)
IGM SERPL-MCNC: 35 MG/DL (ref 35–263)
INTERPRETATION SERPL IFE-IMP: ABNORMAL
MONOCLON BAND OBS SERPL: ABNORMAL
MONOCLONAL PROTEIN NL11656: ABNORMAL G/DL
PATHOLOGY STUDY: ABNORMAL
PROT SERPL-MCNC: 6.7 G/DL (ref 6.3–8.2)

## 2024-05-30 ENCOUNTER — HOSPITAL ENCOUNTER (OUTPATIENT)
Dept: RADIOLOGY | Facility: MEDICAL CENTER | Age: 74
End: 2024-05-30

## 2024-06-06 DIAGNOSIS — I10 ESSENTIAL HYPERTENSION: ICD-10-CM

## 2024-06-06 RX ORDER — LOSARTAN POTASSIUM 100 MG/1
100 TABLET ORAL
Qty: 90 TABLET | Refills: 3 | Status: SHIPPED | OUTPATIENT
Start: 2024-06-06

## 2024-06-06 NOTE — TELEPHONE ENCOUNTER
Received request via: Patient    Was the patient seen in the last year in this department? Yes    Does the patient have an active prescription (recently filled or refills available) for medication(s) requested? No    [facility]    Does the patient have long term Plus and need 100 day supply (blood pressure, diabetes and cholesterol meds only)? Patient does not have SCP

## 2024-06-26 RX ORDER — ROSUVASTATIN CALCIUM 40 MG/1
40 TABLET, COATED ORAL
Qty: 90 TABLET | Refills: 2 | Status: SHIPPED | OUTPATIENT
Start: 2024-06-26

## 2024-07-16 RX ORDER — GABAPENTIN 800 MG/1
800 TABLET ORAL 3 TIMES DAILY
Qty: 270 TABLET | Refills: 0 | Status: SHIPPED | OUTPATIENT
Start: 2024-07-16

## 2024-07-25 ENCOUNTER — APPOINTMENT (RX ONLY)
Dept: URBAN - METROPOLITAN AREA CLINIC 35 | Facility: CLINIC | Age: 74
Setting detail: DERMATOLOGY
End: 2024-07-25

## 2024-07-25 DIAGNOSIS — L82.0 INFLAMED SEBORRHEIC KERATOSIS: ICD-10-CM

## 2024-07-25 DIAGNOSIS — Z87.2 PERSONAL HISTORY OF DISEASES OF THE SKIN AND SUBCUTANEOUS TISSUE: ICD-10-CM

## 2024-07-25 DIAGNOSIS — L81.4 OTHER MELANIN HYPERPIGMENTATION: ICD-10-CM

## 2024-07-25 DIAGNOSIS — L72.0 EPIDERMAL CYST: ICD-10-CM

## 2024-07-25 DIAGNOSIS — D22 MELANOCYTIC NEVI: ICD-10-CM

## 2024-07-25 DIAGNOSIS — D18.0 HEMANGIOMA: ICD-10-CM

## 2024-07-25 DIAGNOSIS — Z71.89 OTHER SPECIFIED COUNSELING: ICD-10-CM

## 2024-07-25 DIAGNOSIS — Z85.828 PERSONAL HISTORY OF OTHER MALIGNANT NEOPLASM OF SKIN: ICD-10-CM

## 2024-07-25 DIAGNOSIS — L82.1 OTHER SEBORRHEIC KERATOSIS: ICD-10-CM

## 2024-07-25 DIAGNOSIS — L11.1 TRANSIENT ACANTHOLYTIC DERMATOSIS [GROVER]: ICD-10-CM

## 2024-07-25 PROBLEM — D22.62 MELANOCYTIC NEVI OF LEFT UPPER LIMB, INCLUDING SHOULDER: Status: ACTIVE | Noted: 2024-07-25

## 2024-07-25 PROBLEM — D22.5 MELANOCYTIC NEVI OF TRUNK: Status: ACTIVE | Noted: 2024-07-25

## 2024-07-25 PROBLEM — D18.01 HEMANGIOMA OF SKIN AND SUBCUTANEOUS TISSUE: Status: ACTIVE | Noted: 2024-07-25

## 2024-07-25 PROBLEM — D22.61 MELANOCYTIC NEVI OF RIGHT UPPER LIMB, INCLUDING SHOULDER: Status: ACTIVE | Noted: 2024-07-25

## 2024-07-25 PROCEDURE — ? COUNSELING

## 2024-07-25 PROCEDURE — ? ADDITIONAL NOTES

## 2024-07-25 PROCEDURE — 17110 DESTRUCTION B9 LES UP TO 14: CPT

## 2024-07-25 PROCEDURE — 99213 OFFICE O/P EST LOW 20 MIN: CPT | Mod: 25

## 2024-07-25 PROCEDURE — ? LIQUID NITROGEN

## 2024-07-25 ASSESSMENT — LOCATION DETAILED DESCRIPTION DERM
LOCATION DETAILED: RIGHT MEDIAL UPPER BACK
LOCATION DETAILED: RIGHT POPLITEAL SKIN
LOCATION DETAILED: LEFT SUPERIOR LATERAL UPPER BACK
LOCATION DETAILED: RIGHT INFERIOR MEDIAL UPPER BACK
LOCATION DETAILED: LEFT PROXIMAL DORSAL FOREARM
LOCATION DETAILED: LEFT NASAL DORSUM
LOCATION DETAILED: SUPERIOR LUMBAR SPINE
LOCATION DETAILED: RIGHT PROXIMAL POSTERIOR UPPER ARM
LOCATION DETAILED: INFERIOR THORACIC SPINE
LOCATION DETAILED: RIGHT DISTAL DORSAL FOREARM
LOCATION DETAILED: LEFT LATERAL SUPERIOR CHEST
LOCATION DETAILED: SUPERIOR THORACIC SPINE
LOCATION DETAILED: RIGHT ANTERIOR DISTAL UPPER ARM
LOCATION DETAILED: LEFT DISTAL POSTERIOR UPPER ARM
LOCATION DETAILED: LEFT SUPERIOR UPPER BACK
LOCATION DETAILED: LEFT ANTERIOR DISTAL UPPER ARM
LOCATION DETAILED: RIGHT PROXIMAL DORSAL FOREARM
LOCATION DETAILED: LEFT DISTAL DORSAL FOREARM
LOCATION DETAILED: EPIGASTRIC SKIN
LOCATION DETAILED: LEFT INFERIOR UPPER BACK
LOCATION DETAILED: LEFT SUPERIOR MEDIAL MIDBACK
LOCATION DETAILED: RIGHT CENTRAL LATERAL NECK

## 2024-07-25 ASSESSMENT — LOCATION SIMPLE DESCRIPTION DERM
LOCATION SIMPLE: RIGHT UPPER ARM
LOCATION SIMPLE: NOSE
LOCATION SIMPLE: RIGHT FOREARM
LOCATION SIMPLE: ABDOMEN
LOCATION SIMPLE: LEFT FOREARM
LOCATION SIMPLE: NECK
LOCATION SIMPLE: LEFT UPPER ARM
LOCATION SIMPLE: RIGHT UPPER BACK
LOCATION SIMPLE: UPPER BACK
LOCATION SIMPLE: CHEST
LOCATION SIMPLE: LEFT UPPER BACK
LOCATION SIMPLE: LEFT LOWER BACK
LOCATION SIMPLE: RIGHT POPLITEAL SKIN
LOCATION SIMPLE: LOWER BACK

## 2024-07-25 ASSESSMENT — LOCATION ZONE DERM
LOCATION ZONE: LEG
LOCATION ZONE: NOSE
LOCATION ZONE: TRUNK
LOCATION ZONE: NECK
LOCATION ZONE: ARM

## 2024-07-25 NOTE — PROCEDURE: LIQUID NITROGEN
Duration Of Freeze Thaw-Cycle (Seconds): 10
Detail Level: Detailed
Show Aperture Variable?: Yes
Number Of Freeze-Thaw Cycles: 2 freeze-thaw cycles
Render Note In Bullet Format When Appropriate: No
Consent: The patient's consent was obtained including but not limited to risks of crusting, scabbing, blistering, scarring, darker or lighter pigmentary change, recurrence, incomplete removal and infection.
Post-Care Instructions: I reviewed with the patient in detail post-care instructions. Patient is to wear sunprotection, and avoid picking at any of the treated lesions. Pt may apply Vaseline to crusted or scabbing areas.
Medical Necessity Clause: This procedure was medically necessary because the lesions that were treated were:
Medical Necessity Information: It is in your best interest to select a reason for this procedure from the list below. All of these items fulfill various CMS LCD requirements except the new and changing color options.
Spray Paint Text: The liquid nitrogen was applied to the skin utilizing a spray paint frosting technique.

## 2024-09-19 ENCOUNTER — APPOINTMENT (OUTPATIENT)
Dept: SLEEP MEDICINE | Facility: MEDICAL CENTER | Age: 74
End: 2024-09-19
Attending: STUDENT IN AN ORGANIZED HEALTH CARE EDUCATION/TRAINING PROGRAM
Payer: MEDICARE

## 2024-09-27 ENCOUNTER — OFFICE VISIT (OUTPATIENT)
Dept: MEDICAL GROUP | Facility: LAB | Age: 74
End: 2024-09-27
Payer: MEDICARE

## 2024-09-27 VITALS
RESPIRATION RATE: 20 BRPM | DIASTOLIC BLOOD PRESSURE: 62 MMHG | TEMPERATURE: 98 F | BODY MASS INDEX: 38.66 KG/M2 | WEIGHT: 261 LBS | SYSTOLIC BLOOD PRESSURE: 122 MMHG | HEART RATE: 62 BPM | HEIGHT: 69 IN | OXYGEN SATURATION: 96 %

## 2024-09-27 DIAGNOSIS — H69.92 DYSFUNCTION OF LEFT EUSTACHIAN TUBE: ICD-10-CM

## 2024-09-27 PROCEDURE — 99213 OFFICE O/P EST LOW 20 MIN: CPT | Performed by: STUDENT IN AN ORGANIZED HEALTH CARE EDUCATION/TRAINING PROGRAM

## 2024-09-27 PROCEDURE — 3078F DIAST BP <80 MM HG: CPT | Performed by: STUDENT IN AN ORGANIZED HEALTH CARE EDUCATION/TRAINING PROGRAM

## 2024-09-27 PROCEDURE — 3074F SYST BP LT 130 MM HG: CPT | Performed by: STUDENT IN AN ORGANIZED HEALTH CARE EDUCATION/TRAINING PROGRAM

## 2024-09-27 RX ORDER — FLUTICASONE PROPIONATE 50 MCG
2 SPRAY, SUSPENSION (ML) NASAL DAILY
Qty: 16 G | Refills: 0 | Status: SHIPPED | OUTPATIENT
Start: 2024-09-27

## 2024-09-27 ASSESSMENT — ENCOUNTER SYMPTOMS
COUGH: 0
NAUSEA: 0
DIARRHEA: 0
HEADACHES: 0
SHORTNESS OF BREATH: 0
ABDOMINAL PAIN: 0
WEIGHT LOSS: 0
CHILLS: 0
SORE THROAT: 0
FEVER: 0
VOMITING: 0
DIZZINESS: 0

## 2024-09-27 ASSESSMENT — FIBROSIS 4 INDEX: FIB4 SCORE: 1.2

## 2024-09-27 NOTE — PROGRESS NOTES
"Verbal consent was acquired by the patient to use [email or web address] ambient listening note generation during this visit Yes.    Subjective:     Chief Complaint   Patient presents with    Ear Pain     Left; 1x month      HPI:   Gerald is a 74 y.o. male who presents today with left ear pain for 1 month. PCP unavailable.    History of Present Illness  The patient is a 74-year-old male presenting with left ear pain persisting for the past month.    He reports experiencing intermittent sounds in his left ear that he likens to a punctured tire, accompanied by a clicking/popping sounds when he eats. He also mentions a slight soreness just anterior to the left ear, but not jaw pain. His hearing remains consistent and not worsened from prior but he does endorse some chronic hearing loss. He has chosen not to use hearing aids despite being advised to do so. He reports no congestion or recent travel.     He occasionally experiences nasal congestion at times, which resolves quickly. He uses a BiPAP machine, believes it has humidification.    Review of Systems   Constitutional:  Negative for chills, fever, malaise/fatigue and weight loss.   HENT:  Positive for ear pain and hearing loss. Negative for congestion, sore throat and tinnitus.    Respiratory:  Negative for cough and shortness of breath.    Cardiovascular:  Negative for chest pain.   Gastrointestinal:  Negative for abdominal pain, diarrhea, nausea and vomiting.   Skin:  Negative for rash.   Neurological:  Negative for dizziness and headaches.       Objective:     Exam:  /62 (BP Location: Left arm, Patient Position: Sitting, BP Cuff Size: Adult long)   Pulse 62   Temp 36.7 °C (98 °F) (Temporal)   Resp 20   Ht 1.753 m (5' 9\")   Wt 118 kg (261 lb)   SpO2 96% Comment: 3 L  BMI 38.54 kg/m²  Body mass index is 38.54 kg/m².    Physical Exam  Vitals reviewed.   Constitutional:       General: He is not in acute distress.     Appearance: Normal appearance. He is not " ill-appearing.   HENT:      Head: Normocephalic and atraumatic.      Right Ear: Tympanic membrane, ear canal and external ear normal.      Left Ear: Tympanic membrane, ear canal and external ear normal.      Nose: Nose normal. No congestion or rhinorrhea.      Comments: Wearing nasal cannula prior to nasal exam.     Mouth/Throat:      Mouth: Mucous membranes are moist.      Pharynx: No oropharyngeal exudate or posterior oropharyngeal erythema.      Comments: No abnormal jaw deviation or crepitus, normal oral aperture.    Eyes:      General: No scleral icterus.     Conjunctiva/sclera: Conjunctivae normal.   Neck:      Thyroid: No thyroid mass, thyromegaly or thyroid tenderness.   Cardiovascular:      Rate and Rhythm: Normal rate and regular rhythm.      Heart sounds: Normal heart sounds.   Pulmonary:      Effort: Pulmonary effort is normal. No respiratory distress.   Musculoskeletal:      Cervical back: Normal range of motion and neck supple. No rigidity or tenderness.   Lymphadenopathy:      Cervical: No cervical adenopathy.   Neurological:      Mental Status: He is alert and oriented to person, place, and time.      Comments: Hearing fair.   Psychiatric:         Mood and Affect: Mood normal.         Behavior: Behavior normal.         Thought Content: Thought content normal.       Assessment & Plan:     74 y.o. male with the following -     1. Dysfunction of left eustachian tube  - fluticasone (FLONASE) 50 MCG/ACT nasal spray; Administer 2 Sprays into affected nostril(S) every day.  Dispense: 16 g; Refill: 0    Assessment & Plan  1. Eustachian tube dysfunction.  Acute, consistent with eustachian tube dysfunction. Flonase nasal spray was recommended, with instructions to administer 1 to 2 sprays in the left nostril daily (start with 2 sprays). He was informed about potential side effects such as dryness and nosebleeds and provided with information on how to manage these should they occur. The use of a saline spray or  rinse was suggested to enhance the effectiveness of Flonase. A printout detailing the causes of eustachian tube dysfunction was given to him. If there is no improvement, a referral to an ENT specialist will be considered.       Return if symptoms worsen or fail to improve.    Please note that this dictation was created using voice recognition software. I have made every reasonable attempt to correct obvious errors, but I expect that there are errors of grammar and possibly content that I did not discover before finalizing the note.

## 2024-09-28 NOTE — PATIENT INSTRUCTIONS
Flonase 1-2 sprays each nostril daily (only left ok)    If nasal dryness/nose bleeds develop:  Consider in humidifier, AYR gel (green box) or Ponaris Nasal Emollient, and/or Flonase reduce down to 1 spray a day    If congested can use a saline spray or [place] Med Saline Rinse (black top bottle)-can be used prior to flonase as well to improve efficacy

## 2024-10-15 ENCOUNTER — TELEPHONE (OUTPATIENT)
Dept: MEDICAL GROUP | Facility: LAB | Age: 74
End: 2024-10-15
Payer: MEDICARE

## 2024-10-16 DIAGNOSIS — H69.92 DYSFUNCTION OF LEFT EUSTACHIAN TUBE: ICD-10-CM

## 2024-10-16 RX ORDER — MELOXICAM 15 MG/1
15 TABLET ORAL DAILY
Qty: 14 TABLET | Refills: 0 | Status: SHIPPED | OUTPATIENT
Start: 2024-10-16 | End: 2024-10-30

## 2024-10-20 DIAGNOSIS — H69.92 DYSFUNCTION OF LEFT EUSTACHIAN TUBE: ICD-10-CM

## 2024-10-21 RX ORDER — FLUTICASONE PROPIONATE 50 MCG
2 SPRAY, SUSPENSION (ML) NASAL DAILY
Qty: 48 ML | Refills: 1 | Status: SHIPPED | OUTPATIENT
Start: 2024-10-21

## 2024-11-04 ENCOUNTER — TELEPHONE (OUTPATIENT)
Dept: MEDICAL GROUP | Facility: LAB | Age: 74
End: 2024-11-04
Payer: MEDICARE

## 2024-11-04 DIAGNOSIS — H69.92 DYSFUNCTION OF LEFT EUSTACHIAN TUBE: ICD-10-CM

## 2024-11-04 NOTE — TELEPHONE ENCOUNTER
1. Caller Name: Gerald Oshea                          Call Back Number: There are no phone numbers on file.        How would the patient prefer to be contacted with a response: Phone call do NOT leave a detailed message    2. SPECIFIC Action To Be Taken: Referral pending, please sign.    3. Diagnosis/Clinical Reason for Request: Ongoing eustachian tube Dysfunction, left sided    4. Specialty & Provider Name/Lab/Imaging Location: no    5. Is appointment scheduled for requested order/referral: no    Patient was not informed they will receive a return phone call from the office ONLY if there are any questions before processing their request. Advised to call back if they haven't received a call from the referral department in 5 days.

## 2024-11-06 RX ORDER — GABAPENTIN 800 MG/1
800 TABLET ORAL 3 TIMES DAILY
Qty: 270 TABLET | Refills: 0 | Status: SHIPPED | OUTPATIENT
Start: 2024-11-06

## 2024-11-06 NOTE — TELEPHONE ENCOUNTER
Received request via: Pharmacy    Was the patient seen in the last year in this department? Yes    Does the patient have an active prescription (recently filled or refills available) for medication(s) requested? No    [facility]    Does the patient have FDC Plus and need 100-day supply? (This applies to ALL medications) Patient does not have SCP

## 2024-11-12 ENCOUNTER — TELEPHONE (OUTPATIENT)
Dept: MEDICAL GROUP | Facility: LAB | Age: 74
End: 2024-11-12
Payer: MEDICARE

## 2024-11-12 DIAGNOSIS — H69.92 DYSFUNCTION OF LEFT EUSTACHIAN TUBE: ICD-10-CM

## 2024-11-12 NOTE — TELEPHONE ENCOUNTER
Patient needs a hearing test before he can go to [facility] & [facility]

## 2024-12-03 ENCOUNTER — HOSPITAL ENCOUNTER (OUTPATIENT)
Dept: LAB | Facility: MEDICAL CENTER | Age: 74
End: 2024-12-03
Attending: STUDENT IN AN ORGANIZED HEALTH CARE EDUCATION/TRAINING PROGRAM
Payer: MEDICARE

## 2024-12-03 DIAGNOSIS — E78.2 MIXED HYPERLIPIDEMIA: ICD-10-CM

## 2024-12-03 DIAGNOSIS — E11.22 TYPE 2 DIABETES MELLITUS WITH STAGE 3 CHRONIC KIDNEY DISEASE, WITHOUT LONG-TERM CURRENT USE OF INSULIN, UNSPECIFIED WHETHER STAGE 3A OR 3B CKD (HCC): ICD-10-CM

## 2024-12-03 DIAGNOSIS — N18.30 TYPE 2 DIABETES MELLITUS WITH STAGE 3 CHRONIC KIDNEY DISEASE, WITHOUT LONG-TERM CURRENT USE OF INSULIN, UNSPECIFIED WHETHER STAGE 3A OR 3B CKD (HCC): ICD-10-CM

## 2024-12-03 LAB
EST. AVERAGE GLUCOSE BLD GHB EST-MCNC: 128 MG/DL
HBA1C MFR BLD: 6.1 % (ref 4–5.6)

## 2024-12-03 PROCEDURE — 83036 HEMOGLOBIN GLYCOSYLATED A1C: CPT | Mod: GA

## 2024-12-03 PROCEDURE — 36415 COLL VENOUS BLD VENIPUNCTURE: CPT

## 2024-12-03 PROCEDURE — 82043 UR ALBUMIN QUANTITATIVE: CPT

## 2024-12-03 PROCEDURE — 82570 ASSAY OF URINE CREATININE: CPT

## 2024-12-03 PROCEDURE — 80061 LIPID PANEL: CPT

## 2024-12-03 PROCEDURE — 80053 COMPREHEN METABOLIC PANEL: CPT

## 2024-12-04 LAB
ALBUMIN SERPL BCP-MCNC: 4.4 G/DL (ref 3.2–4.9)
ALBUMIN/GLOB SERPL: 1.9 G/DL
ALP SERPL-CCNC: 85 U/L (ref 30–99)
ALT SERPL-CCNC: 20 U/L (ref 2–50)
ANION GAP SERPL CALC-SCNC: 14 MMOL/L (ref 7–16)
AST SERPL-CCNC: 24 U/L (ref 12–45)
BILIRUB SERPL-MCNC: 0.7 MG/DL (ref 0.1–1.5)
BUN SERPL-MCNC: 15 MG/DL (ref 8–22)
CALCIUM ALBUM COR SERPL-MCNC: 9.7 MG/DL (ref 8.5–10.5)
CALCIUM SERPL-MCNC: 10 MG/DL (ref 8.5–10.5)
CHLORIDE SERPL-SCNC: 108 MMOL/L (ref 96–112)
CHOLEST SERPL-MCNC: 124 MG/DL (ref 100–199)
CO2 SERPL-SCNC: 23 MMOL/L (ref 20–33)
CREAT SERPL-MCNC: 1.08 MG/DL (ref 0.5–1.4)
CREAT UR-MCNC: 114.03 MG/DL
FASTING STATUS PATIENT QL REPORTED: NORMAL
GFR SERPLBLD CREATININE-BSD FMLA CKD-EPI: 72 ML/MIN/1.73 M 2
GLOBULIN SER CALC-MCNC: 2.3 G/DL (ref 1.9–3.5)
GLUCOSE SERPL-MCNC: 126 MG/DL (ref 65–99)
HDLC SERPL-MCNC: 56 MG/DL
LDLC SERPL CALC-MCNC: 47 MG/DL
MICROALBUMIN UR-MCNC: 5.6 MG/DL
MICROALBUMIN/CREAT UR: 49 MG/G (ref 0–30)
POTASSIUM SERPL-SCNC: 4.7 MMOL/L (ref 3.6–5.5)
PROT SERPL-MCNC: 6.7 G/DL (ref 6–8.2)
SODIUM SERPL-SCNC: 145 MMOL/L (ref 135–145)
TRIGL SERPL-MCNC: 107 MG/DL (ref 0–149)

## 2024-12-09 ENCOUNTER — OFFICE VISIT (OUTPATIENT)
Dept: MEDICAL GROUP | Facility: LAB | Age: 74
End: 2024-12-09
Payer: MEDICARE

## 2024-12-09 VITALS
WEIGHT: 270.06 LBS | RESPIRATION RATE: 20 BRPM | HEART RATE: 82 BPM | DIASTOLIC BLOOD PRESSURE: 58 MMHG | TEMPERATURE: 97.4 F | HEIGHT: 69 IN | SYSTOLIC BLOOD PRESSURE: 124 MMHG | OXYGEN SATURATION: 97 % | BODY MASS INDEX: 40 KG/M2

## 2024-12-09 DIAGNOSIS — R80.9 MICROALBUMINURIA DUE TO TYPE 2 DIABETES MELLITUS (HCC): ICD-10-CM

## 2024-12-09 DIAGNOSIS — E11.22 TYPE 2 DIABETES MELLITUS WITH STAGE 3 CHRONIC KIDNEY DISEASE, WITHOUT LONG-TERM CURRENT USE OF INSULIN, UNSPECIFIED WHETHER STAGE 3A OR 3B CKD (HCC): ICD-10-CM

## 2024-12-09 DIAGNOSIS — N18.30 TYPE 2 DIABETES MELLITUS WITH STAGE 3 CHRONIC KIDNEY DISEASE, WITHOUT LONG-TERM CURRENT USE OF INSULIN, UNSPECIFIED WHETHER STAGE 3A OR 3B CKD (HCC): ICD-10-CM

## 2024-12-09 DIAGNOSIS — I10 PRIMARY HYPERTENSION: ICD-10-CM

## 2024-12-09 DIAGNOSIS — Z12.5 PROSTATE CANCER SCREENING: ICD-10-CM

## 2024-12-09 DIAGNOSIS — E11.29 MICROALBUMINURIA DUE TO TYPE 2 DIABETES MELLITUS (HCC): ICD-10-CM

## 2024-12-09 DIAGNOSIS — Z00.00 ENCOUNTER FOR ANNUAL WELLNESS VISIT (AWV) IN MEDICARE PATIENT: ICD-10-CM

## 2024-12-09 DIAGNOSIS — E78.2 MIXED HYPERLIPIDEMIA: ICD-10-CM

## 2024-12-09 PROCEDURE — 3074F SYST BP LT 130 MM HG: CPT | Performed by: STUDENT IN AN ORGANIZED HEALTH CARE EDUCATION/TRAINING PROGRAM

## 2024-12-09 PROCEDURE — G0439 PPPS, SUBSEQ VISIT: HCPCS | Performed by: STUDENT IN AN ORGANIZED HEALTH CARE EDUCATION/TRAINING PROGRAM

## 2024-12-09 PROCEDURE — 3078F DIAST BP <80 MM HG: CPT | Performed by: STUDENT IN AN ORGANIZED HEALTH CARE EDUCATION/TRAINING PROGRAM

## 2024-12-09 ASSESSMENT — ENCOUNTER SYMPTOMS: GENERAL WELL-BEING: GOOD

## 2024-12-09 ASSESSMENT — PATIENT HEALTH QUESTIONNAIRE - PHQ9: CLINICAL INTERPRETATION OF PHQ2 SCORE: 0

## 2024-12-09 ASSESSMENT — FIBROSIS 4 INDEX: FIB4 SCORE: 1.54

## 2024-12-09 ASSESSMENT — ACTIVITIES OF DAILY LIVING (ADL): BATHING_REQUIRES_ASSISTANCE: 0

## 2024-12-09 NOTE — PROGRESS NOTES
Chief Complaint   Patient presents with    Annual Wellness Visit       HPI:  Gerald Clemente is a 74 y.o. here for Medicare Annual Wellness Visit     Patient Active Problem List    Diagnosis Date Noted    Other specified anemias 01/04/2023    Hospital discharge follow-up 12/01/2022    Hyperlipidemia 11/27/2022    Compression fracture of L1 lumbar vertebra, sequela 11/27/2022    Medicare annual wellness visit, subsequent 07/14/2022    Long-term current use of benzodiazepine 06/09/2022    Generalized anxiety disorder 04/30/2021    Microalbuminuria due to type 2 diabetes mellitus (HCC) 06/22/2020    Chronic respiratory failure with hypoxia (HCC) 04/11/2018    History of skin cancer 12/06/2017    Obesity (BMI 30-39.9) 09/20/2017    Obstructive sleep apnea 05/09/2017    Neuropathy associated with cancer (HCC) 03/28/2017    History of colon cancer 09/28/2016    History of gout 09/28/2016    Hypertension 09/28/2016    Anxiety 09/28/2016    Pure hypercholesterolemia 09/28/2016    Prostate cancer (HCC) 09/28/2016    Prediabetes 10/25/2012       Current Outpatient Medications   Medication Sig Dispense Refill    Empagliflozin (JARDIANCE) 10 MG Tab tablet Take 1 Tablet by mouth every day. 90 Tablet 1    gabapentin (NEURONTIN) 800 MG tablet TAKE 1 TABLET BY MOUTH 3 TIMES A  Tablet 0    fluticasone (FLONASE) 50 MCG/ACT nasal spray ADMINISTER 2 SPRAYS INTO AFFECTED NOSTRIL(S) EVERY DAY 48 mL 1    rosuvastatin (CRESTOR) 40 MG tablet TAKE 1 TABLET BY MOUTH EVERYDAY AT BEDTIME 90 Tablet 2    losartan (COZAAR) 100 MG Tab TAKE 1 TABLET BY MOUTH EVERY DAY 90 Tablet 3    NON SPECIFIED OCD Regulator M6 tanks      acetaminophen (TYLENOL) 325 MG Tab Take 650 mg by mouth every four hours as needed for Mild Pain.       No current facility-administered medications for this visit.          Current supplements as per medication list.     Allergies: Lidocaine, Lidocaine-tetracaine-epineph, Duloxetine, Ketamine, and  Morphine    Current social contact/activities:     He  reports that he quit smoking about 26 years ago. His smoking use included cigarettes. He started smoking about 46 years ago. He has a 20 pack-year smoking history. He has never used smokeless tobacco. He reports that he does not currently use alcohol after a past usage of about 1.8 oz of alcohol per week. He reports that he does not use drugs.  Counseling given: Not Answered      ROS:    Gait: Uses no assistive device  Ostomy: No  Other tubes: No  Amputations: No  Chronic oxygen use: Yes  Last eye exam: 10/24  Wears hearing aids: No   : Denies any urinary leakage during the last 6 months    Screening:    Depression Screening  Little interest or pleasure in doing things?  0 - not at all  Feeling down, depressed , or hopeless? 0 - not at all  Patient Health Questionnaire Score: 0     If depressive symptoms identified deferred to follow up visit unless specifically addressed in assessment and plan.    Interpretation of PHQ-9 Total Score   Score Severity   1-4 No Depression   5-9 Mild Depression   10-14 Moderate Depression   15-19 Moderately Severe Depression   20-27 Severe Depression    Screening for Cognitive Impairment  Do you or any of your friends or family members have any concern about your memory? No  Three Minute Recall (Leader, Season, Table) 3/3    Roberto Carlos clock face with all 12 numbers and set the hands to show 10 minutes after 11.  Yes    Cognitive concerns identified deferred for follow up unless specifically addressed in assessment and plan.    Fall Risk Assessment  Has the patient had two or more falls in the last year or any fall with injury in the last year?  No    Safety Assessment  Do you always wear your seatbelt?  Yes  Any changes to home needed to function safely?    Difficulty hearing.  Yes  Patient counseled about all safety risks that were identified.    Functional Assessment ADLs  Are there any barriers preventing you from cooking for  yourself or meeting nutritional needs?  No.    Are there any barriers preventing you from driving safely or obtaining transportation?  No.    Are there any barriers preventing you from using a telephone or calling for help?  No    Are there any barriers preventing you from shopping?  No.    Are there any barriers preventing you from taking care of your own finances?  No    Are there any barriers preventing you from managing your medications?  No    Are there any barriers preventing you from showering, bathing or dressing yourself? No    Are there any barriers preventing you from doing housework or laundry? No  Are there any barriers preventing you from using the toilet?No  Are you currently engaging in any exercise or physical activity?  No.      Self-Assessment of Health  What is your perception of your health? Good    Do you sleep more than six hours a night? Yes    In the past 7 days, how much did pain keep you from doing your normal work? Some Back, feet  Do you spend quality time with family or friends (virtually or in person)? Yes    Do you usually eat a heart healthy diet that constists of a variety of fruits, vegetables, whole grains and fiber? No    Do you eat foods high in fat and/or Fast Food more than three times per week? No    How concerned are you that your medical conditions are not being well managed? a little    Are you worried that in the next 2 months, you may not have stable housing that you own, rent, or stay in as part of a household? Choose not to answer      Advance Care Planning  Do you have an Advance Directive, Living Will, Durable Power of , or POLST? Yes    Living Will     is not on file - instructed patient to bring in a copy to scan into their chart      Health Maintenance Summary            Overdue - IMM DTaP/Tdap/Td Vaccine (1 - Tdap) Overdue since 8/17/2021 08/16/2021  Imm Admin: TD Vaccine              Overdue - Annual Wellness Visit (Yearly) Overdue since 11/20/2024       11/20/2023  Visit Dx: Medicare annual wellness visit, subsequent    07/14/2022  Prob Dx: Medicare annual wellness visit, subsequent    07/14/2022  Visit Dx: Medicare annual wellness visit, subsequent    09/20/2017  Visit Dx: Medicare annual wellness visit, initial              Postponed - Zoster (Shingles) Vaccines (1 of 2) Postponed until 2/27/2025      No completion history exists for this topic.              Postponed - Influenza Vaccine (1) Postponed until 9/27/2025      10/17/2019  Imm Admin: Influenza Vaccine Adult HD    12/07/2018  Imm Admin: Influenza Vaccine Adult HD    11/20/2015  Imm Admin: Influenza Vaccine Quad Inj (Pf)              Postponed - COVID-19 Vaccine (1 - 2024-25 season) Postponed until 9/27/2025      No completion history exists for this topic.              A1c Screening (Every 6 Months) Next due on 6/3/2025      12/03/2024  HEMOGLOBIN A1C    05/13/2024  HEMOGLOBIN A1C    11/14/2023  HEMOGLOBIN A1C    11/28/2022  HEMOGLOBIN A1C    10/05/2022  HEMOGLOBIN A1C    Only the first 5 history entries have been loaded, but more history exists.              Diabetes: Retinopathy Screening (Yearly) Next due on 7/29/2025 07/29/2024  RETINAL SCREENING RESULTS    10/31/2023  AMB EXTERNAL RETINAL SCREENING RESULTS    01/01/2020  Done              Fasting Lipid Profile (Yearly) Next due on 12/3/2025      12/03/2024  Lipid Profile    05/13/2024  Lipid Profile    11/14/2023  Lipid Profile    10/05/2022  LIPID PANEL    01/10/2022  LIPID PANEL    Only the first 5 history entries have been loaded, but more history exists.              Diabetes: Urine Protein Screening (Yearly) Next due on 12/3/2025      12/03/2024  MICROALBUMIN CREAT RATIO URINE    01/26/2023  MICROALBUMIN CREAT RATIO URINE    01/26/2023  PROTEIN/CREAT RATIO URINE    08/26/2021  MICROALB/CREAT RATIO RAND. UR    06/17/2020  MICROALB/CREAT RATIO RAND. UR    Only the first 5 history entries have been loaded, but more history exists.               SERUM CREATININE (Yearly) Next due on 12/3/2025      12/03/2024  Comp Metabolic Panel    05/22/2024  Comp Metabolic Panel    05/13/2024  Comp Metabolic Panel    11/14/2023  Comp Metabolic Panel    11/14/2023  Comp Metabolic Panel    Only the first 5 history entries have been loaded, but more history exists.              Diabetes: Monofilament / LE Exam (Yearly) Tentatively due on 12/9/2025 12/09/2024  Diabetic Monofilament LE Exam    06/14/2023  Diabetic Monofilament LE Exam    08/02/2021  Diabetic Monofilament Lower Extremity Exam    08/02/2021  Smart[employer]: WORKFLOW - DIABETES - DIABETIC FOOT EXAM PERFORMED    01/27/2020  SmartData: WORKFLOW - DIABETES - DIABETIC FOOT EXAM PERFORMED    Only the first 5 history entries have been loaded, but more history exists.              Colorectal Cancer Screening (Colonoscopy - Every 10 Years) Tentatively due on 6/29/2026 06/29/2016  REFERRAL TO GI FOR COLONOSCOPY              Abdominal Aortic Aneurysm (AAA) Screening  Tentatively Complete      07/06/2018  CT-CHEST,ABDOMEN,PELVIS WITH    06/13/2017  CT-CHEST,ABDOMEN,PELVIS WITH    05/28/2012  CT-RENAL COLIC EVALUATION(A/P W/O) (R/O kidney stones, ruptured AAA, retroperitoneal hemorrhage)              Pneumococcal Vaccine: 65+ Years (Series Information) Completed      09/07/2018  Imm Admin: Pneumococcal Conjugate Vaccine (Prevnar/PCV-13)    11/20/2016  Imm Admin: Pneumococcal polysaccharide vaccine (PPSV-23)    08/19/2016  Imm Admin: Pneumococcal polysaccharide vaccine (PPSV-23)              Hepatitis A Vaccine (Hep A) (Series Information) Aged Out      No completion history exists for this topic.              HPV Vaccines (Series Information) Aged Out      No completion history exists for this topic.              Polio Vaccine (Inactivated Polio) (Series Information) Aged Out      No completion history exists for this topic.              Meningococcal Immunization (Series Information) Aged Out      No  completion history exists for this topic.              Discontinued - Hepatitis B Vaccine (Hep B)  Discontinued      No completion history exists for this topic.              Discontinued - Hepatitis C Screening  Discontinued      No completion history exists for this topic.                    Patient Care Team:  Luis Toth D.O. as PCP - General (Internal Medicine)  Lex Abraham M.D. as Consulting Physician (Gastroenterology)  [facility] (Inactive) as Consulting Physician  Ann Keller M.D. as Consulting Physician (Pulmonary Medicine)  Saúl Kamara M.D. (Phys Med and Rehab)  Amy R. Schoening, P.A. (Family Medicine)  Manuel Boston O.D. (Optometry)  Lucas Villegas M.D. as Consulting Physician (Urology)  [employer] for [employer] for PAP supplies  (DME Supplier)  [employer] (DME Supplier)        Social History     Tobacco Use    Smoking status: Former     Current packs/day: 0.00     Average packs/day: 1 pack/day for 20.0 years (20.0 ttl pk-yrs)     Types: Cigarettes     Start date: 1978     Quit date: 1998     Years since quittin.9    Smokeless tobacco: Never   Vaping Use    Vaping status: Never Used   Substance Use Topics    Alcohol use: Not Currently     Alcohol/week: 1.8 oz     Types: 3 Glasses of wine per week    Drug use: No     Family History   Problem Relation Age of Onset    Cancer Mother         Bladder    Heart Disease Father     Heart Disease Brother         CABG x2    Hyperlipidemia Brother     No Known Problems Maternal Grandmother     No Known Problems Paternal Grandmother     Heart Disease Paternal Grandfather     Hypertension Paternal Grandfather     Hyperlipidemia Paternal Grandfather     No Known Problems Son     Kidney Disease Neg Hx      He  has a past medical history of Anemia, Anxiety, Atrophy of right kidney, Breath shortness, Cancer (HCC) (2016), Colon cancer (HCC), Former tobacco use, Gout, High cholesterol (2017), Hyperlipidemia, Hypertension  "(12/12/2017), Neuropathy (12/12/2017), Pain (12/12/2017), Pneumonia (1991), Psychiatric problem, Sleep apnea (12/12/2017), and Snoring.   Past Surgical History:   Procedure Laterality Date    THORACOSCOPY  2/5/2018    Procedure: THORACOSCOPY- WEDGE RESECTION LT UPPER LOBE METASTASIS;  Surgeon: John H Ganser, M.D.;  Location: SURGERY [facility] ORS;  Service: General    CATH PLACEMENT Left 10/7/2016    Procedure: CATH PLACEMENT - SUBCLAVIAN PORT;  Surgeon: Sho Bajwa M.D.;  Location: SURGERY SAME DAY [facility] ORS;  Service:     COLON RESECTION  2016    [place]    CLOSED REDUCTION  10/25/2012    Performed by Chavez Duran M.D. at SURGERY [facility]       Exam:   /58 (BP Location: Right arm, Patient Position: Sitting, BP Cuff Size: Large adult)   Pulse 82   Temp 36.3 °C (97.4 °F) (Temporal)   Resp 20   Ht 1.753 m (5' 9\")   Wt 123 kg (270 lb 1 oz)   SpO2 97%  Body mass index is 39.88 kg/m².    Hearing fair.    Dentition fair  Alert, oriented in no acute distress.  Eye contact is good, speech goal directed, affect calm    Monofilament testing wnl b/l    Assessment and Plan. The following treatment and monitoring plan is recommended:    1. Type 2 diabetes mellitus with stage 3 chronic kidney disease, without long-term current use of insulin, unspecified whether stage 3a or 3b CKD (HCC)  - Diabetic Monofilament LE Exam  - Empagliflozin (JARDIANCE) 10 MG Tab tablet; Take 1 Tablet by mouth every day.  Dispense: 90 Tablet; Refill: 1    2. Encounter for annual wellness visit (AWV) in Medicare patient    3. Microalbuminuria due to type 2 diabetes mellitus (HCC)      Services suggested: No services needed at this time  Health Care Screening: Age-appropriate preventive services recommended by USPTF and ACIP covered by Medicare were discussed today. Services ordered if indicated and agreed upon by the patient.  Referrals offered: Community-based lifestyle interventions to reduce health risks and " promote self-management and wellness, fall prevention, nutrition, physical activity, tobacco-use cessation, weight loss, and mental health services as per orders if indicated.    Discussion today about general wellness and lifestyle habits:    Prevent falls and reduce trip hazards; Cautioned about securing or removing rugs.  Have a working fire alarm and carbon monoxide detector;   Engage in regular physical activity and social activities     Follow-up: 6 month diabetes follow up A1c in the office

## 2024-12-18 ENCOUNTER — TELEPHONE (OUTPATIENT)
Dept: MEDICAL GROUP | Facility: LAB | Age: 74
End: 2024-12-18
Payer: MEDICARE

## 2024-12-18 NOTE — TELEPHONE ENCOUNTER
Patient called and LVM reporting his new medication at the pharmacy is to expensive, he will not be picking it up. FYI. Please advise.     Said it was about 400 dollar for a month supply.

## 2025-02-20 RX ORDER — GABAPENTIN 800 MG/1
800 TABLET ORAL 3 TIMES DAILY
Qty: 270 TABLET | Refills: 0 | Status: SHIPPED | OUTPATIENT
Start: 2025-02-20

## 2025-03-04 ENCOUNTER — OFFICE VISIT (OUTPATIENT)
Dept: SLEEP MEDICINE | Facility: MEDICAL CENTER | Age: 75
End: 2025-03-04
Payer: MEDICARE

## 2025-03-04 VITALS
BODY MASS INDEX: 40.36 KG/M2 | RESPIRATION RATE: 18 BRPM | DIASTOLIC BLOOD PRESSURE: 82 MMHG | WEIGHT: 281.9 LBS | OXYGEN SATURATION: 94 % | SYSTOLIC BLOOD PRESSURE: 134 MMHG | HEIGHT: 70 IN | HEART RATE: 83 BPM

## 2025-03-04 DIAGNOSIS — G47.33 OSA TREATED WITH BIPAP: ICD-10-CM

## 2025-03-04 PROCEDURE — 99213 OFFICE O/P EST LOW 20 MIN: CPT

## 2025-03-04 PROCEDURE — 3079F DIAST BP 80-89 MM HG: CPT

## 2025-03-04 PROCEDURE — 3075F SYST BP GE 130 - 139MM HG: CPT

## 2025-03-04 ASSESSMENT — FIBROSIS 4 INDEX: FIB4 SCORE: 1.54

## 2025-03-04 NOTE — PROGRESS NOTES
[facility] Follow-up Visit    CC:   Chief Complaint   Patient presents with    Follow-Up     Last Office Visit 2/28/2024 with Dr. Whittington   auto titrating BiPAP 10/20/4 with 3 LPM bleed in             HPI:    Gerald Clemente is a 74 y.o.male present today for ongoing management of SU on BiPAP with 3L bleed in O2. Patient was last seen by sleep medicine 2/28/24 by Dr. Whittington. Patient is also seen by pulmonary and wears 3L during the daytime. Patient uses the machine nightly and reports moderate benefit. Patient reports dry mouth  and excessive leak  stating that in the morning his water reservoir is dry. Patient denies residual snoring and excessive daytime sleepiness . Generally sleep is restorative though he afternoon fatigue. The primary reason for today's visit is annual follow up for data evaluation and renewal of mask and supplies.     DME provider: [facility]  Device: aircurve 10   Settings: BiPAP 20/16/4  Mask: FFM  Aerophagia: no  Snoring: no  Dry mouth: yes  Leak: yes  Skin irritation: no  Chin strap: no      Sleep History  3/21/18 - PSG titration, sub-optimal. No definitive pressure can be extrapolated from the titration, I recommend Auto BiPAP 20/16 to 24/21 cm supplemental Oxygen 2L/min with large simplus mask.  11/19/12 - (PMA) PSG split night, AHI 97, shivani O2 22%. CPAP and BiPAP tried with 3L bleed in due to persistently low O2. Recommendation: BiPAP 20/16 with 3L bleed in   2/6/00 - (PMA) PSG split night, , shivani O2 52%, recommendation for CPAP 11    Patient Active Problem List    Diagnosis Date Noted    Other specified anemias 01/04/2023    Hospital discharge follow-up 12/01/2022    Hyperlipidemia 11/27/2022    Compression fracture of L1 lumbar vertebra, sequela 11/27/2022    Medicare annual wellness visit, subsequent 07/14/2022    Long-term current use of benzodiazepine 06/09/2022    Generalized anxiety disorder 04/30/2021    Microalbuminuria due to type 2 diabetes mellitus  "(HCC) 06/22/2020    Chronic respiratory failure with hypoxia (HCC) 04/11/2018    History of skin cancer 12/06/2017    Obesity (BMI 30-39.9) 09/20/2017    Obstructive sleep apnea 05/09/2017    Neuropathy associated with cancer (HCC) 03/28/2017    History of colon cancer 09/28/2016    History of gout 09/28/2016    Hypertension 09/28/2016    Anxiety 09/28/2016    Pure hypercholesterolemia 09/28/2016    Prostate cancer (HCC) 09/28/2016    Prediabetes 10/25/2012       Past Medical History:   Diagnosis Date    Anemia     Anxiety     Atrophy of right kidney     one functioning kidney    Breath shortness     Cancer (HCC) 2016    colon   rx surgery and chemo    Colon cancer (HCC)     Former tobacco use     Gout     High cholesterol 12/12/2017    Hyperlipidemia     Hypertension 12/12/2017    Neuropathy 12/12/2017    Pain 12/12/2017    \"Lower Back & Left Leg\"    Pneumonia 1991    Left upper lobe    Psychiatric problem     anxiety    Sleep apnea 12/12/2017    CPAP USE    Snoring     DX SU        Past Surgical History:   Procedure Laterality Date    THORACOSCOPY  2/5/2018    Procedure: THORACOSCOPY- WEDGE RESECTION LT UPPER LOBE METASTASIS;  Surgeon: John H Ganser, M.D.;  Location: SURGERY [facility];  Service: General    CATH PLACEMENT Left 10/7/2016    Procedure: CATH PLACEMENT - SUBCLAVIAN PORT;  Surgeon: Sho Bajwa M.D.;  Location: SURGERY SAME DAY [facility];  Service:     COLON RESECTION  2016    [place]    CLOSED REDUCTION  10/25/2012    Performed by Chavez Duran M.D. at SURGERY [facility]       Family History   Problem Relation Age of Onset    Cancer Mother         Bladder    Heart Disease Father     Heart Disease Brother         CABG x2    Hyperlipidemia Brother     No Known Problems Maternal Grandmother     No Known Problems Paternal Grandmother     Heart Disease Paternal Grandfather     Hypertension Paternal Grandfather     Hyperlipidemia Paternal Grandfather     No Known Problems Son     " "Kidney Disease Neg Hx        Current Outpatient Medications   Medication Sig Dispense Refill    gabapentin (NEURONTIN) 800 MG tablet TAKE 1 TABLET BY MOUTH THREE TIMES A  Tablet 0    Empagliflozin (JARDIANCE) 10 MG Tab tablet Take 1 Tablet by mouth every day. 90 Tablet 1    fluticasone (FLONASE) 50 MCG/ACT nasal spray ADMINISTER 2 SPRAYS INTO AFFECTED NOSTRIL(S) EVERY DAY 48 mL 1    rosuvastatin (CRESTOR) 40 MG tablet TAKE 1 TABLET BY MOUTH EVERYDAY AT BEDTIME 90 Tablet 2    losartan (COZAAR) 100 MG Tab TAKE 1 TABLET BY MOUTH EVERY DAY 90 Tablet 3    NON SPECIFIED OCD Regulator M6 tanks      acetaminophen (TYLENOL) 325 MG Tab Take 650 mg by mouth every four hours as needed for Mild Pain.       No current facility-administered medications for this visit.        ALLERGIES: Lidocaine, Lidocaine-tetracaine-epineph, Duloxetine, Ketamine, and Morphine    ROS  Constitutional: Denies fevers, Denies weight changes  Ears/Nose/Throat/Mouth: Denies nasal congestion or sore throat   Cardiovascular: Denies chest pain  Respiratory: Denies shortness of breath, Denies cough  Gastrointestinal/Hepatic: Denies nausea, vomiting  Sleep: see HPI      PHYSICAL EXAM  /82 (BP Location: Left arm, Patient Position: Sitting, BP Cuff Size: Adult)   Pulse 83   Resp 18   Ht 1.778 m (5' 10\")   Wt (!) 128 kg (281 lb 14.4 oz)   SpO2 94%   BMI 40.45 kg/m²   Constitutional: Alert, no distress, well-groomed.  Skin: Warm, dry, good turgor, no rashes in visible areas.  Eye: Equal, round and reactive, conjunctiva clear, lids normal.  ENMT: Lips without lesions, good dentition, moist mucous membranes.  Neck: Trachea midline, no masses, no thyromegaly.  Respiratory: Unlabored respiratory effort, no cough.  MSK: Normal gait, moves all extremities.  Neuro: Grossly non-focal.   Psych: Alert and oriented x3, normal affect and mood.      Medical Decision Making   Assessment and Plan  The medical record was reviewed including Diagnostic and " titration nocturnal polysomnogram's  and compliance reports .    Obstructive sleep apnea  - Compliance data reviewed showing 100% usage > 4hours in last 30 days. Average AHI 32.7, unknown is 31.4 events/hour.  Leak 95th percentile 114.6. Advised close follow up due to  high AHI which is likely inaccurate due to severe mask leak.   - Current Settings BiPAP 20/16/4. Discussed potential causes of dry mouth including mask leak, mouth breathing/jaw dropping below level of mask, humidity settings, environmental factors. Recommended mask fit/switching to a new mask or different size that might provider a better seal.  Recommend continuing current settings vs mask fit vs decreasing pressure along with new mask.  Patient would like to try new mask first and see if leak resolves with better control of respiratory events. Provided patient with fit pack of Kaiden full face mask.   - Consider OPO on 3L to ensure this is sufficient as patient uses this 24/7 and never had repeat OPO with O2 bleed in on BiPAP therapy.  - Pt continues to use and benefit from machine.   - Orders placed for mask and supplies   - Advised to reach out via MyChart with questions     SU/PAP EDUCATION:  - Clean equipment frequently, and get new mask and supplies as allowed by insurance and DME.   - Avoid driving a motor vehicle when drowsy  - Patients with SU are at increased risk of cardiovascular disease including coronary artery disease, systemic arterial hypertension, pulmonary arterial hypertension, cardiac arrythmias, and stroke. Positive airway pressure will favorably impact many of the adverse conditions and effects provoked by SU.       Return in about 1 month (around 4/4/2025), or if symptoms worsen or fail to improve.   - Recommend an earlier appointment, if significant treatment barriers develop.  - Patient to follow up with the appropriate healthcare practitioners for all other medical problems and issues.    Please note portions of this  record was created using voice recognition software. I have made every reasonable attempt to correct obvious errors, but I expect that there are errors of grammar and possibly content I did not discover before finalizing the note.

## 2025-03-20 RX ORDER — ROSUVASTATIN CALCIUM 40 MG/1
40 TABLET, COATED ORAL
Qty: 90 TABLET | Refills: 2 | Status: SHIPPED | OUTPATIENT
Start: 2025-03-20

## 2025-03-21 ENCOUNTER — HOSPITAL ENCOUNTER (OUTPATIENT)
Dept: LAB | Facility: MEDICAL CENTER | Age: 75
End: 2025-03-21
Attending: RADIOLOGY
Payer: MEDICARE

## 2025-03-21 PROCEDURE — 84153 ASSAY OF PSA TOTAL: CPT

## 2025-03-21 PROCEDURE — 36415 COLL VENOUS BLD VENIPUNCTURE: CPT

## 2025-03-22 LAB — PSA SERPL DL<=0.01 NG/ML-MCNC: 0.72 NG/ML (ref 0–4)

## 2025-04-15 DIAGNOSIS — I10 ESSENTIAL HYPERTENSION: ICD-10-CM

## 2025-04-15 RX ORDER — LOSARTAN POTASSIUM 100 MG/1
100 TABLET ORAL
Qty: 90 TABLET | Refills: 3 | Status: SHIPPED | OUTPATIENT
Start: 2025-04-15

## 2025-04-30 ENCOUNTER — APPOINTMENT (OUTPATIENT)
Dept: URBAN - METROPOLITAN AREA CLINIC 35 | Facility: CLINIC | Age: 75
Setting detail: DERMATOLOGY
End: 2025-04-30

## 2025-04-30 DIAGNOSIS — L81.4 OTHER MELANIN HYPERPIGMENTATION: ICD-10-CM

## 2025-04-30 DIAGNOSIS — L82.1 OTHER SEBORRHEIC KERATOSIS: ICD-10-CM

## 2025-04-30 DIAGNOSIS — L11.1 TRANSIENT ACANTHOLYTIC DERMATOSIS [GROVER]: ICD-10-CM

## 2025-04-30 DIAGNOSIS — D18.0 HEMANGIOMA: ICD-10-CM

## 2025-04-30 DIAGNOSIS — L71.8 OTHER ROSACEA: ICD-10-CM

## 2025-04-30 DIAGNOSIS — Z85.828 PERSONAL HISTORY OF OTHER MALIGNANT NEOPLASM OF SKIN: ICD-10-CM

## 2025-04-30 DIAGNOSIS — D22 MELANOCYTIC NEVI: ICD-10-CM

## 2025-04-30 DIAGNOSIS — Z87.2 PERSONAL HISTORY OF DISEASES OF THE SKIN AND SUBCUTANEOUS TISSUE: ICD-10-CM

## 2025-04-30 DIAGNOSIS — Z71.89 OTHER SPECIFIED COUNSELING: ICD-10-CM

## 2025-04-30 DIAGNOSIS — L82.0 INFLAMED SEBORRHEIC KERATOSIS: ICD-10-CM

## 2025-04-30 PROBLEM — D22.62 MELANOCYTIC NEVI OF LEFT UPPER LIMB, INCLUDING SHOULDER: Status: ACTIVE | Noted: 2025-04-30

## 2025-04-30 PROBLEM — D22.5 MELANOCYTIC NEVI OF TRUNK: Status: ACTIVE | Noted: 2025-04-30

## 2025-04-30 PROBLEM — D22.61 MELANOCYTIC NEVI OF RIGHT UPPER LIMB, INCLUDING SHOULDER: Status: ACTIVE | Noted: 2025-04-30

## 2025-04-30 PROBLEM — D18.01 HEMANGIOMA OF SKIN AND SUBCUTANEOUS TISSUE: Status: ACTIVE | Noted: 2025-04-30

## 2025-04-30 PROCEDURE — ? PRESCRIPTION

## 2025-04-30 PROCEDURE — 99213 OFFICE O/P EST LOW 20 MIN: CPT | Mod: 25

## 2025-04-30 PROCEDURE — 17110 DESTRUCTION B9 LES UP TO 14: CPT

## 2025-04-30 PROCEDURE — ? COUNSELING

## 2025-04-30 PROCEDURE — ? TREATMENT REGIMEN

## 2025-04-30 PROCEDURE — ? MEDICATION COUNSELING

## 2025-04-30 PROCEDURE — ? LIQUID NITROGEN

## 2025-04-30 RX ORDER — METRONIDAZOLE 10 MG/G
THIN LAYER GEL TOPICAL QHS
Qty: 60 | Refills: 3 | Status: ERX | COMMUNITY
Start: 2025-04-30

## 2025-04-30 RX ORDER — TRIAMCINOLONE ACETONIDE 1 MG/G
THIN LAYER CREAM TOPICAL BID
Qty: 453.6 | Refills: 0 | Status: ERX

## 2025-04-30 RX ADMIN — METRONIDAZOLE THIN LAYER: 10 GEL TOPICAL at 00:00

## 2025-04-30 ASSESSMENT — LOCATION SIMPLE DESCRIPTION DERM
LOCATION SIMPLE: RIGHT POPLITEAL SKIN
LOCATION SIMPLE: CHEST
LOCATION SIMPLE: UPPER BACK
LOCATION SIMPLE: LEFT FOREARM
LOCATION SIMPLE: RIGHT FOREARM
LOCATION SIMPLE: LOWER BACK
LOCATION SIMPLE: ABDOMEN
LOCATION SIMPLE: LEFT UPPER BACK
LOCATION SIMPLE: LEFT UPPER ARM
LOCATION SIMPLE: RIGHT CHEEK
LOCATION SIMPLE: LEFT FOREHEAD
LOCATION SIMPLE: LEFT CHEEK
LOCATION SIMPLE: RIGHT UPPER ARM
LOCATION SIMPLE: NECK
LOCATION SIMPLE: SCALP
LOCATION SIMPLE: RIGHT UPPER BACK
LOCATION SIMPLE: LEFT LOWER BACK

## 2025-04-30 ASSESSMENT — LOCATION DETAILED DESCRIPTION DERM
LOCATION DETAILED: EPIGASTRIC SKIN
LOCATION DETAILED: LEFT CENTRAL MALAR CHEEK
LOCATION DETAILED: SUPERIOR THORACIC SPINE
LOCATION DETAILED: LEFT LATERAL SUPERIOR CHEST
LOCATION DETAILED: LEFT INFERIOR FOREHEAD
LOCATION DETAILED: RIGHT DISTAL DORSAL FOREARM
LOCATION DETAILED: RIGHT POPLITEAL SKIN
LOCATION DETAILED: RIGHT INFERIOR MEDIAL UPPER BACK
LOCATION DETAILED: RIGHT CENTRAL LATERAL NECK
LOCATION DETAILED: RIGHT INFERIOR LATERAL MALAR CHEEK
LOCATION DETAILED: RIGHT PROXIMAL POSTERIOR UPPER ARM
LOCATION DETAILED: LEFT PROXIMAL DORSAL FOREARM
LOCATION DETAILED: SUPERIOR LUMBAR SPINE
LOCATION DETAILED: LEFT DISTAL DORSAL FOREARM
LOCATION DETAILED: RIGHT ANTERIOR DISTAL UPPER ARM
LOCATION DETAILED: LEFT DISTAL POSTERIOR UPPER ARM
LOCATION DETAILED: LEFT CENTRAL POSTAURICULAR SKIN
LOCATION DETAILED: RIGHT MEDIAL UPPER BACK
LOCATION DETAILED: RIGHT PROXIMAL DORSAL FOREARM
LOCATION DETAILED: INFERIOR THORACIC SPINE
LOCATION DETAILED: LEFT SUPERIOR MEDIAL MIDBACK
LOCATION DETAILED: LEFT ANTERIOR DISTAL UPPER ARM
LOCATION DETAILED: LEFT SUPERIOR LATERAL UPPER BACK
LOCATION DETAILED: LEFT INFERIOR UPPER BACK

## 2025-04-30 ASSESSMENT — LOCATION ZONE DERM
LOCATION ZONE: ARM
LOCATION ZONE: SCALP
LOCATION ZONE: LEG
LOCATION ZONE: NECK
LOCATION ZONE: FACE
LOCATION ZONE: TRUNK

## 2025-05-05 ENCOUNTER — APPOINTMENT (OUTPATIENT)
Dept: SLEEP MEDICINE | Facility: MEDICAL CENTER | Age: 75
End: 2025-05-05
Payer: MEDICARE

## 2025-05-05 ENCOUNTER — RX ONLY (RX ONLY)
Age: 75
End: 2025-05-05

## 2025-05-05 RX ORDER — METRONIDAZOLE 7.5 MG/G
THIN LAYER GEL TOPICAL BID
Qty: 45 | Refills: 3 | Status: ERX | COMMUNITY
Start: 2025-05-05

## 2025-05-21 NOTE — PROGRESS NOTES
Chief Complaint   Patient presents with    Annual Wellness Visit       HPI:  Gerald Oshea is a 73 y.o. here for Medicare Annual Wellness Visit     Patient Active Problem List    Diagnosis Date Noted    Other specified anemias 01/04/2023    Hospital discharge follow-up 12/01/2022    Hyperlipidemia 11/27/2022    Compression fracture of L1 lumbar vertebra, sequela 11/27/2022    Medicare annual wellness visit, subsequent 07/14/2022    Long-term current use of benzodiazepine 06/09/2022    Generalized anxiety disorder 04/30/2021    Microalbuminuria due to type 2 diabetes mellitus (HCC) 06/22/2020    Chronic respiratory failure with hypoxia (HCC) 04/11/2018    History of skin cancer 12/06/2017    Obesity (BMI 30-39.9) 09/20/2017    Obstructive sleep apnea 05/09/2017    Neuropathy associated with cancer (HCC) 03/28/2017    History of colon cancer 09/28/2016    History of gout 09/28/2016    Hypertension 09/28/2016    Anxiety 09/28/2016    Pure hypercholesterolemia 09/28/2016    Prostate cancer (HCC) 09/28/2016    Prediabetes 10/25/2012       Current Outpatient Medications   Medication Sig Dispense Refill    [START ON 12/1/2023] Clonazepam 0.25 MG TABLET DISPERSIBLE Take 1 Tablet by mouth 1 time a day as needed (anxiety) for up to 90 days. Indications: Feeling Anxious 90 Tablet 0    rosuvastatin (CRESTOR) 40 MG tablet TAKE 1 TABLET BY MOUTH EVERYDAY AT BEDTIME 90 Tablet 2    losartan (COZAAR) 100 MG Tab TAKE 1 TABLET BY MOUTH EVERY DAY 90 Tablet 3    gabapentin (NEURONTIN) 800 MG tablet Take 800 mg by mouth 2 times a day.      acetaminophen (TYLENOL) 325 MG Tab Take 650 mg by mouth every four hours as needed for Mild Pain.      methocarbamol (ROBAXIN) 750 MG Tab Take 750 mg by mouth 2 times a day as needed. Indications: Musculoskeletal Pain      Diclofenac Sodium 1 % Gel Apply 1 Application topically 2 times a day as needed (Back Pain).      NON SPECIFIED OCD Regulator M6 tanks      ferrous sulfate 325 (65 Fe) MG  Number listed is not available. Called mom's number and left message.    tablet Take 1 Tablet by mouth every day. 90 Tablet 1    tamsulosin (FLOMAX) 0.4 MG capsule Take 1 Capsule by mouth 1/2 hour after breakfast. 90 Capsule 3     No current facility-administered medications for this visit.          Current supplements as per medication list.     Allergies: Duloxetine, Ketamine, Lidocaine, Lidocaine-tetracaine-epineph, and Morphine    Current social contact/activities:     He  reports that he quit smoking about 25 years ago. His smoking use included cigarettes. He started smoking about 45 years ago. He has a 20.0 pack-year smoking history. He has never used smokeless tobacco. He reports current alcohol use of about 1.8 oz of alcohol per week. He reports that he does not use drugs.  Counseling given: Not Answered      ROS:    Gait: Uses no assistive device  Ostomy: No  Other tubes: No  Amputations: No  Chronic oxygen use: Yes  Last eye exam: 1/2023  Wears hearing aids: No   : Denies any urinary leakage during the last 6 months    Screening:    Depression Screening  Little interest or pleasure in doing things?  0 - not at all  Feeling down, depressed , or hopeless? 0 - not at all  Trouble falling or staying asleep, or sleeping too much?     Feeling tired or having little energy?     Poor appetite or overeating?     Feeling bad about yourself - or that you are a failure or have let yourself or your family down?    Trouble concentrating on things, such as reading the newspaper or watching television?    Moving or speaking so slowly that other people could have noticed.  Or the opposite - being so fidgety or restless that you have been moving around a lot more than usual?     Thoughts that you would be better off dead, or of hurting yourself?     Patient Health Questionnaire Score:      If depressive symptoms identified deferred to follow up visit unless specifically addressed in assessment and plan.    Interpretation of PHQ-9 Total Score   Score Severity   1-4 No Depression   5-9 Mild  Depression   10-14 Moderate Depression   15-19 Moderately Severe Depression   20-27 Severe Depression    Screening for Cognitive Impairment  Do you or any of your friends or family members have any concern about your memory? Yes  Three Minute Recall (Banana, Sunrise, Chair) 3/3    Roberto Carlos clock face with all 12 numbers and set the hands to show 20 past 8.  Yes    Cognitive concerns identified deferred for follow up unless specifically addressed in assessment and plan.    Fall Risk Assessment  Has the patient had two or more falls in the last year or any fall with injury in the last year?  Yes    Safety Assessment  Do you always wear your seatbelt?  Yes  Any changes to home needed to function safely? No  Difficulty hearing.  No  Patient counseled about all safety risks that were identified.    Functional Assessment ADLs  Are there any barriers preventing you from cooking for yourself or meeting nutritional needs?  No.    Are there any barriers preventing you from driving safely or obtaining transportation?  No.    Are there any barriers preventing you from using a telephone or calling for help?  No    Are there any barriers preventing you from shopping?  Yes.    Are there any barriers preventing you from taking care of your own finances?  No    Are there any barriers preventing you from managing your medications?  No    Are there any barriers preventing you from showering, bathing or dressing yourself? No    Are there any barriers preventing you from doing housework or laundry? No    Are there any barriers preventing you from using the toilet?No    Are you currently engaging in any exercise or physical activity?  No.      Self-Assessment of Health  What is your perception of your health? Fair    Do you sleep more than six hours a night? Yes    In the past 7 days, how much did pain keep you from doing your normal work? Some    Do you spend quality time with family or friends (virtually or in person)? Yes    Do you  usually eat a heart healthy diet that constists of a variety of fruits, vegetables, whole grains and fiber? No    Do you eat foods high in fat and/or Fast Food more than three times per week? No    How concerned are you that your medical conditions are not being well managed? a little        Advance Care Planning  Do you have an Advance Directive, Living Will, Durable Power of , or POLST? Yes    Living Will     is not on file - instructed patient to bring in a copy to scan into their chart      Health Maintenance Summary            Overdue - Zoster (Shingles) Vaccines (1 of 2) Overdue - never done      No completion history exists for this topic.              Overdue - IMM DTaP/Tdap/Td Vaccine (1 - Tdap) Overdue since 8/17/2021 08/16/2021  Imm Admin: TD Vaccine              Overdue - Influenza Vaccine (1) Overdue since 9/1/2023      10/17/2019  Imm Admin: Influenza Vaccine Adult HD    12/07/2018  Imm Admin: Influenza Vaccine Adult HD    11/20/2015  Imm Admin: Influenza Vaccine Quad Inj (Pf)              Postponed - COVID-19 Vaccine (1) Postponed until 6/14/2024      No completion history exists for this topic.              Diabetes: Urine Protein Screening (Yearly) Next due on 1/26/2024 01/26/2023  MICROALBUMIN CREAT RATIO URINE    01/26/2023  PROTEIN/CREAT RATIO URINE    08/26/2021  MICROALB/CREAT RATIO RAND. UR    06/17/2020  MICROALB/CREAT RATIO RAND. UR    11/24/2014  MICROALBUMIN CREAT RATIO URINE              A1c Screening (Every 6 Months) Next due on 5/14/2024 11/14/2023  HEMOGLOBIN A1C    11/28/2022  HEMOGLOBIN A1C    10/05/2022  HEMOGLOBIN A1C    01/04/2022  HEMOGLOBIN A1C    11/04/2020  HEMOGLOBIN A1C    Only the first 5 history entries have been loaded, but more history exists.              Diabetes: Monofilament / LE Exam (Yearly) Tentatively due on 6/14/2024 06/14/2023  Diabetic Monofilament LE Exam    08/02/2021  Diabetic Monofilament Lower Extremity Exam    08/02/2021   [employer]: WORKFLOW - DIABETES - DIABETIC FOOT EXAM PERFORMED    01/27/2020  [employer]: WORKFLOW - DIABETES - DIABETIC FOOT EXAM PERFORMED    01/27/2020  Diabetic Monofilament LE Exam              Diabetes: Retinopathy Screening (Yearly) Next due on 10/31/2024      10/31/2023  AMB EXTERNAL RETINAL SCREENING RESULTS    01/01/2020  Done              Fasting Lipid Profile (Yearly) Next due on 11/14/2024 11/14/2023  Lipid Profile    10/05/2022  LIPID PANEL    01/10/2022  LIPID PANEL    01/04/2022  LIPID PANEL    06/17/2020  LIPID PANEL    Only the first 5 history entries have been loaded, but more history exists.              SERUM CREATININE (Yearly) Next due on 11/14/2024 11/14/2023  Comp Metabolic Panel    11/14/2023  Comp Metabolic Panel    06/07/2023  Basic Metabolic Panel    05/18/2023  Comp Metabolic Panel    01/26/2023  Basic Metabolic Panel    Only the first 5 history entries have been loaded, but more history exists.              Annual Wellness Visit (Every 366 Days) Next due on 11/20/2024 11/20/2023  Visit Dx: Medicare annual wellness visit, subsequent    07/14/2022  Prob Dx: Medicare annual wellness visit, subsequent    07/14/2022  Visit Dx: Medicare annual wellness visit, subsequent    09/20/2017  Visit Dx: Medicare annual wellness visit, initial              Colorectal Cancer Screening (Colonoscopy - Every 10 Years) Tentatively due on 6/29/2026 06/29/2016  REFERRAL TO GI FOR COLONOSCOPY              Abdominal Aortic Aneurysm (AAA) Screening  Tentatively Complete      07/06/2018  CT-CHEST,ABDOMEN,PELVIS WITH    06/13/2017  CT-CHEST,ABDOMEN,PELVIS WITH    05/28/2012  CT-RENAL COLIC EVALUATION(A/P W/O) (R/O kidney stones, ruptured AAA, retroperitoneal hemorrhage)              Pneumococcal Vaccine: 65+ Years (Series Information) Completed      09/07/2018  Imm Admin: Pneumococcal Conjugate Vaccine (Prevnar/PCV-13)    11/20/2016  Imm Admin: Pneumococcal polysaccharide vaccine (PPSV-23)     2016  Imm Admin: Pneumococcal polysaccharide vaccine (PPSV-23)              Hepatitis A Vaccine (Hep A) (Series Information) Aged Out      No completion history exists for this topic.              HPV Vaccines (Series Information) Aged Out      No completion history exists for this topic.              Polio Vaccine (Inactivated Polio) (Series Information) Aged Out      No completion history exists for this topic.              Meningococcal Immunization (Series Information) Aged Out      No completion history exists for this topic.              Discontinued - Hepatitis B Vaccine (Hep B)  Discontinued      No completion history exists for this topic.              Discontinued - Hepatitis C Screening  Discontinued      No completion history exists for this topic.                    Patient Care Team:  Luis Toth D.O. as PCP - General (Internal Medicine)  Lex Abraham M.D. as Consulting Physician (Gastroenterology)  [facility] (Inactive) as Consulting Physician  Ann Keller M.D. as Consulting Physician (Pulmonary Medicine)  Saúl Kamara M.D. (Phys Med and Rehab)  Amy R. Schoening, P.A. (Family Medicine)  Manuel Boston O.D. (Optometry)  Lucas Villegas M.D. as Consulting Physician (Urology)  [employer] for PAP supplies  (DME Supplier)  [employer] (DME Supplier)      Social History     Tobacco Use    Smoking status: Former     Current packs/day: 0.00     Average packs/day: 1 pack/day for 20.0 years (20.0 ttl pk-yrs)     Types: Cigarettes     Start date: 1978     Quit date: 1998     Years since quittin.9    Smokeless tobacco: Never   Vaping Use    Vaping Use: Never used   Substance Use Topics    Alcohol use: Yes     Alcohol/week: 1.8 oz     Types: 3 Glasses of wine per week     Comment: 2-3 per day    Drug use: No     Family History   Problem Relation Age of Onset    Cancer Mother         Bladder    Heart Disease Father     Heart Disease Brother         CABG  "x2    Hyperlipidemia Brother     No Known Problems Maternal Grandmother     No Known Problems Paternal Grandmother     Heart Disease Paternal Grandfather     Hypertension Paternal Grandfather     Hyperlipidemia Paternal Grandfather     No Known Problems Son     Kidney Disease Neg Hx      He  has a past medical history of Anemia, Anxiety, Atrophy of right kidney, Breath shortness, Cancer (HCC) (2016), Colon cancer (HCC), Former tobacco use, Gout, High cholesterol (12/12/2017), Hyperlipidemia, Hypertension (12/12/2017), Neuropathy (12/12/2017), Pain (12/12/2017), Pneumonia (1991), Psychiatric problem, Sleep apnea (12/12/2017), and Snoring.   Past Surgical History:   Procedure Laterality Date    THORACOSCOPY  2/5/2018    Procedure: THORACOSCOPY- WEDGE RESECTION LT UPPER LOBE METASTASIS;  Surgeon: John H Ganser, M.D.;  Location: SURGERY [facility];  Service: General    CATH PLACEMENT Left 10/7/2016    Procedure: CATH PLACEMENT - SUBCLAVIAN PORT;  Surgeon: Sho Bajwa M.D.;  Location: SURGERY SAME DAY [facility] ORS;  Service:     COLON RESECTION  2016    Daniel    CLOSED REDUCTION  10/25/2012    Performed by Chavez Duran M.D. at SURGERY [facility]       Exam:   /62 (BP Location: Right arm, Patient Position: Sitting, BP Cuff Size: Adult)   Pulse 72   Temp 36.5 °C (97.7 °F)   Resp 16   Ht 1.753 m (5' 9\")   Wt 117 kg (257 lb 12.8 oz)   SpO2 96%  Body mass index is 38.07 kg/m².    Hearing fair.    Dentition fair  Alert, oriented in no acute distress.  Eye contact is good, speech goal directed, affect calm    Assessment and Plan. The following treatment and monitoring plan is recommended:    Problem List Items Addressed This Visit       Anxiety    Relevant Medications    Clonazepam 0.25 MG TABLET DISPERSIBLE (Start on 12/1/2023)    Long-term current use of benzodiazepine     Chronic-improving  - Decrease clonazepam to 0.25 mg every other day  -PDMP checked  - Controlled substance " previously signed         Relevant Orders    Controlled Substance Treatment Agreement    URINE DRUG SCREEN    Medicare annual wellness visit, subsequent    Neuropathy associated with cancer (HCC)    Relevant Medications    Clonazepam 0.25 MG TABLET DISPERSIBLE (Start on 12/1/2023)    Prediabetes    Prostate cancer (HCC)           Services suggested: No services needed at this time  Health Care Screening: Age-appropriate preventive services recommended by USPTF and ACIP covered by Medicare were discussed today. Services ordered if indicated and agreed upon by the patient.  Referrals offered: Community-based lifestyle interventions to reduce health risks and promote self-management and wellness, fall prevention, nutrition, physical activity, tobacco-use cessation, weight loss, and mental health services as per orders if indicated.    Discussion today about general wellness and lifestyle habits:    Prevent falls and reduce trip hazards; Cautioned about securing or removing rugs.  Have a working fire alarm and carbon monoxide detector;   Engage in regular physical activity and social activities     Follow-up: 6 month follow up

## 2025-06-03 ENCOUNTER — RESULTS FOLLOW-UP (OUTPATIENT)
Dept: MEDICAL GROUP | Facility: LAB | Age: 75
End: 2025-06-03

## 2025-06-03 ENCOUNTER — HOSPITAL ENCOUNTER (OUTPATIENT)
Dept: LAB | Facility: MEDICAL CENTER | Age: 75
End: 2025-06-03
Attending: STUDENT IN AN ORGANIZED HEALTH CARE EDUCATION/TRAINING PROGRAM
Payer: MEDICARE

## 2025-06-03 DIAGNOSIS — N18.30 TYPE 2 DIABETES MELLITUS WITH STAGE 3 CHRONIC KIDNEY DISEASE, WITHOUT LONG-TERM CURRENT USE OF INSULIN, UNSPECIFIED WHETHER STAGE 3A OR 3B CKD (HCC): ICD-10-CM

## 2025-06-03 DIAGNOSIS — E11.22 TYPE 2 DIABETES MELLITUS WITH STAGE 3 CHRONIC KIDNEY DISEASE, WITHOUT LONG-TERM CURRENT USE OF INSULIN, UNSPECIFIED WHETHER STAGE 3A OR 3B CKD (HCC): ICD-10-CM

## 2025-06-03 DIAGNOSIS — E78.2 MIXED HYPERLIPIDEMIA: ICD-10-CM

## 2025-06-03 DIAGNOSIS — Z12.5 PROSTATE CANCER SCREENING: ICD-10-CM

## 2025-06-03 LAB
ALBUMIN SERPL BCP-MCNC: 4.3 G/DL (ref 3.2–4.9)
ALBUMIN/GLOB SERPL: 1.8 G/DL
ALP SERPL-CCNC: 87 U/L (ref 30–99)
ALT SERPL-CCNC: 18 U/L (ref 2–50)
ANION GAP SERPL CALC-SCNC: 13 MMOL/L (ref 7–16)
AST SERPL-CCNC: 18 U/L (ref 12–45)
BASOPHILS # BLD AUTO: 0.6 % (ref 0–1.8)
BASOPHILS # BLD: 0.04 K/UL (ref 0–0.12)
BILIRUB SERPL-MCNC: 0.7 MG/DL (ref 0.1–1.5)
BUN SERPL-MCNC: 13 MG/DL (ref 8–22)
CALCIUM ALBUM COR SERPL-MCNC: 9.9 MG/DL (ref 8.5–10.5)
CALCIUM SERPL-MCNC: 10.1 MG/DL (ref 8.5–10.5)
CHLORIDE SERPL-SCNC: 104 MMOL/L (ref 96–112)
CHOLEST SERPL-MCNC: 114 MG/DL (ref 100–199)
CO2 SERPL-SCNC: 23 MMOL/L (ref 20–33)
CREAT SERPL-MCNC: 1.03 MG/DL (ref 0.5–1.4)
EOSINOPHIL # BLD AUTO: 0.28 K/UL (ref 0–0.51)
EOSINOPHIL NFR BLD: 4.1 % (ref 0–6.9)
ERYTHROCYTE [DISTWIDTH] IN BLOOD BY AUTOMATED COUNT: 44.9 FL (ref 35.9–50)
EST. AVERAGE GLUCOSE BLD GHB EST-MCNC: 126 MG/DL
GFR SERPLBLD CREATININE-BSD FMLA CKD-EPI: 76 ML/MIN/1.73 M 2
GLOBULIN SER CALC-MCNC: 2.4 G/DL (ref 1.9–3.5)
GLUCOSE SERPL-MCNC: 132 MG/DL (ref 65–99)
HBA1C MFR BLD: 6 % (ref 4–5.6)
HCT VFR BLD AUTO: 41.2 % (ref 42–52)
HDLC SERPL-MCNC: 49 MG/DL
HGB BLD-MCNC: 13.5 G/DL (ref 14–18)
IMM GRANULOCYTES # BLD AUTO: 0.01 K/UL (ref 0–0.11)
IMM GRANULOCYTES NFR BLD AUTO: 0.1 % (ref 0–0.9)
LDLC SERPL CALC-MCNC: 33 MG/DL
LYMPHOCYTES # BLD AUTO: 1.04 K/UL (ref 1–4.8)
LYMPHOCYTES NFR BLD: 15.4 % (ref 22–41)
MCH RBC QN AUTO: 30.4 PG (ref 27–33)
MCHC RBC AUTO-ENTMCNC: 32.8 G/DL (ref 32.3–36.5)
MCV RBC AUTO: 92.8 FL (ref 81.4–97.8)
MONOCYTES # BLD AUTO: 0.58 K/UL (ref 0–0.85)
MONOCYTES NFR BLD AUTO: 8.6 % (ref 0–13.4)
NEUTROPHILS # BLD AUTO: 4.81 K/UL (ref 1.82–7.42)
NEUTROPHILS NFR BLD: 71.2 % (ref 44–72)
NRBC # BLD AUTO: 0 K/UL
NRBC BLD-RTO: 0 /100 WBC (ref 0–0.2)
PLATELET # BLD AUTO: 202 K/UL (ref 164–446)
PMV BLD AUTO: 10.5 FL (ref 9–12.9)
POTASSIUM SERPL-SCNC: 4.5 MMOL/L (ref 3.6–5.5)
PROT SERPL-MCNC: 6.7 G/DL (ref 6–8.2)
PSA SERPL DL<=0.01 NG/ML-MCNC: 0.57 NG/ML (ref 0–4)
RBC # BLD AUTO: 4.44 M/UL (ref 4.7–6.1)
SODIUM SERPL-SCNC: 140 MMOL/L (ref 135–145)
TRIGL SERPL-MCNC: 158 MG/DL (ref 0–149)
TSH SERPL DL<=0.005 MIU/L-ACNC: 3.8 UIU/ML (ref 0.38–5.33)
WBC # BLD AUTO: 6.8 K/UL (ref 4.8–10.8)

## 2025-06-03 PROCEDURE — 84153 ASSAY OF PSA TOTAL: CPT | Mod: GA

## 2025-06-03 PROCEDURE — 80061 LIPID PANEL: CPT

## 2025-06-03 PROCEDURE — 80053 COMPREHEN METABOLIC PANEL: CPT

## 2025-06-03 PROCEDURE — 83036 HEMOGLOBIN GLYCOSYLATED A1C: CPT | Mod: GA

## 2025-06-03 PROCEDURE — 84443 ASSAY THYROID STIM HORMONE: CPT

## 2025-06-03 PROCEDURE — 36415 COLL VENOUS BLD VENIPUNCTURE: CPT

## 2025-06-03 PROCEDURE — 85025 COMPLETE CBC W/AUTO DIFF WBC: CPT

## 2025-06-09 ENCOUNTER — OFFICE VISIT (OUTPATIENT)
Dept: MEDICAL GROUP | Facility: LAB | Age: 75
End: 2025-06-09
Payer: MEDICARE

## 2025-06-09 VITALS
HEIGHT: 70 IN | BODY MASS INDEX: 40.62 KG/M2 | HEART RATE: 86 BPM | RESPIRATION RATE: 18 BRPM | TEMPERATURE: 97.6 F | DIASTOLIC BLOOD PRESSURE: 68 MMHG | SYSTOLIC BLOOD PRESSURE: 128 MMHG | OXYGEN SATURATION: 96 % | WEIGHT: 283.73 LBS

## 2025-06-09 DIAGNOSIS — N18.30 TYPE 2 DIABETES MELLITUS WITH STAGE 3 CHRONIC KIDNEY DISEASE, WITHOUT LONG-TERM CURRENT USE OF INSULIN, UNSPECIFIED WHETHER STAGE 3A OR 3B CKD (HCC): Primary | ICD-10-CM

## 2025-06-09 DIAGNOSIS — E11.22 TYPE 2 DIABETES MELLITUS WITH STAGE 3 CHRONIC KIDNEY DISEASE, WITHOUT LONG-TERM CURRENT USE OF INSULIN, UNSPECIFIED WHETHER STAGE 3A OR 3B CKD (HCC): Primary | ICD-10-CM

## 2025-06-09 PROCEDURE — 99214 OFFICE O/P EST MOD 30 MIN: CPT | Performed by: STUDENT IN AN ORGANIZED HEALTH CARE EDUCATION/TRAINING PROGRAM

## 2025-06-09 PROCEDURE — 3078F DIAST BP <80 MM HG: CPT | Performed by: STUDENT IN AN ORGANIZED HEALTH CARE EDUCATION/TRAINING PROGRAM

## 2025-06-09 PROCEDURE — 3074F SYST BP LT 130 MM HG: CPT | Performed by: STUDENT IN AN ORGANIZED HEALTH CARE EDUCATION/TRAINING PROGRAM

## 2025-06-09 ASSESSMENT — ENCOUNTER SYMPTOMS
FEVER: 0
CHILLS: 0
SHORTNESS OF BREATH: 0

## 2025-06-09 ASSESSMENT — PATIENT HEALTH QUESTIONNAIRE - PHQ9: CLINICAL INTERPRETATION OF PHQ2 SCORE: 0

## 2025-06-09 ASSESSMENT — FIBROSIS 4 INDEX: FIB4 SCORE: 1.53

## 2025-06-09 NOTE — PROGRESS NOTES
"Subjective:     CC:   Chief Complaint   Patient presents with    Results    Nerve Pain        HPI:   History of Present Illness  The patient presents for evaluation of neuropathy and prediabetes.    He reports experiencing numbness in his legs, particularly in the shin bones, and discomfort in his joints. He also describes a tingling sensation in his fingers upon applying pressure. The neuropathy is predominantly localized in his feet, although he does experience some symptoms in his hands. Approximately 1.5 weeks ago, he sustained an ankle sprain, which has resulted in swelling. He describes a sensation akin to walking on sponges and persistent numbness in his feet. He has been attempting to reduce his intake of sugars and carbohydrates. His oncologist has informed him that his neuropathy, which was present post-chemotherapy, has escalated to a level that should not have occurred. The oncologist suggested that this could be due to excessive sugar consumption or another underlying cause.    He recalls being prescribed a medication for his kidneys during his last visit, which he did not purchase due to its cost. However, he is now willing to pay for it if necessary. He is currently on losartan 100 mg for blood pressure management.    He has a history of cancer and has undergone chemotherapy. He has only one kidney.       No problems updated.    Current Medications and Prescriptions Ordered in Epic[1]        ROS:  Review of Systems   Constitutional:  Negative for chills and fever.   Respiratory:  Negative for shortness of breath.    Cardiovascular:  Negative for chest pain.       Objective:     Exam:  /68 (BP Location: Right arm, Patient Position: Sitting, BP Cuff Size: Adult long)   Pulse 86   Temp 36.4 °C (97.6 °F) (Temporal)   Resp 18   Ht 1.778 m (5' 10\")   Wt (!) 129 kg (283 lb 11.7 oz)   SpO2 96% Comment: on O2  BMI 40.71 kg/m²  Body mass index is 40.71 kg/m².    Physical Exam  Constitutional:       " General: He is not in acute distress.     Appearance: He is not ill-appearing.   Pulmonary:      Effort: Pulmonary effort is normal.   Neurological:      Mental Status: He is alert.   Psychiatric:         Mood and Affect: Mood normal.         Behavior: Behavior normal.         Thought Content: Thought content normal.         Judgment: Judgment normal.               Assessment & Plan:     Assessment & Plan  1. Prediabetes.  - Hemoglobin A1c level is currently at 6.0, placing him in the prediabetes category.  - Fasting blood sugar levels have been consistently high, with a reading of 126 in 12/2024 and 132 today.  - Not currently on any medications specifically designed to lower blood sugar levels.  - Advised to work on diet over the next 3 months to lower blood sugar levels; medication may be considered if numbers do not improve.    2. Neuropathy.  - Reports numbness and tingling in legs and hands, worsened since completing chemotherapy.  - Elevated blood sugar levels may be contributing to the worsening of neuropathy.  - Advised to reduce intake of sugars and carbohydrates to help manage symptoms.  - Monitoring symptoms and dietary changes to assess improvement.    3. Proteinuria-not controlled   - Special urine test conducted in 12/2024 revealed elevated protein level, which was concerning.  - Currently on losartan 100 mg for blood pressure management, which also aids in reducing proteinuria.  - Prescription for Jardiance will be provided to help lower blood sugar levels and protect kidneys.  - Prior authorization from Medicare will be required for Jardiance.    Follow-up  - Follow-up appointment scheduled in 3 months.      Problem List Items Addressed This Visit    None  Visit Diagnoses         Type 2 diabetes mellitus with stage 3 chronic kidney disease, without long-term current use of insulin, unspecified whether stage 3a or 3b CKD (HCC)    -  Primary    Relevant Medications    Empagliflozin (JARDIANCE) 10 MG  Tab tablet    Other Relevant Orders    Comp Metabolic Panel    HEMOGLOBIN A1C    MICROALBUMIN CREAT RATIO URINE            3 month follow up      Please note that this dictation was created using voice recognition software. I have made every reasonable attempt to correct obvious errors, but I expect that there are errors of grammar and possibly content that I did not discover before finalizing the note.        [1]   Current Outpatient Medications Ordered in Epic   Medication Sig Dispense Refill    Empagliflozin (JARDIANCE) 10 MG Tab tablet Take 1 Tablet by mouth every day. 90 Tablet 1    losartan (COZAAR) 100 MG Tab Take 1 Tablet by mouth every day. 90 Tablet 3    rosuvastatin (CRESTOR) 40 MG tablet TAKE 1 TABLET BY MOUTH EVERYDAY AT BEDTIME 90 Tablet 2    gabapentin (NEURONTIN) 800 MG tablet TAKE 1 TABLET BY MOUTH THREE TIMES A  Tablet 0    fluticasone (FLONASE) 50 MCG/ACT nasal spray ADMINISTER 2 SPRAYS INTO AFFECTED NOSTRIL(S) EVERY DAY 48 mL 1    NON SPECIFIED OCD Regulator M6 tanks      acetaminophen (TYLENOL) 325 MG Tab Take 650 mg by mouth every four hours as needed for Mild Pain.       No current Epic-ordered facility-administered medications on file.

## 2025-06-10 RX ORDER — GABAPENTIN 800 MG/1
800 TABLET ORAL 3 TIMES DAILY
Qty: 270 TABLET | Refills: 3 | Status: SHIPPED | OUTPATIENT
Start: 2025-06-10

## 2025-07-02 DIAGNOSIS — H69.92 DYSFUNCTION OF LEFT EUSTACHIAN TUBE: ICD-10-CM

## 2025-07-02 RX ORDER — FLUTICASONE PROPIONATE 50 MCG
2 SPRAY, SUSPENSION (ML) NASAL DAILY
Qty: 48 ML | Refills: 1 | Status: SHIPPED | OUTPATIENT
Start: 2025-07-02

## 2025-07-21 ENCOUNTER — APPOINTMENT (OUTPATIENT)
Dept: URBAN - METROPOLITAN AREA CLINIC 35 | Facility: CLINIC | Age: 75
Setting detail: DERMATOLOGY
End: 2025-07-21

## 2025-07-21 DIAGNOSIS — L82.1 OTHER SEBORRHEIC KERATOSIS: ICD-10-CM

## 2025-07-21 PROBLEM — D48.5 NEOPLASM OF UNCERTAIN BEHAVIOR OF SKIN: Status: ACTIVE | Noted: 2025-07-21

## 2025-07-21 PROCEDURE — ? BIOPSY BY SHAVE METHOD

## 2025-07-21 PROCEDURE — ? COUNSELING

## 2025-07-21 ASSESSMENT — LOCATION ZONE DERM
LOCATION ZONE: FACE
LOCATION ZONE: EAR

## 2025-07-21 ASSESSMENT — LOCATION SIMPLE DESCRIPTION DERM
LOCATION SIMPLE: RIGHT EAR
LOCATION SIMPLE: RIGHT TEMPLE

## 2025-07-21 ASSESSMENT — LOCATION DETAILED DESCRIPTION DERM
LOCATION DETAILED: RIGHT POSTAURICULAR CREASE
LOCATION DETAILED: RIGHT LATERAL TEMPLE

## 2025-07-21 NOTE — PROCEDURE: BIOPSY BY SHAVE METHOD
Detail Level: Detailed
Depth Of Biopsy: dermis
Was A Bandage Applied: Yes
Size Of Lesion In Cm: 1
X Size Of Lesion In Cm: 0
Biopsy Type: H and E
Biopsy Method: double edge Personna blade
Anesthesia Type: 1% lidocaine without epinephrine
Anesthesia Volume In Cc: 1.8
Hemostasis: Aluminum Chloride
Wound Care: Petrolatum
Dressing: bandage
Destruction After The Procedure: No
Type Of Destruction Used: Curettage
Curettage Text: The wound bed was treated with curettage after the biopsy was performed.
Cryotherapy Text: The wound bed was treated with cryotherapy after the biopsy was performed.
Electrodesiccation Text: The wound bed was treated with electrodesiccation after the biopsy was performed.
Electrodesiccation And Curettage Text: The wound bed was treated with electrodesiccation and curettage after the biopsy was performed.
Silver Nitrate Text: The wound bed was treated with silver nitrate after the biopsy was performed.
Lab: [id number]
Lab Facility: 
Medical Necessity Information: It is in your best interest to select a reason for this procedure from the list below. All of these items fulfill various CMS LCD requirements except the new and changing color options.
Consent: Written consent was obtained and risks were reviewed including but not limited to scarring, infection, bleeding, scabbing, incomplete removal, nerve damage and allergy to anesthesia.
Post-Care Instructions: I reviewed with the patient in detail post-care instructions. Keep the biopsy site dry overnight, and then change the dressing once daily and apply a thin layer of petrolatum with a clean q-tip. \\nShowers are OK after 24 hours.  Allow clean water to run over the area.  Do not touch the biopsy site with your hands.  Do not immerse the biopsy site in water such as going into a swimming pool or bathtub until the sutures are removed.  If the biopsy site gets more painful with time, red, or drains, this is concerning for infection.  If you have signs of infection please call the office to come in for a walk in visit Monday through Friday 8:30 am to 12 pm or 1 pm to 4:30 pm.  If we are not in the office, please call through the answering service.
Notification Instructions: Patient will be notified of biopsy results. However, patient instructed to call the office if not contacted within 2 weeks.
Billing Type: Third-Party Bill
Information: Selecting Yes will display possible errors in your note based on the variables you have selected. This validation is only offered as a suggestion for you. PLEASE NOTE THAT THE VALIDATION TEXT WILL BE REMOVED WHEN YOU FINALIZE YOUR NOTE. IF YOU WANT TO FAX A PRELIMINARY NOTE YOU WILL NEED TO TOGGLE THIS TO 'NO' IF YOU DO NOT WANT IT IN YOUR FAXED NOTE.

## 2025-08-12 RX ADMIN — DOXYCYCLINE MONOHYDRATE: 100 CAPSULE ORAL at 00:00

## 2025-08-13 ENCOUNTER — APPOINTMENT (OUTPATIENT)
Dept: URBAN - METROPOLITAN AREA CLINIC 36 | Facility: CLINIC | Age: 75
Setting detail: DERMATOLOGY
End: 2025-08-13

## 2025-08-13 PROBLEM — C44.41 BASAL CELL CARCINOMA OF SKIN OF SCALP AND NECK: Status: ACTIVE | Noted: 2025-08-13

## 2025-08-13 PROCEDURE — ? PRESCRIPTION

## 2025-08-13 PROCEDURE — ? MOHS SURGERY

## 2025-08-13 RX ORDER — DOXYCYCLINE MONOHYDRATE 100 MG/1
CAPSULE ORAL BID
Qty: 14 | Refills: 0 | Status: ERX | COMMUNITY
Start: 2025-08-12

## 2025-08-22 ENCOUNTER — APPOINTMENT (OUTPATIENT)
Dept: URBAN - METROPOLITAN AREA CLINIC 36 | Facility: CLINIC | Age: 75
Setting detail: DERMATOLOGY
End: 2025-08-22

## 2025-08-22 DIAGNOSIS — Z48.02 ENCOUNTER FOR REMOVAL OF SUTURES: ICD-10-CM

## 2025-08-22 PROCEDURE — ? SUTURE REMOVAL (GLOBAL PERIOD)

## 2025-08-22 ASSESSMENT — LOCATION SIMPLE DESCRIPTION DERM: LOCATION SIMPLE: SCALP

## 2025-08-22 ASSESSMENT — LOCATION ZONE DERM: LOCATION ZONE: SCALP

## 2025-08-22 ASSESSMENT — LOCATION DETAILED DESCRIPTION DERM: LOCATION DETAILED: RIGHT SUPERIOR POSTAURICULAR SKIN

## 2025-08-29 ENCOUNTER — APPOINTMENT (OUTPATIENT)
Dept: URBAN - METROPOLITAN AREA CLINIC 36 | Facility: CLINIC | Age: 75
Setting detail: DERMATOLOGY
End: 2025-08-29

## 2025-08-29 DIAGNOSIS — Z48.817 ENCOUNTER FOR SURGICAL AFTERCARE FOLLOWING SURGERY ON THE SKIN AND SUBCUTANEOUS TISSUE: ICD-10-CM

## 2025-08-29 PROCEDURE — ? COUNSELING

## 2025-08-29 PROCEDURE — ? POST-OP WOUND CHECK

## 2025-08-29 PROCEDURE — ? PHOTO-DOCUMENTATION

## 2025-08-29 ASSESSMENT — LOCATION SIMPLE DESCRIPTION DERM: LOCATION SIMPLE: SCALP

## 2025-08-29 ASSESSMENT — LOCATION ZONE DERM: LOCATION ZONE: SCALP

## 2025-08-29 ASSESSMENT — LOCATION DETAILED DESCRIPTION DERM: LOCATION DETAILED: LEFT SUPERIOR POSTAURICULAR SKIN

## (undated) DEVICE — DRESSING TRANSPARENT FILM TEGADERM 2.375 X 2.75"  (100EA/BX)"

## (undated) DEVICE — SLEEVE, VASO, THIGH, MED

## (undated) DEVICE — SOD. CHL. INJ. 0.9% 1000 ML - (14EA/CA 60CA/PF)

## (undated) DEVICE — STAPLE 45MM GOLD 3.8MM ECHELN (12EA/BX) WAS ECR45D

## (undated) DEVICE — STAPLE 45MM BLUE 3.5MM ECHELON (12EA/BX)

## (undated) DEVICE — TROCAR 5X100 NON BLADED Z-TH - READ KII (6/BX)

## (undated) DEVICE — DRAPE IOBAN II INCISE 23X17 - (10EA/BX 4BX/CA)

## (undated) DEVICE — HEAD HOLDER JUNIOR/ADULT

## (undated) DEVICE — ELECTRODE 850 FOAM ADHESIVE - HYDROGEL RADIOTRNSPRNT (50/PK)

## (undated) DEVICE — TUBING CLEARLINK DUO-VENT - C-FLO (48EA/CA)

## (undated) DEVICE — PROTECTOR ULNA NERVE - (36PR/CA)

## (undated) DEVICE — SPONGE GAUZESTER. 2X2 4-PL - (2/PK 50PK/BX 30BX/CS)

## (undated) DEVICE — PACK LAP CHOLE OR - (2EA/CA)

## (undated) DEVICE — DRAPESURG STERI-DRAPE LONG - (10/BX 4BX/CA)

## (undated) DEVICE — SUTURE 2-0 VICRYL PLUS CT-2 - 27 INCH (36/BX)

## (undated) DEVICE — SODIUM CHL IRRIGATION 0.9% 1000ML (12EA/CA)

## (undated) DEVICE — CANNULA W/SEAL 5X100 Z-THRE - ADED KII (12/BX)

## (undated) DEVICE — TROCAR Z THREAD12MM OPTICAL - NON BLADED (6/BX)

## (undated) DEVICE — KIT ROOM DECONTAMINATION

## (undated) DEVICE — CANISTER SUCTION 3000ML MECHANICAL FILTER AUTO SHUTOFF MEDI-VAC NONSTERILE LF DISP  (40EA/CA)

## (undated) DEVICE — STAPLER POWERED 45MM (3EA/BX)

## (undated) DEVICE — CONNECTOR Y TBG CRTY 5 IN 1 STERILE (50EA/CA)

## (undated) DEVICE — LACTATED RINGERS INJ 1000 ML - (14EA/CA 60CA/PF)

## (undated) DEVICE — KIT ANESTHESIA W/CIRCUIT & 3/LT BAG W/FILTER (20EA/CA)

## (undated) DEVICE — ELECTRODE DUAL RETURN W/ CORD - (50/PK)

## (undated) DEVICE — SET LEADWIRE 5 LEAD BEDSIDE DISPOSABLE ECG (1SET OF 5/EA)

## (undated) DEVICE — SET EXTENSION WITH 2 PORTS (48EA/CA) ***PART #2C8610 IS A SUBSTITUTE*****

## (undated) DEVICE — GLOVE BIOGEL M SZ 8 SURGICAL PF LTX - (50/BX 4BX/CA)

## (undated) DEVICE — SPONGE DRAIN 4 X 4IN 6-PLY - (2/PK25PK/BX12BX/CS)

## (undated) DEVICE — Device

## (undated) DEVICE — TUBE ENDOBRONCHEAL 39FR - (1/BX)

## (undated) DEVICE — CHLORAPREP 26 ML APPLICATOR - ORANGE TINT(25/CA)

## (undated) DEVICE — SUCTION INSTRUMENT YANKAUER BULBOUS TIP W/O VENT (50EA/CA)

## (undated) DEVICE — WATER IRRIG. STER. 1500 ML - (9/CA)

## (undated) DEVICE — BAG RETRIEVAL LARGE 10EA/BX

## (undated) DEVICE — TROCAR 12MM THORACOPORT (6EA/BX)

## (undated) DEVICE — SUTURE GENERAL

## (undated) DEVICE — MASK ANESTHESIA ADULT  - (100/CA)

## (undated) DEVICE — SENSOR SPO2 NEO LNCS ADHESIVE (20/BX) SEE USER NOTES

## (undated) DEVICE — DRAPE CHEST/BREAST - (12EA/CA)

## (undated) DEVICE — DRESSING NON-ADHERING 8 X 3 - (50/BX)

## (undated) DEVICE — GLOVE BIOGEL INDICATOR SZ 8 SURGICAL PF LTX - (50/BX 4BX/CA)

## (undated) DEVICE — GLOVE BIOGEL SZ 7.5 SURGICAL PF LTX - (50PR/BX 4BX/CA)

## (undated) DEVICE — SUTURE 4-0 VICRYL PLUS FS-2 - 27 INCH (36/BX)

## (undated) DEVICE — SET SUCTION/IRRIGATION WITH DISPOSABLE TIP (6/CA )PART #0250-070-520 IS A SUB

## (undated) DEVICE — CONNECTOR HUBLESS DRAINAGE - ONE WAY (20/BX)

## (undated) DEVICE — NEPTUNE 4 PORT MANIFOLD - (20/PK)

## (undated) DEVICE — DRAIN CHEST ADULT (6EA/CA) DELETED ITEM  ORDER #15909